# Patient Record
Sex: MALE | Race: BLACK OR AFRICAN AMERICAN | Employment: FULL TIME | URBAN - METROPOLITAN AREA
[De-identification: names, ages, dates, MRNs, and addresses within clinical notes are randomized per-mention and may not be internally consistent; named-entity substitution may affect disease eponyms.]

---

## 2021-12-01 ENCOUNTER — HOSPITAL ENCOUNTER (INPATIENT)
Age: 61
LOS: 17 days | Discharge: HOME | DRG: 216 | End: 2021-12-19
Attending: EMERGENCY MEDICINE | Admitting: HOSPITALIST
Payer: COMMERCIAL

## 2021-12-01 ENCOUNTER — APPOINTMENT (OUTPATIENT)
Dept: GENERAL RADIOLOGY | Age: 61
DRG: 216 | End: 2021-12-01
Payer: COMMERCIAL

## 2021-12-01 DIAGNOSIS — I50.9 ACUTE CONGESTIVE HEART FAILURE, UNSPECIFIED HEART FAILURE TYPE (HCC): Primary | ICD-10-CM

## 2021-12-01 DIAGNOSIS — Z95.1 S/P CABG (CORONARY ARTERY BYPASS GRAFT): ICD-10-CM

## 2021-12-01 LAB
A/G RATIO: 0.9 (ref 1.1–2.2)
ALBUMIN SERPL-MCNC: 3.4 G/DL (ref 3.4–5)
ALP BLD-CCNC: 507 U/L (ref 40–129)
ALT SERPL-CCNC: 72 U/L (ref 10–40)
ANION GAP SERPL CALCULATED.3IONS-SCNC: 11 MMOL/L (ref 3–16)
AST SERPL-CCNC: 67 U/L (ref 15–37)
BASOPHILS ABSOLUTE: 0.1 K/UL (ref 0–0.2)
BASOPHILS RELATIVE PERCENT: 0.7 %
BILIRUB SERPL-MCNC: 0.8 MG/DL (ref 0–1)
BUN BLDV-MCNC: 20 MG/DL (ref 7–20)
CALCIUM SERPL-MCNC: 9.5 MG/DL (ref 8.3–10.6)
CHLORIDE BLD-SCNC: 104 MMOL/L (ref 99–110)
CO2: 26 MMOL/L (ref 21–32)
CREAT SERPL-MCNC: 1.2 MG/DL (ref 0.9–1.3)
D DIMER: 936 NG/ML DDU (ref 0–229)
EOSINOPHILS ABSOLUTE: 0.1 K/UL (ref 0–0.6)
EOSINOPHILS RELATIVE PERCENT: 0.8 %
GFR AFRICAN AMERICAN: >60
GFR NON-AFRICAN AMERICAN: >60
GLUCOSE BLD-MCNC: 167 MG/DL (ref 70–99)
HCT VFR BLD CALC: 44.5 % (ref 40.5–52.5)
HEMOGLOBIN: 14.8 G/DL (ref 13.5–17.5)
LYMPHOCYTES ABSOLUTE: 2.5 K/UL (ref 1–5.1)
LYMPHOCYTES RELATIVE PERCENT: 32.1 %
MCH RBC QN AUTO: 30.2 PG (ref 26–34)
MCHC RBC AUTO-ENTMCNC: 33.3 G/DL (ref 31–36)
MCV RBC AUTO: 90.6 FL (ref 80–100)
MONOCYTES ABSOLUTE: 0.8 K/UL (ref 0–1.3)
MONOCYTES RELATIVE PERCENT: 10.4 %
NEUTROPHILS ABSOLUTE: 4.3 K/UL (ref 1.7–7.7)
NEUTROPHILS RELATIVE PERCENT: 56 %
PDW BLD-RTO: 16.7 % (ref 12.4–15.4)
PLATELET # BLD: 190 K/UL (ref 135–450)
PMV BLD AUTO: 8.9 FL (ref 5–10.5)
POTASSIUM REFLEX MAGNESIUM: 4.1 MMOL/L (ref 3.5–5.1)
PRO-BNP: 3325 PG/ML (ref 0–124)
RBC # BLD: 4.92 M/UL (ref 4.2–5.9)
REASON FOR REJECTION: NORMAL
REJECTED TEST: NORMAL
SODIUM BLD-SCNC: 141 MMOL/L (ref 136–145)
TOTAL PROTEIN: 7 G/DL (ref 6.4–8.2)
TROPONIN: 0.01 NG/ML
WBC # BLD: 7.8 K/UL (ref 4–11)

## 2021-12-01 PROCEDURE — 80053 COMPREHEN METABOLIC PANEL: CPT

## 2021-12-01 PROCEDURE — 71045 X-RAY EXAM CHEST 1 VIEW: CPT

## 2021-12-01 PROCEDURE — 96374 THER/PROPH/DIAG INJ IV PUSH: CPT

## 2021-12-01 PROCEDURE — 85025 COMPLETE CBC W/AUTO DIFF WBC: CPT

## 2021-12-01 PROCEDURE — 93005 ELECTROCARDIOGRAM TRACING: CPT | Performed by: PHYSICIAN ASSISTANT

## 2021-12-01 PROCEDURE — 84484 ASSAY OF TROPONIN QUANT: CPT

## 2021-12-01 PROCEDURE — 85379 FIBRIN DEGRADATION QUANT: CPT

## 2021-12-01 PROCEDURE — 83880 ASSAY OF NATRIURETIC PEPTIDE: CPT

## 2021-12-01 PROCEDURE — 36415 COLL VENOUS BLD VENIPUNCTURE: CPT

## 2021-12-01 PROCEDURE — 99285 EMERGENCY DEPT VISIT HI MDM: CPT

## 2021-12-01 ASSESSMENT — ENCOUNTER SYMPTOMS
STRIDOR: 0
BACK PAIN: 0
NAUSEA: 0
WHEEZING: 0
SHORTNESS OF BREATH: 1
ABDOMINAL PAIN: 0
CHEST TIGHTNESS: 0
COLOR CHANGE: 0
VOMITING: 0
CONSTIPATION: 0
COUGH: 1
DIARRHEA: 0

## 2021-12-02 ENCOUNTER — APPOINTMENT (OUTPATIENT)
Dept: CT IMAGING | Age: 61
DRG: 216 | End: 2021-12-02
Payer: COMMERCIAL

## 2021-12-02 PROBLEM — I50.9 ACUTE HEART FAILURE (HCC): Status: ACTIVE | Noted: 2021-12-02

## 2021-12-02 LAB
ANION GAP SERPL CALCULATED.3IONS-SCNC: 13 MMOL/L (ref 3–16)
BILIRUBIN URINE: NEGATIVE
BLOOD, URINE: NEGATIVE
BUN BLDV-MCNC: 17 MG/DL (ref 7–20)
CALCIUM SERPL-MCNC: 9.4 MG/DL (ref 8.3–10.6)
CHLORIDE BLD-SCNC: 106 MMOL/L (ref 99–110)
CLARITY: CLEAR
CO2: 23 MMOL/L (ref 21–32)
COLOR: YELLOW
CREAT SERPL-MCNC: 1.1 MG/DL (ref 0.9–1.3)
EKG ATRIAL RATE: 122 BPM
EKG ATRIAL RATE: 123 BPM
EKG DIAGNOSIS: NORMAL
EKG DIAGNOSIS: NORMAL
EKG P AXIS: 22 DEGREES
EKG P AXIS: 27 DEGREES
EKG P-R INTERVAL: 142 MS
EKG P-R INTERVAL: 144 MS
EKG Q-T INTERVAL: 310 MS
EKG Q-T INTERVAL: 328 MS
EKG QRS DURATION: 100 MS
EKG QRS DURATION: 98 MS
EKG QTC CALCULATION (BAZETT): 443 MS
EKG QTC CALCULATION (BAZETT): 467 MS
EKG R AXIS: 126 DEGREES
EKG R AXIS: 129 DEGREES
EKG T AXIS: 62 DEGREES
EKG T AXIS: 71 DEGREES
EKG VENTRICULAR RATE: 122 BPM
EKG VENTRICULAR RATE: 123 BPM
ESTIMATED AVERAGE GLUCOSE: 134.1 MG/DL
GFR AFRICAN AMERICAN: >60
GFR NON-AFRICAN AMERICAN: >60
GLUCOSE BLD-MCNC: 116 MG/DL (ref 70–99)
GLUCOSE BLD-MCNC: 145 MG/DL (ref 70–99)
GLUCOSE BLD-MCNC: 173 MG/DL (ref 70–99)
GLUCOSE BLD-MCNC: 179 MG/DL (ref 70–99)
GLUCOSE URINE: NEGATIVE MG/DL
HBA1C MFR BLD: 6.3 %
HCT VFR BLD CALC: 43.6 % (ref 40.5–52.5)
HEMOGLOBIN: 14.6 G/DL (ref 13.5–17.5)
KETONES, URINE: NEGATIVE MG/DL
LEUKOCYTE ESTERASE, URINE: NEGATIVE
LV EF: 18 %
LVEF MODALITY: NORMAL
MAGNESIUM: 1.9 MG/DL (ref 1.8–2.4)
MCH RBC QN AUTO: 30.1 PG (ref 26–34)
MCHC RBC AUTO-ENTMCNC: 33.6 G/DL (ref 31–36)
MCV RBC AUTO: 89.7 FL (ref 80–100)
MICROSCOPIC EXAMINATION: NORMAL
NITRITE, URINE: NEGATIVE
PDW BLD-RTO: 16.6 % (ref 12.4–15.4)
PERFORMED ON: ABNORMAL
PH UA: 5.5 (ref 5–8)
PLATELET # BLD: 208 K/UL (ref 135–450)
PMV BLD AUTO: 8.5 FL (ref 5–10.5)
POTASSIUM SERPL-SCNC: 3.9 MMOL/L (ref 3.5–5.1)
PROTEIN UA: NEGATIVE MG/DL
RBC # BLD: 4.86 M/UL (ref 4.2–5.9)
SODIUM BLD-SCNC: 142 MMOL/L (ref 136–145)
SPECIFIC GRAVITY UA: 1.02 (ref 1–1.03)
TROPONIN: 0.01 NG/ML
TROPONIN: 0.01 NG/ML
URINE REFLEX TO CULTURE: NORMAL
URINE TYPE: NORMAL
UROBILINOGEN, URINE: 0.2 E.U./DL
WBC # BLD: 8.3 K/UL (ref 4–11)

## 2021-12-02 PROCEDURE — 85027 COMPLETE CBC AUTOMATED: CPT

## 2021-12-02 PROCEDURE — 6370000000 HC RX 637 (ALT 250 FOR IP): Performed by: PHYSICIAN ASSISTANT

## 2021-12-02 PROCEDURE — 83735 ASSAY OF MAGNESIUM: CPT

## 2021-12-02 PROCEDURE — 1200000000 HC SEMI PRIVATE

## 2021-12-02 PROCEDURE — 6360000002 HC RX W HCPCS: Performed by: PHYSICIAN ASSISTANT

## 2021-12-02 PROCEDURE — 6370000000 HC RX 637 (ALT 250 FOR IP): Performed by: INTERNAL MEDICINE

## 2021-12-02 PROCEDURE — U0005 INFEC AGEN DETEC AMPLI PROBE: HCPCS

## 2021-12-02 PROCEDURE — 84484 ASSAY OF TROPONIN QUANT: CPT

## 2021-12-02 PROCEDURE — 83036 HEMOGLOBIN GLYCOSYLATED A1C: CPT

## 2021-12-02 PROCEDURE — 93306 TTE W/DOPPLER COMPLETE: CPT

## 2021-12-02 PROCEDURE — 6360000004 HC RX CONTRAST MEDICATION: Performed by: PHYSICIAN ASSISTANT

## 2021-12-02 PROCEDURE — 99222 1ST HOSP IP/OBS MODERATE 55: CPT | Performed by: INTERNAL MEDICINE

## 2021-12-02 PROCEDURE — 2580000003 HC RX 258: Performed by: PHYSICIAN ASSISTANT

## 2021-12-02 PROCEDURE — 36415 COLL VENOUS BLD VENIPUNCTURE: CPT

## 2021-12-02 PROCEDURE — 93010 ELECTROCARDIOGRAM REPORT: CPT | Performed by: INTERNAL MEDICINE

## 2021-12-02 PROCEDURE — 80048 BASIC METABOLIC PNL TOTAL CA: CPT

## 2021-12-02 PROCEDURE — U0003 INFECTIOUS AGENT DETECTION BY NUCLEIC ACID (DNA OR RNA); SEVERE ACUTE RESPIRATORY SYNDROME CORONAVIRUS 2 (SARS-COV-2) (CORONAVIRUS DISEASE [COVID-19]), AMPLIFIED PROBE TECHNIQUE, MAKING USE OF HIGH THROUGHPUT TECHNOLOGIES AS DESCRIBED BY CMS-2020-01-R: HCPCS

## 2021-12-02 PROCEDURE — 81003 URINALYSIS AUTO W/O SCOPE: CPT

## 2021-12-02 PROCEDURE — 6370000000 HC RX 637 (ALT 250 FOR IP): Performed by: HOSPITALIST

## 2021-12-02 PROCEDURE — 71260 CT THORAX DX C+: CPT

## 2021-12-02 RX ORDER — ASPIRIN 81 MG/1
81 TABLET ORAL DAILY
Status: DISCONTINUED | OUTPATIENT
Start: 2021-12-02 | End: 2021-12-10

## 2021-12-02 RX ORDER — ONDANSETRON 2 MG/ML
4 INJECTION INTRAMUSCULAR; INTRAVENOUS EVERY 6 HOURS PRN
Status: DISCONTINUED | OUTPATIENT
Start: 2021-12-02 | End: 2021-12-10

## 2021-12-02 RX ORDER — FUROSEMIDE 10 MG/ML
20 INJECTION INTRAMUSCULAR; INTRAVENOUS ONCE
Status: COMPLETED | OUTPATIENT
Start: 2021-12-02 | End: 2021-12-02

## 2021-12-02 RX ORDER — CARVEDILOL 3.12 MG/1
3.12 TABLET ORAL 2 TIMES DAILY WITH MEALS
Status: DISCONTINUED | OUTPATIENT
Start: 2021-12-02 | End: 2021-12-10

## 2021-12-02 RX ORDER — ACETAMINOPHEN 650 MG/1
650 SUPPOSITORY RECTAL EVERY 6 HOURS PRN
Status: DISCONTINUED | OUTPATIENT
Start: 2021-12-02 | End: 2021-12-10

## 2021-12-02 RX ORDER — ATORVASTATIN CALCIUM 40 MG/1
40 TABLET, FILM COATED ORAL NIGHTLY
Status: DISCONTINUED | OUTPATIENT
Start: 2021-12-02 | End: 2021-12-19 | Stop reason: HOSPADM

## 2021-12-02 RX ORDER — DEXTROSE MONOHYDRATE 50 MG/ML
100 INJECTION, SOLUTION INTRAVENOUS PRN
Status: DISCONTINUED | OUTPATIENT
Start: 2021-12-02 | End: 2021-12-10

## 2021-12-02 RX ORDER — NICOTINE POLACRILEX 4 MG
15 LOZENGE BUCCAL PRN
Status: DISCONTINUED | OUTPATIENT
Start: 2021-12-02 | End: 2021-12-10

## 2021-12-02 RX ORDER — SODIUM CHLORIDE 0.9 % (FLUSH) 0.9 %
5-40 SYRINGE (ML) INJECTION EVERY 12 HOURS SCHEDULED
Status: DISCONTINUED | OUTPATIENT
Start: 2021-12-02 | End: 2021-12-08 | Stop reason: SDUPTHER

## 2021-12-02 RX ORDER — DEXTROSE MONOHYDRATE 25 G/50ML
12.5 INJECTION, SOLUTION INTRAVENOUS PRN
Status: DISCONTINUED | OUTPATIENT
Start: 2021-12-02 | End: 2021-12-10

## 2021-12-02 RX ORDER — INSULIN LISPRO 100 [IU]/ML
0-6 INJECTION, SOLUTION INTRAVENOUS; SUBCUTANEOUS
Status: DISCONTINUED | OUTPATIENT
Start: 2021-12-02 | End: 2021-12-10

## 2021-12-02 RX ORDER — POTASSIUM CHLORIDE 20 MEQ/1
40 TABLET, EXTENDED RELEASE ORAL PRN
Status: DISCONTINUED | OUTPATIENT
Start: 2021-12-02 | End: 2021-12-10

## 2021-12-02 RX ORDER — FUROSEMIDE 10 MG/ML
80 INJECTION INTRAMUSCULAR; INTRAVENOUS ONCE
Status: DISCONTINUED | OUTPATIENT
Start: 2021-12-02 | End: 2021-12-02

## 2021-12-02 RX ORDER — POTASSIUM CHLORIDE 7.45 MG/ML
10 INJECTION INTRAVENOUS PRN
Status: DISCONTINUED | OUTPATIENT
Start: 2021-12-02 | End: 2021-12-10

## 2021-12-02 RX ORDER — SODIUM CHLORIDE 9 MG/ML
25 INJECTION, SOLUTION INTRAVENOUS PRN
Status: DISCONTINUED | OUTPATIENT
Start: 2021-12-02 | End: 2021-12-10

## 2021-12-02 RX ORDER — ZOLPIDEM TARTRATE 5 MG/1
5 TABLET ORAL NIGHTLY PRN
Status: DISCONTINUED | OUTPATIENT
Start: 2021-12-02 | End: 2021-12-10

## 2021-12-02 RX ORDER — FUROSEMIDE 10 MG/ML
40 INJECTION INTRAMUSCULAR; INTRAVENOUS 2 TIMES DAILY
Status: DISCONTINUED | OUTPATIENT
Start: 2021-12-02 | End: 2021-12-06

## 2021-12-02 RX ORDER — LORAZEPAM 0.5 MG/1
0.5 TABLET ORAL NIGHTLY PRN
Status: DISCONTINUED | OUTPATIENT
Start: 2021-12-02 | End: 2021-12-03

## 2021-12-02 RX ORDER — TEMAZEPAM 7.5 MG/1
7.5 CAPSULE ORAL NIGHTLY PRN
Status: DISCONTINUED | OUTPATIENT
Start: 2021-12-02 | End: 2021-12-02 | Stop reason: RX

## 2021-12-02 RX ORDER — INSULIN LISPRO 100 [IU]/ML
0-3 INJECTION, SOLUTION INTRAVENOUS; SUBCUTANEOUS NIGHTLY
Status: DISCONTINUED | OUTPATIENT
Start: 2021-12-02 | End: 2021-12-10

## 2021-12-02 RX ORDER — LISINOPRIL 5 MG/1
2.5 TABLET ORAL DAILY
Status: DISCONTINUED | OUTPATIENT
Start: 2021-12-03 | End: 2021-12-03

## 2021-12-02 RX ORDER — ACETAMINOPHEN 325 MG/1
650 TABLET ORAL EVERY 6 HOURS PRN
Status: DISCONTINUED | OUTPATIENT
Start: 2021-12-02 | End: 2021-12-03 | Stop reason: SDUPTHER

## 2021-12-02 RX ORDER — SODIUM CHLORIDE 0.9 % (FLUSH) 0.9 %
10 SYRINGE (ML) INJECTION PRN
Status: DISCONTINUED | OUTPATIENT
Start: 2021-12-02 | End: 2021-12-08 | Stop reason: SDUPTHER

## 2021-12-02 RX ORDER — LORAZEPAM 0.5 MG/1
0.5 TABLET ORAL ONCE
Status: COMPLETED | OUTPATIENT
Start: 2021-12-02 | End: 2021-12-02

## 2021-12-02 RX ORDER — SPIRONOLACTONE 25 MG/1
12.5 TABLET ORAL DAILY
Status: DISCONTINUED | OUTPATIENT
Start: 2021-12-02 | End: 2021-12-14 | Stop reason: SDUPTHER

## 2021-12-02 RX ADMIN — FUROSEMIDE 40 MG: 10 INJECTION, SOLUTION INTRAMUSCULAR; INTRAVENOUS at 09:04

## 2021-12-02 RX ADMIN — CARVEDILOL 3.12 MG: 3.12 TABLET, FILM COATED ORAL at 17:58

## 2021-12-02 RX ADMIN — IOPAMIDOL 75 ML: 755 INJECTION, SOLUTION INTRAVENOUS at 01:26

## 2021-12-02 RX ADMIN — SPIRONOLACTONE 12.5 MG: 25 TABLET ORAL at 17:58

## 2021-12-02 RX ADMIN — Medication 10 ML: at 20:15

## 2021-12-02 RX ADMIN — FUROSEMIDE 40 MG: 10 INJECTION, SOLUTION INTRAMUSCULAR; INTRAVENOUS at 17:57

## 2021-12-02 RX ADMIN — LORAZEPAM 0.5 MG: 0.5 TABLET ORAL at 11:56

## 2021-12-02 RX ADMIN — NITROGLYCERIN 0.5 INCH: 20 OINTMENT TOPICAL at 02:20

## 2021-12-02 RX ADMIN — FUROSEMIDE 20 MG: 10 INJECTION, SOLUTION INTRAMUSCULAR; INTRAVENOUS at 02:18

## 2021-12-02 RX ADMIN — ASPIRIN 81 MG: 81 TABLET, COATED ORAL at 17:58

## 2021-12-02 RX ADMIN — ZOLPIDEM TARTRATE 5 MG: 5 TABLET ORAL at 20:15

## 2021-12-02 RX ADMIN — ATORVASTATIN CALCIUM 40 MG: 40 TABLET, FILM COATED ORAL at 20:15

## 2021-12-02 RX ADMIN — Medication 10 ML: at 09:04

## 2021-12-02 NOTE — ED PROVIDER NOTES
any history of hypertension or hyperlipidemia. Nursing Notes were all reviewed and agreed with or any disagreements were addressed in the HPI. REVIEW OF SYSTEMS    (2-9 systems for level 4, 10 or more for level 5)     Review of Systems   Constitutional: Negative for activity change, appetite change, chills, diaphoresis, fatigue and fever. Respiratory: Positive for cough and shortness of breath. Negative for chest tightness, wheezing and stridor. Cardiovascular: Positive for leg swelling. Negative for chest pain and palpitations. Gastrointestinal: Negative for abdominal pain, constipation, diarrhea, nausea and vomiting. Genitourinary: Negative for decreased urine volume, difficulty urinating, dysuria, flank pain, frequency, hematuria and urgency. Musculoskeletal: Negative for arthralgias, back pain, myalgias, neck pain and neck stiffness. Skin: Negative for color change, pallor, rash and wound. Neurological: Negative for dizziness, weakness, light-headedness, numbness and headaches. Positives and Pertinent negatives as per HPI. Except as noted above in the ROS, all other systems were reviewed and negative. PAST MEDICAL HISTORY   History reviewed. No pertinent past medical history. SURGICAL HISTORY   History reviewed. No pertinent surgical history. CURRENTMEDICATIONS       Previous Medications    No medications on file         ALLERGIES     Patient has no known allergies. FAMILYHISTORY     History reviewed. No pertinent family history.        SOCIAL HISTORY       Social History     Tobacco Use    Smoking status: Former Smoker     Packs/day: 1.00     Years: 15.00     Pack years: 15.00     Types: Cigarettes    Smokeless tobacco: Never Used   Vaping Use    Vaping Use: Never used   Substance Use Topics    Alcohol use: Not Currently    Drug use: Never       SCREENINGS    Kwasi Coma Scale  Eye Opening: Spontaneous  Best Verbal Response: Oriented  Best Motor Response: Obeys commands  Kwasi Coma Scale Score: 15        PHYSICAL EXAM    (up to 7 for level 4, 8 or more for level 5)     ED Triage Vitals [12/01/21 2134]   BP Temp Temp Source Pulse Resp SpO2 Height Weight   125/87 98 °F (36.7 °C) Oral 130 17 100 % 6' 1\" (1.854 m) 199 lb 1.6 oz (90.3 kg)       Physical Exam  Constitutional:       General: He is not in acute distress. Appearance: Normal appearance. He is well-developed. He is not ill-appearing, toxic-appearing or diaphoretic. HENT:      Head: Normocephalic and atraumatic. Right Ear: External ear normal.      Left Ear: External ear normal.   Eyes:      General:         Right eye: No discharge. Left eye: No discharge. Conjunctiva/sclera: Conjunctivae normal.   Cardiovascular:      Rate and Rhythm: Regular rhythm. Tachycardia present. Pulses: Normal pulses. Heart sounds: Normal heart sounds. No murmur heard. No friction rub. No gallop. Comments: 2+ radial pulses bilaterally. 2+ pitting edema to the bilateral lower extremities. There is no calf tenderness. No JVD. Pulmonary:      Effort: Pulmonary effort is normal. No respiratory distress. Breath sounds: Normal breath sounds. No stridor. No wheezing, rhonchi or rales. Comments: 100% on room air. There is no conversational dyspnea. No hypoxia. No respiratory distress. Chest:      Chest wall: No tenderness. Abdominal:      General: Abdomen is flat. Bowel sounds are normal. There is no distension. Palpations: Abdomen is soft. There is no mass. Tenderness: There is no abdominal tenderness. There is no right CVA tenderness, left CVA tenderness, guarding or rebound. Hernia: No hernia is present. Musculoskeletal:         General: Normal range of motion. Cervical back: Normal range of motion and neck supple. Skin:     General: Skin is warm and dry. Coloration: Skin is not pale. Findings: No erythema or rash.    Neurological:      Mental Status: He is alert and oriented to person, place, and time. Psychiatric:         Behavior: Behavior normal.         DIAGNOSTIC RESULTS   LABS:    Labs Reviewed   CBC WITH AUTO DIFFERENTIAL - Abnormal; Notable for the following components:       Result Value    RDW 16.7 (*)     All other components within normal limits    Narrative:     Performed at:  [facility] [address] [place], [place]   Phone [phone]   COMPREHENSIVE METABOLIC PANEL W/ REFLEX TO MG FOR LOW K - Abnormal; Notable for the following components:    Glucose 167 (*)     Albumin/Globulin Ratio 0.9 (*)     Alkaline Phosphatase 507 (*)     ALT 72 (*)     AST 67 (*)     All other components within normal limits    Narrative:     Performed at:  [facility] [address], [place]   Phone [phone]   BRAIN NATRIURETIC PEPTIDE - Abnormal; Notable for the following components:    Pro-BNP 3,325 (*)     All other components within normal limits    Narrative:     Performed at:  [facility] [address], [place]   Phone [phone]   D-DIMER, QUANTITATIVE - Abnormal; Notable for the following components:    D-Dimer, Quant 936 (*)     All other components within normal limits    Narrative:     Performed at:  [facility] [address], [place]   Phone [phone]    Narrative:     Joyce Kruger  [facility] tel. [phone],  Rejected Test Name CMP TROP BMP/Called to: Laith Thorne, 12/01/2021 23:06, by  Alysia Hatfield  Performed at:  [facility] [address], [place]   Phone [phone]   TROPONIN    Narrative:     Performed at:  [facility] [address], [place]   Phone [phone]   URINE RT REFLEX TO CULTURE       When ordered only abnormal lab results are displayed. patient is tachycardic. Rate vital signs unremarkable. He complains of shortness of breath especially with lying flat at night and associated bilateral leg swelling for the past several weeks. Just recently had travel to and from [facility] states symptoms started shortly after this. He denies a history of congestive heart failure. IV is established and blood work obtained. Given recent travel prior to symptoms beginning D-dimer obtained. D-dimer 936. CMP obtained and showed alk phos of 507 with ALT 72 and AST of 67. Otherwise unremarkable. Troponin was normal.  BNP 3300. CBC showed normal white count and hemoglobin and platelets. EKG interpreted by attending. Chest x-ray showed cardiomegaly with pulmonary congestion. There is bibasilar atelectasis or early infiltrates with questionable effusion. Given elevated D-dimer CT of the chest obtained. CT showed no pulmonary embolism. Does show moderate right-sided pleural effusion and left pleural effusion with subcutaneous edema concerning for anasarca. There is compressive atelectasis on the right greater than left lower lobes. Cardiomegaly noted. Given patient's history and physical examination concern for potential new onset of CHF. He has never had echocardiogram and never been diagnosed with CHF in the past.  He remains tachycardic and upon repeat evaluation oxygen saturation sitting at 93%. Patient was given Lasix and Nitropaste here in the emergency department. Believe he would benefit from admission for further evaluation by cardiology and potential echocardiogram.  Case was discussed with hospital service for admission. FINAL IMPRESSION      1. Acute congestive heart failure, unspecified heart failure type [facility])          DISPOSITION/PLAN   DISPOSITION Decision To Admit 12/02/2021 01:48:27 AM      PATIENT REFERRED TO:  No follow-up provider specified.     DISCHARGE MEDICATIONS:  New Prescriptions    No medications on file DISCONTINUED MEDICATIONS:  Discontinued Medications    No medications on file              (Please note that portions of this note were completed with a voice recognition program.  Efforts were made to edit the dictations but occasionally words are mis-transcribed.)    KATE Sahu Ma (electronically signed)          KATE Wu  12/02/21 0385

## 2021-12-02 NOTE — H&P
Hospital Medicine History & Physical      Patient Name: Sofy Scruggs    : 1960    PCP: No primary care provider on file. Date of Service:  Patient seen and examined on 2021     Chief Complaint: Leg swelling    History Of Present Illness:    Sofy Scruggs is a 64 y.o. male who presented to ED for evaluation of bilateral leg swelling which has been ongoing for the past week. Patient has also had shortness of breath at night and orthopnea. Patient reports he does not weigh himself regularly but did notice that his pants and socks were tight. He denies fever, dizziness, chest pain, cough, abdominal pain, nausea, vomiting, diarrhea, constipation, urinary symptoms. Patient reports that he does have a history of diabetes but states it has been diet controlled for a number years since losing a significant amount of weight. He denies any other chronic health conditions. He is a former smoker. He denies any history of DVT or PE. Past Medical History:    Patient has a past medical history of Diabetes ([address] Past Surgical History:    Patient denies any significant past surgical history. Medications Prior to Admission:      Prior to Admission medications    None       Allergies:  Patient has no known allergies. Social History:      TOBACCO:   reports that he has quit smoking. His smoking use included cigarettes. He has a 15.00 pack-year smoking history. He has never used smokeless tobacco.  ETOH:   reports previous alcohol use. DRUGS:  reports no history of drug use. Family History:      Reviewed in detail positive as follows:    History reviewed. No pertinent family history. REVIEW OF SYSTEMS:   Pertinent positives as noted in the HPI. All other systems reviewed and negative.     PHYSICAL EXAM PERFORMED:    BP (!) 123/98   Pulse 118   Temp 98 °F (36.7 °C) (Oral)   Resp 19   Ht 6' 1\" (1.854 m)   Wt 199 lb 1.6 oz (90.3 kg)   SpO2 97%   BMI 26.27 kg/m²     General appearance:  Awake, alert, no apparent distress  HEENT:  Normocephalic, atraumatic without obvious deformity. PERRL. EOM intact. Conjunctivae/corneas clear. Neck: Supple, with full range of motion. No JVD. Trachea midline. Respiratory:  Clear to auscultation bilaterally without rales, wheezes, or rhonchi. Normal respiratory effort. Cardiovascular:  +Tachycardia. Regular rhythm without murmurs, rubs or gallops. Abdomen: Soft, NT, ND, without rebound or guarding. Normal bowel sounds. Extremities:  +Edema BLE. Full range of motion without deformity. +2 palpable pulses, equal bilaterally. Capillary refill brisk,< 3 seconds   Skin: No rashes or lesions. Warm/dry. Neurologic:  Neurovascularly intact without any focal sensory/motor deficits. Cranial nerves: II-XII intact, grossly non-focal. Alert and oriented x 3. Normal speech. Psychiatric:  Thought content appropriate, normal insight. Labs:   CBC   Recent Labs     12/01/21  2225   WBC 7.8   HGB 14.8   HCT 44.5           RENAL  Recent Labs     12/01/21  2315      K 4.1      CO2 26   BUN 20   CREATININE 1.2       LFTS  Recent Labs     12/01/21  2315   AST 67*   ALT 72*   BILITOT 0.8   ALKPHOS 507*       COAG  No results for input(s): INR in the last 72 hours. CARDIAC ENZYMES  Recent Labs     12/01/21  2315   TROPONINI 0.01       LIPIDS  No results found for: CHOL, TRIG, HDL, LDLCALC      Radiology:     CT CHEST PULMONARY EMBOLISM W CONTRAST   Final Result   No evidence of an acute pulmonary embolus. Moderate-sized right pleural effusion, small left pleural effusion and   subcutaneous edema suspicious for anasarca. There is associated compressive   atelectasis of the right greater than left lower lobes. Mild cardiomegaly. XR CHEST PORTABLE   Final Result   Mild cardiomegaly and mild central pulmonary congestion. Mild bibasilar atelectasis or early infiltrates and a questionable small   right pleural effusion. ASSESSMENT/PLAN:    Acute CHF   CT concerning for moderate right pleural effusion, small left pleural effusion, anasarca  Trend troponin  ProBNP 3325  Lasix 40 mg IV BID  We will deferred initiation of BB, ACEI/ARB to cardiology  Daily weights, Strict I/Os  CHF RN and cardiology consulted    DM2  Diet controlled  No recent HbA1c. Will check in AM  Accuchecks, SSI  Hypoglycemia protocol      DVT prophylaxis: Lovenox  Probiotic if on abx: N/A    Diet: ADULT DIET; Regular; 4 carb choices (60 gm/meal); Low Sodium (2 gm)  Code Status: Full Code    Consults:  IP CONSULT TO CARDIOLOGY  IP CONSULT TO HEART FAILURE NURSE/COORDINATOR  IP CONSULT TO DIETITIAN    Disposition: Admit to Inpatient   ELOS: Greater than two midnights due to medical therapy     Marika Farmer PA-C    Thank you for the opportunity to be involved in this patient's care. If you have any questions or concerns please feel free to contact me at [phone].

## 2021-12-02 NOTE — ED PROVIDER NOTES
EKG Interpretation    Interpreted by emergency department physician    Rhythm: sinus tachycardia  Rate: 120-130  Axis: 131 010 33  Ectopy: none  Conduction: normal  ST Segments: no acute change  T Waves: no acute change  Q Waves: none    Clinical Impression: Sinus tachycardia septal infarct age indeterminate    MD Varsha Barkley MD  12/02/21 0499

## 2021-12-02 NOTE — CONSULTS
Cardiovascular Consultation     Attending Physician: Teresita Rogers MD    PATIENT: Naila Milligan  : 1960  MRN: [id number]    Reason for Consultation:   Chief Complaint   Patient presents with    Leg Swelling     From home. Was sick with cough and rash for about a month, ended about a week ago. Bilateral lower extremity edema X1 week, denies Hx of CHF. Some SOB still. History of present illness:   Mr. Naila Milligan is a 64 y.o. male patient with a medical history reported only for diabetes who presented to the hospital yesterday following worsening cough over the previous 3 weeks. He states that he went to [employer] with his sister and developed the cough upon his return. His sister is bedside and states that they both tested negative for Covid several times. He is unvaccinated. No fevers chills nausea vomiting diarrhea. Dates that the bilateral lower extremity edema started 1 week ago and progressed to his mid calf. He is fully dressed and acknowledges that he is anxious to be discharged as he is never been hospitalized. His sister at bedside states that he was having dyspnea on exertion and orthopnea by his descriptions to her. No chest pain palpitations or syncope. Medical History:      Diagnosis Date    Diabetes [facility])        Surgical History:  History reviewed. No pertinent surgical history.     Social History:  Social History     Socioeconomic History    Marital status: Single     Spouse name: Not on file    Number of children: Not on file    Years of education: Not on file    Highest education level: Not on file   Occupational History    Not on file   Tobacco Use    Smoking status: Current Every Day Smoker     Packs/day: 1.00     Years: 15.00     Pack years: 15.00     Types: Cigarettes    Smokeless tobacco: Never Used   Vaping Use    Vaping Use: Never used   Substance and Sexual Activity    Alcohol use: Not Currently     Comment: occasionally    Drug use: Never    Sexual activity: Not on file   Other Topics Concern    Not on file   Social History Narrative    Not on file     Social Determinants of Health     Financial Resource Strain:     Difficulty of Paying Living Expenses: Not on file   Food Insecurity:     Worried About Running Out of Food in the Last Year: Not on file    Wilfredo of Food in the Last Year: Not on file   Transportation Needs:     Lack of Transportation (Medical): Not on file    Lack of Transportation (Non-Medical): Not on file   Physical Activity:     Days of Exercise per Week: Not on file    Minutes of Exercise per Session: Not on file   Stress:     Feeling of Stress : Not on file   Social Connections:     Frequency of Communication with Friends and Family: Not on file    Frequency of Social Gatherings with Friends and Family: Not on file    Attends Restoration Services: Not on file    Active Member of [address] [place] [address] [employer] or Organizations: Not on file    Attends Club or Organization Meetings: Not on file    Marital Status: Not on file   Intimate Partner Violence:     Fear of Current or Ex-Partner: Not on file    Emotionally Abused: Not on file    Physically Abused: Not on file    Sexually Abused: Not on file   Housing Stability:     Unable to Pay for Housing in the Last Year: Not on file    Number of [place] in the Last Year: Not on file    Unstable Housing in the Last Year: Not on file        Family History:  No evidence for sudden cardiac death or premature CAD. History reviewed. No pertinent family history.     Medications:  sodium chloride flush 0.9 % injection 5-40 mL, 2 times per day  sodium chloride flush 0.9 % injection 10 mL, PRN  0.9 % sodium chloride infusion, PRN  magnesium hydroxide (MILK OF MAGNESIA) 400 MG/5ML suspension 30 mL, Daily PRN  acetaminophen (TYLENOL) tablet 650 mg, Q6H PRN   Or  acetaminophen (TYLENOL) suppository 650 mg, Q6H PRN  enoxaparin (LOVENOX) injection 40 mg, Daily  furosemide (LASIX) injection 40 mg, BID  ondansetron (ZOFRAN) injection 4 mg, Q6H PRN  zolpidem (AMBIEN) tablet 5 mg, Nightly PRN  insulin lispro (1 Unit Dial) 0-6 Units, TID WC  insulin lispro (1 Unit Dial) 0-3 Units, Nightly  glucose (GLUTOSE) 40 % oral gel 15 g, PRN  dextrose 50 % IV solution, PRN  glucagon (rDNA) injection 1 mg, PRN  dextrose 5 % solution, PRN        Allergies:  Patient has no known allergies.      Review of Systems:   [x]Full ROS obtained and negative except as mentioned in HPI    Physical Examination:    /79   Pulse 109   Temp 97.9 °F (36.6 °C) (Oral)   Resp 18   Ht 6' 1\" (1.854 m)   Wt 206 lb (93.4 kg)   SpO2 97%   BMI 27.18 kg/m²   Wt Readings from Last 3 Encounters:   12/02/21 206 lb (93.4 kg)       GENERAL: Well developed, well nourished, no acute distress  NEUROLOGICAL: Alert and oriented x3  PSYCH: Normal mood and affect   SKIN: Warm and dry, without lesions  HEENT: Normocephalic, atraumatic, Sclera non-icteric, mucous membranes moist  NECK: supple, JVP normal, thyroid not enlarged   CARDIAC: Normal PMI, regular rate and rhythm, normal S1S2, no murmur, rub  RESPIRATORY: Normal respiratory effort, clear to auscultation bilaterally  EXTREMITIES: No cyanosis, clubbing palpable pulses bilaterally ; +2 edema to midcalf   MUSCULOSKELETAL: No joint swelling or tenderness, no chest wall tenderness  GASTROINTESTINAL:  soft, non-tender, no bruit    Labs:  Lab Review     Lab Results   Component Value Date     12/02/2021    K 3.9 12/02/2021    K 4.1 12/01/2021     12/02/2021    CO2 23 12/02/2021    BUN 17 12/02/2021    CREATININE 1.1 12/02/2021    GLUCOSE 179 12/02/2021    CALCIUM 9.4 12/02/2021     Lab Results   Component Value Date    TROPONINI 0.01 12/02/2021     Lab Results   Component Value Date    WBC 8.3 12/02/2021    HGB 14.6 12/02/2021    HCT 43.6 12/02/2021    MCV 89.7 12/02/2021     12/02/2021     Imaging:  I have reviewed the below testing personally:    EK2021  Sinus tachycardia  Left posterior fascicular block  Possible Inferior infarct , age undetermined  Anteroseptal infarct , age undetermined    2021 CTA Chest  No evidence of an acute pulmonary embolus.       Moderate-sized right pleural effusion, small left pleural effusion and   subcutaneous edema suspicious for anasarca. [place] is associated compressive   atelectasis of the right greater than left lower lobes.       Mild cardiomegaly.           ProBNP 3325  Troponin 0.01, 0.01     Impression/Recommendations    Mr. Author Hamilton is a 64 y.o. male patient    Acute CHF   Diabetes mellitus   Former tobacco       New CHF diagnosis. Echocardiogram pending. Viral prodrome reported in preceding weeks. No other contextual suggestion provided. No oxygen requirements. Afebrile, no active signs of infection. Responding to initial diuresis. Still with significant fluid overload clinically. No chest pain or ischemic ECG changes. Additional testing and medication recommendations pending TTE. CHF education reinforced. ~salt restriction: 2,000 mg daily                         ~fluid restriction: 1500 ml daily                        ~medication compliance                        ~daily weights and notify of any significant weight gain/loss                        ~establish with CHF nurse                        ~outpatient follow-up with our CHF team          Thank you for allowing me to participate in the care of your patient. Please do not hesitate to call. Nu Butler DO, [facility]  Interventional Cardiology     o: [phone]  [address] [place] [address]., [address] [facility], [address] [place] [address]      NOTE:  This report was transcribed using voice recognition software. Every effort was made to ensure accuracy; however, inadvertent computerized transcription errors may be present.

## 2021-12-02 NOTE — ED PROVIDER NOTES
range of motion. no deformity plus pedal edema to the knee biateral   Pulses: Equal  upper and lower    Skin:  No rashes or lesions to exposed skin. Neurologic: Alert and oriented X 3. Motor grossly normal.  Speech clear. Cr n 2-12 intact    EKG Interpretation    Interpreted by emergency department physician    Rhythm: sinus tachycardia  Rate: 120-130  Axis:27 126 62  Ectopy: {EKG EC  Conduction: normal  ST Segments: no acute change  T Waves: no acute change  Q Waves: none    Clinical Impression: Sinus tachycardia anterior septal infarct age-indeterminate    Roseanne Badillo MD  CT chest shows no PE anasarca present pleural fluid patient will be admitted for new onset CHF received Nitropaste and Lasix here      1. Acute congestive heart failure, unspecified heart failure type ([facility] [address]          DISPOSITION/PLAN  PATIENT REFERRED TO:  No follow-up provider specified.   DISCHARGE MEDICATIONS:  New Prescriptions    No medications on file         MD Roseanne Shrestha MD  12/02/21 0206

## 2021-12-02 NOTE — PROGRESS NOTES
Nutrition Education  Provided heart failure diet education. Education included low sodium diet guidelines (3-4 gm Na+/day) and fluid restriction (64 oz/day). Reviewed foods to choose and foods to avoid, along with label reading and ways to add flavor to food. Pt does not currently follow a low sodium diet at home. Pt accepted education but needs reinforcement. Time spent with patient: 5 minutes. · Verbally reviewed information with PATIENT/FAMILY  · Educated on CHF  · Written educational materials provided. · Contact name and number provided. · Refer to Patient Education activity for more details.     Electronically signed by Adriana Franco RD, LD on 12/2/21 at 11:23 AM EST    Contact: [phone]

## 2021-12-02 NOTE — PROGRESS NOTES
Pt arrived to the ER for BLE swelling, no known cardiac hx per pt, states that a few weeks ago he had a coughing fit and that's when his legs began to swell. 1+ pitting edema in his legs, A and O, ST on the monitor.

## 2021-12-02 NOTE — ED NOTES
Pt received from [facility], noted to be sinus tachy on monitor to 120's, but otherwise in NAD, respirations even and unlabored. Pending CT at this time.       Virgie Amato RN  12/02/21 6468

## 2021-12-02 NOTE — ED NOTES
Bed: 14  Expected date:   Expected time:   Means of arrival:   Comments:  Hanna Thakur RN  12/01/21 1003

## 2021-12-03 LAB
ABO/RH: NORMAL
ANION GAP SERPL CALCULATED.3IONS-SCNC: 12 MMOL/L (ref 3–16)
ANTIBODY SCREEN: NORMAL
BASE EXCESS ARTERIAL: 2 MMOL/L (ref -3–3)
BASE EXCESS ARTERIAL: 2.3 MMOL/L (ref -3–3)
BUN BLDV-MCNC: 16 MG/DL (ref 7–20)
CALCIUM SERPL-MCNC: 9.5 MG/DL (ref 8.3–10.6)
CARBOXYHEMOGLOBIN ARTERIAL: 1.7 % (ref 0–1.5)
CARBOXYHEMOGLOBIN ARTERIAL: 1.8 % (ref 0–1.5)
CHLORIDE BLD-SCNC: 107 MMOL/L (ref 99–110)
CHOLESTEROL, TOTAL: 122 MG/DL (ref 0–199)
CO2: 25 MMOL/L (ref 21–32)
CREAT SERPL-MCNC: 1 MG/DL (ref 0.9–1.3)
ESTIMATED AVERAGE GLUCOSE: 131.2 MG/DL
GFR AFRICAN AMERICAN: >60
GFR NON-AFRICAN AMERICAN: >60
GLUCOSE BLD-MCNC: 109 MG/DL (ref 70–99)
GLUCOSE BLD-MCNC: 115 MG/DL (ref 70–99)
GLUCOSE BLD-MCNC: 146 MG/DL (ref 70–99)
GLUCOSE BLD-MCNC: 224 MG/DL (ref 70–99)
GLUCOSE BLD-MCNC: 96 MG/DL (ref 70–99)
HBA1C MFR BLD: 6.2 %
HCO3 ARTERIAL: 24.8 MMOL/L (ref 21–29)
HCO3 ARTERIAL: 27.2 MMOL/L (ref 21–29)
HCT VFR BLD CALC: 36 % (ref 40.5–52.5)
HDLC SERPL-MCNC: 37 MG/DL (ref 40–60)
HEMOGLOBIN, ART, EXTENDED: 13.1 G/DL (ref 13.5–17.5)
HEMOGLOBIN, ART, EXTENDED: 14.8 G/DL (ref 13.5–17.5)
HEMOGLOBIN: 12.3 G/DL (ref 13.5–17.5)
LDL CHOLESTEROL CALCULATED: 72 MG/DL
LEFT VENTRICULAR EJECTION FRACTION MODE: NORMAL
LV EF: 15 %
MCH RBC QN AUTO: 30.9 PG (ref 26–34)
MCHC RBC AUTO-ENTMCNC: 34.3 G/DL (ref 31–36)
MCV RBC AUTO: 90.2 FL (ref 80–100)
METHEMOGLOBIN ARTERIAL: ABNORMAL %
METHEMOGLOBIN ARTERIAL: ABNORMAL %
O2 SAT, ARTERIAL: 83.3 %
O2 SAT, ARTERIAL: 96.7 %
O2 THERAPY: ABNORMAL
O2 THERAPY: ABNORMAL
PCO2 ARTERIAL: 32.9 MMHG (ref 35–45)
PCO2 ARTERIAL: 42.4 MMHG (ref 35–45)
PDW BLD-RTO: 16.5 % (ref 12.4–15.4)
PERFORMED ON: ABNORMAL
PERFORMED ON: NORMAL
PH ARTERIAL: 7.42 (ref 7.35–7.45)
PH ARTERIAL: 7.49 (ref 7.35–7.45)
PLATELET # BLD: 165 K/UL (ref 135–450)
PMV BLD AUTO: 8.3 FL (ref 5–10.5)
PO2 ARTERIAL: 49.9 MMHG (ref 75–108)
PO2 ARTERIAL: 77.5 MMHG (ref 75–108)
POTASSIUM SERPL-SCNC: 3.7 MMOL/L (ref 3.5–5.1)
RBC # BLD: 3.99 M/UL (ref 4.2–5.9)
SARS-COV-2: NOT DETECTED
SODIUM BLD-SCNC: 144 MMOL/L (ref 136–145)
T4 FREE: 1.6 NG/DL (ref 0.9–1.8)
TCO2 ARTERIAL: 57.8 MMOL/L
TCO2 ARTERIAL: 63.9 MMOL/L
TRIGL SERPL-MCNC: 65 MG/DL (ref 0–150)
TSH REFLEX: 4.67 UIU/ML (ref 0.27–4.2)
VLDLC SERPL CALC-MCNC: 13 MG/DL
WBC # BLD: 6.9 K/UL (ref 4–11)

## 2021-12-03 PROCEDURE — 6370000000 HC RX 637 (ALT 250 FOR IP): Performed by: HOSPITALIST

## 2021-12-03 PROCEDURE — 84439 ASSAY OF FREE THYROXINE: CPT

## 2021-12-03 PROCEDURE — 6370000000 HC RX 637 (ALT 250 FOR IP): Performed by: NURSE PRACTITIONER

## 2021-12-03 PROCEDURE — 83036 HEMOGLOBIN GLYCOSYLATED A1C: CPT

## 2021-12-03 PROCEDURE — 2709999900 HC NON-CHARGEABLE SUPPLY

## 2021-12-03 PROCEDURE — 80048 BASIC METABOLIC PNL TOTAL CA: CPT

## 2021-12-03 PROCEDURE — 2580000003 HC RX 258: Performed by: PHYSICIAN ASSISTANT

## 2021-12-03 PROCEDURE — 6360000002 HC RX W HCPCS: Performed by: INTERNAL MEDICINE

## 2021-12-03 PROCEDURE — 6370000000 HC RX 637 (ALT 250 FOR IP): Performed by: INTERNAL MEDICINE

## 2021-12-03 PROCEDURE — 86900 BLOOD TYPING SEROLOGIC ABO: CPT

## 2021-12-03 PROCEDURE — 4A023N7 MEASUREMENT OF CARDIAC SAMPLING AND PRESSURE, LEFT HEART, PERCUTANEOUS APPROACH: ICD-10-PCS | Performed by: INTERNAL MEDICINE

## 2021-12-03 PROCEDURE — 99233 SBSQ HOSP IP/OBS HIGH 50: CPT | Performed by: NURSE PRACTITIONER

## 2021-12-03 PROCEDURE — 86901 BLOOD TYPING SEROLOGIC RH(D): CPT

## 2021-12-03 PROCEDURE — 84443 ASSAY THYROID STIM HORMONE: CPT

## 2021-12-03 PROCEDURE — C1894 INTRO/SHEATH, NON-LASER: HCPCS

## 2021-12-03 PROCEDURE — 85027 COMPLETE CBC AUTOMATED: CPT

## 2021-12-03 PROCEDURE — 36415 COLL VENOUS BLD VENIPUNCTURE: CPT

## 2021-12-03 PROCEDURE — C1769 GUIDE WIRE: HCPCS

## 2021-12-03 PROCEDURE — 93458 L HRT ARTERY/VENTRICLE ANGIO: CPT

## 2021-12-03 PROCEDURE — 1200000000 HC SEMI PRIVATE

## 2021-12-03 PROCEDURE — 6360000002 HC RX W HCPCS: Performed by: PHYSICIAN ASSISTANT

## 2021-12-03 PROCEDURE — 2580000003 HC RX 258: Performed by: INTERNAL MEDICINE

## 2021-12-03 PROCEDURE — 86850 RBC ANTIBODY SCREEN: CPT

## 2021-12-03 PROCEDURE — 99152 MOD SED SAME PHYS/QHP 5/>YRS: CPT | Performed by: INTERNAL MEDICINE

## 2021-12-03 PROCEDURE — 93458 L HRT ARTERY/VENTRICLE ANGIO: CPT | Performed by: INTERNAL MEDICINE

## 2021-12-03 PROCEDURE — 2500000003 HC RX 250 WO HCPCS

## 2021-12-03 PROCEDURE — 6360000004 HC RX CONTRAST MEDICATION: Performed by: INTERNAL MEDICINE

## 2021-12-03 PROCEDURE — B2111ZZ FLUOROSCOPY OF MULTIPLE CORONARY ARTERIES USING LOW OSMOLAR CONTRAST: ICD-10-PCS | Performed by: INTERNAL MEDICINE

## 2021-12-03 PROCEDURE — 6370000000 HC RX 637 (ALT 250 FOR IP): Performed by: PHYSICIAN ASSISTANT

## 2021-12-03 PROCEDURE — 80061 LIPID PANEL: CPT

## 2021-12-03 PROCEDURE — 2580000003 HC RX 258

## 2021-12-03 PROCEDURE — 99152 MOD SED SAME PHYS/QHP 5/>YRS: CPT

## 2021-12-03 PROCEDURE — 82803 BLOOD GASES ANY COMBINATION: CPT

## 2021-12-03 PROCEDURE — 36600 WITHDRAWAL OF ARTERIAL BLOOD: CPT

## 2021-12-03 PROCEDURE — B2151ZZ FLUOROSCOPY OF LEFT HEART USING LOW OSMOLAR CONTRAST: ICD-10-PCS | Performed by: INTERNAL MEDICINE

## 2021-12-03 PROCEDURE — 6360000002 HC RX W HCPCS

## 2021-12-03 PROCEDURE — 99024 POSTOP FOLLOW-UP VISIT: CPT | Performed by: INTERNAL MEDICINE

## 2021-12-03 RX ORDER — SODIUM CHLORIDE 0.9 % (FLUSH) 0.9 %
5-40 SYRINGE (ML) INJECTION EVERY 12 HOURS SCHEDULED
Status: DISCONTINUED | OUTPATIENT
Start: 2021-12-03 | End: 2021-12-10

## 2021-12-03 RX ORDER — MILRINONE LACTATE 0.2 MG/ML
0.12 INJECTION, SOLUTION INTRAVENOUS CONTINUOUS
Status: DISCONTINUED | OUTPATIENT
Start: 2021-12-03 | End: 2021-12-10

## 2021-12-03 RX ORDER — LORAZEPAM 0.5 MG/1
0.5 TABLET ORAL EVERY 6 HOURS PRN
Status: DISCONTINUED | OUTPATIENT
Start: 2021-12-03 | End: 2021-12-10

## 2021-12-03 RX ORDER — SODIUM CHLORIDE 0.9 % (FLUSH) 0.9 %
5-40 SYRINGE (ML) INJECTION PRN
Status: DISCONTINUED | OUTPATIENT
Start: 2021-12-03 | End: 2021-12-10

## 2021-12-03 RX ORDER — SODIUM CHLORIDE 9 MG/ML
25 INJECTION, SOLUTION INTRAVENOUS PRN
Status: DISCONTINUED | OUTPATIENT
Start: 2021-12-03 | End: 2021-12-08

## 2021-12-03 RX ORDER — ACETAMINOPHEN 325 MG/1
650 TABLET ORAL EVERY 4 HOURS PRN
Status: DISCONTINUED | OUTPATIENT
Start: 2021-12-03 | End: 2021-12-10

## 2021-12-03 RX ADMIN — Medication 10 ML: at 21:05

## 2021-12-03 RX ADMIN — MILRINONE LACTATE 0.12 MCG/KG/MIN: 0.2 INJECTION, SOLUTION INTRAVENOUS at 14:27

## 2021-12-03 RX ADMIN — Medication 10 ML: at 14:35

## 2021-12-03 RX ADMIN — SPIRONOLACTONE 12.5 MG: 25 TABLET ORAL at 11:46

## 2021-12-03 RX ADMIN — ASPIRIN 81 MG: 81 TABLET, COATED ORAL at 09:05

## 2021-12-03 RX ADMIN — FUROSEMIDE 40 MG: 10 INJECTION, SOLUTION INTRAMUSCULAR; INTRAVENOUS at 12:16

## 2021-12-03 RX ADMIN — Medication 10 ML: at 12:15

## 2021-12-03 RX ADMIN — ATORVASTATIN CALCIUM 40 MG: 40 TABLET, FILM COATED ORAL at 20:48

## 2021-12-03 RX ADMIN — INSULIN LISPRO 1 UNITS: 100 INJECTION, SOLUTION INTRAVENOUS; SUBCUTANEOUS at 21:06

## 2021-12-03 RX ADMIN — CARVEDILOL 3.12 MG: 3.12 TABLET, FILM COATED ORAL at 11:46

## 2021-12-03 RX ADMIN — FUROSEMIDE 40 MG: 10 INJECTION, SOLUTION INTRAMUSCULAR; INTRAVENOUS at 18:48

## 2021-12-03 RX ADMIN — CARVEDILOL 3.12 MG: 3.12 TABLET, FILM COATED ORAL at 17:34

## 2021-12-03 RX ADMIN — ZOLPIDEM TARTRATE 5 MG: 5 TABLET ORAL at 20:48

## 2021-12-03 RX ADMIN — IOPAMIDOL 109 ML: 755 INJECTION, SOLUTION INTRAVENOUS at 10:29

## 2021-12-03 RX ADMIN — LORAZEPAM 0.5 MG: 0.5 TABLET ORAL at 17:34

## 2021-12-03 RX ADMIN — SACUBITRIL AND VALSARTAN 1 TABLET: 24; 26 TABLET, FILM COATED ORAL at 12:15

## 2021-12-03 NOTE — PROGRESS NOTES
Cardiovascular Progress Note      Chief Complaint:   Chief Complaint   Patient presents with    Leg Swelling     From home. Was sick with cough and rash for about a month, ended about a week ago. Bilateral lower extremity edema X1 week, denies Hx of CHF. Some SOB still. Impression/Recommendations:    Mr. Dory Gonzales is a 64 y.o. male patient     Acute Biventricular Systolic CHF  (LVEF 88-91%, severe MR by 2021 TTE)  MVCAD  Diabetes mellitus   Former tobacco   Family history of CAD       Severe, three vessel coronary disease  Severe cardiomyopathy  Severe mitral regurgitation   CHADS2VASC=3  CTS consult to discuss CABG/ MVR/KATHARINE Clip  Appreciate CHF Service.      Interval History:   No events overnight. No oxygen requirements, orthopnea. LE edema improving. No chest pain. Questions answered. Troponin 0.01 x 3     [id number]  Sinus tachycardia  Left posterior fascicular block  Possible Inferior infarct , age undetermined  Anteroseptal infarct , age undetermined    2021 TTE   Left ventricular cavity size is mildly dilated. Overall left ventricular systolic function appears severely reduced. Ejection fraction is visually estimated to be 15-20%. Cannot exclude apical   thrombus as contrast not administered. There is severe diffuse global hypokinesis. Grade III diastolic dysfunction. Left atrium is mildly dilated. The right ventricle is moderately dilated with moderately reduced function   by visual assessment. Severe mitral regurgitation with single posteriorly directed jet. Severe tricuspid regurgitation. IVC size is dilated (>2.1 cm) and collapses < 50% with respiration   consistent with elevated RA pressure (15 mmHg). Cannot estimate PASP given severe TR, but PASP elevated. Critical findings relayed to Dr. Marin Yung of severely reduced EF with low   stroke volume.     Medications:  sacubitril-valsartan (ENTRESTO) 24-26 MG per tablet 1 tablet, BID  milrinone (PRIMACOR) 20 mg in dextrose 5 % 100 mL infusion, Continuous  sodium chloride flush 0.9 % injection 5-40 mL, 2 times per day  sodium chloride flush 0.9 % injection 10 mL, PRN  0.9 % sodium chloride infusion, PRN  magnesium hydroxide (MILK OF MAGNESIA) 400 MG/5ML suspension 30 mL, Daily PRN  acetaminophen (TYLENOL) tablet 650 mg, Q6H PRN   Or  acetaminophen (TYLENOL) suppository 650 mg, Q6H PRN  enoxaparin (LOVENOX) injection 40 mg, Daily  furosemide (LASIX) injection 40 mg, BID  ondansetron (ZOFRAN) injection 4 mg, Q6H PRN  zolpidem (AMBIEN) tablet 5 mg, Nightly PRN  insulin lispro (1 Unit Dial) 0-6 Units, TID WC  insulin lispro (1 Unit Dial) 0-3 Units, Nightly  glucose (GLUTOSE) 40 % oral gel 15 g, PRN  dextrose 50 % IV solution, PRN  glucagon (rDNA) injection 1 mg, PRN  dextrose 5 % solution, PRN  potassium chloride (KLOR-CON M) extended release tablet 40 mEq, PRN   Or  potassium bicarb-citric acid (EFFER-K) effervescent tablet 40 mEq, PRN   Or  potassium chloride 10 mEq/100 mL IVPB (Peripheral Line), PRN  carvedilol (COREG) tablet 3.125 mg, BID WC  spironolactone (ALDACTONE) tablet 12.5 mg, Daily  aspirin EC tablet 81 mg, Daily  atorvastatin (LIPITOR) tablet 40 mg, Nightly  LORazepam (ATIVAN) tablet 0.5 mg, Nightly PRN        I/O:     Intake/Output Summary (Last 24 hours) at 12/3/2021 1042  Last data filed at 12/2/2021 1751  Gross per 24 hour   Intake 480 ml   Output --   Net 480 ml       Physical Exam:    /84   Pulse 104   Temp 97.6 °F (36.4 °C) (Oral)   Resp 16   Ht 6' 1\" (1.854 m)   Wt 196 lb 3.2 oz (89 kg)   SpO2 98%   BMI 25.89 kg/m²   Wt Readings from Last 3 Encounters:   12/03/21 196 lb 3.2 oz (89 kg)       GENERAL: Well developed, well nourished, no acute distress  NEUROLOGICAL: Alert and oriented x3  PSYCH: Normal mood and affect   SKIN: Warm and dry, without lesions  HEENT: Normocephalic, atraumatic, Sclera non-icteric, mucous membranes moist  NECK: supple, JVP normal, thyroid not enlarged CAROTID: Normal upstroke, no bruits  CARDIAC: Normal PMI, regular rate and rhythm, normal S1S2, no murmur, rub  RESPIRATORY: Normal respiratory effort, clear to auscultation bilaterally  EXTREMITIES: No cyanosis, clubbing or edema, palpable pulses bilaterally   MUSCULOSKELETAL: No joint swelling or tenderness, no chest wall tenderness  GASTROINTESTINAL:  soft, non-tender, no bruit    Data Review:    CBC:   Recent Labs     12/01/21  2225 12/02/21  0520 12/03/21  1000   WBC 7.8 8.3 6.9   HGB 14.8 14.6 12.3*   HCT 44.5 43.6 36.0*   MCV 90.6 89.7 90.2    208 165     BMP:   Recent Labs     12/01/21  2315 12/02/21  0520 12/03/21  0455    142 144   K 4.1 3.9 3.7    106 107   CO2 26 23 25   BUN 20 17 16   CREATININE 1.2 1.1 1.0   GFRAA >60 >60 >60   MG  --  1.90  --      LFTS:   Recent Labs     12/01/21  2315   ALT 72*   AST 67*   ALKPHOS 507*   PROT 7.0   AGRATIO 0.9*   BILITOT 0.8     Cardiac Enzymes:   Recent Labs     12/01/21  2315 12/02/21  0520 12/02/21  0931   TROPONINI 0.01 0.01 0.01       Dilcia Rosa DO, [facility]  Interventional Cardiology     o: [phone]  [address] [place] [address]., [address] [facility], [address] [place] [address]      NOTE:  This report was transcribed using voice recognition software. Every effort was made to ensure accuracy; however, inadvertent computerized transcription errors may be present.

## 2021-12-03 NOTE — PROGRESS NOTES
[facility] PROGRESS NOTE    12/3/2021 9:02 AM        Name: Myla Jiménez . Admitted: 12/1/2021  Primary Care Provider: No primary care provider on file. (Tel: None)                        Subjective:  . No acute events overnight. Resting well. Pain control. Diet ok. Labs reviewed  Denies any chest pain sob.      Reviewed interval ancillary notes    Current Medications  sodium chloride flush 0.9 % injection 5-40 mL, 2 times per day  sodium chloride flush 0.9 % injection 10 mL, PRN  0.9 % sodium chloride infusion, PRN  magnesium hydroxide (MILK OF MAGNESIA) 400 MG/5ML suspension 30 mL, Daily PRN  acetaminophen (TYLENOL) tablet 650 mg, Q6H PRN   Or  acetaminophen (TYLENOL) suppository 650 mg, Q6H PRN  enoxaparin (LOVENOX) injection 40 mg, Daily  furosemide (LASIX) injection 40 mg, BID  ondansetron (ZOFRAN) injection 4 mg, Q6H PRN  zolpidem (AMBIEN) tablet 5 mg, Nightly PRN  insulin lispro (1 Unit Dial) 0-6 Units, TID WC  insulin lispro (1 Unit Dial) 0-3 Units, Nightly  glucose (GLUTOSE) 40 % oral gel 15 g, PRN  dextrose 50 % IV solution, PRN  glucagon (rDNA) injection 1 mg, PRN  dextrose 5 % solution, PRN  potassium chloride (KLOR-CON M) extended release tablet 40 mEq, PRN   Or  potassium bicarb-citric acid (EFFER-K) effervescent tablet 40 mEq, PRN   Or  potassium chloride 10 mEq/100 mL IVPB (Peripheral Line), PRN  carvedilol (COREG) tablet 3.125 mg, BID WC  lisinopril (PRINIVIL;ZESTRIL) tablet 2.5 mg, Daily  spironolactone (ALDACTONE) tablet 12.5 mg, Daily  aspirin EC tablet 81 mg, Daily  atorvastatin (LIPITOR) tablet 40 mg, Nightly  LORazepam (ATIVAN) tablet 0.5 mg, Nightly PRN        Objective:  /86   Pulse 85   Temp 97.6 °F (36.4 °C) (Oral)   Resp 16   Ht 6' 1\" (1.854 m)   Wt 196 lb 3.2 oz (89 kg)   SpO2 96%   BMI 25.89 kg/m²     Intake/Output Summary (Last 24 hours) at 12/3/2021 0902  Last data filed at 12/2/2021 1751  Gross per 24 hour   Intake 480 ml   Output --   Net 480 ml      Wt Readings from Last 3 Encounters:   12/03/21 196 lb 3.2 oz (89 kg)       General appearance:  Appears comfortable  Eyes: Sclera clear. Pupils equal.  ENT: Moist oral mucosa. Trachea midline, no adenopathy. Cardiovascular: Regular rhythm, normal S1, S2. No murmur. No edema in lower extremities  Respiratory: Not using accessory muscles. Good inspiratory effort. Clear to auscultation bilaterally, no wheeze or crackles. GI: Abdomen soft, no tenderness, not distended, normal bowel sounds  Musculoskeletal: No cyanosis in digits, neck supple  Neurology: CN 2-12 grossly intact. No speech or motor deficits  Psych: Normal affect. Alert and oriented in time, place and person  Skin: Warm, dry, normal turgor    Labs and Tests:  CBC:   Recent Labs     12/01/21  2225 12/02/21  0520   WBC 7.8 8.3   HGB 14.8 14.6    208     BMP:    Recent Labs     12/01/21  2315 12/02/21  0520 12/03/21  0455    142 144   K 4.1 3.9 3.7    106 107   CO2 26 23 25   BUN 20 17 16   CREATININE 1.2 1.1 1.0   GLUCOSE 167* 179* 115*     Hepatic:   Recent Labs     12/01/21  2315   AST 67*   ALT 72*   BILITOT 0.8   ALKPHOS 507*       Discussed care with family and patient             Spent 30  minutes with patient and family at bedside and on unit reviewing medical records and labs, spent greater than 50% time counseling patient and family on diagnosis and plan   Problem List  Active Problems:    Acute heart failure ([facility] [address]  Resolved Problems:    * No resolved hospital problems. *       Assessment & Plan:   1.  Acute CHF  -Finding with low EF of 10 to 20%  -Continue diuresis  -Plan for left heart cath today  -Further recs to follow      Diet: Diet NPO  Code:Full Code  DVT PPX lovenox Saintclair Fujisawa, MD   12/3/2021 9:02 AM

## 2021-12-03 NOTE — PROGRESS NOTES
[facility]  HEART FAILURE  Progress Note      Admit Date 12/1/2021     Reason for Consult:      Reason for Consultation/Chief Complaint: Edema    HPI:    Erica Banks is a 64 y.o. male with PMH DM admitted for cough and LE edema, WEST and orthopnea after viral illness. Echo showed severely depressed EF and pt had LHC today with severe 3 vessel dz and MR - CTS consult today. Subjective:  Patient is being seen for new onset CHF/CMP. There were no acute overnight cardiac events. Today Mr. Nita Ayers denies chest pain, shortness of breath, palpitations and is feeling better, still with some edema. He is anxious to leave, we discussed CMP and medical therapy.        Baseline Weight:    Wt Readings from Last 3 Encounters:   12/03/21 196 lb 3.2 oz (89 kg)          NYHA Class II  Objective:   /86   Pulse 85   Temp 97.6 °F (36.4 °C) (Oral)   Resp 16   Ht 6' 1\" (1.854 m)   Wt 196 lb 3.2 oz (89 kg)   SpO2 96%   BMI 25.89 kg/m²       Intake/Output Summary (Last 24 hours) at 12/3/2021 0858  Last data filed at 12/2/2021 1751  Gross per 24 hour   Intake 720 ml   Output --   Net 720 ml      Wt Readings from Last 3 Encounters:   12/03/21 196 lb 3.2 oz (89 kg)      In: 240 [P.O.:240]  Out: -       Physical Exam:  General Appearance:  Non-obese/Well Nourished  Respiratory:  · Resp Auscultation: Normal breath sounds without dullness  Cardiovascular:  · Auscultation: Regular rate and rhythm, normal S1S2, +murmur  · Palpation: Normal    · JVD: none  · Pedal Pulses: 2+ and equal   Abdomen:  · Soft, NT, ND, + bs  Extremities:  · No Cyanosis or Clubbing  · Extremities: Trace edema  Neurological/Psychiatric:  · Oriented to time, place, and person  · Non-anxious    MEDICATIONS:   Scheduled Meds:   Scheduled Meds:   sodium chloride flush  5-40 mL IntraVENous 2 times per day    enoxaparin  40 mg SubCUTAneous Daily    furosemide  40 mg IntraVENous BID    insulin lispro  0-6 Units SubCUTAneous TID WC    insulin lispro  0-3 Units SubCUTAneous Nightly    carvedilol  3.125 mg Oral BID     lisinopril  2.5 mg Oral Daily    spironolactone  12.5 mg Oral Daily    aspirin  81 mg Oral Daily    atorvastatin  40 mg Oral Nightly     Continuous Infusions:   sodium chloride      dextrose       PRN Meds:.sodium chloride flush, sodium chloride, magnesium hydroxide, acetaminophen **OR** acetaminophen, ondansetron, zolpidem, glucose, dextrose, glucagon (rDNA), dextrose, potassium chloride **OR** potassium alternative oral replacement **OR** potassium chloride, LORazepam  Continuous Infusions:   sodium chloride      dextrose         Intake/Output Summary (Last 24 hours) at 12/3/2021 0858  Last data filed at 12/2/2021 1751  Gross per 24 hour   Intake 720 ml   Output --   Net 720 ml       Lab Data:  CBC:   Lab Results   Component Value Date    WBC 8.3 12/02/2021    HGB 14.6 12/02/2021     12/02/2021     BMP:  Lab Results   Component Value Date     12/03/2021    K 3.7 12/03/2021    K 4.1 12/01/2021     12/03/2021    CO2 25 12/03/2021    BUN 16 12/03/2021    CREATININE 1.0 12/03/2021    GLUCOSE 115 12/03/2021     INR: No results found for: INR     CARDIAC LABS  ENZYMES:  Recent Labs     12/01/21  2315 12/02/21  0520 12/02/21  0931   TROPONINI 0.01 0.01 0.01     FASTING LIPID PANEL:No results found for: HDL, LDLDIRECT, LDLCALC, TRIG, TSH  LIVER PROFILE:  Lab Results   Component Value Date    AST 67 12/01/2021    ALT 72 12/01/2021     BNP:   Lab Results   Component Value Date    PROBNP 3,325 12/01/2021     Iron Studies:  No results found for: FERRITIN  No results found for: IRON, TIBC, FERRITIN   Iron Deficiency Anemia:  No IV Iron Therapy:  No  2017 ACC/AHA HF Guidelines:   intravenous iron replacement in patients with New York Heart Association (NYHA) class II and III HF and iron deficiency(ferritin <100 ng/ml or 100-300 ng/ml if transferrin saturation <20%), to improve functional status and QoL.       1. WEIGHT: Admit Weight: 199 lb 1.6 oz (90.3 kg)      Today  Weight: 196 lb 3.2 oz (89 kg)   2. I/O     Intake/Output Summary (Last 24 hours) at 12/3/2021 0858  Last data filed at 12/2/2021 1751  Gross per 24 hour   Intake 720 ml   Output --   Net 720 ml       Cardiac Testing:     ECHO 12/2/21:   Summary   Left ventricular cavity size is mildly dilated. Overall left ventricular systolic function appears severely reduced. Ejection fraction is visually estimated to be 15-20%. Cannot exclude apical   thrombus as contrast not administered. There is severe diffuse global hypokinesis. Grade III diastolic dysfunction. Left atrium is mildly dilated. The right ventricle is moderately dilated with moderately reduced function   by visual assessment. Severe mitral regurgitation with single posteriorly directed jet. Severe tricuspid regurgitation. IVC size is dilated (>2.1 cm) and collapses < 50% with respiration   consistent with elevated RA pressure (15 mmHg). Cannot estimate PASP given severe TR, but PASP elevated. Critical findings relayed to Dr. Esmer Palm of severely reduced EF with low   stroke volume. [facility] 12/3/21:   Left Heart Cath  Dominance: Right     LM: 30% distal   LAD: large, arborizing, high first diagonal with 70% mid stenosis; LAD has a 95% stenosis after small, diffusely diseased second diagonal and is JOHANNA I flow to the apex   LCx: small, diffusely diseased that is subtotaled after small first marginal and receiving left to left collaterals to smaller second marginal/distal LCX  RCA: 90% proximal, 70% mid, 100% distal stenoses with left to right collaterals to RPDA      LVEDP: 23 mmHg  LVEF: 15% with severe global HK     Impression/Recommendations:  Severe, three vessel coronary disease  Severe cardiomyopathy  Severe mitral regurgitation   CHADS2VASC=3  CTS consult to discuss CABG/ MVR/KATHARINE Clip    Assessment/Plan:     1. AHF-780 out, continue IV lasix  2.  CMP- start entresto instead of ACE, continue BB, dorinda, LHC today with CAD, CTS consult  3.  MVR - CTS          I appreciate the opportunity of cooperating in the care of this individual.    Steve Man, APRN - CNP, ACNP, [address] 12/3/2021, 8:58 AM  Heart Failure  The [address], [address]  Ph: [phone]      Core Measures:   · Discharge instructions:   · LVEF documented:   · ACEI for LV dysfunction:   · Smoking Cessation:

## 2021-12-03 NOTE — PROGRESS NOTES
Clinical Pharmacy Note    HF Core Measures Assessment    Latest EF: 15% on cath    ACE/ARB (EF<40%): entresto  Evidence based BB (bisoprolol, metoprolol XL, carvedilol) (EF<40%): carvedilol   Aldosterone Antagonist (EF<35%): spironolactone    (May consider the use of hydralazine + nitrate in patients intolerant to ACEI/ARB)    Leroy Blevins, [facility]  Clinical Pharmacist  [id number]

## 2021-12-03 NOTE — OP NOTE
Cardiac Catheterization     Procedure: LHC, LVG  Complications: None  Medications: Procedural sedation with minimal conscious sedation (3 Versed/100 Fentanyl)  An independent trained observer pushed medications at my direction. We monitored the patient's level of consciousness and vital signs/physiologic status throughout the procedure duration (see start and stop times in log). Estimated Blood Loss: Less than 20 mL  Specimens: were not obtained  Access: 6 Fr Right Radial   Closure: TR Band   Contrast: 109 cc  Start time: 0943  Stop time: 1000  Fluoro time: 3.6  Findings:     Left Heart Cath  Dominance: Right    LM: 30% distal   LAD: large, arborizing, high first diagonal with 70% mid stenosis; LAD has a 95% stenosis after small, diffusely diseased second diagonal and is JOHANNA I flow to the apex   LCx: small, diffusely diseased that is subtotaled after small first marginal and receiving left to left collaterals to smaller second marginal/distal LCX  RCA: 90% proximal, 70% mid, 100% distal stenoses with left to right collaterals to RPDA     LVEDP: 23 mmHg  LVEF: 15% with severe global HK     Impression/Recommendations:  Severe, three vessel coronary disease  Severe cardiomyopathy  Severe mitral regurgitation   CHADS2VASC=3  CTS consult to discuss CABG/ MVR/KATHARINE Clip  Appreciate CHF Service.      Vinnie Goel DO, [facility]  Interventional Cardiology     o: [phone]  [address], [address] [facility], [address] [place] [address]

## 2021-12-03 NOTE — CONSULTS
Cardiothoracic Surgery Consultation           PCP: No primary care provider on file. Referring Physician: Dr. Venessa Merida    DIAGNOSIS:  Severe 3 vessel CAD. Severe Mitral Valve Regurgitation, Low EF    CHIEF COMPLAINT:  Cough and Bilateral Leg Edema     History Obtained From:  patient, electronic medical record    HISTORY OF PRESENT ILLNESS:      The patient is a 64 y.o. male with significant past medical history of diabetes who presented to the hospital on 12/1/2021 with worsening cough over the past 3 weeks. Left heart angiogram found severe multivessel coronary artery disease. Echo showed severe mitral valve regurgitation and severe tricuspid regurgitation with an EF of 15%. CVTS was consulted for surgical evaluation for myocardial revascularization and possible mitral valve replacement. Patient is currently hemodynamically stable and denying any complaints of chest pain or shortness of breath. Reports swelling in bilateral lower extremities. Patient is a current smoker. Patient has not been vaccinated for covid. Unconfirmed covid diagnosis in October that brought about symptoms. Family history of early CAD (Father had bypass approximately age 48)    Past Medical History:        Diagnosis Date    Diabetes [facility])        Past Surgical History:    History reviewed. No pertinent surgical history.     Medications:   Home Meds:   Prior to Admission medications    Not on File      Scheduled Meds:    sacubitril-valsartan  1 tablet Oral BID    sodium chloride flush  5-40 mL IntraVENous 2 times per day    enoxaparin  40 mg SubCUTAneous Daily    furosemide  40 mg IntraVENous BID    insulin lispro  0-6 Units SubCUTAneous TID WC    insulin lispro  0-3 Units SubCUTAneous Nightly    carvedilol  3.125 mg Oral BID WC    spironolactone  12.5 mg Oral Daily    aspirin  81 mg Oral Daily    atorvastatin  40 mg Oral Nightly     Continuous Infusions:    milrinone      sodium chloride      dextrose chloride (KLOR-CON M) extended release tablet 40 mEq  40 mEq Oral PRN Marce Nalini, DO        Or    potassium bicarb-citric acid (EFFER-K) effervescent tablet 40 mEq  40 mEq Oral PRN Marce Nalini, DO        Or    potassium chloride 10 mEq/100 mL IVPB (Peripheral Line)  10 mEq IntraVENous PRN Marce Nalini, DO        carvedilol (COREG) tablet 3.125 mg  3.125 mg Oral BID WC Marce Nalini, DO   3.125 mg at 12/02/21 1758    spironolactone (ALDACTONE) tablet 12.5 mg  12.5 mg Oral Daily Marce Nalini, DO   12.5 mg at 12/02/21 1758    aspirin EC tablet 81 mg  81 mg Oral Daily Marce Nalini, DO   81 mg at 12/03/21 5870    atorvastatin (LIPITOR) tablet 40 mg  40 mg Oral Nightly Marce Nalini, DO   40 mg at 12/02/21 2015    LORazepam (ATIVAN) tablet 0.5 mg  0.5 mg Oral Nightly PRN Den Hendrix MD           Allergies:  Patient has no known allergies. Social History:    TOBACCO:  Currently smokes. Type of tobacco used:  Cigarettes. Quantity per day:  1 pack(s). Duration of tobacco use:  15 years. Past attempts to quit? No.  Smoking cessation advice provided? Yes. 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 Family History:    Father CAD with CABG at approximately age 48.     REVIEW OF SYSTEMS:    CONSTITUTIONAL:  Denies fevers, chills, sweats  DERMATOLOGICAL: positive for - rash on back   NEUROLOGICAL:  negative  EYES:  glasses  HEENT:  negative  RESPIRATORY:  negative  CARDIOVASCULAR:  negative  GASTROINTESTINAL:  negative  GENITO-URINARY: no dysuria, trouble voiding, or hematuria  ENDOCRINE: negative  MUSCULOSKELETAL:  negative  HEMATOLOGICAL AND LYMPHATIC: negative  IMMUNOLOGICAL: negative  PSYCHOLOGICAL: negative    PHYSICAL EXAM:    VITALS:  /86   Pulse 85   Temp 97.6 °F (36.4 °C) (Oral)   Resp 16   Ht 6' 1\" (1.854 m)   Wt 196 lb 3.2 oz (89 kg)   SpO2 96%   BMI 25.89 kg/m²   Hand Dominance: Right    Eyes:  lids and lashes normal, pupils equal and round, extra ocular muscles intact, sclera clear, conjunctiva normal    Head/ENT:  Normocephalic, atraumatic, good dentition, normal gums, & palate, oropharynx without erythema or exudates    Neck:  supple, symmetrical, trachea midline, no lymphadenopathy, no jugular venous distension, and MASSES:  no masses    Lungs:  no increased work of breathing, good air exchange, no retractions and clear to auscultation, no palpable / percussible abnormalities    Cardiovascular:   Regular rate and rhythm, S1, S2, murmur heard. Pulses:     carotid brachial radial femoral popliteal DP PT   RIGHT +2 +2 TR band +2 +2 +1 +2   LEFT +2 +2 +2 +2 +2 +1 +2     Abdomen:  Soft, normal bowel sounds, non-tender, no hepatosplenomegaly, aorta likely normal and bruits absent    Musculoskeletal:  Back is straight and non-tender,  No CVAT, full ROM of upper and lower extremities. Extremities:   2+ pitting edema in bilateral lower extremities, No clubbing, or cyanosis. Skin: rash on right side of back, dissipating rash thought to be brought on by recent covid infection. warm and normal turgor, no ulcers, infections.     Neurological: awake, alert and oriented x 3, motor 5/5 bilateral upper and lower extremities, sensation grossly intact    Psychiatric: Mood and affect appear appropriate    LABS:  BMP:   Lab Results   Component Value Date     12/03/2021    K 3.7 12/03/2021    K 4.1 12/01/2021     12/03/2021    CO2 25 12/03/2021    BUN 16 12/03/2021    CREATININE 1.0 12/03/2021      No components found for: GLU  Lab Results   Component Value Date    MG 1.90 12/02/2021      CBC:   Lab Results   Component Value Date    WBC 8.3 12/02/2021    HGB 14.6 12/02/2021    HCT 43.6 12/02/2021    MCV 89.7 12/02/2021     12/02/2021      Coagulation:   No results found for: INR, APTT  Cardiac markers:   Lab Results   Component Value Date    TROPONINI 0.01 2021     HgbA1c:  Lab Results   Component Value Date    LABA1C 6.3 2021      Hepatic Profile:  No results found for: ALB    Lab Results   Component Value Date    BILITOT 0.8 2021    AST 67 2021    ALT 72 2021    ALKPHOS 507 2021      Cholesterol:  No results found for: CHOL, HDL, LDLCALC, TRIG   UA:  Lab Results   Component Value Date    COLORU Yellow 2021    PHUR 5.5 2021    CLARITYU Clear 2021    SPECGRAV 1.020 2021    LEUKOCYTESUR Negative 2021    UROBILINOGEN 0.2 2021    BILIRUBINUR Negative 2021    BLOODU Negative 2021    GLUCOSEU Negative 2021       TESTING/IMAGING  EK2021  Sinus tachycardia  Left posterior fascicular block  Possible Inferior infarct , age undetermined  Anteroseptal infarct , age undetermined    ANGIOGRAM: 12/3/2021  Left Heart Cath  Dominance: Right     LM: 30% distal   LAD: large, arborizing, high first diagonal with 70% mid stenosis; LAD has a 95% stenosis after small, diffusely diseased second diagonal and is JOHANNA I flow to the apex   LCx: small, diffusely diseased that is subtotaled after small first marginal and receiving left to left collaterals to smaller second marginal/distal LCX  RCA: 90% proximal, 70% mid, 100% distal stenoses with left to right collaterals to RPDA      LVEDP: 23 mmHg  LVEF: 15% with severe global HK    ECHO: 2021     Summary   Left ventricular cavity size is mildly dilated. Overall left ventricular systolic function appears severely reduced. Ejection fraction is visually estimated to be 15-20%. Cannot exclude apical   thrombus as contrast not administered. There is severe diffuse global hypokinesis. Grade III diastolic dysfunction. Left atrium is mildly dilated. The right ventricle is moderately dilated with moderately reduced function   by visual assessment.    Severe mitral regurgitation with single posteriorly directed jet. Severe tricuspid regurgitation. IVC size is dilated (>2.1 cm) and collapses < 50% with respiration   consistent with elevated RA pressure (15 mmHg). Cannot estimate PASP given severe TR, but PASP elevated. CT CHEST Pulmonary Embolism W Contrast: 12/2/2021  No evidence of an acute pulmonary embolus.     Moderate-sized right pleural effusion, small left pleural effusion and  subcutaneous edema suspicious for anasarca. Vicenta Mary is associated compressive  atelectasis of the right greater than left lower lobes.     Mild cardiomegaly. CXRAY: 12/2/2021  No evidence of an acute pulmonary embolus.     Moderate-sized right pleural effusion, small left pleural effusion and  subcutaneous edema suspicious for anasarca. Vicenta Mary is associated compressive  atelectasis of the right greater than left lower lobes.     Mild cardiomegaly. CAROTIDS: Pending     PFTS: Pending     [id number] Score for Atrial Fibrillation Stroke Risk   Risk   Factors  Component Value   C CHF Yes 1   H HTN No 0   A2 Age >= 76 No,  (61 y.o.) 0   D DM Yes 1   S2 Prior Stroke/TIA No 0   V Vascular Disease Yes 1   A Age 74-69 No,  (61 y.o.) 0   Sc Sex male 0    [id number]  Score  3   Score last updated 12/3/21 20:44 AM EST    Click here for a link to the UpToDate guideline \"Atrial Fibrillation: Anticoagulation therapy to prevent embolization    Disclaimer: Risk Score calculation is dependent on accuracy of patient problem list and past encounter diagnosis. CTS Adult Cardiac Risk: CABG + MVR: Pending Carotids and PFT's and does not take into account poor distal targets  Mortality Risk: 5.452%  Renal Failure: 4.490%  Permanent Stroke: 2.260%  Prolonged Ventilation: 29.002%  DSW Infection: 0.273%  Reoperation: 5.895%  Morbidity and Mortality: 31.417%  Short Length of Stay: 8.928%  Long Length of Stay: 16.483%            ASSESSMENT AND PLAN:     Patient is currently does not want to pursue surgery.    Ed Watson discussed surgical option with patient. Discussed with cardiology to start patient on Primacor and have an echo Monday. If EF is not better may need to pursue a viability study to determine if patient is a good surgical candidate. Continue with medical optimization in the interim. Thank you Dr. Abdirashid Perez for the consultation. Electronically signed by Ryan Espinal on 12/3/2021 at 10:24 AM    Attending Supervising Physicians ANTHONY Patrick 106  I was present with the physician assistant during the history and exam. I discussed the findings and plans with the physician assistant and agree as documented in his note except for where noted and changed. I had discussion about R/B/Alt/E of surgery vs PCI vs medicine with the patient and his family and answered all their questions.     Electronically signed by Isabelle Leon MD on 12/4/21 at 7:18 AM EST

## 2021-12-03 NOTE — PRE SEDATION
Brief Pre-Op Note/Sedation Assessment      Sumeet Portillo [facility]  1960  [id number]      [id number]  7:41 AM    Planned Procedure: Cardiac Catheterization Procedure    Post Procedure Plan: Return to same level of care    Consent: I have discussed with the patient and/or the patient representative the indication, alternatives, and the possible risks and/or complications of the planned procedure and the anesthesia methods. The patient and/or patient representative appear to understand and agree to proceed. Chief Complaint: Dyspnea on Exertion      Indications for Cath Procedure:  Cardiomyopathy  Anginal Classification within 2 weeks:  No symptoms  NYHA Heart Failure Class within 2 weeks: Class IV - Symptoms of HF at rest, Newly Diagnosed? Yes, Heart Failure Type: Systolic  Is Cath Lab Visit Valve-related?: No  Surgical Risk: Intermediate  Functional Type: >= 4 METS with symptoms    Anti- Anginal Meds within 2 weeks:   Yes: Beta Blockers, Aspirin and Statin (Any)    Stress or Imaging Studies Performed:  None     Vital Signs:  /87   Pulse 107   Temp 97.6 °F (36.4 °C) (Oral)   Resp 16   Ht 6' 1\" (1.854 m)   Wt 196 lb 3.2 oz (89 kg)   SpO2 97%   BMI 25.89 kg/m²     Allergies:  No Known Allergies    Past Medical History:  Past Medical History:   Diagnosis Date    Diabetes [facility])          Surgical History:  History reviewed. No pertinent surgical history.       Medications:  Current Facility-Administered Medications   Medication Dose Route Frequency Provider Last Rate Last Admin    sodium chloride flush 0.9 % injection 5-40 mL  5-40 mL IntraVENous 2 times per day Enedina Allen PA-C   10 mL at 12/02/21 2015    sodium chloride flush 0.9 % injection 10 mL  10 mL IntraVENous PRN Enedina Allen PA-C        0.9 % sodium chloride infusion  25 mL IntraVENous PRN Enedina Allen PA-C        magnesium hydroxide (MILK OF MAGNESIA) 400 MG/5ML suspension 30 mL  30 mL Oral Daily PRN Parth Chua Elizabeth Hanley PA-C        acetaminophen (TYLENOL) tablet 650 mg  650 mg Oral Q6H PRN Cher Cerna PA-C        Or    acetaminophen (TYLENOL) suppository 650 mg  650 mg Rectal Q6H PRN Cher Cerna PA-C        enoxaparin (LOVENOX) injection 40 mg  40 mg SubCUTAneous Daily Pippa Humphrey PA-C        furosemide (LASIX) injection 40 mg  40 mg IntraVENous BID Cher Cerna PA-C   40 mg at 12/02/21 1757    ondansetron (ZOFRAN) injection 4 mg  4 mg IntraVENous Q6H PRN Cher Cerna PA-C        zolpidem (AMBIEN) tablet 5 mg  5 mg Oral Nightly PRN Cher Cerna PA-C   5 mg at 12/02/21 2015    insulin lispro (1 Unit Dial) 0-6 Units  0-6 Units SubCUTAneous TID  Cher Cerna PA-C        insulin lispro (1 Unit Dial) 0-3 Units  0-3 Units SubCUTAneous Nightly Pippa Humphrey PA-C        glucose (GLUTOSE) 40 % oral gel 15 g  15 g Oral PRN Cher Cerna PA-C        dextrose 50 % IV solution  12.5 g IntraVENous PRN Cher Cerna PA-C        glucagon (rDNA) injection 1 mg  1 mg IntraMUSCular PRN Cher Cerna PA-C        dextrose 5 % solution  100 mL/hr IntraVENous PRN Cher Cerna PA-C        potassium chloride (KLOR-CON M) extended release tablet 40 mEq  40 mEq Oral PRN Rosenthal Fore, DO        Or    potassium bicarb-citric acid (EFFER-K) effervescent tablet 40 mEq  40 mEq Oral PRN Rosenthal Fore, DO        Or    potassium chloride 10 mEq/100 mL IVPB (Peripheral Line)  10 mEq IntraVENous PRN Rosenthal Fore, DO        carvedilol (COREG) tablet 3.125 mg  3.125 mg Oral BID  Rosenthal Fore, DO   3.125 mg at 12/02/21 1758    lisinopril (PRINIVIL;ZESTRIL) tablet 2.5 mg  2.5 mg Oral Daily Rosenthal Fore, DO        spironolactone (ALDACTONE) tablet 12.5 mg  12.5 mg Oral Daily Rosenthal Fore, DO   12.5 mg at 12/02/21 1758    aspirin EC tablet 81 mg  81 mg Oral Daily Rosenthal Fore, DO   81 mg at 12/02/21 9227    atorvastatin (LIPITOR) tablet 40 mg  40 mg Oral Nightly Vinnie Goel DO   40 mg at 12/02/21 2015    LORazepam (ATIVAN) tablet 0.5 mg  0.5 mg Oral Nightly PRN Angie Acevedo MD               Pre-Sedation:    Pre-Sedation Documentation and Exam:  I have assessed the patient and agree with the H&P present on the chart. Prior History of Anesthesia Complications:   none    Modified Mallampati:  II (soft palate, uvula, fauces visible)    ASA Classification:  Class 4 - A patient with an incapacitating systemic disease that is a constant threat to life      Ria Scale: Activity:  2 - Able to move 4 extremities voluntarily on command  Respiration:  2 - Able to breathe deeply and cough freely  Circulation:  2 - BP+/- 20mmHg of normal  Consciousness:  2 - Fully awake  Oxygen Saturation (color):  2 - Able to maintain oxygen saturation >92% on room air    Sedation/Anesthesia Plan:  Guard the patient's safety and welfare. Minimize physical discomfort and pain. Minimize negative psychological responses to treatment by providing sedation and analgesia and maximize the potential amnesia. Patient to meet pre-procedure discharge plan. Medication Planned:  midazolam intravenously and fentanyl intravenously    Patient is an appropriate candidate for plan of sedation: yes    The risks, benefits, goals, and alternatives of the procedure were discussed in detail with the patient. Informed consent was obtained and further recommendations will be made following the procedure. Vinnie Goel DO[facility]  Interventional Cardiology     o: [phone]  [address] [employer] [facility] [facility], [address]      NOTE:  This report was transcribed using voice recognition software. Every effort was made to ensure accuracy; however, inadvertent computerized transcription errors may be present.

## 2021-12-03 NOTE — PLAN OF CARE
Problem: Falls - Risk of:  Goal: Will remain free from falls  Description: Will remain free from falls  Outcome: Ongoing  Goal: Absence of physical injury  Description: Absence of physical injury  Outcome: Ongoing     Problem: OXYGENATION/RESPIRATORY FUNCTION  Goal: Patient will maintain patent airway  Outcome: Ongoing  Goal: Patient will achieve/maintain normal respiratory rate/effort  Description: Respiratory rate and effort will be within normal limits for the patient  Outcome: Ongoing

## 2021-12-03 NOTE — CONSULTS
Kendall Cardenas 1960    History:  Past Medical History:   Diagnosis Date    Diabetes ([facility] [address]        ECHO:    Conclusions      Summary   Definity administered for endocardial border definition and to rule out LV   thrombus. Left ventricular cavity size is dilated with normal left ventricular wall   thickness. Overall, left ventricular systolic function appears severely reduced. Ejection fraction is visually estimated to be in the 25% range. (Guajardo's=26%)   There is severe diffuse hypokinesis with akinesis of the distal septum and   apex. No evidence of left ventricular mass or thrombus noted. Severe mitral regurgitation directed centrally and posteriorly with systolic   flow reversal in pulmonary veins. Moderate tricuspid regurgitation with a PASP of 46 mmHg. Signature      ------------------------------------------------------------------   Electronically signed by Brenda Shafer, [facility]   (Interpreting physician) on 12/06/2021 at 04:02 PM   ------------------------------------------------------------------        ACE/ARB: Sacubitril-valsartan 24-26 mg bid  BB: Carvedilol 3.125 mg bid  Aldosterone Antagonist: Spironolactone 12.5 mg daily     History of sleep apnea: No  Equipment used at home: None  [place] Screen ordered: Yes    DM History: Yes  DM medications:None  Consult for DM educator: Yes      Last Hospital Admission: 11/7/2208 Diabetic ketoacidosis  Code Status: Full   Discharge plans: Patient to return. May need help if has CABG    Family Present: brother and sister    Patient seen for a new diagnosis of systolic heart failure. He had an angiogram and is requiring a CABG. Patient is thinking about it. He is a 2 pack a day smoker and is very anxious and agitated wanting to go home. He wants to think about having by-pass surgery. He had complaints of a cough and a rash for about a month prior which ended a week ago.  Then developed bilateral lower extremity edema and slight shortness of breath. He was instructed about daily weights and parameters ago. Low sodium and fluid restriction diet taught  as well as  Signs and symptoms of heart failure. Sister said she was going to purchase a scale for him. He would benefit from Cardiac Rehab for strengthening. Patient provided with both written and verbal education on CHF signs/ symptoms, causes, discharge medications, daily weights, low sodium diet, activity, and follow-up. Pt to call if gains 3 pounds in one day or 5 pounds in one week. Mutually agreed upon goals were discussed such as calling the MD as soon as they recognize symptoms and weight gain, maintaining proper diet, taking medications as prescribed, joining cardiac rehab when able. Also reviewed importance of risk factor reduction. Patient provided with CHF Zone Management tool and CHF symptoms magnet. Discussed importance of lifestyle changes: daily weights, low sodium and fluid restriction diet    PATIENT/CAREGIVER TEACHING:    Level of patient/caregiver understanding able to:   [ x] Verbalize understanding [ ] Demonstrate understanding [ ] Teach back   [ ] Needs reinforcement [ ] Other:       Time spent teachin minutes    1. WEIGHT: Admit Weight: 199 lb 1.6 oz (90.3 kg)      Today  Weight: 196 lb 3.2 oz (89 kg)   2. I/O     Intake/Output Summary (Last 24 hours) at 12/3/2021 1731  Last data filed at 2021 1751  Gross per 24 hour   Intake 240 ml   Output --   Net 240 ml       Recommendations:   1. Patient needs an appointment   2. Educate further on fluid restriction 48 oz- 64 oz during inpatient stay so he can understand how to measure intake at home. 3. Continue to educate on S/S.   4. Emphasize daily weights, diet, and knowing when and who to call  5. Provided patient with CHF Resource Line for questions and concerns. 6. He would benefit from outpatient cardiac rehab and Francisco Owusu.         Keiko Mobley KAREEN CARUSO 12/3/2021 5:31 PM

## 2021-12-03 NOTE — CARE COORDINATION
Discharge Planning Note     Patient  reviewed for d/c planning needs; admitted from home , A&O, independent  and it appears that patient has minimal needs for discharge at this time with readmission score of 6%. Discussed with patients nurse and requested that case management be notified if discharge needs are identified. Patient has active insurance with [employer]    There is no PCP list. Patient will be provided with the list of the area PCPs to call . Case management will continue to follow progress and update discharge plan as needed.

## 2021-12-04 LAB
ANION GAP SERPL CALCULATED.3IONS-SCNC: 14 MMOL/L (ref 3–16)
BUN BLDV-MCNC: 17 MG/DL (ref 7–20)
CALCIUM SERPL-MCNC: 9.1 MG/DL (ref 8.3–10.6)
CHLORIDE BLD-SCNC: 103 MMOL/L (ref 99–110)
CO2: 22 MMOL/L (ref 21–32)
CREAT SERPL-MCNC: 1 MG/DL (ref 0.9–1.3)
ESTIMATED AVERAGE GLUCOSE: 131.2 MG/DL
GFR AFRICAN AMERICAN: >60
GFR NON-AFRICAN AMERICAN: >60
GLUCOSE BLD-MCNC: 111 MG/DL (ref 70–99)
GLUCOSE BLD-MCNC: 121 MG/DL (ref 70–99)
GLUCOSE BLD-MCNC: 127 MG/DL (ref 70–99)
GLUCOSE BLD-MCNC: 188 MG/DL (ref 70–99)
HBA1C MFR BLD: 6.2 %
HCT VFR BLD CALC: 43.2 % (ref 40.5–52.5)
HEMOGLOBIN: 14.5 G/DL (ref 13.5–17.5)
MCH RBC QN AUTO: 30.6 PG (ref 26–34)
MCHC RBC AUTO-ENTMCNC: 33.6 G/DL (ref 31–36)
MCV RBC AUTO: 90.9 FL (ref 80–100)
PDW BLD-RTO: 16.5 % (ref 12.4–15.4)
PERFORMED ON: ABNORMAL
PLATELET # BLD: 181 K/UL (ref 135–450)
PMV BLD AUTO: 8.6 FL (ref 5–10.5)
POTASSIUM SERPL-SCNC: 3.8 MMOL/L (ref 3.5–5.1)
PRO-BNP: 2458 PG/ML (ref 0–124)
RBC # BLD: 4.75 M/UL (ref 4.2–5.9)
SODIUM BLD-SCNC: 139 MMOL/L (ref 136–145)
WBC # BLD: 7 K/UL (ref 4–11)

## 2021-12-04 PROCEDURE — 6370000000 HC RX 637 (ALT 250 FOR IP): Performed by: INTERNAL MEDICINE

## 2021-12-04 PROCEDURE — 1200000000 HC SEMI PRIVATE

## 2021-12-04 PROCEDURE — 99233 SBSQ HOSP IP/OBS HIGH 50: CPT | Performed by: INTERNAL MEDICINE

## 2021-12-04 PROCEDURE — 6370000000 HC RX 637 (ALT 250 FOR IP): Performed by: HOSPITALIST

## 2021-12-04 PROCEDURE — 83880 ASSAY OF NATRIURETIC PEPTIDE: CPT

## 2021-12-04 PROCEDURE — 85027 COMPLETE CBC AUTOMATED: CPT

## 2021-12-04 PROCEDURE — 2580000003 HC RX 258: Performed by: INTERNAL MEDICINE

## 2021-12-04 PROCEDURE — 36415 COLL VENOUS BLD VENIPUNCTURE: CPT

## 2021-12-04 PROCEDURE — 6370000000 HC RX 637 (ALT 250 FOR IP): Performed by: NURSE PRACTITIONER

## 2021-12-04 PROCEDURE — 6360000002 HC RX W HCPCS: Performed by: PHYSICIAN ASSISTANT

## 2021-12-04 PROCEDURE — 6370000000 HC RX 637 (ALT 250 FOR IP): Performed by: PHYSICIAN ASSISTANT

## 2021-12-04 PROCEDURE — 80048 BASIC METABOLIC PNL TOTAL CA: CPT

## 2021-12-04 PROCEDURE — 6360000002 HC RX W HCPCS: Performed by: INTERNAL MEDICINE

## 2021-12-04 PROCEDURE — 2580000003 HC RX 258: Performed by: PHYSICIAN ASSISTANT

## 2021-12-04 RX ADMIN — Medication 10 ML: at 21:49

## 2021-12-04 RX ADMIN — LORAZEPAM 0.5 MG: 0.5 TABLET ORAL at 16:41

## 2021-12-04 RX ADMIN — LORAZEPAM 0.5 MG: 0.5 TABLET ORAL at 10:05

## 2021-12-04 RX ADMIN — CARVEDILOL 3.12 MG: 3.12 TABLET, FILM COATED ORAL at 09:55

## 2021-12-04 RX ADMIN — SPIRONOLACTONE 12.5 MG: 25 TABLET ORAL at 09:54

## 2021-12-04 RX ADMIN — ATORVASTATIN CALCIUM 40 MG: 40 TABLET, FILM COATED ORAL at 21:14

## 2021-12-04 RX ADMIN — CARVEDILOL 3.12 MG: 3.12 TABLET, FILM COATED ORAL at 16:40

## 2021-12-04 RX ADMIN — Medication 10 ML: at 09:58

## 2021-12-04 RX ADMIN — Medication 10 ML: at 10:06

## 2021-12-04 RX ADMIN — ENOXAPARIN SODIUM 40 MG: 100 INJECTION SUBCUTANEOUS at 09:57

## 2021-12-04 RX ADMIN — ASPIRIN 81 MG: 81 TABLET, COATED ORAL at 09:54

## 2021-12-04 RX ADMIN — ZOLPIDEM TARTRATE 5 MG: 5 TABLET ORAL at 21:14

## 2021-12-04 RX ADMIN — SACUBITRIL AND VALSARTAN 1 TABLET: 24; 26 TABLET, FILM COATED ORAL at 09:54

## 2021-12-04 RX ADMIN — SACUBITRIL AND VALSARTAN 1 TABLET: 24; 26 TABLET, FILM COATED ORAL at 00:38

## 2021-12-04 RX ADMIN — MILRINONE LACTATE 0.12 MCG/KG/MIN: 0.2 INJECTION, SOLUTION INTRAVENOUS at 14:40

## 2021-12-04 RX ADMIN — FUROSEMIDE 40 MG: 10 INJECTION, SOLUTION INTRAMUSCULAR; INTRAVENOUS at 16:42

## 2021-12-04 RX ADMIN — FUROSEMIDE 40 MG: 10 INJECTION, SOLUTION INTRAMUSCULAR; INTRAVENOUS at 09:58

## 2021-12-04 RX ADMIN — SACUBITRIL AND VALSARTAN 1 TABLET: 24; 26 TABLET, FILM COATED ORAL at 21:14

## 2021-12-04 NOTE — PROGRESS NOTES
[facility]    12/4/2021 2:03 PM        Name: Belkis Vazquez . Admitted: 12/1/2021  Primary Care Provider: No primary care provider on file. (Tel: None)                        Subjective:  . No acute events overnight. Resting well. Pain control. Diet ok. Labs reviewed  Denies any chest pain sob.      Reviewed interval ancillary notes    Current Medications  perflutren lipid microspheres (DEFINITY) injection 1.65 mg, ONCE PRN  sacubitril-valsartan (ENTRESTO) 24-26 MG per tablet 1 tablet, BID  sodium chloride flush 0.9 % injection 5-40 mL, 2 times per day  sodium chloride flush 0.9 % injection 5-40 mL, PRN  0.9 % sodium chloride infusion, PRN  acetaminophen (TYLENOL) tablet 650 mg, Q4H PRN  milrinone (PRIMACOR) 20 mg in dextrose 5 % 100 mL infusion, Continuous  LORazepam (ATIVAN) tablet 0.5 mg, Q6H PRN  sodium chloride flush 0.9 % injection 5-40 mL, 2 times per day  sodium chloride flush 0.9 % injection 10 mL, PRN  0.9 % sodium chloride infusion, PRN  magnesium hydroxide (MILK OF MAGNESIA) 400 MG/5ML suspension 30 mL, Daily PRN  acetaminophen (TYLENOL) suppository 650 mg, Q6H PRN  enoxaparin (LOVENOX) injection 40 mg, Daily  furosemide (LASIX) injection 40 mg, BID  ondansetron (ZOFRAN) injection 4 mg, Q6H PRN  zolpidem (AMBIEN) tablet 5 mg, Nightly PRN  insulin lispro (1 Unit Dial) 0-6 Units, TID WC  insulin lispro (1 Unit Dial) 0-3 Units, Nightly  glucose (GLUTOSE) 40 % oral gel 15 g, PRN  dextrose 50 % IV solution, PRN  glucagon (rDNA) injection 1 mg, PRN  dextrose 5 % solution, PRN  potassium chloride (KLOR-CON M) extended release tablet 40 mEq, PRN   Or  potassium bicarb-citric acid (EFFER-K) effervescent tablet 40 mEq, PRN   Or  potassium chloride 10 mEq/100 mL IVPB (Peripheral Line), PRN  carvedilol (COREG) tablet 3.125 mg, BID WC  spironolactone (ALDACTONE) tablet 12.5 mg, Daily  aspirin EC tablet 81 mg, Daily  atorvastatin (LIPITOR) tablet 40 mg, Nightly        Objective:  /70   Pulse 110   Temp 97.4 °F (36.3 °C) (Oral)   Resp 18   Ht 6' 1\" (1.854 m)   Wt 195 lb (88.5 kg)   SpO2 93%   BMI 25.73 kg/m²     Intake/Output Summary (Last 24 hours) at 12/4/2021 1403  Last data filed at 12/4/2021 1340  Gross per 24 hour   Intake 240 ml   Output 3850 ml   Net -3610 ml      Wt Readings from Last 3 Encounters:   12/04/21 195 lb (88.5 kg)       General appearance:  Appears comfortable  Eyes: Sclera clear. Pupils equal.  ENT: Moist oral mucosa. Trachea midline, no adenopathy. Cardiovascular: Regular rhythm, normal S1, S2. No murmur. No edema in lower extremities  Respiratory: Not using accessory muscles. Good inspiratory effort. Clear to auscultation bilaterally, no wheeze or crackles. GI: Abdomen soft, no tenderness, not distended, normal bowel sounds  Musculoskeletal: No cyanosis in digits, neck supple  Neurology: CN 2-12 grossly intact. No speech or motor deficits  Psych: Normal affect. Alert and oriented in time, place and person  Skin: Warm, dry, normal turgor    Labs and Tests:  CBC:   Recent Labs     12/02/21  0520 12/03/21  1000 12/04/21  0654   WBC 8.3 6.9 7.0   HGB 14.6 12.3* 14.5    165 181     BMP:    Recent Labs     12/02/21  0520 12/03/21  0455 12/04/21  0654    144 139   K 3.9 3.7 3.8    107 103   CO2 23 25 22   BUN 17 16 17   CREATININE 1.1 1.0 1.0   GLUCOSE 179* 115* 121*     Hepatic:   Recent Labs     12/01/21  2315   AST 67*   ALT 72*   BILITOT 0.8   ALKPHOS 507*       Discussed care with family and patient             Spent 30  minutes with patient and family at bedside and on unit reviewing medical records and labs, spent greater than 50% time counseling patient and family on diagnosis and plan   Problem List  Active Problems:    Acute heart failure ([address]  Resolved Problems:    * No resolved hospital problems.  * Assessment & Plan:   1. Acute CHF  -Finding with low EF of 10 to 20%  -Continue diuresis  -Patient is status post left heart cath withmultivessel disease. Patient has been recommended for CABG  -We will continue to monitor  -CT chest following. Reassess  on Monday. On milrinone. Diet: ADULT DIET; Regular; 3 carb choices (45 gm/meal);  Low Sodium (2 gm)  Code:Full Code  DVT PPX cuca Harris MD   12/4/2021 2:03 PM

## 2021-12-04 NOTE — PROGRESS NOTES
Cardiovascular Progress Note      Chief Complaint:   Chief Complaint   Patient presents with    Leg Swelling     From home. Was sick with cough and rash for about a month, ended about a week ago. Bilateral lower extremity edema X1 week, denies Hx of CHF. Some SOB still. Impression/Recommendations:    Mr. Demetra Polk is a 64 y.o. male patient     Acute Biventricular Systolic CHF  (LVEF 36-18%, severe MR, severe TR by 2021 TTE)  MVCAD  Diabetes mellitus   Former tobacco   Family history of CAD       Severe, three vessel coronary disease  Severe cardiomyopathy  Severe mitral regurgitation   CHADS2VASC=3  CTS consult to discuss CABG/ MVR/KATHARINE Clip. Patient still considering options. Plan for Milrinone gtt with repeat limited TTE 21 and potential for viability study pending comparison to previous echo. Appreciate CHF Service. Entresto/Spironolactone     Interval History:   No events overnight. No oxygen requirements, orthopnea. LE edema now +1. No chest pain. Wants to go home but agreeable to stay. Sister at bedside. 206-->195 lbs  ProBNP 3325--> 2458    Troponin 0.01 x 3     EK2021  Sinus tachycardia  Left posterior fascicular block  Possible Inferior infarct , age undetermined  Anteroseptal infarct , age undetermined    2021 TTE   Left ventricular cavity size is mildly dilated. Overall left ventricular systolic function appears severely reduced. Ejection fraction is visually estimated to be 15-20%. Cannot exclude apical   thrombus as contrast not administered. There is severe diffuse global hypokinesis. Grade III diastolic dysfunction. Left atrium is mildly dilated. The right ventricle is moderately dilated with moderately reduced function   by visual assessment. Severe mitral regurgitation with single posteriorly directed jet. Severe tricuspid regurgitation.    IVC size is dilated (>2.1 cm) and collapses < 50% with respiration   consistent with elevated RA pressure (15 mmHg). Cannot estimate PASP given severe TR, but PASP elevated. Critical findings relayed to Dr. Estela Melvin of severely reduced EF with low   stroke volume.     12/3/21  Left Heart Cath  Dominance: Right     LM: 30% distal   LAD: large, arborizing, high first diagonal with 70% mid stenosis; LAD has a 95% stenosis after small, diffusely diseased second diagonal and is JOHANNA I flow to the apex   LCx: small, diffusely diseased that is subtotaled after small first marginal and receiving left to left collaterals to smaller second marginal/distal LCX  RCA: 90% proximal, 70% mid, 100% distal stenoses with left to right collaterals to RPDA      LVEDP: 23 mmHg  LVEF: 15% with severe global HK    Medications:  perflutren lipid microspheres (DEFINITY) injection 1.65 mg, ONCE PRN  sacubitril-valsartan (ENTRESTO) 24-26 MG per tablet 1 tablet, BID  sodium chloride flush 0.9 % injection 5-40 mL, 2 times per day  sodium chloride flush 0.9 % injection 5-40 mL, PRN  0.9 % sodium chloride infusion, PRN  acetaminophen (TYLENOL) tablet 650 mg, Q4H PRN  milrinone (PRIMACOR) 20 mg in dextrose 5 % 100 mL infusion, Continuous  LORazepam (ATIVAN) tablet 0.5 mg, Q6H PRN  sodium chloride flush 0.9 % injection 5-40 mL, 2 times per day  sodium chloride flush 0.9 % injection 10 mL, PRN  0.9 % sodium chloride infusion, PRN  magnesium hydroxide (MILK OF MAGNESIA) 400 MG/5ML suspension 30 mL, Daily PRN  acetaminophen (TYLENOL) suppository 650 mg, Q6H PRN  enoxaparin (LOVENOX) injection 40 mg, Daily  furosemide (LASIX) injection 40 mg, BID  ondansetron (ZOFRAN) injection 4 mg, Q6H PRN  zolpidem (AMBIEN) tablet 5 mg, Nightly PRN  insulin lispro (1 Unit Dial) 0-6 Units, TID WC  insulin lispro (1 Unit Dial) 0-3 Units, Nightly  glucose (GLUTOSE) 40 % oral gel 15 g, PRN  dextrose 50 % IV solution, PRN  glucagon (rDNA) injection 1 mg, PRN  dextrose 5 % solution, PRN  potassium chloride (KLOR-CON M) extended release tablet 40 mEq, PRN Labs     12/01/21  2315 12/02/21  0520 12/02/21  0931   TROPONINI 0.01 0.01 0.01       Candelario Dancer, DO, [facility] [place]  Interventional Cardiology     o: [phone]  [address] [place] [address]., [address], [address]      NOTE:  This report was transcribed using voice recognition software. Every effort was made to ensure accuracy; however, inadvertent computerized transcription errors may be present.

## 2021-12-04 NOTE — PROGRESS NOTES
CC: MR / diffuse CAD, cadiomyopathy    No c/o this morning  Stable overnight  No questions for me  Continue medical treatment of CHF and re-evaluate LV Monday

## 2021-12-05 LAB
ANION GAP SERPL CALCULATED.3IONS-SCNC: 8 MMOL/L (ref 3–16)
BUN BLDV-MCNC: 16 MG/DL (ref 7–20)
CALCIUM SERPL-MCNC: 9.3 MG/DL (ref 8.3–10.6)
CHLORIDE BLD-SCNC: 103 MMOL/L (ref 99–110)
CO2: 26 MMOL/L (ref 21–32)
CREAT SERPL-MCNC: 1 MG/DL (ref 0.9–1.3)
GFR AFRICAN AMERICAN: >60
GFR NON-AFRICAN AMERICAN: >60
GLUCOSE BLD-MCNC: 136 MG/DL (ref 70–99)
GLUCOSE BLD-MCNC: 149 MG/DL (ref 70–99)
GLUCOSE BLD-MCNC: 151 MG/DL (ref 70–99)
GLUCOSE BLD-MCNC: 163 MG/DL (ref 70–99)
GLUCOSE BLD-MCNC: 169 MG/DL (ref 70–99)
PERFORMED ON: ABNORMAL
POTASSIUM SERPL-SCNC: 3.8 MMOL/L (ref 3.5–5.1)
SODIUM BLD-SCNC: 137 MMOL/L (ref 136–145)

## 2021-12-05 PROCEDURE — 6360000002 HC RX W HCPCS: Performed by: PHYSICIAN ASSISTANT

## 2021-12-05 PROCEDURE — 80048 BASIC METABOLIC PNL TOTAL CA: CPT

## 2021-12-05 PROCEDURE — 99232 SBSQ HOSP IP/OBS MODERATE 35: CPT | Performed by: INTERNAL MEDICINE

## 2021-12-05 PROCEDURE — 1200000000 HC SEMI PRIVATE

## 2021-12-05 PROCEDURE — 6370000000 HC RX 637 (ALT 250 FOR IP): Performed by: NURSE PRACTITIONER

## 2021-12-05 PROCEDURE — 6370000000 HC RX 637 (ALT 250 FOR IP): Performed by: PHYSICIAN ASSISTANT

## 2021-12-05 PROCEDURE — 6370000000 HC RX 637 (ALT 250 FOR IP): Performed by: INTERNAL MEDICINE

## 2021-12-05 PROCEDURE — 6360000002 HC RX W HCPCS: Performed by: INTERNAL MEDICINE

## 2021-12-05 PROCEDURE — 36415 COLL VENOUS BLD VENIPUNCTURE: CPT

## 2021-12-05 PROCEDURE — 2580000003 HC RX 258: Performed by: INTERNAL MEDICINE

## 2021-12-05 RX ADMIN — SPIRONOLACTONE 12.5 MG: 25 TABLET ORAL at 11:16

## 2021-12-05 RX ADMIN — Medication 10 ML: at 11:18

## 2021-12-05 RX ADMIN — SACUBITRIL AND VALSARTAN 1 TABLET: 24; 26 TABLET, FILM COATED ORAL at 21:43

## 2021-12-05 RX ADMIN — ZOLPIDEM TARTRATE 5 MG: 5 TABLET ORAL at 21:43

## 2021-12-05 RX ADMIN — MILRINONE LACTATE 0.12 MCG/KG/MIN: 0.2 INJECTION, SOLUTION INTRAVENOUS at 21:42

## 2021-12-05 RX ADMIN — ATORVASTATIN CALCIUM 40 MG: 40 TABLET, FILM COATED ORAL at 21:43

## 2021-12-05 RX ADMIN — FUROSEMIDE 40 MG: 10 INJECTION, SOLUTION INTRAMUSCULAR; INTRAVENOUS at 18:50

## 2021-12-05 RX ADMIN — ASPIRIN 81 MG: 81 TABLET, COATED ORAL at 11:09

## 2021-12-05 RX ADMIN — ENOXAPARIN SODIUM 40 MG: 100 INJECTION SUBCUTANEOUS at 11:11

## 2021-12-05 RX ADMIN — FUROSEMIDE 40 MG: 10 INJECTION, SOLUTION INTRAMUSCULAR; INTRAVENOUS at 11:24

## 2021-12-05 RX ADMIN — CARVEDILOL 3.12 MG: 3.12 TABLET, FILM COATED ORAL at 18:50

## 2021-12-05 RX ADMIN — CARVEDILOL 3.12 MG: 3.12 TABLET, FILM COATED ORAL at 11:09

## 2021-12-05 RX ADMIN — SACUBITRIL AND VALSARTAN 1 TABLET: 24; 26 TABLET, FILM COATED ORAL at 11:08

## 2021-12-05 NOTE — PLAN OF CARE
Problem: HEMODYNAMIC STATUS  Goal: Patient has stable vital signs and fluid balance  Outcome: Ongoing     Problem: Falls - Risk of:  Goal: Will remain free from falls  Description: Will remain free from falls  Outcome: Ongoing     Problem: ACTIVITY INTOLERANCE/IMPAIRED MOBILITY  Goal: Mobility/activity is maintained at optimum level for patient  Outcome: Ongoing

## 2021-12-05 NOTE — PROGRESS NOTES
[facility]    12/5/2021 12:35 PM        Name: Linwood Patel . Admitted: 12/1/2021  Primary Care Provider: No primary care provider on file. (Tel: None)                        Subjective:  . No acute events overnight. Resting well. Pain control. Diet ok. Labs reviewed  Denies any chest pain sob.      Reviewed interval ancillary notes    Current Medications  perflutren lipid microspheres (DEFINITY) injection 1.65 mg, ONCE PRN  sacubitril-valsartan (ENTRESTO) 24-26 MG per tablet 1 tablet, BID  sodium chloride flush 0.9 % injection 5-40 mL, 2 times per day  sodium chloride flush 0.9 % injection 5-40 mL, PRN  0.9 % sodium chloride infusion, PRN  acetaminophen (TYLENOL) tablet 650 mg, Q4H PRN  milrinone (PRIMACOR) 20 mg in dextrose 5 % 100 mL infusion, Continuous  LORazepam (ATIVAN) tablet 0.5 mg, Q6H PRN  sodium chloride flush 0.9 % injection 5-40 mL, 2 times per day  sodium chloride flush 0.9 % injection 10 mL, PRN  0.9 % sodium chloride infusion, PRN  magnesium hydroxide (MILK OF MAGNESIA) 400 MG/5ML suspension 30 mL, Daily PRN  acetaminophen (TYLENOL) suppository 650 mg, Q6H PRN  enoxaparin (LOVENOX) injection 40 mg, Daily  furosemide (LASIX) injection 40 mg, BID  ondansetron (ZOFRAN) injection 4 mg, Q6H PRN  zolpidem (AMBIEN) tablet 5 mg, Nightly PRN  insulin lispro (1 Unit Dial) 0-6 Units, TID WC  insulin lispro (1 Unit Dial) 0-3 Units, Nightly  glucose (GLUTOSE) 40 % oral gel 15 g, PRN  dextrose 50 % IV solution, PRN  glucagon (rDNA) injection 1 mg, PRN  dextrose 5 % solution, PRN  potassium chloride (KLOR-CON M) extended release tablet 40 mEq, PRN   Or  potassium bicarb-citric acid (EFFER-K) effervescent tablet 40 mEq, PRN   Or  potassium chloride 10 mEq/100 mL IVPB (Peripheral Line), PRN  carvedilol (COREG) tablet 3.125 mg, BID WC  spironolactone (ALDACTONE) tablet 12.5 mg, Daily  aspirin EC tablet 81 mg, Daily  atorvastatin (LIPITOR) tablet 40 mg, Nightly        Objective:  /76   Pulse 110   Temp 97.6 °F (36.4 °C) (Oral)   Resp 16   Ht 6' 1\" (1.854 m)   Wt 185 lb 3.2 oz (84 kg)   SpO2 99%   BMI 24.43 kg/m²     Intake/Output Summary (Last 24 hours) at 12/5/2021 1235  Last data filed at 12/5/2021 0243  Gross per 24 hour   Intake --   Output 3275 ml   Net -3275 ml      Wt Readings from Last 3 Encounters:   12/05/21 185 lb 3.2 oz (84 kg)       General appearance:  Appears comfortable  Eyes: Sclera clear. Pupils equal.  ENT: Moist oral mucosa. Trachea midline, no adenopathy. Cardiovascular: Regular rhythm, normal S1, S2. No murmur. No edema in lower extremities  Respiratory: Not using accessory muscles. Good inspiratory effort. Clear to auscultation bilaterally, no wheeze or crackles. GI: Abdomen soft, no tenderness, not distended, normal bowel sounds  Musculoskeletal: No cyanosis in digits, neck supple  Neurology: CN 2-12 grossly intact. No speech or motor deficits  Psych: Normal affect. Alert and oriented in time, place and person  Skin: Warm, dry, normal turgor    Labs and Tests:  CBC:   Recent Labs     12/03/21  1000 12/04/21  0654   WBC 6.9 7.0   HGB 12.3* 14.5    181     BMP:    Recent Labs     12/03/21  0455 12/04/21  0654 12/05/21  0826    139 137   K 3.7 3.8 3.8    103 103   CO2 25 22 26   BUN 16 17 16   CREATININE 1.0 1.0 1.0   GLUCOSE 115* 121* 151*     Hepatic:   No results for input(s): AST, ALT, ALB, BILITOT, ALKPHOS in the last 72 hours. Discussed care with family and patient             Spent 30  minutes with patient and family at bedside and on unit reviewing medical records and labs, spent greater than 50% time counseling patient and family on diagnosis and plan   Problem List  Active Problems:    Acute heart failure ([facility] [address]  Resolved Problems:    * No resolved hospital problems.  *       Assessment & Plan: 1. Acute CHF  -Finding with low EF of 10 to 20%  -Continue diuresis  -Patient is status post left heart cath withmultivessel disease. Patient has been recommended for CABG  -We will continue to monitor  -CT chest following. Reassess  on Monday. On milrinone. Diet: ADULT DIET; Regular; 3 carb choices (45 gm/meal);  Low Sodium (2 gm)  Code:Full Code  DVT PPX lovenox       Barbra Lange MD   12/5/2021 12:35 PM

## 2021-12-05 NOTE — PROGRESS NOTES
Cardiovascular Progress Note      Chief Complaint:   Chief Complaint   Patient presents with    Leg Swelling     From home. Was sick with cough and rash for about a month, ended about a week ago. Bilateral lower extremity edema X1 week, denies Hx of CHF. Some SOB still. Impression/Recommendations:    Mr. Myla Jiménez is a 64 y.o. male patient     Acute Biventricular Systolic CHF  (LVEF 85-56%, severe MR, severe TR by 2021 TTE)  MVCAD  Diabetes mellitus   Former tobacco   Family history of CAD       Severe, three vessel coronary disease  Severe cardiomyopathy  Severe mitral regurgitation   CHADS2VASC=3  CTS consult to discuss CABG/ MVR/KATHARINE Clip. Patient still considering options. Plan for Milrinone gtt with repeat limited TTE 21 and potential for viability study pending comparison to previous echo. Appreciate CHF Service. Entresto/Spironolactone     Interval History:   No events overnight. No oxygen requirements, orthopnea, LE edema. No chest pain. 2 sisters and a brother at bedside.     -7.2L  206-->195 lbs  ProBNP 3325--> 2458    Troponin 0.01 x 3     EK2021  Sinus tachycardia  Left posterior fascicular block  Possible Inferior infarct , age undetermined  Anteroseptal infarct , age undetermined    2021 TTE   Left ventricular cavity size is mildly dilated. Overall left ventricular systolic function appears severely reduced. Ejection fraction is visually estimated to be 15-20%. Cannot exclude apical   thrombus as contrast not administered. There is severe diffuse global hypokinesis. Grade III diastolic dysfunction. Left atrium is mildly dilated. The right ventricle is moderately dilated with moderately reduced function   by visual assessment. Severe mitral regurgitation with single posteriorly directed jet. Severe tricuspid regurgitation. IVC size is dilated (>2.1 cm) and collapses < 50% with respiration   consistent with elevated RA pressure (15 mmHg).    Cannot (EFFER-K) effervescent tablet 40 mEq, PRN   Or  potassium chloride 10 mEq/100 mL IVPB (Peripheral Line), PRN  carvedilol (COREG) tablet 3.125 mg, BID WC  spironolactone (ALDACTONE) tablet 12.5 mg, Daily  aspirin EC tablet 81 mg, Daily  atorvastatin (LIPITOR) tablet 40 mg, Nightly        I/O:     Intake/Output Summary (Last 24 hours) at 12/5/2021 1239  Last data filed at 12/5/2021 0243  Gross per 24 hour   Intake --   Output 3275 ml   Net -3275 ml       Physical Exam:    /76   Pulse 110   Temp 97.6 °F (36.4 °C) (Oral)   Resp 16   Ht 6' 1\" (1.854 m)   Wt 185 lb 3.2 oz (84 kg)   SpO2 99%   BMI 24.43 kg/m²   Wt Readings from Last 3 Encounters:   12/05/21 185 lb 3.2 oz (84 kg)       GENERAL: Well developed, well nourished, no acute distress  NEUROLOGICAL: Alert and oriented x3  PSYCH: Normal mood and affect   SKIN: Warm and dry, without lesions  HEENT: Normocephalic, atraumatic, Sclera non-icteric, mucous membranes moist  NECK: supple, JVP normal, thyroid not enlarged   CAROTID: Normal upstroke, no bruits  CARDIAC: Normal PMI, regular rate and rhythm, normal S1S2, no murmur, rub  RESPIRATORY: Normal respiratory effort, clear to auscultation bilaterally  EXTREMITIES: No cyanosis, clubbing  , palpable pulses bilaterally   MUSCULOSKELETAL: No joint swelling or tenderness, no chest wall tenderness  GASTROINTESTINAL:  soft, non-tender, no bruit    Data Review:    CBC:   Recent Labs     12/03/21  1000 12/04/21  0654   WBC 6.9 7.0   HGB 12.3* 14.5   HCT 36.0* 43.2   MCV 90.2 90.9    181     BMP:   Recent Labs     12/03/21  0455 12/04/21  0654 12/05/21  0826    139 137   K 3.7 3.8 3.8    103 103   CO2 25 22 26   BUN 16 17 16   CREATININE 1.0 1.0 1.0   GFRAA >60 >60 >60        No results for input(s): CKTOTAL, CKMB, CKMBINDEX, TROPONINI in the last 72 hours.     Annamaria Morrissey DO, [facility] [place]  Interventional Cardiology     o: [phone]  [address] [place] [address]., [address] [facility], [address] [facility]      NOTE:  This report was transcribed using voice recognition software. Every effort was made to ensure accuracy; however, inadvertent computerized transcription errors may be present.

## 2021-12-05 NOTE — PROGRESS NOTES
CC: CHF, MR, TR, CAD, ICM    No c/o  In good spirits  Bibasilar crackles RRR trace ankle edema  As per CC   Continue medical treatment CHF  For repeat echo tomorrow

## 2021-12-06 LAB
ANION GAP SERPL CALCULATED.3IONS-SCNC: 12 MMOL/L (ref 3–16)
BUN BLDV-MCNC: 20 MG/DL (ref 7–20)
CALCIUM SERPL-MCNC: 9.1 MG/DL (ref 8.3–10.6)
CHLORIDE BLD-SCNC: 103 MMOL/L (ref 99–110)
CO2: 20 MMOL/L (ref 21–32)
CREAT SERPL-MCNC: 0.9 MG/DL (ref 0.9–1.3)
GFR AFRICAN AMERICAN: >60
GFR NON-AFRICAN AMERICAN: >60
GLUCOSE BLD-MCNC: 120 MG/DL (ref 70–99)
GLUCOSE BLD-MCNC: 124 MG/DL (ref 70–99)
GLUCOSE BLD-MCNC: 141 MG/DL (ref 70–99)
GLUCOSE BLD-MCNC: 166 MG/DL (ref 70–99)
GLUCOSE BLD-MCNC: 217 MG/DL (ref 70–99)
PERFORMED ON: ABNORMAL
POTASSIUM SERPL-SCNC: 3.6 MMOL/L (ref 3.5–5.1)
PRO-BNP: 2156 PG/ML (ref 0–124)
SODIUM BLD-SCNC: 135 MMOL/L (ref 136–145)

## 2021-12-06 PROCEDURE — 99232 SBSQ HOSP IP/OBS MODERATE 35: CPT | Performed by: NURSE PRACTITIONER

## 2021-12-06 PROCEDURE — 93308 TTE F-UP OR LMTD: CPT

## 2021-12-06 PROCEDURE — 2580000003 HC RX 258: Performed by: INTERNAL MEDICINE

## 2021-12-06 PROCEDURE — 6360000004 HC RX CONTRAST MEDICATION: Performed by: INTERNAL MEDICINE

## 2021-12-06 PROCEDURE — 6370000000 HC RX 637 (ALT 250 FOR IP): Performed by: INTERNAL MEDICINE

## 2021-12-06 PROCEDURE — 36415 COLL VENOUS BLD VENIPUNCTURE: CPT

## 2021-12-06 PROCEDURE — 6360000002 HC RX W HCPCS: Performed by: PHYSICIAN ASSISTANT

## 2021-12-06 PROCEDURE — 6370000000 HC RX 637 (ALT 250 FOR IP): Performed by: PHYSICIAN ASSISTANT

## 2021-12-06 PROCEDURE — 80048 BASIC METABOLIC PNL TOTAL CA: CPT

## 2021-12-06 PROCEDURE — 83880 ASSAY OF NATRIURETIC PEPTIDE: CPT

## 2021-12-06 PROCEDURE — 6370000000 HC RX 637 (ALT 250 FOR IP): Performed by: NURSE PRACTITIONER

## 2021-12-06 PROCEDURE — 6360000002 HC RX W HCPCS: Performed by: NURSE PRACTITIONER

## 2021-12-06 PROCEDURE — 1200000000 HC SEMI PRIVATE

## 2021-12-06 PROCEDURE — 2580000003 HC RX 258: Performed by: PHYSICIAN ASSISTANT

## 2021-12-06 PROCEDURE — 6370000000 HC RX 637 (ALT 250 FOR IP): Performed by: HOSPITALIST

## 2021-12-06 PROCEDURE — 99232 SBSQ HOSP IP/OBS MODERATE 35: CPT

## 2021-12-06 RX ORDER — FUROSEMIDE 10 MG/ML
20 INJECTION INTRAMUSCULAR; INTRAVENOUS 2 TIMES DAILY
Status: DISCONTINUED | OUTPATIENT
Start: 2021-12-06 | End: 2021-12-07

## 2021-12-06 RX ADMIN — SACUBITRIL AND VALSARTAN 1 TABLET: 24; 26 TABLET, FILM COATED ORAL at 09:30

## 2021-12-06 RX ADMIN — SPIRONOLACTONE 12.5 MG: 25 TABLET ORAL at 09:29

## 2021-12-06 RX ADMIN — ASPIRIN 81 MG: 81 TABLET, COATED ORAL at 09:29

## 2021-12-06 RX ADMIN — FUROSEMIDE 20 MG: 10 INJECTION, SOLUTION INTRAMUSCULAR; INTRAVENOUS at 18:02

## 2021-12-06 RX ADMIN — LORAZEPAM 0.5 MG: 0.5 TABLET ORAL at 18:02

## 2021-12-06 RX ADMIN — ZOLPIDEM TARTRATE 5 MG: 5 TABLET ORAL at 21:25

## 2021-12-06 RX ADMIN — Medication 10 ML: at 09:30

## 2021-12-06 RX ADMIN — ATORVASTATIN CALCIUM 40 MG: 40 TABLET, FILM COATED ORAL at 21:16

## 2021-12-06 RX ADMIN — PERFLUTREN 1.65 MG: 6.52 INJECTION, SUSPENSION INTRAVENOUS at 14:44

## 2021-12-06 RX ADMIN — CARVEDILOL 3.12 MG: 3.12 TABLET, FILM COATED ORAL at 09:30

## 2021-12-06 RX ADMIN — FUROSEMIDE 40 MG: 10 INJECTION, SOLUTION INTRAMUSCULAR; INTRAVENOUS at 09:30

## 2021-12-06 RX ADMIN — CARVEDILOL 3.12 MG: 3.12 TABLET, FILM COATED ORAL at 18:02

## 2021-12-06 RX ADMIN — Medication 10 ML: at 21:16

## 2021-12-06 RX ADMIN — SACUBITRIL AND VALSARTAN 1 TABLET: 24; 26 TABLET, FILM COATED ORAL at 21:14

## 2021-12-06 ASSESSMENT — PAIN SCALES - GENERAL
PAINLEVEL_OUTOF10: 0

## 2021-12-06 ASSESSMENT — PAIN - FUNCTIONAL ASSESSMENT: PAIN_FUNCTIONAL_ASSESSMENT: 0-10

## 2021-12-06 NOTE — PLAN OF CARE
Problem: Falls - Risk of:  Goal: Will remain free from falls  Description: Will remain free from falls  12/6/2021 0256 by Sergio Cruz RN  Outcome: Ongoing       Problem: Falls - Risk of:  Goal: Absence of physical injury  Description: Absence of physical injury  Outcome: Ongoing

## 2021-12-06 NOTE — PROGRESS NOTES
[facility] PROGRESS NOTE    12/6/2021 3:17 PM        Name: Moraima Nolen . Admitted: 12/1/2021  Primary Care Provider: No primary care provider on file. (Tel: None)                        Subjective:  . No acute events overnight. Resting well. Pain control. Diet ok. Labs reviewed  Denies any chest pain sob.      Reviewed interval ancillary notes    Current Medications  furosemide (LASIX) injection 20 mg, BID  sacubitril-valsartan (ENTRESTO) 24-26 MG per tablet 1 tablet, BID  sodium chloride flush 0.9 % injection 5-40 mL, 2 times per day  sodium chloride flush 0.9 % injection 5-40 mL, PRN  0.9 % sodium chloride infusion, PRN  acetaminophen (TYLENOL) tablet 650 mg, Q4H PRN  milrinone (PRIMACOR) 20 mg in dextrose 5 % 100 mL infusion, Continuous  LORazepam (ATIVAN) tablet 0.5 mg, Q6H PRN  sodium chloride flush 0.9 % injection 5-40 mL, 2 times per day  sodium chloride flush 0.9 % injection 10 mL, PRN  0.9 % sodium chloride infusion, PRN  magnesium hydroxide (MILK OF MAGNESIA) 400 MG/5ML suspension 30 mL, Daily PRN  acetaminophen (TYLENOL) suppository 650 mg, Q6H PRN  enoxaparin (LOVENOX) injection 40 mg, Daily  ondansetron (ZOFRAN) injection 4 mg, Q6H PRN  zolpidem (AMBIEN) tablet 5 mg, Nightly PRN  insulin lispro (1 Unit Dial) 0-6 Units, TID WC  insulin lispro (1 Unit Dial) 0-3 Units, Nightly  glucose (GLUTOSE) 40 % oral gel 15 g, PRN  dextrose 50 % IV solution, PRN  glucagon (rDNA) injection 1 mg, PRN  dextrose 5 % solution, PRN  potassium chloride (KLOR-CON M) extended release tablet 40 mEq, PRN   Or  potassium bicarb-citric acid (EFFER-K) effervescent tablet 40 mEq, PRN   Or  potassium chloride 10 mEq/100 mL IVPB (Peripheral Line), PRN  carvedilol (COREG) tablet 3.125 mg, BID WC  spironolactone (ALDACTONE) tablet 12.5 mg, Daily  aspirin EC tablet 81 mg, Daily  atorvastatin (LIPITOR) tablet 40 mg, Nightly        Objective:  /70   Pulse 109   Temp 97.6 °F (36.4 °C) (Oral)   Resp 16   Ht 6' 1\" (1.854 m)   Wt 184 lb 9.6 oz (83.7 kg)   SpO2 92%   BMI 24.36 kg/m²     Intake/Output Summary (Last 24 hours) at 12/6/2021 1517  Last data filed at 12/6/2021 0929  Gross per 24 hour   Intake 818.68 ml   Output 1000 ml   Net -181.32 ml      Wt Readings from Last 3 Encounters:   12/06/21 184 lb 9.6 oz (83.7 kg)       General appearance:  Appears comfortable  Eyes: Sclera clear. Pupils equal.  ENT: Moist oral mucosa. Trachea midline, no adenopathy. Cardiovascular: Regular rhythm, normal S1, S2. No murmur. No edema in lower extremities  Respiratory: Not using accessory muscles. Good inspiratory effort. Clear to auscultation bilaterally, no wheeze or crackles. GI: Abdomen soft, no tenderness, not distended, normal bowel sounds  Musculoskeletal: No cyanosis in digits, neck supple  Neurology: CN 2-12 grossly intact. No speech or motor deficits  Psych: Normal affect. Alert and oriented in time, place and person  Skin: Warm, dry, normal turgor    Labs and Tests:  CBC:   Recent Labs     12/04/21  0654   WBC 7.0   HGB 14.5        BMP:    Recent Labs     12/04/21  0654 12/05/21  0826 12/06/21  0603    137 135*   K 3.8 3.8 3.6    103 103   CO2 22 26 20*   BUN 17 16 20   CREATININE 1.0 1.0 0.9   GLUCOSE 121* 151* 141*     Hepatic: No results for input(s): AST, ALT, ALB, BILITOT, ALKPHOS in the last 72 hours. Discussed care with family and patient             Spent 30  minutes with patient and family at bedside and on unit reviewing medical records and labs, spent greater than 50% time counseling patient and family on diagnosis and plan   Problem List  Active Problems:    Acute heart failure ([address]  Resolved Problems:    * No resolved hospital problems.  *       Assessment & Plan:   Acute systolic CHF exacerbation  -With new onset with low EF  -Continue diuresis. Cardiomyopathy  -Patient is status post cath with low EF and multivessel disease  -Patient has been referred for CABG evaluation  -CT surgery following plan for repeat echo today post diuresis  -Further recs on timing of CABG      Diet: ADULT DIET; Regular; 3 carb choices (45 gm/meal);  Low Sodium (2 gm)  Code:Full Code  DVT PPX lovenox       Jhony Joseph MD   12/6/2021 3:17 PM

## 2021-12-06 NOTE — PROGRESS NOTES
Cardiothoracic Surgery Progress Note    CC: CHF, MR, TR, CAD, ICM    Subjective:  Hemodynamically stable, RA, afebrile. Alert and oriented X 3. Weight down this am. Denies any chest pain, shortness of breath. WT:83.9 kg/84 kg   admission 90.3 kg    Vital Signs:   /70   Pulse 102   Temp 98 °F (36.7 °C) (Oral)   Resp 16   Ht 6' 1\" (1.854 m)   Wt 184 lb 9.6 oz (83.7 kg)   SpO2 94%   BMI 24.36 kg/m²     Gtts:  Milrinone 20mg in dextrose 5% 100mL infusion    Physical Exam:   Cardiac: regular, murmur heard  Lungs: clear to auscultation, decreased bases bilaterally  Abdomen:  + BM, active bowel sounds heard in all 4 quadrants   Vascular:  pulses all palpable   Extremities: generalized, trace ankle edema  :  UOP 1550/?/2550   Lasix 40mg IV bid        Labs:   CBC: Recent Labs     12/03/21  1000 12/04/21  0654   WBC 6.9 7.0   HGB 12.3* 14.5   HCT 36.0* 43.2    181     BMP:   Recent Labs     12/05/21  0826 12/06/21  0603   K 3.8 3.6   CREATININE 1.0 0.9   CALCIUM 9.3 9.1       Assessment/Plan:  As per CC:     Plan:     CHF, MR, TR, CAD, ICM  Continue medical treatment on CHF   Diuresis - strict I/O   Repeat echo today and will reevaluate after results are reviewed.             Electronically signed by KATE Dos Santos on 12/6/2021 at 9:11 AM

## 2021-12-06 NOTE — PROGRESS NOTES
[facility]  HEART FAILURE  Progress Note      Admit Date 12/1/2021     Reason for Consult:      Reason for Consultation/Chief Complaint: Edema    HPI:    Shaun Mcneil is a 64 y.o. male with PMH DM admitted for cough and LE edema, WEST and orthopnea after viral illness. Echo showed severely depressed EF and pt had LHC  with severe 3 vessel dz and MR Chasity Plan for Milrinone gtt with repeat limited TTE today and potential for viability study pending comparison to previous echo. Subjective:  Patient is being seen for new onset CHF/CMP. There were no acute overnight cardiac events.    Today Mr. Maura Yung denies chest pain, shortness of breath, palpitations and feels well, has had some hypotension with diuresis      Baseline Weight:    Wt Readings from Last 3 Encounters:   12/06/21 184 lb 9.6 oz (83.7 kg)          NYHA Class II  Objective:   /70   Pulse 102   Temp 98 °F (36.7 °C) (Oral)   Resp 16   Ht 6' 1\" (1.854 m)   Wt 184 lb 9.6 oz (83.7 kg)   SpO2 94%   BMI 24.36 kg/m²       Intake/Output Summary (Last 24 hours) at 12/6/2021 0902  Last data filed at 12/6/2021 0400  Gross per 24 hour   Intake 600 ml   Output 1650 ml   Net -1050 ml      Wt Readings from Last 3 Encounters:   12/06/21 184 lb 9.6 oz (83.7 kg)      In: 600 [P.O.:600]  Out: 1000       Physical Exam:  General Appearance:  Non-obese/Well Nourished  Respiratory:  · Resp Auscultation: Normal breath sounds without dullness  Cardiovascular:  · Auscultation: Regular rate and rhythm, normal S1S2, +murmur  · Palpation: Normal    · JVD: none  · Pedal Pulses: 2+ and equal   Abdomen:  · Soft, NT, ND, + bs  Extremities:  · No Cyanosis or Clubbing  · Extremities: No edema  Neurological/Psychiatric:  · Oriented to time, place, and person  · Non-anxious    MEDICATIONS:   Scheduled Meds:   Scheduled Meds:   sacubitril-valsartan  1 tablet Oral BID    sodium chloride flush  5-40 mL IntraVENous 2 times per day    sodium chloride flush  5-40 mL IntraVENous 2 times per day    enoxaparin  40 mg SubCUTAneous Daily    furosemide  40 mg IntraVENous BID    insulin lispro  0-6 Units SubCUTAneous TID     insulin lispro  0-3 Units SubCUTAneous Nightly    carvedilol  3.125 mg Oral BID     spironolactone  12.5 mg Oral Daily    aspirin  81 mg Oral Daily    atorvastatin  40 mg Oral Nightly     Continuous Infusions:   sodium chloride      milrinone 0.125 mcg/kg/min (12/05/21 2142)    sodium chloride      dextrose       PRN Meds:.perflutren lipid microspheres, sodium chloride flush, sodium chloride, acetaminophen, LORazepam, sodium chloride flush, sodium chloride, magnesium hydroxide, [DISCONTINUED] acetaminophen **OR** acetaminophen, ondansetron, zolpidem, glucose, dextrose, glucagon (rDNA), dextrose, potassium chloride **OR** potassium alternative oral replacement **OR** potassium chloride  Continuous Infusions:   sodium chloride      milrinone 0.125 mcg/kg/min (12/05/21 2142)    sodium chloride      dextrose         Intake/Output Summary (Last 24 hours) at 12/6/2021 0902  Last data filed at 12/6/2021 0400  Gross per 24 hour   Intake 600 ml   Output 1650 ml   Net -1050 ml       Lab Data:  CBC:   Lab Results   Component Value Date    WBC 7.0 12/04/2021    HGB 14.5 12/04/2021     12/04/2021     BMP:  Lab Results   Component Value Date     12/06/2021    K 3.6 12/06/2021    K 4.1 12/01/2021     12/06/2021    CO2 20 12/06/2021    BUN 20 12/06/2021    CREATININE 0.9 12/06/2021    GLUCOSE 141 12/06/2021     INR: No results found for: INR     CARDIAC LABS  ENZYMES:  No results for input(s): CKMB, CKMBINDEX, TROPONINI in the last 72 hours.     Invalid input(s): CKTOTAL;3  FASTING LIPID PANEL:  Lab Results   Component Value Date    HDL 37 12/03/2021    LDLCALC 72 12/03/2021    TRIG 65 12/03/2021     LIVER PROFILE:  Lab Results   Component Value Date    AST 67 12/01/2021    ALT 72 12/01/2021     BNP:   Lab Results   Component Value Date PROBNP 2,156 12/06/2021    PROBNP 2,458 12/04/2021    PROBNP 3,325 12/01/2021     Iron Studies:  No results found for: FERRITIN  No results found for: IRON, TIBC, FERRITIN   Iron Deficiency Anemia:  No   IV Iron Therapy:  No  2017 ACC/AHA HF Guidelines:   intravenous iron replacement in patients with New York Heart Association (NYHA) class II and III HF and iron deficiency(ferritin <100 ng/ml or 100-300 ng/ml if transferrin saturation <20%), to improve functional status and QoL. 1. WEIGHT: Admit Weight: 199 lb 1.6 oz (90.3 kg)      Today  Weight: 184 lb 9.6 oz (83.7 kg)   2. I/O     Intake/Output Summary (Last 24 hours) at 12/6/2021 0902  Last data filed at 12/6/2021 0400  Gross per 24 hour   Intake 600 ml   Output 1650 ml   Net -1050 ml       Cardiac Testing:     ECHO 12/2/21:   Summary   Left ventricular cavity size is mildly dilated. Overall left ventricular systolic function appears severely reduced. Ejection fraction is visually estimated to be 15-20%. Cannot exclude apical   thrombus as contrast not administered. There is severe diffuse global hypokinesis. Grade III diastolic dysfunction. Left atrium is mildly dilated. The right ventricle is moderately dilated with moderately reduced function   by visual assessment. Severe mitral regurgitation with single posteriorly directed jet. Severe tricuspid regurgitation. IVC size is dilated (>2.1 cm) and collapses < 50% with respiration   consistent with elevated RA pressure (15 mmHg). Cannot estimate PASP given severe TR, but PASP elevated. Critical findings relayed to Dr. Mellissa Ramsey of severely reduced EF with low   stroke volume.        [facility] 12/3/21:   Left Heart Cath  Dominance: Right     LM: 30% distal   LAD: large, arborizing, high first diagonal with 70% mid stenosis; LAD has a 95% stenosis after small, diffusely diseased second diagonal and is JOHANNA I flow to the apex   LCx: small, diffusely diseased that is subtotaled after small first marginal and receiving left to left collaterals to smaller second marginal/distal LCX  RCA: 90% proximal, 70% mid, 100% distal stenoses with left to right collaterals to RPDA      LVEDP: 23 mmHg  LVEF: 15% with severe global HK     Impression/Recommendations:  Severe, three vessel coronary disease  Severe cardiomyopathy  Severe mitral regurgitation   CHADS2VASC=3  CTS consult to discuss CABG/ MVR/KATHARINE Clip    Assessment/Plan:     1. AHF-8L out on IV lasix, continue milrinone,  decrease IV lasix dose to avoid hypotension  2. CMP- repeat echo today, continue entresto, coreg, and dorinda  3. MVR -per CTS  4.  Hypotension - decrease diuretic dose          I appreciate the opportunity of cooperating in the care of this individual.    Ayleen Croft APRN - CNP, ACNP, [address] 12/6/2021, 9:02 AM  Heart Failure  The [address], [address]  Ph: [phone]      Core Measures:   · Discharge instructions:   · LVEF documented:   · ACEI for LV dysfunction:   · Smoking Cessation:

## 2021-12-07 LAB
BASOPHILS ABSOLUTE: 0.1 K/UL (ref 0–0.2)
BASOPHILS RELATIVE PERCENT: 0.8 %
EOSINOPHILS ABSOLUTE: 0.2 K/UL (ref 0–0.6)
EOSINOPHILS RELATIVE PERCENT: 2.8 %
GLUCOSE BLD-MCNC: 105 MG/DL (ref 70–99)
GLUCOSE BLD-MCNC: 110 MG/DL (ref 70–99)
GLUCOSE BLD-MCNC: 134 MG/DL (ref 70–99)
GLUCOSE BLD-MCNC: 138 MG/DL (ref 70–99)
HCT VFR BLD CALC: 46.9 % (ref 40.5–52.5)
HEMOGLOBIN: 15.5 G/DL (ref 13.5–17.5)
LYMPHOCYTES ABSOLUTE: 2.8 K/UL (ref 1–5.1)
LYMPHOCYTES RELATIVE PERCENT: 39.5 %
MCH RBC QN AUTO: 29.8 PG (ref 26–34)
MCHC RBC AUTO-ENTMCNC: 33.1 G/DL (ref 31–36)
MCV RBC AUTO: 90.1 FL (ref 80–100)
MONOCYTES ABSOLUTE: 0.7 K/UL (ref 0–1.3)
MONOCYTES RELATIVE PERCENT: 9.3 %
NEUTROPHILS ABSOLUTE: 3.3 K/UL (ref 1.7–7.7)
NEUTROPHILS RELATIVE PERCENT: 47.6 %
PDW BLD-RTO: 16.6 % (ref 12.4–15.4)
PERFORMED ON: ABNORMAL
PLATELET # BLD: 201 K/UL (ref 135–450)
PMV BLD AUTO: 8.5 FL (ref 5–10.5)
RBC # BLD: 5.21 M/UL (ref 4.2–5.9)
WBC # BLD: 7 K/UL (ref 4–11)

## 2021-12-07 PROCEDURE — 6370000000 HC RX 637 (ALT 250 FOR IP): Performed by: NURSE PRACTITIONER

## 2021-12-07 PROCEDURE — APPSS15 APP SPLIT SHARED TIME 0-15 MINUTES: Performed by: NURSE PRACTITIONER

## 2021-12-07 PROCEDURE — 80048 BASIC METABOLIC PNL TOTAL CA: CPT

## 2021-12-07 PROCEDURE — 6370000000 HC RX 637 (ALT 250 FOR IP): Performed by: INTERNAL MEDICINE

## 2021-12-07 PROCEDURE — 2580000003 HC RX 258: Performed by: PHYSICIAN ASSISTANT

## 2021-12-07 PROCEDURE — 99232 SBSQ HOSP IP/OBS MODERATE 35: CPT | Performed by: NURSE PRACTITIONER

## 2021-12-07 PROCEDURE — 6370000000 HC RX 637 (ALT 250 FOR IP): Performed by: HOSPITALIST

## 2021-12-07 PROCEDURE — 6360000002 HC RX W HCPCS: Performed by: PHYSICIAN ASSISTANT

## 2021-12-07 PROCEDURE — APPNB30 APP NON BILLABLE TIME 0-30 MINS: Performed by: NURSE PRACTITIONER

## 2021-12-07 PROCEDURE — 36415 COLL VENOUS BLD VENIPUNCTURE: CPT

## 2021-12-07 PROCEDURE — 85025 COMPLETE CBC W/AUTO DIFF WBC: CPT

## 2021-12-07 PROCEDURE — 1200000000 HC SEMI PRIVATE

## 2021-12-07 PROCEDURE — 2100000000 HC CCU R&B

## 2021-12-07 PROCEDURE — 93308 TTE F-UP OR LMTD: CPT

## 2021-12-07 PROCEDURE — 2000000000 HC ICU R&B

## 2021-12-07 PROCEDURE — 2580000003 HC RX 258: Performed by: INTERNAL MEDICINE

## 2021-12-07 PROCEDURE — 6370000000 HC RX 637 (ALT 250 FOR IP): Performed by: PHYSICIAN ASSISTANT

## 2021-12-07 PROCEDURE — 6360000002 HC RX W HCPCS: Performed by: INTERNAL MEDICINE

## 2021-12-07 RX ORDER — FUROSEMIDE 20 MG/1
20 TABLET ORAL 2 TIMES DAILY
Status: DISCONTINUED | OUTPATIENT
Start: 2021-12-07 | End: 2021-12-10

## 2021-12-07 RX ADMIN — SACUBITRIL AND VALSARTAN 0.5 TABLET: 24; 26 TABLET, FILM COATED ORAL at 11:05

## 2021-12-07 RX ADMIN — SPIRONOLACTONE 12.5 MG: 25 TABLET ORAL at 09:58

## 2021-12-07 RX ADMIN — SACUBITRIL AND VALSARTAN 1 TABLET: 24; 26 TABLET, FILM COATED ORAL at 20:41

## 2021-12-07 RX ADMIN — LORAZEPAM 0.5 MG: 0.5 TABLET ORAL at 01:04

## 2021-12-07 RX ADMIN — MILRINONE LACTATE 0.12 MCG/KG/MIN: 0.2 INJECTION, SOLUTION INTRAVENOUS at 06:44

## 2021-12-07 RX ADMIN — ENOXAPARIN SODIUM 40 MG: 100 INJECTION SUBCUTANEOUS at 11:08

## 2021-12-07 RX ADMIN — ZOLPIDEM TARTRATE 5 MG: 5 TABLET ORAL at 23:14

## 2021-12-07 RX ADMIN — FUROSEMIDE 20 MG: 20 TABLET ORAL at 18:47

## 2021-12-07 RX ADMIN — ASPIRIN 81 MG: 81 TABLET, COATED ORAL at 11:06

## 2021-12-07 RX ADMIN — ATORVASTATIN CALCIUM 40 MG: 40 TABLET, FILM COATED ORAL at 20:42

## 2021-12-07 RX ADMIN — CARVEDILOL 3.12 MG: 3.12 TABLET, FILM COATED ORAL at 11:11

## 2021-12-07 RX ADMIN — Medication 10 ML: at 11:06

## 2021-12-07 RX ADMIN — CARVEDILOL 3.12 MG: 3.12 TABLET, FILM COATED ORAL at 18:47

## 2021-12-07 RX ADMIN — Medication 10 ML: at 11:12

## 2021-12-07 ASSESSMENT — PAIN SCALES - GENERAL
PAINLEVEL_OUTOF10: 0

## 2021-12-07 NOTE — PROGRESS NOTES
VSS - afebrile. Pt is alert and oriented x 4 with no history of falls. Assessment completed as charted. Bed is in lowest position with 2/4 bed rails raised, wheels locked and call light within reach - patient wearing non-skid socks and verbalizes understanding to call out for assistance. Family at bedside. No further requests at this time. Will continue to monitor.      Vitals:    12/06/21 1948   BP: 94/60   Pulse: 109   Resp: 17   Temp: 97.3 °F (36.3 °C)   SpO2: 95%

## 2021-12-07 NOTE — PROGRESS NOTES
[facility]  HEART FAILURE  Progress Note      Admit Date 12/1/2021     Reason for Consult:      Reason for Consultation/Chief Complaint: Edema    HPI:    Holden Hdz is a 64 y.o. male with PMH DM admitted for cough and LE edema, WEST and orthopnea after viral illness. Echo showed severely depressed EF and pt had LHC  with severe 3 vessel dz and MR. Ortiz yesterday still with very low EF, hopefully pt will agree to surgery. Continue Milrinone until then      Subjective:  Patient is being seen for new onset CHF/CMP. There were no acute overnight cardiac events. Today Mr. Theodore Mohr denies chest pain, shortness of breath, palpitations and feels well, has had some hypotension with diuresis      Baseline Weight:    Wt Readings from Last 3 Encounters:   12/07/21 175 lb 3.2 oz (79.5 kg)          NYHA Class II  Objective:   BP 97/66   Pulse 108   Temp 97.7 °F (36.5 °C) (Oral)   Resp 17   Ht 6' 1\" (1.854 m)   Wt 175 lb 3.2 oz (79.5 kg)   SpO2 97%   BMI 23.11 kg/m²       Intake/Output Summary (Last 24 hours) at 12/7/2021 0923  Last data filed at 12/7/2021 0454  Gross per 24 hour   Intake 531.98 ml   Output 275 ml   Net 256.98 ml      Wt Readings from Last 3 Encounters:   12/07/21 175 lb 3.2 oz (79.5 kg)      In: 313.3 [P.O.:240;  I.V.:73.3]  Out: 275       Physical Exam:  General Appearance:  Non-obese/Well Nourished  Respiratory:  · Resp Auscultation: Normal breath sounds without dullness  Cardiovascular:  · Auscultation: Regular rate and rhythm, normal S1S2, +murmur  · Palpation: Normal    · JVD: none  · Pedal Pulses: 2+ and equal   Abdomen:  · Soft, NT, ND, + bs  Extremities:  · No Cyanosis or Clubbing  · Extremities: No edema  Neurological/Psychiatric:  · Oriented to time, place, and person  · Non-anxious    MEDICATIONS:   Scheduled Meds:   Scheduled Meds:   furosemide  20 mg IntraVENous BID    sacubitril-valsartan  1 tablet Oral BID    sodium chloride flush  5-40 mL IntraVENous 2 times per day    sodium chloride flush  5-40 mL IntraVENous 2 times per day    enoxaparin  40 mg SubCUTAneous Daily    insulin lispro  0-6 Units SubCUTAneous TID WC    insulin lispro  0-3 Units SubCUTAneous Nightly    carvedilol  3.125 mg Oral BID WC    spironolactone  12.5 mg Oral Daily    aspirin  81 mg Oral Daily    atorvastatin  40 mg Oral Nightly     Continuous Infusions:   sodium chloride      milrinone 0.125 mcg/kg/min (12/07/21 0644)    sodium chloride      dextrose       PRN Meds:.sodium chloride flush, sodium chloride, acetaminophen, LORazepam, sodium chloride flush, sodium chloride, magnesium hydroxide, [DISCONTINUED] acetaminophen **OR** acetaminophen, ondansetron, zolpidem, glucose, dextrose, glucagon (rDNA), dextrose, potassium chloride **OR** potassium alternative oral replacement **OR** potassium chloride  Continuous Infusions:   sodium chloride      milrinone 0.125 mcg/kg/min (12/07/21 0644)    sodium chloride      dextrose         Intake/Output Summary (Last 24 hours) at 12/7/2021 0923  Last data filed at 12/7/2021 0454  Gross per 24 hour   Intake 531.98 ml   Output 275 ml   Net 256.98 ml       Lab Data:  CBC:   Lab Results   Component Value Date    WBC 7.0 12/04/2021    HGB 14.5 12/04/2021     12/04/2021     BMP:  Lab Results   Component Value Date     12/06/2021    K 3.6 12/06/2021    K 4.1 12/01/2021     12/06/2021    CO2 20 12/06/2021    BUN 20 12/06/2021    CREATININE 0.9 12/06/2021    GLUCOSE 141 12/06/2021     INR: No results found for: INR     CARDIAC LABS  ENZYMES:  No results for input(s): CKMB, CKMBINDEX, TROPONINI in the last 72 hours.     Invalid input(s): CKTOTAL;3  FASTING LIPID PANEL:  Lab Results   Component Value Date    HDL 37 12/03/2021    LDLCALC 72 12/03/2021    TRIG 65 12/03/2021     LIVER PROFILE:  Lab Results   Component Value Date    AST 67 12/01/2021    ALT 72 12/01/2021     BNP:   Lab Results   Component Value Date    PROBNP 2,156 12/06/2021    PROBNP single posteriorly directed jet. Severe tricuspid regurgitation. IVC size is dilated (>2.1 cm) and collapses < 50% with respiration   consistent with elevated RA pressure (15 mmHg). Cannot estimate PASP given severe TR, but PASP elevated. Critical findings relayed to Dr. Esmer Palm of severely reduced EF with low   stroke volume. [facility] 12/3/21:   Left Heart Cath  Dominance: Right     LM: 30% distal   LAD: large, arborizing, high first diagonal with 70% mid stenosis; LAD has a 95% stenosis after small, diffusely diseased second diagonal and is JOHANNA I flow to the apex   LCx: small, diffusely diseased that is subtotaled after small first marginal and receiving left to left collaterals to smaller second marginal/distal LCX  RCA: 90% proximal, 70% mid, 100% distal stenoses with left to right collaterals to RPDA      LVEDP: 23 mmHg  LVEF: 15% with severe global HK     Impression/Recommendations:  Severe, three vessel coronary disease  Severe cardiomyopathy  Severe mitral regurgitation   CHADS2VASC=3  CTS consult to discuss CABG/ MVR/KATHARINE Clip    Assessment/Plan:     1. AHF- compensated, stop IV lasix and start po, continue dorinda and Milrinone gtt until surgery tentatively friday  2. CMP-continue entresto, coreg, and dorinda, start SGLT2i at discharge  3. MVR -per CTS  4.  Hypotension - stop IV lasix, may need to decrease entresto dose if continued          I appreciate the opportunity of cooperating in the care of this individual.    Lana Rawls, APRN - CNP, ACNP, [address] 12/7/2021, 9:23 AM  Heart Failure  [facility], [address]  Ph: [phone]      Core Measures:   · Discharge instructions:   · LVEF documented:   · ACEI for LV dysfunction:   · Smoking Cessation:

## 2021-12-07 NOTE — PROGRESS NOTES
[facility]    12/7/2021 8:50 AM        Name: Janelle Meyers . Admitted: 12/1/2021  Primary Care Provider: No primary care provider on file. (Tel: None)                        Subjective:  . No acute events overnight. Resting well. Pain control. Diet ok. Labs reviewed  Denies any chest pain sob.      Reviewed interval ancillary notes    Current Medications  furosemide (LASIX) injection 20 mg, BID  sacubitril-valsartan (ENTRESTO) 24-26 MG per tablet 1 tablet, BID  sodium chloride flush 0.9 % injection 5-40 mL, 2 times per day  sodium chloride flush 0.9 % injection 5-40 mL, PRN  0.9 % sodium chloride infusion, PRN  acetaminophen (TYLENOL) tablet 650 mg, Q4H PRN  milrinone (PRIMACOR) 20 mg in dextrose 5 % 100 mL infusion, Continuous  LORazepam (ATIVAN) tablet 0.5 mg, Q6H PRN  sodium chloride flush 0.9 % injection 5-40 mL, 2 times per day  sodium chloride flush 0.9 % injection 10 mL, PRN  0.9 % sodium chloride infusion, PRN  magnesium hydroxide (MILK OF MAGNESIA) 400 MG/5ML suspension 30 mL, Daily PRN  acetaminophen (TYLENOL) suppository 650 mg, Q6H PRN  enoxaparin (LOVENOX) injection 40 mg, Daily  ondansetron (ZOFRAN) injection 4 mg, Q6H PRN  zolpidem (AMBIEN) tablet 5 mg, Nightly PRN  insulin lispro (1 Unit Dial) 0-6 Units, TID WC  insulin lispro (1 Unit Dial) 0-3 Units, Nightly  glucose (GLUTOSE) 40 % oral gel 15 g, PRN  dextrose 50 % IV solution, PRN  glucagon (rDNA) injection 1 mg, PRN  dextrose 5 % solution, PRN  potassium chloride (KLOR-CON M) extended release tablet 40 mEq, PRN   Or  potassium bicarb-citric acid (EFFER-K) effervescent tablet 40 mEq, PRN   Or  potassium chloride 10 mEq/100 mL IVPB (Peripheral Line), PRN  carvedilol (COREG) tablet 3.125 mg, BID WC  spironolactone (ALDACTONE) tablet 12.5 mg, Daily  aspirin EC tablet 81 mg, Daily  atorvastatin (LIPITOR) tablet 40 mg, Nightly        Objective:  BP 97/66   Pulse 108   Temp 97.7 °F (36.5 °C) (Oral)   Resp 17   Ht 6' 1\" (1.854 m)   Wt 175 lb 3.2 oz (79.5 kg)   SpO2 97%   BMI 23.11 kg/m²     Intake/Output Summary (Last 24 hours) at 12/7/2021 0850  Last data filed at 12/7/2021 0454  Gross per 24 hour   Intake 531.98 ml   Output 275 ml   Net 256.98 ml      Wt Readings from Last 3 Encounters:   12/07/21 175 lb 3.2 oz (79.5 kg)       General appearance:  Appears comfortable  Eyes: Sclera clear. Pupils equal.  ENT: Moist oral mucosa. Trachea midline, no adenopathy. Cardiovascular: Regular rhythm, normal S1, S2. No murmur. No edema in lower extremities  Respiratory: Not using accessory muscles. Good inspiratory effort. Clear to auscultation bilaterally, no wheeze or crackles. GI: Abdomen soft, no tenderness, not distended, normal bowel sounds  Musculoskeletal: No cyanosis in digits, neck supple  Neurology: CN 2-12 grossly intact. No speech or motor deficits  Psych: Normal affect. Alert and oriented in time, place and person  Skin: Warm, dry, normal turgor    Labs and Tests:  CBC:   No results for input(s): WBC, HGB, PLT in the last 72 hours. BMP:    Recent Labs     12/05/21  0826 12/06/21  0603    135*   K 3.8 3.6    103   CO2 26 20*   BUN 16 20   CREATININE 1.0 0.9   GLUCOSE 151* 141*     Hepatic: No results for input(s): AST, ALT, ALB, BILITOT, ALKPHOS in the last 72 hours. Discussed care with family and patient             Spent 30  minutes with patient and family at bedside and on unit reviewing medical records and labs, spent greater than 50% time counseling patient and family on diagnosis and plan   Problem List  Active Problems:    Acute heart failure ([address]  Resolved Problems:    * No resolved hospital problems. *       Assessment & Plan:   Acute systolic CHF exacerbation  -With new onset with low EF  -Continue diuresis.   -Still on milrinone drip.  -Has been started on [facility] continue beta-blocker first      Cardiomyopathy  -Patient is status post cath with low EF and multivessel disease  -Patient has been referred for CABG evaluation  -Repeat echo did not show any significant change  -Still persistently not agreeable to CABG  -Follow recommendation      Diet: ADULT DIET; Regular; 3 carb choices (45 gm/meal);  Low Sodium (2 gm)  Code:Full Code  DVT PPX lovenox       Jackquelyn Holstein, MD   12/7/2021 8:50 AM

## 2021-12-07 NOTE — PROGRESS NOTES
Cardiothoracic Surgery Progress Note    CC: Severe mitral regurgitation, moderate tricuspid regurgitation, severe multivessel coronary artery disease, ICM, reduced EF, CHF    Subjective:  Hemodynamically stable, RA currently, afebrile. Alert and oriented X 3. Patient denying any c/o chest pain or SOB at present time. Weight down this am.     WT:79.5 kg/83.7 kg   admit 90.3 kg    Vital Signs:   BP 97/66   Pulse 108   Temp 97.7 °F (36.5 °C) (Oral)   Resp 17   Ht 6' 1\" (1.854 m)   Wt 175 lb 3.2 oz (79.5 kg)   SpO2 97%   BMI 23.11 kg/m²      Gtts: milrinone @ . 125 mcg/kg/min    Physical Exam:   Cardiac: regular, no murmur  Lungs: clear to auscultation, decreased bases bilaterally  Abdomen:  BS NA X 4,   Vascular:  pulses all palpable   Extremities: trace edema LE  :  UOP ?/?/275   Lasix 20 mg IVP BID      Labs:   BMP:   Recent Labs     12/05/21  0826 12/06/21  0603   K 3.8 3.6   CREATININE 1.0 0.9   CALCIUM 9.3 9.1     Imaging:   ECHO: 12/6/2021  Definity administered for endocardial border definition and to rule out LV   thrombus. Left ventricular cavity size is dilated with normal left ventricular wall   thickness. Overall, left ventricular systolic function appears severely reduced. Ejection fraction is visually estimated to be in the 25% range. (Guajardo's=26%)   There is severe diffuse hypokinesis with akinesis of the distal septum and   apex. No evidence of left ventricular mass or thrombus noted. Severe mitral regurgitation directed centrally and posteriorly with systolic   flow reversal in pulmonary veins. Moderate tricuspid regurgitation with a PASP of 46 mmHg. Assessment/Plan:  As per CC:     Plan:   Severe mitral regurgitation, severe MV CAD, moderate tricuspid regurgitation  -Continuing to work-up if patient is appropriate candidate for MVR + CABG. Echo reviewed, EF improved to 25%.  Further recommendations to follow.   -Patient still unsure whether he wants to proceed with open heart surgery. Brother at bedside. All questions answered.    ICM, acute systolic CHF  -on milrinone gtt  -Lasix 20 mg IV BID  -entresto, coreg, spironolactone      Electronically signed by MICHELLE Murphy CNP on 12/7/2021 at 8:56 AM

## 2021-12-07 NOTE — PROGRESS NOTES
Physician Progress Note      Mervin Rodriguez  [facility] #:                  [id number]  :                       1960  ADMIT DATE:       2021 9:20 PM  DISCH DATE:  RESPONDING  PROVIDER #:        Princess Louis SCHUSTER          QUERY TEXT:    Patient admitted with Acute Systolic CHF. Noted documentation in ED note of   Sinus tachycardia anterior septal infarct age-indeterminate on 21. If   possible, please document in the progress notes and discharge summary if you   are evaluating and/or treating any of the following: The medical record reflects the following:  Risk Factors: Acute sCHF, s/p LHC w/severe multiple vessel disease, Severe   Cardiomyopathy, Severe Mitral Regurgitation, DM  Clinical Indicators: Per ED Note 21 \"Clinical Impression:  Sinus   tachycardia anterior septal infarct age-indeterminate. \"   Labs on admission   21 TROPONIN x 3 0.01 EKG 21 \"Sinus tachycardia Left posterior   fascicular block Possible Inferior infarct , age undetermined Anteroseptal   infarct , age undetermined Confirmed by Barnabas Dakins"  Per OP Report [facility]   21 \"Impression/Recommendations: Severe, three vessel coronary disease    Severe cardiomyopathy Severe mitral regurgitation  CHADS2VASC=3 CTS consult to   discuss CABG/ MVR/KATHARINE Clip  Treatment: Cardiology/Cardiac Surgery Consults, [facility], 2D Echo, CxR, CT   Chest/PE, iv Lasix, iv Primacor, monitor cardiac status & labs  Options provided:  -- NSTEMI  -- Type 2 MI  -- Demand Ischemia with MI  -- Demand Ischemia only, no MI  -- After further study, MI ruled out  -- Other - I will add my own diagnosis  -- Disagree - Not applicable / Not valid  -- Disagree - Clinically unable to determine / Unknown  -- Refer to Clinical Documentation Reviewer    PROVIDER RESPONSE TEXT:    This patient has an NSTEMI.     Query created by: Luis Eduardo Mckee on 2021 12:21 PM      Electronically signed by:  Princess Louis SCHUSTER 2021 12:34 PM

## 2021-12-08 LAB
ANION GAP SERPL CALCULATED.3IONS-SCNC: 20 MMOL/L (ref 3–16)
BUN BLDV-MCNC: 20 MG/DL (ref 7–20)
CALCIUM SERPL-MCNC: 9.2 MG/DL (ref 8.3–10.6)
CHLORIDE BLD-SCNC: 102 MMOL/L (ref 99–110)
CO2: 17 MMOL/L (ref 21–32)
CREAT SERPL-MCNC: 1 MG/DL (ref 0.9–1.3)
GFR AFRICAN AMERICAN: >60
GFR NON-AFRICAN AMERICAN: >60
GLUCOSE BLD-MCNC: 109 MG/DL (ref 70–99)
GLUCOSE BLD-MCNC: 112 MG/DL (ref 70–99)
GLUCOSE BLD-MCNC: 158 MG/DL (ref 70–99)
GLUCOSE BLD-MCNC: 187 MG/DL (ref 70–99)
GLUCOSE BLD-MCNC: 99 MG/DL (ref 70–99)
PERFORMED ON: ABNORMAL
POTASSIUM SERPL-SCNC: 4.5 MMOL/L (ref 3.5–5.1)
PRO-BNP: 1345 PG/ML (ref 0–124)
SODIUM BLD-SCNC: 139 MMOL/L (ref 136–145)

## 2021-12-08 PROCEDURE — 1200000000 HC SEMI PRIVATE

## 2021-12-08 PROCEDURE — 36415 COLL VENOUS BLD VENIPUNCTURE: CPT

## 2021-12-08 PROCEDURE — 6370000000 HC RX 637 (ALT 250 FOR IP): Performed by: NURSE PRACTITIONER

## 2021-12-08 PROCEDURE — 2000000000 HC ICU R&B

## 2021-12-08 PROCEDURE — 6360000002 HC RX W HCPCS: Performed by: PHYSICIAN ASSISTANT

## 2021-12-08 PROCEDURE — 6370000000 HC RX 637 (ALT 250 FOR IP): Performed by: INTERNAL MEDICINE

## 2021-12-08 PROCEDURE — 6370000000 HC RX 637 (ALT 250 FOR IP): Performed by: HOSPITALIST

## 2021-12-08 PROCEDURE — 83880 ASSAY OF NATRIURETIC PEPTIDE: CPT

## 2021-12-08 PROCEDURE — 99232 SBSQ HOSP IP/OBS MODERATE 35: CPT | Performed by: NURSE PRACTITIONER

## 2021-12-08 RX ADMIN — LORAZEPAM 0.5 MG: 0.5 TABLET ORAL at 02:32

## 2021-12-08 RX ADMIN — ASPIRIN 81 MG: 81 TABLET, COATED ORAL at 09:08

## 2021-12-08 RX ADMIN — FUROSEMIDE 20 MG: 20 TABLET ORAL at 16:59

## 2021-12-08 RX ADMIN — FUROSEMIDE 20 MG: 20 TABLET ORAL at 09:08

## 2021-12-08 RX ADMIN — CARVEDILOL 3.12 MG: 3.12 TABLET, FILM COATED ORAL at 09:08

## 2021-12-08 RX ADMIN — ATORVASTATIN CALCIUM 40 MG: 40 TABLET, FILM COATED ORAL at 19:36

## 2021-12-08 RX ADMIN — SPIRONOLACTONE 12.5 MG: 25 TABLET ORAL at 09:08

## 2021-12-08 RX ADMIN — SACUBITRIL AND VALSARTAN 1 TABLET: 24; 26 TABLET, FILM COATED ORAL at 09:08

## 2021-12-08 RX ADMIN — CARVEDILOL 3.12 MG: 3.12 TABLET, FILM COATED ORAL at 16:59

## 2021-12-08 RX ADMIN — ENOXAPARIN SODIUM 40 MG: 100 INJECTION SUBCUTANEOUS at 09:08

## 2021-12-08 RX ADMIN — SACUBITRIL AND VALSARTAN 1 TABLET: 24; 26 TABLET, FILM COATED ORAL at 19:36

## 2021-12-08 ASSESSMENT — PAIN SCALES - GENERAL
PAINLEVEL_OUTOF10: 0

## 2021-12-08 NOTE — CARE COORDINATION
Discharge Planning Assessment  Readmission risk score 8%  RN discharge planner met with patient/ (and family member) to discuss reason for admission, current living situation, and potential needs at the time of discharge    Demographics/Insurance verified Yes    Current type of dwelling: condo single level with 7 steps to enter    Patient from ECF/SW confirmed with:n/a    Living arrangements: lives alone, has supportive family who could stay with him if needed    Level of function/Support: independent    PCP: NONE, PCP list provided    Last Visit to PCP:    DME: none    Active with any community resources/agencies/skilled home care: none, CM discussed the role of home care should patient agree to open heart surgery. An in network listing of home care agencies will be provided to patient    Medication compliance issues: not noted    Financial issues that could impact healthcare:  none      Tentative discharge plan: home, will need home health if agrees to surgery    Discussed and provided facilities of choice if transition to a skilled nursing facility is required at the time of discharge      Discussed with patient and/or family that on the day of discharge home tentative time of discharge will be between 10 AM and noon.     Transportation at the time of discharge:family    Maggy Garcia, BSN, CCM, RN  [facility]  [phone]

## 2021-12-08 NOTE — PROGRESS NOTES
CC: 3V CAD, ICM, severe MR and TR, CHF    No c/o  Stable overnight  I discussed yesterday with the patient and his family mechanical vs tissue valves and that with MVR / TVr / CABG, he has a Euroscore II risk of ~5.8% risk of death and ~12-15% risk of major complication including, but not limited to: bleeding (with possible need for transfusion), infection, heart attack, heart block (with possible need for permanent pacemaker), stroke (with possible permanent neurologic deficit), kidney failure (with possible need for dialysis), respiratory insufficiency (with possible need for tracheostomy and / or prolonged ventilator support), intestinal ischemia, intestinal infarction, limb ischemia, limb loss, recurrence of diease and need for further surgery. We discussed the necessity of scars and the possibility of chronic pain at or associated with the surgical sites. All questions answered. He is still considering this data.

## 2021-12-08 NOTE — PROGRESS NOTES
[facility]    12/8/2021 2:43 PM        Name: Dory Gonzales . Admitted: 12/1/2021  Primary Care Provider: No primary care provider on file. (Tel: None)                        Subjective:  . No acute events overnight. Resting well. Pain control. Diet ok. Labs reviewed  Denies any chest pain sob.      Reviewed interval ancillary notes    Current Medications  furosemide (LASIX) tablet 20 mg, BID  sacubitril-valsartan (ENTRESTO) 24-26 MG per tablet 1 tablet, BID  sodium chloride flush 0.9 % injection 5-40 mL, 2 times per day  sodium chloride flush 0.9 % injection 5-40 mL, PRN  acetaminophen (TYLENOL) tablet 650 mg, Q4H PRN  milrinone (PRIMACOR) 20 mg in dextrose 5 % 100 mL infusion, Continuous  LORazepam (ATIVAN) tablet 0.5 mg, Q6H PRN  0.9 % sodium chloride infusion, PRN  magnesium hydroxide (MILK OF MAGNESIA) 400 MG/5ML suspension 30 mL, Daily PRN  acetaminophen (TYLENOL) suppository 650 mg, Q6H PRN  enoxaparin (LOVENOX) injection 40 mg, Daily  ondansetron (ZOFRAN) injection 4 mg, Q6H PRN  zolpidem (AMBIEN) tablet 5 mg, Nightly PRN  insulin lispro (1 Unit Dial) 0-6 Units, TID WC  insulin lispro (1 Unit Dial) 0-3 Units, Nightly  glucose (GLUTOSE) 40 % oral gel 15 g, PRN  dextrose 50 % IV solution, PRN  glucagon (rDNA) injection 1 mg, PRN  dextrose 5 % solution, PRN  potassium chloride (KLOR-CON M) extended release tablet 40 mEq, PRN   Or  potassium bicarb-citric acid (EFFER-K) effervescent tablet 40 mEq, PRN   Or  potassium chloride 10 mEq/100 mL IVPB (Peripheral Line), PRN  carvedilol (COREG) tablet 3.125 mg, BID WC  spironolactone (ALDACTONE) tablet 12.5 mg, Daily  aspirin EC tablet 81 mg, Daily  atorvastatin (LIPITOR) tablet 40 mg, Nightly        Objective:  BP 91/68   Pulse 106   Temp 97.3 °F (36.3 °C) (Temporal)   Resp 18   Ht 6' 1\" (1.854 m)   Wt 171 lb 8.3 oz (77.8 kg)   SpO2 95%   BMI 22.63 kg/m²     Intake/Output Summary (Last 24 hours) at 12/8/2021 1443  Last data filed at 12/8/2021 1230  Gross per 24 hour   Intake 1042.75 ml   Output 1100 ml   Net -57.25 ml      Wt Readings from Last 3 Encounters:   12/08/21 171 lb 8.3 oz (77.8 kg)       General appearance:  Appears comfortable  Eyes: Sclera clear. Pupils equal.  ENT: Moist oral mucosa. Trachea midline, no adenopathy. Cardiovascular: Regular rhythm, normal S1, S2. No murmur. No edema in lower extremities  Respiratory: Not using accessory muscles. Good inspiratory effort. Clear to auscultation bilaterally, no wheeze or crackles. GI: Abdomen soft, no tenderness, not distended, normal bowel sounds  Musculoskeletal: No cyanosis in digits, neck supple  Neurology: CN 2-12 grossly intact. No speech or motor deficits  Psych: Normal affect. Alert and oriented in time, place and person  Skin: Warm, dry, normal turgor    Labs and Tests:  CBC:   Recent Labs     12/07/21  1108   WBC 7.0   HGB 15.5        BMP:    Recent Labs     12/06/21  0603 12/07/21  1108   * 139   K 3.6 4.5    102   CO2 20* 17*   BUN 20 20   CREATININE 0.9 1.0   GLUCOSE 141* 99     Hepatic: No results for input(s): AST, ALT, ALB, BILITOT, ALKPHOS in the last 72 hours. Discussed care with family and patient             Spent 30  minutes with patient and family at bedside and on unit reviewing medical records and labs, spent greater than 50% time counseling patient and family on diagnosis and plan   Problem List  Active Problems:    Acute heart failure ([facility] [place] [address]  Resolved Problems:    * No resolved hospital problems. *       Assessment & Plan:   Acute systolic CHF exacerbation  -With new onset with low EF  -Continue diuresis.   -Still on milrinone drip.  -Has been started on Entresto continue beta-blocker first      Cardiomyopathy  -Patient is status post cath with low EF and multivessel disease  -Patient has been referred for CABG evaluation  -Repeat echo did not show any significant change  -Still not unclear on patient needing surgery or not      Diet: ADULT DIET; Regular; 3 carb choices (45 gm/meal); Low Sodium (2 gm)  ADULT ORAL NUTRITION SUPPLEMENT; Breakfast, Dinner;  Low Calorie/High Protein Oral Supplement  Code:Full Code  DVT PPX lovenox   Disposition pending patient decision CABG    Rock Ji MD   12/8/2021 2:43 PM

## 2021-12-08 NOTE — PROGRESS NOTES
Comprehensive Nutrition Assessment    Type and Reason for Visit:  Initial (LOS)    Nutrition Recommendations/Plan:   1. Continue carb control, low sodium diet  2. Offer Ensure high protein bid due to high protein needs  3. Monitor need for diet education  4. Will monitor nutritional adequacy, nutrition-related labs, weights, BMs, and clinical progress     Nutrition Assessment:  Patient admitted with acute heart failure. Extensive heart history and diabetes. Status post left heart cath on 12/3, severe cardiomyopathy noted with possible CABG. CHF diet education provided on 12/2/21. Currently on a carb control. diet eating % meals. Blood sugars mostly less than 200 mg/dl. Unable to speak with patient at this time, attempted x 2. Will offer Ensure high protein bid and monitor tolerance. Will monitor need for further education. Malnutrition Assessment:  Malnutrition Status: At risk for malnutrition (Comment)      Estimated Daily Nutrient Needs:  Energy (kcal):  [id number]; Weight Used for Energy Requirements:  Current (77.8 kg)     Protein (g):  ; Weight Used for Protein Requirements:  Current (77.8 kg; 1.2-1.3)        Fluid (ml/day):  less than 2 liters with history of CHF; Method Used for Fluid Requirements:         Nutrition Related Findings:  electrolytes stable as of 12/7 on 2 diuretics      Wounds:  None       Current Nutrition Therapies:    ADULT DIET; Regular; 3 carb choices (45 gm/meal); Low Sodium (2 gm)    Anthropometric Measures:  · Height: 6' 1\" (185.4 cm)  · Current Body Weight: 171 lb 8 oz (77.8 kg)     · Ideal Body Weight: 184 lbs; % Ideal Body Weight     · BMI: 22.6  · BMI Categories: Normal Weight (BMI 18.5-24. 9)       Nutrition Diagnosis:   · Increased nutrient needs related to increase demand for energy/nutrients as evidenced by  (extended hospital stay)      Nutrition Interventions:   Food and/or Nutrient Delivery:  Continue Current Diet, Start Oral Nutrition Supplement  Nutrition Education/Counseling:  Education completed (on 12/2/2)   Coordination of Nutrition Care:  Continue to monitor while inpatient    Goals:  Patient will eat 50% or greater of meals and supplements. Nutrition Monitoring and Evaluation:   Behavioral-Environmental Outcomes:      Food/Nutrient Intake Outcomes:  Food and Nutrient Intake  Physical Signs/Symptoms Outcomes:  Biochemical Data, Nutrition Focused Physical Findings     Discharge Planning:     Too soon to determine     Electronically signed by Keyanna Poe RD, LD on 12/8/21 at 12:11 PM EST    Contact: [phone]

## 2021-12-08 NOTE — PROGRESS NOTES
[facility]  HEART FAILURE  Progress Note      Admit Date 12/1/2021     Reason for Consult:      Reason for Consultation/Chief Complaint: Edema    HPI:    Demetra Polk is a 64 y.o. male with PMH DM admitted for cough and LE edema, WEST and orthopnea after viral illness. Echo showed severely depressed EF and pt had LHC  with severe 3 vessel dz and MR. Repeat Echo with very low EF, hopefully pt will agree to surgery for this friday. Continue Milrinone until then      Subjective:  Patient is being seen for new onset CHF/CMP. There were no acute overnight cardiac events.    Today Mr. Esperanza Cardoso denies chest pain, shortness of breath, palpitations and feels well, has had some hypotension with diuresis      Baseline Weight:    Wt Readings from Last 3 Encounters:   12/08/21 171 lb 8.3 oz (77.8 kg)          NYHA Class II  Objective:   BP 95/70   Pulse 110   Temp 97 °F (36.1 °C) (Temporal)   Resp 18   Ht 6' 1\" (1.854 m)   Wt 171 lb 8.3 oz (77.8 kg)   SpO2 99%   BMI 22.63 kg/m²       Intake/Output Summary (Last 24 hours) at 12/8/2021 0932  Last data filed at 12/8/2021 0549  Gross per 24 hour   Intake 442.75 ml   Output 750 ml   Net -307.25 ml      Wt Readings from Last 3 Encounters:   12/08/21 171 lb 8.3 oz (77.8 kg)      In: 442.8 [P.O.:360; I.V.:82.8]  Out: 750       Physical Exam:  General Appearance:  Non-obese/Well Nourished  Respiratory:  · Resp Auscultation: Normal breath sounds without dullness  Cardiovascular:  · Auscultation: Regular rate and rhythm, normal S1S2, +murmur  · Palpation: Normal    · JVD: none  · Pedal Pulses: 2+ and equal   Abdomen:  · Soft, NT, ND, + bs  Extremities:  · No Cyanosis or Clubbing  · Extremities: No edema  Neurological/Psychiatric:  · Oriented to time, place, and person  · Non-anxious    MEDICATIONS:   Scheduled Meds:   Scheduled Meds:   furosemide  20 mg Oral BID    sacubitril-valsartan  1 tablet Oral BID    sodium chloride flush  5-40 mL IntraVENous 2 times per day    enoxaparin  40 mg SubCUTAneous Daily    insulin lispro  0-6 Units SubCUTAneous TID     insulin lispro  0-3 Units SubCUTAneous Nightly    carvedilol  3.125 mg Oral BID     spironolactone  12.5 mg Oral Daily    aspirin  81 mg Oral Daily    atorvastatin  40 mg Oral Nightly     Continuous Infusions:   milrinone 0.125 mcg/kg/min (12/08/21 0549)    sodium chloride      dextrose       PRN Meds:.sodium chloride flush, acetaminophen, LORazepam, sodium chloride, magnesium hydroxide, [DISCONTINUED] acetaminophen **OR** acetaminophen, ondansetron, zolpidem, glucose, dextrose, glucagon (rDNA), dextrose, potassium chloride **OR** potassium alternative oral replacement **OR** potassium chloride  Continuous Infusions:   milrinone 0.125 mcg/kg/min (12/08/21 0549)    sodium chloride      dextrose         Intake/Output Summary (Last 24 hours) at 12/8/2021 0932  Last data filed at 12/8/2021 0549  Gross per 24 hour   Intake 442.75 ml   Output 750 ml   Net -307.25 ml       Lab Data:  CBC:   Lab Results   Component Value Date    WBC 7.0 12/07/2021    HGB 15.5 12/07/2021     12/07/2021     BMP:  Lab Results   Component Value Date     12/07/2021    K 4.5 12/07/2021    K 4.1 12/01/2021     12/07/2021    CO2 17 12/07/2021    BUN 20 12/07/2021    CREATININE 1.0 12/07/2021    GLUCOSE 99 12/07/2021     INR: No results found for: INR     CARDIAC LABS  ENZYMES:  No results for input(s): CKMB, CKMBINDEX, TROPONINI in the last 72 hours.     Invalid input(s): CKTOTAL;3  FASTING LIPID PANEL:  Lab Results   Component Value Date    HDL 37 12/03/2021    LDLCALC 72 12/03/2021    TRIG 65 12/03/2021     LIVER PROFILE:  Lab Results   Component Value Date    AST 67 12/01/2021    ALT 72 12/01/2021     BNP:   Lab Results   Component Value Date    PROBNP 1,345 12/08/2021    PROBNP 2,156 12/06/2021    PROBNP 2,458 12/04/2021     Iron Studies:  No results found for: FERRITIN  No results found for: IRON, TIBC, FERRITIN   Iron Deficiency Anemia:  No   IV Iron Therapy:  No  2017 ACC/AHA HF Guidelines:   intravenous iron replacement in patients with New York Heart Association (NYHA) class II and III HF and iron deficiency(ferritin <100 ng/ml or 100-300 ng/ml if transferrin saturation <20%), to improve functional status and QoL. 1. WEIGHT: Admit Weight: 199 lb 1.6 oz (90.3 kg)      Today  Weight: 171 lb 8.3 oz (77.8 kg)   2. I/O     Intake/Output Summary (Last 24 hours) at 12/8/2021 0932  Last data filed at 12/8/2021 0549  Gross per 24 hour   Intake 442.75 ml   Output 750 ml   Net -307.25 ml       Cardiac Testing:   Limited Echo 12/6/21:   Summary   Definity administered for endocardial border definition and to rule out LV   thrombus. Left ventricular cavity size is dilated with normal left ventricular wall   thickness. Overall, left ventricular systolic function appears severely reduced. Ejection fraction is visually estimated to be in the 25% range. (Guajardo's=26%)   There is severe diffuse hypokinesis with akinesis of the distal septum and   apex. No evidence of left ventricular mass or thrombus noted. Severe mitral regurgitation directed centrally and posteriorly with systolic   flow reversal in pulmonary veins. Moderate tricuspid regurgitation with a PASP of 46 mmHg  ECHO 12/2/21:   Summary   Left ventricular cavity size is mildly dilated. Overall left ventricular systolic function appears severely reduced. Ejection fraction is visually estimated to be 15-20%. Cannot exclude apical   thrombus as contrast not administered. There is severe diffuse global hypokinesis. Grade III diastolic dysfunction. Left atrium is mildly dilated. The right ventricle is moderately dilated with moderately reduced function   by visual assessment. Severe mitral regurgitation with single posteriorly directed jet. Severe tricuspid regurgitation.    IVC size is dilated (>2.1 cm) and collapses < 50% with respiration   consistent with elevated RA pressure (15 mmHg). Cannot estimate PASP given severe TR, but PASP elevated. Critical findings relayed to Dr. Chris Andrews of severely reduced EF with low   stroke volume. [facility] 12/3/21:   Left Heart Cath  Dominance: Right     LM: 30% distal   LAD: large, arborizing, high first diagonal with 70% mid stenosis; LAD has a 95% stenosis after small, diffusely diseased second diagonal and is JOHANNA I flow to the apex   LCx: small, diffusely diseased that is subtotaled after small first marginal and receiving left to left collaterals to smaller second marginal/distal LCX  RCA: 90% proximal, 70% mid, 100% distal stenoses with left to right collaterals to RPDA      LVEDP: 23 mmHg  LVEF: 15% with severe global HK     Impression/Recommendations:  Severe, three vessel coronary disease  Severe cardiomyopathy  Severe mitral regurgitation   CHADS2VASC=3  CTS consult to discuss CABG/ MVR/KATHARINE Clip    Assessment/Plan:     1. AHF- compensated, continue po lasix, Milrinone gtt  2. CMP-continue entresto, coreg, and dorinda, start SGLT2i at discharge  3. MVR -per CTS  4.  Hypotension - stable, asx          I appreciate the opportunity of cooperating in the care of this individual.    MICHELLE Pollock - CNP, ACNP, [address] 12/8/2021, 9:32 AM  Heart Failure  The [address], [address]  Ph: [phone]      Core Measures:   · Discharge instructions:   · LVEF documented:   · ACEI for LV dysfunction:   · Smoking Cessation:

## 2021-12-09 ENCOUNTER — ANESTHESIA EVENT (OUTPATIENT)
Dept: OPERATING ROOM | Age: 61
DRG: 216 | End: 2021-12-09
Payer: COMMERCIAL

## 2021-12-09 PROBLEM — I50.43 ACUTE ON CHRONIC COMBINED SYSTOLIC AND DIASTOLIC HEART FAILURE (HCC): Status: ACTIVE | Noted: 2021-12-02

## 2021-12-09 PROBLEM — I50.43 CHF (CONGESTIVE HEART FAILURE), NYHA CLASS I, ACUTE ON CHRONIC, COMBINED (HCC): Status: ACTIVE | Noted: 2021-12-09

## 2021-12-09 PROBLEM — I25.10 CAD (CORONARY ARTERY DISEASE): Status: ACTIVE | Noted: 2021-12-09

## 2021-12-09 PROBLEM — I07.1 SEVERE TRICUSPID REGURGITATION: Status: ACTIVE | Noted: 2021-12-09

## 2021-12-09 PROBLEM — I34.0 SEVERE MITRAL REGURGITATION: Status: ACTIVE | Noted: 2021-12-09

## 2021-12-09 LAB
ABO/RH: NORMAL
ALBUMIN SERPL-MCNC: 3.2 G/DL (ref 3.4–5)
ALP BLD-CCNC: 312 U/L (ref 40–129)
ALT SERPL-CCNC: 53 U/L (ref 10–40)
ANION GAP SERPL CALCULATED.3IONS-SCNC: 10 MMOL/L (ref 3–16)
ANTIBODY SCREEN: NORMAL
APTT: 39.3 SEC (ref 26.2–38.6)
AST SERPL-CCNC: 54 U/L (ref 15–37)
BASOPHILS ABSOLUTE: 0 K/UL (ref 0–0.2)
BASOPHILS RELATIVE PERCENT: 0.7 %
BILIRUB SERPL-MCNC: 0.5 MG/DL (ref 0–1)
BILIRUBIN DIRECT: <0.2 MG/DL (ref 0–0.3)
BILIRUBIN, INDIRECT: ABNORMAL MG/DL (ref 0–1)
BUN BLDV-MCNC: 25 MG/DL (ref 7–20)
CALCIUM SERPL-MCNC: 8.9 MG/DL (ref 8.3–10.6)
CHLORIDE BLD-SCNC: 103 MMOL/L (ref 99–110)
CO2: 23 MMOL/L (ref 21–32)
CREAT SERPL-MCNC: 0.9 MG/DL (ref 0.9–1.3)
EOSINOPHILS ABSOLUTE: 0.1 K/UL (ref 0–0.6)
EOSINOPHILS RELATIVE PERCENT: 2.4 %
GFR AFRICAN AMERICAN: >60
GFR NON-AFRICAN AMERICAN: >60
GLUCOSE BLD-MCNC: 109 MG/DL (ref 70–99)
GLUCOSE BLD-MCNC: 119 MG/DL (ref 70–99)
GLUCOSE BLD-MCNC: 133 MG/DL (ref 70–99)
GLUCOSE BLD-MCNC: 200 MG/DL (ref 70–99)
GLUCOSE BLD-MCNC: 228 MG/DL (ref 70–99)
HCT VFR BLD CALC: 46 % (ref 40.5–52.5)
HEMOGLOBIN: 15.1 G/DL (ref 13.5–17.5)
INR BLD: 1.06 (ref 0.88–1.12)
LYMPHOCYTES ABSOLUTE: 2.4 K/UL (ref 1–5.1)
LYMPHOCYTES RELATIVE PERCENT: 39.1 %
MCH RBC QN AUTO: 29.8 PG (ref 26–34)
MCHC RBC AUTO-ENTMCNC: 32.8 G/DL (ref 31–36)
MCV RBC AUTO: 90.7 FL (ref 80–100)
MONOCYTES ABSOLUTE: 0.6 K/UL (ref 0–1.3)
MONOCYTES RELATIVE PERCENT: 8.9 %
NEUTROPHILS ABSOLUTE: 3.1 K/UL (ref 1.7–7.7)
NEUTROPHILS RELATIVE PERCENT: 48.9 %
PDW BLD-RTO: 16.8 % (ref 12.4–15.4)
PERFORMED ON: ABNORMAL
PLATELET # BLD: 178 K/UL (ref 135–450)
PMV BLD AUTO: 8.8 FL (ref 5–10.5)
POTASSIUM SERPL-SCNC: 4.1 MMOL/L (ref 3.5–5.1)
PROTHROMBIN TIME: 12 SEC (ref 9.9–12.7)
RBC # BLD: 5.07 M/UL (ref 4.2–5.9)
REASON FOR REJECTION: NORMAL
REJECTED TEST: NORMAL
SARS-COV-2, NAAT: NOT DETECTED
SODIUM BLD-SCNC: 136 MMOL/L (ref 136–145)
TOTAL PROTEIN: 6.5 G/DL (ref 6.4–8.2)
WBC # BLD: 6.2 K/UL (ref 4–11)

## 2021-12-09 PROCEDURE — 94010 BREATHING CAPACITY TEST: CPT

## 2021-12-09 PROCEDURE — 36415 COLL VENOUS BLD VENIPUNCTURE: CPT

## 2021-12-09 PROCEDURE — 93880 EXTRACRANIAL BILAT STUDY: CPT

## 2021-12-09 PROCEDURE — 2000000000 HC ICU R&B

## 2021-12-09 PROCEDURE — 85025 COMPLETE CBC W/AUTO DIFF WBC: CPT

## 2021-12-09 PROCEDURE — P9016 RBC LEUKOCYTES REDUCED: HCPCS

## 2021-12-09 PROCEDURE — 80076 HEPATIC FUNCTION PANEL: CPT

## 2021-12-09 PROCEDURE — 6360000002 HC RX W HCPCS: Performed by: INTERNAL MEDICINE

## 2021-12-09 PROCEDURE — 80048 BASIC METABOLIC PNL TOTAL CA: CPT

## 2021-12-09 PROCEDURE — 99232 SBSQ HOSP IP/OBS MODERATE 35: CPT | Performed by: NURSE PRACTITIONER

## 2021-12-09 PROCEDURE — 6370000000 HC RX 637 (ALT 250 FOR IP): Performed by: INTERNAL MEDICINE

## 2021-12-09 PROCEDURE — 6370000000 HC RX 637 (ALT 250 FOR IP): Performed by: NURSE PRACTITIONER

## 2021-12-09 PROCEDURE — 85610 PROTHROMBIN TIME: CPT

## 2021-12-09 PROCEDURE — 93970 EXTREMITY STUDY: CPT

## 2021-12-09 PROCEDURE — 86901 BLOOD TYPING SEROLOGIC RH(D): CPT

## 2021-12-09 PROCEDURE — 86900 BLOOD TYPING SEROLOGIC ABO: CPT

## 2021-12-09 PROCEDURE — 87635 SARS-COV-2 COVID-19 AMP PRB: CPT

## 2021-12-09 PROCEDURE — 6370000000 HC RX 637 (ALT 250 FOR IP): Performed by: HOSPITALIST

## 2021-12-09 PROCEDURE — 85730 THROMBOPLASTIN TIME PARTIAL: CPT

## 2021-12-09 PROCEDURE — 86850 RBC ANTIBODY SCREEN: CPT

## 2021-12-09 PROCEDURE — 86923 COMPATIBILITY TEST ELECTRIC: CPT

## 2021-12-09 PROCEDURE — 6360000002 HC RX W HCPCS: Performed by: PHYSICIAN ASSISTANT

## 2021-12-09 PROCEDURE — 93971 EXTREMITY STUDY: CPT

## 2021-12-09 RX ORDER — SODIUM CHLORIDE, SODIUM LACTATE, POTASSIUM CHLORIDE, CALCIUM CHLORIDE 600; 310; 30; 20 MG/100ML; MG/100ML; MG/100ML; MG/100ML
INJECTION, SOLUTION INTRAVENOUS CONTINUOUS
Status: DISCONTINUED | OUTPATIENT
Start: 2021-12-10 | End: 2021-12-10

## 2021-12-09 RX ORDER — PANTOPRAZOLE SODIUM 40 MG/10ML
40 INJECTION, POWDER, LYOPHILIZED, FOR SOLUTION INTRAVENOUS
Status: COMPLETED | OUTPATIENT
Start: 2021-12-10 | End: 2021-12-10

## 2021-12-09 RX ORDER — CHLORHEXIDINE GLUCONATE 4 G/100ML
SOLUTION TOPICAL SEE ADMIN INSTRUCTIONS
Status: COMPLETED | OUTPATIENT
Start: 2021-12-09 | End: 2021-12-09

## 2021-12-09 RX ADMIN — CARVEDILOL 3.12 MG: 3.12 TABLET, FILM COATED ORAL at 08:27

## 2021-12-09 RX ADMIN — ASPIRIN 81 MG: 81 TABLET, COATED ORAL at 08:27

## 2021-12-09 RX ADMIN — FUROSEMIDE 20 MG: 20 TABLET ORAL at 08:27

## 2021-12-09 RX ADMIN — SACUBITRIL AND VALSARTAN 1 TABLET: 24; 26 TABLET, FILM COATED ORAL at 19:37

## 2021-12-09 RX ADMIN — ATORVASTATIN CALCIUM 40 MG: 40 TABLET, FILM COATED ORAL at 19:37

## 2021-12-09 RX ADMIN — SACUBITRIL AND VALSARTAN 1 TABLET: 24; 26 TABLET, FILM COATED ORAL at 08:28

## 2021-12-09 RX ADMIN — SPIRONOLACTONE 12.5 MG: 25 TABLET ORAL at 08:27

## 2021-12-09 RX ADMIN — ENOXAPARIN SODIUM 40 MG: 100 INJECTION SUBCUTANEOUS at 08:28

## 2021-12-09 RX ADMIN — MUPIROCIN: 20 OINTMENT TOPICAL at 19:37

## 2021-12-09 RX ADMIN — CARVEDILOL 3.12 MG: 3.12 TABLET, FILM COATED ORAL at 17:53

## 2021-12-09 RX ADMIN — CHLORHEXIDINE GLUCONATE: 213 SOLUTION TOPICAL at 19:38

## 2021-12-09 RX ADMIN — LORAZEPAM 0.5 MG: 0.5 TABLET ORAL at 23:10

## 2021-12-09 RX ADMIN — MILRINONE LACTATE 0.12 MCG/KG/MIN: 0.2 INJECTION, SOLUTION INTRAVENOUS at 18:00

## 2021-12-09 RX ADMIN — FUROSEMIDE 20 MG: 20 TABLET ORAL at 17:53

## 2021-12-09 ASSESSMENT — PAIN SCALES - GENERAL
PAINLEVEL_OUTOF10: 0

## 2021-12-09 ASSESSMENT — LIFESTYLE VARIABLES: SMOKING_STATUS: 1

## 2021-12-09 NOTE — PROGRESS NOTES
Surgical consent for anticipated procedure signed at this time and placed in hard chart. Consent for blood transfusion also signed and placed in chart.  No questions voiced by patient at this time    Electronically signed by Dariana Alejandro RN on 12/9/2021 at 1:29 PM

## 2021-12-09 NOTE — ANESTHESIA PRE PROCEDURE
Department of Anesthesiology  Preprocedure Note       Name:  Sofy Scruggs   Age:  64 y.o.  :  1960                                          MRN:  [id number]         Date:  2021      Surgeon: Jessie Mohs):   Sheri Jonas MD    Procedure: Procedure(s):  CABG CORONARY ARTERY BYPASS GRAFT/LIG KATHARINE/ MITRAL VALVE REPLACEMENT/TRICUSPID VALVE REPAIR    Medications prior to admission:   Prior to Admission medications    Not on File       Current medications:    Current Facility-Administered Medications   Medication Dose Route Frequency Provider Last Rate Last Admin    furosemide (LASIX) tablet 20 mg  20 mg Oral BID Othella Casa, APRN - CNP   20 mg at 21 0827    sacubitril-valsartan (ENTRESTO) 24-26 MG per tablet 1 tablet  1 tablet Oral BID Othella Casa, APRN - CNP   1 tablet at 21 8671    sodium chloride flush 0.9 % injection 5-40 mL  5-40 mL IntraVENous 2 times per day Zion Arndt DO   10 mL at 21 1106    sodium chloride flush 0.9 % injection 5-40 mL  5-40 mL IntraVENous PRN Zion Arndt DO        acetaminophen (TYLENOL) tablet 650 mg  650 mg Oral Q4H PRN Zion Arndt DO        milrinone (PRIMACOR) 20 mg in dextrose 5 % 100 mL infusion  0.125 mcg/kg/min IntraVENous Continuous Zion Arndt DO 3.3 mL/hr at 21 0549 0.125 mcg/kg/min at 21 0549    LORazepam (ATIVAN) tablet 0.5 mg  0.5 mg Oral Q6H PRN Claudy Sarabia MD   0.5 mg at 21 0232    0.9 % sodium chloride infusion  25 mL IntraVENous PRN Cher Cerna PA-C        magnesium hydroxide (MILK OF MAGNESIA) 400 MG/5ML suspension 30 mL  30 mL Oral Daily PRN Cher Cerna PA-C        acetaminophen (TYLENOL) suppository 650 mg  650 mg Rectal Q6H PRN Cher Cerna PA-C        enoxaparin (LOVENOX) injection 40 mg  40 mg SubCUTAneous Daily Cher Cerna PA-C   40 mg at 21 0828    ondansetron (ZOFRAN) injection 4 mg  4 mg IntraVENous Q6H PRN Cher Cerna PA-C        zolpidem (AMBIEN) tablet 5 mg  5 mg Oral Nightly PRN [place] Rob[place], PA-C   5 mg at 12/07/21 2314    insulin lispro (1 Unit Dial) 0-6 Units  0-6 Units SubCUTAneous TID WC [place] Robes, PA-C        insulin lispro (1 Unit Dial) 0-3 Units  0-3 Units SubCUTAneous Nightly [place] Rob[place], PA-C   1 Units at 12/03/21 2106    glucose (GLUTOSE) 40 % oral gel 15 g  15 g Oral PRN [place] Robes, PA-C        dextrose 50 % IV solution  12.5 g IntraVENous PRN [place], PA-C        glucagon (rDNA) injection 1 mg  1 mg IntraMUSCular PRN [place], PA-C        dextrose 5 % solution  100 mL/hr IntraVENous PRN [place], PA-C        potassium chloride (KLOR-CON M) extended release tablet 40 mEq  40 mEq Oral PRN Maryagnes Shed, DO        Or    potassium bicarb-citric acid (EFFER-K) effervescent tablet 40 mEq  40 mEq Oral PRN Maryagnes Shed, DO        Or    potassium chloride 10 mEq/100 mL IVPB (Peripheral Line)  10 mEq IntraVENous PRN Maryagnes Shed, DO        carvedilol (COREG) tablet 3.125 mg  3.125 mg Oral BID  Maryagnes Shed, DO   3.125 mg at 12/09/21 0827    spironolactone (ALDACTONE) tablet 12.5 mg  12.5 mg Oral Daily Maryagnes Shed, DO   12.5 mg at 12/09/21 0827    aspirin EC tablet 81 mg  81 mg Oral Daily Maryagnes Shed, DO   81 mg at 12/09/21 0827    atorvastatin (LIPITOR) tablet 40 mg  40 mg Oral Nightly Maryagnes Shed, DO   40 mg at 12/08/21 1936       Allergies:  No Known Allergies    Problem List:    Patient Active Problem List   Diagnosis Code    Acute on chronic combined systolic and diastolic heart failure (HCC) I50.43    CAD (coronary artery disease) I25.10    Severe mitral regurgitation I34.0    Severe tricuspid regurgitation I07.1       Past Medical History:        Diagnosis Date    Diabetes [facility])        Past Surgical History:  History reviewed. No pertinent surgical history.     Social History:    Social History     Tobacco Use    Smoking status: Current Every Day Smoker     Packs/day: 1.00     Years: 15.00     Pack years: 15.00     Types: Cigarettes    Smokeless tobacco: Never Used   Substance Use Topics    Alcohol use: Not Currently     Comment: occasionally                                Ready to quit: Not Answered  Counseling given: No      Vital Signs (Current):   Vitals:    12/08/21 1903 12/09/21 0053 12/09/21 0500 12/09/21 0824   BP: 101/76 91/66 86/60 99/69   Pulse: 111 109 110 108   Resp: 16 16 16 17   Temp: 97.1 °F (36.2 °C) 97.4 °F (36.3 °C) 96.9 °F (36.1 °C) 97.7 °F (36.5 °C)   TempSrc: Temporal Temporal Temporal Temporal   SpO2: 99% 96% 99% 95%   Weight:   170 lb 14.4 oz (77.5 kg)    Height:                                                  BP Readings from Last 3 Encounters:   12/09/21 99/69       NPO Status:                                                                                 BMI:   Wt Readings from Last 3 Encounters:   12/09/21 170 lb 14.4 oz (77.5 kg)     Body mass index is 22.55 kg/m².     CBC:   Lab Results   Component Value Date    WBC 7.0 12/07/2021    RBC 5.21 12/07/2021    HGB 15.5 12/07/2021    HCT 46.9 12/07/2021    MCV 90.1 12/07/2021    RDW 16.6 12/07/2021     12/07/2021       CMP:   Lab Results   Component Value Date     12/07/2021    K 4.5 12/07/2021    K 4.1 12/01/2021     12/07/2021    CO2 17 12/07/2021    BUN 20 12/07/2021    CREATININE 1.0 12/07/2021    GFRAA >60 12/07/2021    AGRATIO 0.9 12/01/2021    LABGLOM >60 12/07/2021    GLUCOSE 99 12/07/2021    PROT 7.0 12/01/2021    CALCIUM 9.2 12/07/2021    BILITOT 0.8 12/01/2021    ALKPHOS 507 12/01/2021    AST 67 12/01/2021    ALT 72 12/01/2021       POC Tests:   Recent Labs     12/09/21  0836   POCGLU 109*       Coags: No results found for: PROTIME, INR, APTT    HCG (If Applicable): No results found for: PREGTESTUR, PREGSERUM, HCG, HCGQUANT     ABGs:   Lab Results   Component Value Date    PHART 7.486 12/03/2021    PO2ART 77.5 12/03/2021 [id number] 32.9 12/03/2021    QYD9QDA 24.8 12/03/2021    BEART 2.0 12/03/2021    [id number] 96.7 12/03/2021        Type & Screen (If Applicable):  No results found for: LABABO, LABRH    Drug/Infectious Status (If Applicable):  No results found for: HIV, HEPCAB    COVID-19 Screening (If Applicable):   Lab Results   Component Value Date    COVID19 Not Detected 12/02/2021           Anesthesia Evaluation  Patient summary reviewed and Nursing notes reviewed  Airway: Mallampati: II  TM distance: >3 FB   Neck ROM: full  Mouth opening: > = 3 FB Dental:          Pulmonary:   (+) COPD:  current smoker                          ROS comment: 1 ppd   Cardiovascular:  Exercise tolerance: good (>4 METS),   (+) valvular problems/murmurs: MR, CAD: non-obstructive,                ROS comment: Echo 2021  Left ventricular cavity size is mildly dilated. Overall left ventricular systolic function appears severely reduced. Ejection fraction is visually estimated to be 15-20%. Cannot exclude apical   thrombus as contrast not administered. There is severe diffuse global hypokinesis. Grade III diastolic dysfunction. Left atrium is mildly dilated. The right ventricle is moderately dilated with moderately reduced function   by visual assessment. Severe mitral regurgitation with single posteriorly directed jet. Severe tricuspid regurgitation. IVC size is dilated (>2.1 cm) and collapses < 50% with respiration   consistent with elevated RA pressure (15 mmHg). Cannot estimate PASP given severe TR, but PASP elevated. Critical findings relayed to Dr. Schuyler Juan of severely reduced EF with low   stroke volume. Repeat echo 2021   Definity administered for endocardial border definition and to rule out LV   thrombus. Left ventricular cavity size is dilated with normal left ventricular wall   thickness. Overall, left ventricular systolic function appears severely reduced.    Ejection fraction is visually estimated to be in the 25%

## 2021-12-09 NOTE — PROGRESS NOTES
CC: Severe MR, Severe TR, 3V CAD, acute and chronic combined systolic and diastolic CHF    Patient has elected to have surgery. He agrees mechanical valve is his best option. Will try to get pre-op testing for OR tomorrow. ALl his questions answered.

## 2021-12-09 NOTE — PROGRESS NOTES
Call placed to Marleny Elizondo, cardiology coordinator, to make aware of patient's HR gabriele from the 110s down to the 20s during carotid doppler study. She will pass message along to Dawna Cox, NP with cardiology. Awaiting further orders.     Electronically signed by Christian Muñoz RN on 12/9/2021 at 11:50 AM

## 2021-12-09 NOTE — PROGRESS NOTES
Hospitalist Progress Note      PCP: No primary care provider on file. Date of Admission: 12/1/2021    Chief Complaint: Leg swelling    Hospital Course: 63 yo M with history of DM 2 who came to ER with BL LE edema. Admitted as inpatient for acute on chronic combined CHF. Started on IV Lasix. Echo:  Left ventricular cavity size is mildly dilated. Overall left ventricular systolic function appears severely reduced. Ejection fraction is visually estimated to be 15-20%. Cannot exclude apical   thrombus as contrast not administered. There is severe diffuse global hypokinesis. Grade III diastolic dysfunction. Left atrium is mildly dilated. The right ventricle is moderately dilated with moderately reduced function   by visual assessment. Severe mitral regurgitation with single posteriorly directed jet. Severe tricuspid regurgitation. IVC size is dilated (>2.1 cm) and collapses < 50% with respiration   consistent with elevated RA pressure (15 mmHg). Cannot estimate PASP given severe TR, but PASP elevated. Critical findings relayed to Dr. Carmina Klein of severely reduced EF with low   stroke volume. [facility] on 12/3:  Left Heart Cath  Dominance: Right     LM: 30% distal   LAD: large, arborizing, high first diagonal with 70% mid stenosis; LAD has a 95% stenosis after small, diffusely diseased second diagonal and is JOHANNA I flow to the apex   LCx: small, diffusely diseased that is subtotaled after small first marginal and receiving left to left collaterals to smaller second marginal/distal LCX  RCA: 90% proximal, 70% mid, 100% distal stenoses with left to right collaterals to RPDA      LVEDP: 23 mmHg  LVEF: 15% with severe global HK     Impression/Recommendations:  Severe, three vessel coronary disease  Severe cardiomyopathy  Severe mitral regurgitation   CHADS2VASC=3  CTS consult to discuss CABG/ MVR/KATHARINE Clip  Appreciate CHF Service. Seen by CTS.   For CABG/mMVR/KATHARINE clip on 12/10/21. Subjective:  Patient denies CP, SOB, HA or abdominal pain. No fevers. Ready to go to OR tomorrow. Medications:  Reviewed    Infusion Medications    milrinone 0.125 mcg/kg/min (12/08/21 0549)    sodium chloride      dextrose       Scheduled Medications    furosemide  20 mg Oral BID    sacubitril-valsartan  1 tablet Oral BID    sodium chloride flush  5-40 mL IntraVENous 2 times per day    enoxaparin  40 mg SubCUTAneous Daily    insulin lispro  0-6 Units SubCUTAneous TID WC    insulin lispro  0-3 Units SubCUTAneous Nightly    carvedilol  3.125 mg Oral BID WC    spironolactone  12.5 mg Oral Daily    aspirin  81 mg Oral Daily    atorvastatin  40 mg Oral Nightly     PRN Meds: sodium chloride flush, acetaminophen, LORazepam, sodium chloride, magnesium hydroxide, [DISCONTINUED] acetaminophen **OR** acetaminophen, ondansetron, zolpidem, glucose, dextrose, glucagon (rDNA), dextrose, potassium chloride **OR** potassium alternative oral replacement **OR** potassium chloride      Intake/Output Summary (Last 24 hours) at 12/9/2021 0830  Last data filed at 12/8/2021 1615  Gross per 24 hour   Intake 960 ml   Output 1600 ml   Net -640 ml       Physical Exam Performed:    BP 99/69   Pulse 108   Temp 97.7 °F (36.5 °C) (Temporal)   Resp 17   Ht 6' 1\" (1.854 m)   Wt 170 lb 14.4 oz (77.5 kg)   SpO2 95%   BMI 22.55 kg/m²     General appearance: No apparent distress, appears stated age and cooperative. HEENT: Pupils equal, round, and reactive to light. Conjunctivae/corneas clear. Neck: Supple, with full range of motion. No jugular venous distention. Trachea midline. Respiratory:  Normal respiratory effort. Clear to auscultation, bilaterally without Rales/Wheezes/Rhonchi. Cardiovascular: Regular rate and rhythm with normal S1/S2 without murmurs, rubs or gallops. Abdomen: Soft, non-tender, non-distended with normal bowel sounds. Musculoskeletal: No clubbing, cyanosis or edema bilaterally. Full range of motion without deformity. Skin: Skin color, texture, turgor normal.  No rashes or lesions. Neurologic:  Neurovascularly intact without any focal sensory/motor deficits. Cranial nerves: II-XII intact, grossly non-focal.  Psychiatric: Alert and oriented, thought content appropriate, normal insight  Capillary Refill: Brisk,3 seconds, normal   Peripheral Pulses: +2 palpable, equal bilaterally       Labs:   Recent Labs     12/07/21  1108   WBC 7.0   HGB 15.5   HCT 46.9        Recent Labs     12/07/21  1108      K 4.5      CO2 17*   BUN 20   CREATININE 1.0   CALCIUM 9.2     No results for input(s): AST, ALT, BILIDIR, BILITOT, ALKPHOS in the last 72 hours. No results for input(s): INR in the last 72 hours. No results for input(s): [place] in the last 72 hours. Urinalysis:      Lab Results   Component Value Date    NITRU Negative 12/02/2021    BLOODU Negative 12/02/2021    SPECGRAV 1.020 12/02/2021    GLUCOSEU Negative 12/02/2021       Radiology:  CT CHEST PULMONARY EMBOLISM W CONTRAST   Final Result   No evidence of an acute pulmonary embolus. Moderate-sized right pleural effusion, small left pleural effusion and   subcutaneous edema suspicious for anasarca. There is associated compressive   atelectasis of the right greater than left lower lobes. Mild cardiomegaly. XR CHEST PORTABLE   Final Result   Mild cardiomegaly and mild central pulmonary congestion. Mild bibasilar atelectasis or early infiltrates and a questionable small   right pleural effusion. VL DUP CAROTID BILATERAL    (Results Pending)   VL PRE OP VEIN MAPPING    (Results Pending)           Assessment/Plan:    Active Hospital Problems    Diagnosis     CAD (coronary artery disease) [I25.10]     Severe mitral regurgitation [I34.0]     Severe tricuspid regurgitation [I07.1]     Acute on chronic combined systolic and diastolic heart failure ([address] [I50.43]      1.  Continue ASA, Coreg, Lipitor, Aldactone, Entresto  2. NPO p MN for CABG tomorrow  3. Continue Lasix 20 mg PO bid  4. Continue SSI  5. BL Carotid US today  6. Vein mapping today  7. Continue Milrinone gtt    DVT Prophylaxis: SCD  Diet: ADULT DIET; Regular; 3 carb choices (45 gm/meal); Low Sodium (2 gm)  ADULT ORAL NUTRITION SUPPLEMENT; Breakfast, Dinner; Low Calorie/High Protein Oral Supplement  Code Status: Full Code    PT/OT Eval Status: After OR    Dispo - Home    Discussed with patient, Jaleesa Montanez (Cardio NP), nursing and CM. D/W brother and sister at bedside. CABG, mMVR and KATHARINE clip tomorrow.     Surendra Chambers MD

## 2021-12-09 NOTE — ACP (ADVANCE CARE PLANNING)
Advanced Care Planning Note. Purpose of Encounter: Advanced care planning in light of CAD  Parties In Attendance: Patient, brother, sister  Decisional Capacity: Yes  Subjective: Patient denies CP and SOB  Objective:   Goals of Care Determination: Patient wants full support (CPR, vent, surgery, HD, trach, PEG)  Plan:  Milrinone gtt. Cardio and CTS consults. CABG  Code Status: Full code   Time spent on Advanced care Plannin minutes  Advanced Care Planning Documents: Completed advanced directives on chart, daughter and sister share the POA.     Ashley Villafana MD  2021 8:30 AM

## 2021-12-09 NOTE — PROGRESS NOTES
[facility]  HEART FAILURE  Progress Note      Admit Date 12/1/2021     Reason for Consult:      Reason for Consultation/Chief Complaint: Edema    HPI:    Seble Almonte is a 64 y.o. male with PMH DM admitted for cough and LE edema, WEST and orthopnea after viral illness. Echo showed severely depressed EF and pt had LHC  with severe 3 vessel dz and MR. Repeat Echo with very low EF, pt has agreed to surgery, continue Milrinone until then      Subjective:  Patient is being seen for new onset CHF/CMP. There were no acute overnight cardiac events.    Today Mr. Kimber Hubbard denies chest pain, shortness of breath, palpitations and feels well, hypotension stable, denies dizziness      Baseline Weight: 170   Wt Readings from Last 3 Encounters:   12/09/21 170 lb 14.4 oz (77.5 kg)          NYHA Class II  Objective:   BP 99/69   Pulse 108   Temp 97.7 °F (36.5 °C) (Temporal)   Resp 17   Ht 6' 1\" (1.854 m)   Wt 170 lb 14.4 oz (77.5 kg)   SpO2 95%   BMI 22.55 kg/m²       Intake/Output Summary (Last 24 hours) at 12/9/2021 8912  Last data filed at 12/8/2021 1615  Gross per 24 hour   Intake 600 ml   Output 1250 ml   Net -650 ml      Wt Readings from Last 3 Encounters:   12/09/21 170 lb 14.4 oz (77.5 kg)      In: 360 [P.O.:360]  Out: 1250       Physical Exam:  General Appearance:  Non-obese/Well Nourished  Respiratory:  · Resp Auscultation: Normal breath sounds without dullness  Cardiovascular:  · Auscultation: Regular rate and rhythm, normal S1S2, +murmur  · Palpation: Normal    · JVD: none  · Pedal Pulses: 2+ and equal   Abdomen:  · Soft, NT, ND, + bs  Extremities:  · No Cyanosis or Clubbing  · Extremities: No edema  Neurological/Psychiatric:  · Oriented to time, place, and person  · Non-anxious    MEDICATIONS:   Scheduled Meds:   Scheduled Meds:   furosemide  20 mg Oral BID    sacubitril-valsartan  1 tablet Oral BID    sodium chloride flush  5-40 mL IntraVENous 2 times per day    enoxaparin  40 mg SubCUTAneous Daily    insulin lispro  0-6 Units SubCUTAneous TID     insulin lispro  0-3 Units SubCUTAneous Nightly    carvedilol  3.125 mg Oral BID WC    spironolactone  12.5 mg Oral Daily    aspirin  81 mg Oral Daily    atorvastatin  40 mg Oral Nightly     Continuous Infusions:   milrinone 0.125 mcg/kg/min (12/08/21 0549)    sodium chloride      dextrose       PRN Meds:.sodium chloride flush, acetaminophen, LORazepam, sodium chloride, magnesium hydroxide, [DISCONTINUED] acetaminophen **OR** acetaminophen, ondansetron, zolpidem, glucose, dextrose, glucagon (rDNA), dextrose, potassium chloride **OR** potassium alternative oral replacement **OR** potassium chloride  Continuous Infusions:   milrinone 0.125 mcg/kg/min (12/08/21 0549)    sodium chloride      dextrose         Intake/Output Summary (Last 24 hours) at 12/9/2021 0927  Last data filed at 12/8/2021 1615  Gross per 24 hour   Intake 600 ml   Output 1250 ml   Net -650 ml       Lab Data:  CBC:   Lab Results   Component Value Date    WBC 7.0 12/07/2021    HGB 15.5 12/07/2021     12/07/2021     BMP:  Lab Results   Component Value Date     12/07/2021    K 4.5 12/07/2021    K 4.1 12/01/2021     12/07/2021    CO2 17 12/07/2021    BUN 20 12/07/2021    CREATININE 1.0 12/07/2021    GLUCOSE 99 12/07/2021     INR: No results found for: INR     CARDIAC LABS  ENZYMES:  No results for input(s): CKMB, CKMBINDEX, TROPONINI in the last 72 hours.     Invalid input(s): CKTOTAL;3  FASTING LIPID PANEL:  Lab Results   Component Value Date    HDL 37 12/03/2021    LDLCALC 72 12/03/2021    TRIG 65 12/03/2021     LIVER PROFILE:  Lab Results   Component Value Date    AST 67 12/01/2021    ALT 72 12/01/2021     BNP:   Lab Results   Component Value Date    PROBNP 1,345 12/08/2021    PROBNP 2,156 12/06/2021    PROBNP 2,458 12/04/2021     Iron Studies:  No results found for: FERRITIN  No results found for: IRON, TIBC, FERRITIN   Iron Deficiency Anemia:  No   IV Iron Therapy:  No  2017 ACC/AHA HF Guidelines:   intravenous iron replacement in patients with New York Heart Association (NYHA) class II and III HF and iron deficiency(ferritin <100 ng/ml or 100-300 ng/ml if transferrin saturation <20%), to improve functional status and QoL. 1. WEIGHT: Admit Weight: 199 lb 1.6 oz (90.3 kg)      Today  Weight: 170 lb 14.4 oz (77.5 kg)   2. I/O     Intake/Output Summary (Last 24 hours) at 12/9/2021 0927  Last data filed at 12/8/2021 1615  Gross per 24 hour   Intake 600 ml   Output 1250 ml   Net -650 ml       Cardiac Testing:   Limited Echo 12/6/21:   Summary   Definity administered for endocardial border definition and to rule out LV   thrombus. Left ventricular cavity size is dilated with normal left ventricular wall   thickness. Overall, left ventricular systolic function appears severely reduced. Ejection fraction is visually estimated to be in the 25% range. (Guajardo's=26%)   There is severe diffuse hypokinesis with akinesis of the distal septum and   apex. No evidence of left ventricular mass or thrombus noted. Severe mitral regurgitation directed centrally and posteriorly with systolic   flow reversal in pulmonary veins. Moderate tricuspid regurgitation with a PASP of 46 mmHg  ECHO 12/2/21:   Summary   Left ventricular cavity size is mildly dilated. Overall left ventricular systolic function appears severely reduced. Ejection fraction is visually estimated to be 15-20%. Cannot exclude apical   thrombus as contrast not administered. There is severe diffuse global hypokinesis. Grade III diastolic dysfunction. Left atrium is mildly dilated. The right ventricle is moderately dilated with moderately reduced function   by visual assessment. Severe mitral regurgitation with single posteriorly directed jet. Severe tricuspid regurgitation. IVC size is dilated (>2.1 cm) and collapses < 50% with respiration   consistent with elevated RA pressure (15 mmHg).    Cannot estimate PASP given severe TR, but PASP elevated. Critical findings relayed to Dr. Chriss Haywood of severely reduced EF with low   stroke volume. [facility] 12/3/21:   Left Heart Cath  Dominance: Right     LM: 30% distal   LAD: large, arborizing, high first diagonal with 70% mid stenosis; LAD has a 95% stenosis after small, diffusely diseased second diagonal and is JOHANNA I flow to the apex   LCx: small, diffusely diseased that is subtotaled after small first marginal and receiving left to left collaterals to smaller second marginal/distal LCX  RCA: 90% proximal, 70% mid, 100% distal stenoses with left to right collaterals to RPDA      LVEDP: 23 mmHg  LVEF: 15% with severe global HK     Impression/Recommendations:  Severe, three vessel coronary disease  Severe cardiomyopathy  Severe mitral regurgitation   CHADS2VASC=3  CTS consult to discuss CABG/ MVR/KATHARINE Clip    Assessment/Plan:     1. AHF- compensated, continue po lasix, Milrinone gtt  2. CMP-continue entresto, coreg, and dorinda, start SGLT2i at discharge  3. CAD/MVR - plans for surgery tomorrow  4.  Hypotension - stable, asx          I appreciate the opportunity of cooperating in the care of this individual.    Isabel Faulkner, APRN - CNP, ACNP, [address] 12/9/2021, 9:27 AM  Heart Failure  The [address], [address]  Ph: [phone]      Core Measures:   · Discharge instructions:   · LVEF documented:   · ACEI for LV dysfunction:   · Smoking Cessation:

## 2021-12-10 ENCOUNTER — ANESTHESIA (OUTPATIENT)
Dept: OPERATING ROOM | Age: 61
DRG: 216 | End: 2021-12-10
Payer: COMMERCIAL

## 2021-12-10 ENCOUNTER — APPOINTMENT (OUTPATIENT)
Dept: GENERAL RADIOLOGY | Age: 61
DRG: 216 | End: 2021-12-10
Payer: COMMERCIAL

## 2021-12-10 VITALS
OXYGEN SATURATION: 99 % | DIASTOLIC BLOOD PRESSURE: 57 MMHG | RESPIRATION RATE: 20 BRPM | SYSTOLIC BLOOD PRESSURE: 104 MMHG | TEMPERATURE: 98.4 F

## 2021-12-10 LAB
ACTIVATED CLOTTING TIME: 113 SEC (ref 99–130)
ACTIVATED CLOTTING TIME: 140 SEC (ref 99–130)
ACTIVATED CLOTTING TIME: 176 SEC (ref 99–130)
ACTIVATED CLOTTING TIME: 449 SEC (ref 99–130)
ACTIVATED CLOTTING TIME: 465 SEC (ref 99–130)
ACTIVATED CLOTTING TIME: 469 SEC (ref 99–130)
ACTIVATED CLOTTING TIME: 480 SEC (ref 99–130)
ACTIVATED CLOTTING TIME: 481 SEC (ref 99–130)
ACTIVATED CLOTTING TIME: 497 SEC (ref 99–130)
ACTIVATED CLOTTING TIME: 527 SEC (ref 99–130)
ACTIVATED CLOTTING TIME: 535 SEC (ref 99–130)
ACTIVATED CLOTTING TIME: 550 SEC (ref 99–130)
ACTIVATED CLOTTING TIME: 562 SEC (ref 99–130)
ACTIVATED CLOTTING TIME: 601 SEC (ref 99–130)
ANION GAP SERPL CALCULATED.3IONS-SCNC: 11 MMOL/L (ref 3–16)
BASE EXCESS ARTERIAL: -1 (ref -3–3)
BASE EXCESS ARTERIAL: -2 (ref -3–3)
BASE EXCESS ARTERIAL: -3 (ref -3–3)
BASE EXCESS ARTERIAL: -4 (ref -3–3)
BASE EXCESS ARTERIAL: 0 (ref -3–3)
BUN BLDV-MCNC: 28 MG/DL (ref 7–20)
CALCIUM IONIZED: 1.09 MMOL/L (ref 1.12–1.32)
CALCIUM IONIZED: 1.11 MMOL/L (ref 1.12–1.32)
CALCIUM IONIZED: 1.11 MMOL/L (ref 1.12–1.32)
CALCIUM IONIZED: 1.13 MMOL/L (ref 1.12–1.32)
CALCIUM IONIZED: 1.14 MMOL/L (ref 1.12–1.32)
CALCIUM IONIZED: 1.15 MMOL/L (ref 1.12–1.32)
CALCIUM IONIZED: 1.21 MMOL/L (ref 1.12–1.32)
CALCIUM IONIZED: 1.23 MMOL/L (ref 1.12–1.32)
CALCIUM IONIZED: 1.26 MMOL/L (ref 1.12–1.32)
CALCIUM IONIZED: 1.29 MMOL/L (ref 1.12–1.32)
CALCIUM IONIZED: 1.4 MMOL/L (ref 1.12–1.32)
CALCIUM IONIZED: 1.7 MMOL/L (ref 1.12–1.32)
CALCIUM SERPL-MCNC: 8.9 MG/DL (ref 8.3–10.6)
CHLORIDE BLD-SCNC: 110 MMOL/L (ref 99–110)
CO2: 23 MMOL/L (ref 21–32)
CREAT SERPL-MCNC: 1.2 MG/DL (ref 0.9–1.3)
GFR AFRICAN AMERICAN: >60
GFR NON-AFRICAN AMERICAN: >60
GLUCOSE BLD-MCNC: 102 MG/DL (ref 70–99)
GLUCOSE BLD-MCNC: 116 MG/DL (ref 70–99)
GLUCOSE BLD-MCNC: 123 MG/DL (ref 70–99)
GLUCOSE BLD-MCNC: 125 MG/DL (ref 70–99)
GLUCOSE BLD-MCNC: 138 MG/DL (ref 70–99)
GLUCOSE BLD-MCNC: 141 MG/DL (ref 70–99)
GLUCOSE BLD-MCNC: 150 MG/DL (ref 70–99)
GLUCOSE BLD-MCNC: 150 MG/DL (ref 70–99)
GLUCOSE BLD-MCNC: 151 MG/DL (ref 70–99)
GLUCOSE BLD-MCNC: 153 MG/DL (ref 70–99)
GLUCOSE BLD-MCNC: 154 MG/DL (ref 70–99)
GLUCOSE BLD-MCNC: 159 MG/DL (ref 70–99)
GLUCOSE BLD-MCNC: 161 MG/DL (ref 70–99)
GLUCOSE BLD-MCNC: 162 MG/DL (ref 70–99)
GLUCOSE BLD-MCNC: 169 MG/DL (ref 70–99)
GLUCOSE BLD-MCNC: 172 MG/DL (ref 70–99)
GLUCOSE BLD-MCNC: 175 MG/DL (ref 70–99)
GLUCOSE BLD-MCNC: 178 MG/DL (ref 70–99)
GLUCOSE BLD-MCNC: 183 MG/DL (ref 70–99)
GLUCOSE BLD-MCNC: 184 MG/DL (ref 70–99)
GLUCOSE BLD-MCNC: 91 MG/DL (ref 70–99)
HCO3 ARTERIAL: 21 MMOL/L (ref 21–29)
HCO3 ARTERIAL: 21.9 MMOL/L (ref 21–29)
HCO3 ARTERIAL: 21.9 MMOL/L (ref 21–29)
HCO3 ARTERIAL: 22.2 MMOL/L (ref 21–29)
HCO3 ARTERIAL: 22.4 MMOL/L (ref 21–29)
HCO3 ARTERIAL: 22.5 MMOL/L (ref 21–29)
HCO3 ARTERIAL: 22.6 MMOL/L (ref 21–29)
HCO3 ARTERIAL: 22.9 MMOL/L (ref 21–29)
HCO3 ARTERIAL: 23.3 MMOL/L (ref 21–29)
HCO3 ARTERIAL: 23.4 MMOL/L (ref 21–29)
HCO3 ARTERIAL: 23.4 MMOL/L (ref 21–29)
HCO3 ARTERIAL: 23.7 MMOL/L (ref 21–29)
HCO3 ARTERIAL: 23.9 MMOL/L (ref 21–29)
HCO3 ARTERIAL: 24.2 MMOL/L (ref 21–29)
HCO3 ARTERIAL: 25.4 MMOL/L (ref 21–29)
HCO3 ARTERIAL: 25.7 MMOL/L (ref 21–29)
HCO3 ARTERIAL: 25.7 MMOL/L (ref 21–29)
HCO3 ARTERIAL: 25.8 MMOL/L (ref 21–29)
HCO3 ARTERIAL: 25.9 MMOL/L (ref 21–29)
HCO3 ARTERIAL: 26.2 MMOL/L (ref 21–29)
HCT VFR BLD CALC: 44.7 % (ref 40.5–52.5)
HEMOGLOBIN: 10 GM/DL (ref 13.5–17.5)
HEMOGLOBIN: 10.2 GM/DL (ref 13.5–17.5)
HEMOGLOBIN: 10.3 GM/DL (ref 13.5–17.5)
HEMOGLOBIN: 10.4 GM/DL (ref 13.5–17.5)
HEMOGLOBIN: 10.4 GM/DL (ref 13.5–17.5)
HEMOGLOBIN: 11 GM/DL (ref 13.5–17.5)
HEMOGLOBIN: 11.4 GM/DL (ref 13.5–17.5)
HEMOGLOBIN: 14.2 GM/DL (ref 13.5–17.5)
HEMOGLOBIN: 14.5 GM/DL (ref 13.5–17.5)
HEMOGLOBIN: 14.6 G/DL (ref 13.5–17.5)
HEMOGLOBIN: 14.8 GM/DL (ref 13.5–17.5)
HEMOGLOBIN: 15.3 GM/DL (ref 13.5–17.5)
HEMOGLOBIN: 15.8 GM/DL (ref 13.5–17.5)
HEMOGLOBIN: 8.9 GM/DL (ref 13.5–17.5)
HEMOGLOBIN: 9.3 GM/DL (ref 13.5–17.5)
HEMOGLOBIN: 9.8 GM/DL (ref 13.5–17.5)
HEMOGLOBIN: 9.8 GM/DL (ref 13.5–17.5)
LACTATE: 1.19 MMOL/L (ref 0.4–2)
LACTATE: 1.45 MMOL/L (ref 0.4–2)
LACTATE: 1.48 MMOL/L (ref 0.4–2)
LACTATE: 1.58 MMOL/L (ref 0.4–2)
LACTATE: 1.6 MMOL/L (ref 0.4–2)
LACTATE: 1.63 MMOL/L (ref 0.4–2)
LACTATE: 1.64 MMOL/L (ref 0.4–2)
LACTATE: 1.67 MMOL/L (ref 0.4–2)
LACTATE: 1.73 MMOL/L (ref 0.4–2)
LACTATE: 1.94 MMOL/L (ref 0.4–2)
LACTATE: 1.98 MMOL/L (ref 0.4–2)
LACTATE: 2.09 MMOL/L (ref 0.4–2)
LACTATE: 2.34 MMOL/L (ref 0.4–2)
LACTATE: 2.44 MMOL/L (ref 0.4–2)
MAGNESIUM: 3.1 MG/DL (ref 1.8–2.4)
MCH RBC QN AUTO: 29.7 PG (ref 26–34)
MCHC RBC AUTO-ENTMCNC: 32.7 G/DL (ref 31–36)
MCV RBC AUTO: 90.9 FL (ref 80–100)
O2 SAT, ARTERIAL: 100 % (ref 93–100)
O2 SAT, ARTERIAL: 91 % (ref 93–100)
O2 SAT, ARTERIAL: 92 % (ref 93–100)
O2 SAT, ARTERIAL: 94 % (ref 93–100)
O2 SAT, ARTERIAL: 94 % (ref 93–100)
O2 SAT, ARTERIAL: 95 % (ref 93–100)
O2 SAT, ARTERIAL: 95 % (ref 93–100)
O2 SAT, ARTERIAL: 97 % (ref 93–100)
PCO2 ARTERIAL: 34.2 MM HG (ref 35–45)
PCO2 ARTERIAL: 35.3 MM HG (ref 35–45)
PCO2 ARTERIAL: 35.6 MM HG (ref 35–45)
PCO2 ARTERIAL: 37.8 MM HG (ref 35–45)
PCO2 ARTERIAL: 38.6 MM HG (ref 35–45)
PCO2 ARTERIAL: 39 MM HG (ref 35–45)
PCO2 ARTERIAL: 39.4 MM HG (ref 35–45)
PCO2 ARTERIAL: 40.8 MM HG (ref 35–45)
PCO2 ARTERIAL: 40.9 MM HG (ref 35–45)
PCO2 ARTERIAL: 42.8 MM HG (ref 35–45)
PCO2 ARTERIAL: 43.2 MM HG (ref 35–45)
PCO2 ARTERIAL: 43.2 MM HG (ref 35–45)
PCO2 ARTERIAL: 43.5 MM HG (ref 35–45)
PCO2 ARTERIAL: 44.6 MM HG (ref 35–45)
PCO2 ARTERIAL: 45 MM HG (ref 35–45)
PCO2 ARTERIAL: 46.5 MM HG (ref 35–45)
PCO2 ARTERIAL: 46.5 MM HG (ref 35–45)
PCO2 ARTERIAL: 47.6 MM HG (ref 35–45)
PCO2 ARTERIAL: 48.9 MM HG (ref 35–45)
PCO2 ARTERIAL: 49.8 MM HG (ref 35–45)
PDW BLD-RTO: 16.6 % (ref 12.4–15.4)
PERFORMED ON: ABNORMAL
PH ARTERIAL: 7.28 (ref 7.35–7.45)
PH ARTERIAL: 7.31 (ref 7.35–7.45)
PH ARTERIAL: 7.32 (ref 7.35–7.45)
PH ARTERIAL: 7.34 (ref 7.35–7.45)
PH ARTERIAL: 7.34 (ref 7.35–7.45)
PH ARTERIAL: 7.35 (ref 7.35–7.45)
PH ARTERIAL: 7.35 (ref 7.35–7.45)
PH ARTERIAL: 7.36 (ref 7.35–7.45)
PH ARTERIAL: 7.37 (ref 7.35–7.45)
PH ARTERIAL: 7.37 (ref 7.35–7.45)
PH ARTERIAL: 7.38 (ref 7.35–7.45)
PH ARTERIAL: 7.39 (ref 7.35–7.45)
PH ARTERIAL: 7.39 (ref 7.35–7.45)
PH ARTERIAL: 7.4 (ref 7.35–7.45)
PH ARTERIAL: 7.42 (ref 7.35–7.45)
PH ARTERIAL: 7.44 (ref 7.35–7.45)
PLATELET # BLD: 108 K/UL (ref 135–450)
PMV BLD AUTO: 9.1 FL (ref 5–10.5)
PO2 ARTERIAL: 233.2 MM HG (ref 75–108)
PO2 ARTERIAL: 243.9 MM HG (ref 75–108)
PO2 ARTERIAL: 251.6 MM HG (ref 75–108)
PO2 ARTERIAL: 303.6 MM HG (ref 75–108)
PO2 ARTERIAL: 344.7 MM HG (ref 75–108)
PO2 ARTERIAL: 344.8 MM HG (ref 75–108)
PO2 ARTERIAL: 386.6 MM HG (ref 75–108)
PO2 ARTERIAL: 387.9 MM HG (ref 75–108)
PO2 ARTERIAL: 391.7 MM HG (ref 75–108)
PO2 ARTERIAL: 402.2 MM HG (ref 75–108)
PO2 ARTERIAL: 424.9 MM HG (ref 75–108)
PO2 ARTERIAL: 508.2 MM HG (ref 75–108)
PO2 ARTERIAL: 575.8 MM HG (ref 75–108)
PO2 ARTERIAL: 68.7 MM HG (ref 75–108)
PO2 ARTERIAL: 69.2 MM HG (ref 75–108)
PO2 ARTERIAL: 70.7 MM HG (ref 75–108)
PO2 ARTERIAL: 72.4 MM HG (ref 75–108)
PO2 ARTERIAL: 81.5 MM HG (ref 75–108)
PO2 ARTERIAL: 82 MM HG (ref 75–108)
PO2 ARTERIAL: 97.6 MM HG (ref 75–108)
POC HEMATOCRIT: 26 % (ref 40.5–52.5)
POC HEMATOCRIT: 27 % (ref 40.5–52.5)
POC HEMATOCRIT: 29 % (ref 40.5–52.5)
POC HEMATOCRIT: 30 % (ref 40.5–52.5)
POC HEMATOCRIT: 30 % (ref 40.5–52.5)
POC HEMATOCRIT: 31 % (ref 40.5–52.5)
POC HEMATOCRIT: 31 % (ref 40.5–52.5)
POC HEMATOCRIT: 32 % (ref 40.5–52.5)
POC HEMATOCRIT: 33 % (ref 40.5–52.5)
POC HEMATOCRIT: 42 % (ref 40.5–52.5)
POC HEMATOCRIT: 43 % (ref 40.5–52.5)
POC HEMATOCRIT: 43 % (ref 40.5–52.5)
POC HEMATOCRIT: 45 % (ref 40.5–52.5)
POC HEMATOCRIT: 47 % (ref 40.5–52.5)
POC PATIENT TEMP: 99
POC POTASSIUM: 3.7 MMOL/L (ref 3.5–5.1)
POC POTASSIUM: 3.9 MMOL/L (ref 3.5–5.1)
POC POTASSIUM: 4 MMOL/L (ref 3.5–5.1)
POC POTASSIUM: 4.1 MMOL/L (ref 3.5–5.1)
POC POTASSIUM: 4.1 MMOL/L (ref 3.5–5.1)
POC POTASSIUM: 4.2 MMOL/L (ref 3.5–5.1)
POC POTASSIUM: 4.2 MMOL/L (ref 3.5–5.1)
POC POTASSIUM: 4.3 MMOL/L (ref 3.5–5.1)
POC POTASSIUM: 4.3 MMOL/L (ref 3.5–5.1)
POC POTASSIUM: 4.4 MMOL/L (ref 3.5–5.1)
POC POTASSIUM: 4.5 MMOL/L (ref 3.5–5.1)
POC POTASSIUM: 4.5 MMOL/L (ref 3.5–5.1)
POC POTASSIUM: 4.8 MMOL/L (ref 3.5–5.1)
POC POTASSIUM: 5 MMOL/L (ref 3.5–5.1)
POC POTASSIUM: 5.4 MMOL/L (ref 3.5–5.1)
POC SAMPLE TYPE: ABNORMAL
POC SODIUM: 140 MMOL/L (ref 136–145)
POC SODIUM: 141 MMOL/L (ref 136–145)
POC SODIUM: 142 MMOL/L (ref 136–145)
POC SODIUM: 143 MMOL/L (ref 136–145)
POC SODIUM: 146 MMOL/L (ref 136–145)
POTASSIUM SERPL-SCNC: 4.1 MMOL/L (ref 3.5–5.1)
RBC # BLD: 4.92 M/UL (ref 4.2–5.9)
SODIUM BLD-SCNC: 144 MMOL/L (ref 136–145)
TCO2 ARTERIAL: 22 MMOL/L
TCO2 ARTERIAL: 23 MMOL/L
TCO2 ARTERIAL: 23 MMOL/L
TCO2 ARTERIAL: 24 MMOL/L
TCO2 ARTERIAL: 25 MMOL/L
TCO2 ARTERIAL: 27 MMOL/L
TCO2 ARTERIAL: 28 MMOL/L
WBC # BLD: 13.5 K/UL (ref 4–11)

## 2021-12-10 PROCEDURE — 85347 COAGULATION TIME ACTIVATED: CPT

## 2021-12-10 PROCEDURE — 06BQ4ZZ EXCISION OF LEFT SAPHENOUS VEIN, PERCUTANEOUS ENDOSCOPIC APPROACH: ICD-10-PCS | Performed by: THORACIC SURGERY (CARDIOTHORACIC VASCULAR SURGERY)

## 2021-12-10 PROCEDURE — C1889 IMPLANT/INSERT DEVICE, NOC: HCPCS | Performed by: THORACIC SURGERY (CARDIOTHORACIC VASCULAR SURGERY)

## 2021-12-10 PROCEDURE — 03HC33Z INSERTION OF INFUSION DEVICE INTO LEFT RADIAL ARTERY, PERCUTANEOUS APPROACH: ICD-10-PCS | Performed by: THORACIC SURGERY (CARDIOTHORACIC VASCULAR SURGERY)

## 2021-12-10 PROCEDURE — 2780000010 HC IMPLANT OTHER: Performed by: THORACIC SURGERY (CARDIOTHORACIC VASCULAR SURGERY)

## 2021-12-10 PROCEDURE — 6360000002 HC RX W HCPCS: Performed by: NURSE PRACTITIONER

## 2021-12-10 PROCEDURE — 71045 X-RAY EXAM CHEST 1 VIEW: CPT

## 2021-12-10 PROCEDURE — 5A1935Z RESPIRATORY VENTILATION, LESS THAN 24 CONSECUTIVE HOURS: ICD-10-PCS | Performed by: THORACIC SURGERY (CARDIOTHORACIC VASCULAR SURGERY)

## 2021-12-10 PROCEDURE — 2709999900 HC NON-CHARGEABLE SUPPLY: Performed by: THORACIC SURGERY (CARDIOTHORACIC VASCULAR SURGERY)

## 2021-12-10 PROCEDURE — 85027 COMPLETE CBC AUTOMATED: CPT

## 2021-12-10 PROCEDURE — 2500000003 HC RX 250 WO HCPCS: Performed by: THORACIC SURGERY (CARDIOTHORACIC VASCULAR SURGERY)

## 2021-12-10 PROCEDURE — 2000000000 HC ICU R&B

## 2021-12-10 PROCEDURE — C9290 INJ, BUPIVACAINE LIPOSOME: HCPCS | Performed by: THORACIC SURGERY (CARDIOTHORACIC VASCULAR SURGERY)

## 2021-12-10 PROCEDURE — 82947 ASSAY GLUCOSE BLOOD QUANT: CPT

## 2021-12-10 PROCEDURE — 33430 REPLACEMENT OF MITRAL VALVE: CPT | Performed by: THORACIC SURGERY (CARDIOTHORACIC VASCULAR SURGERY)

## 2021-12-10 PROCEDURE — 94761 N-INVAS EAR/PLS OXIMETRY MLT: CPT

## 2021-12-10 PROCEDURE — 07B90ZX EXCISION OF LEFT INTERNAL MAMMARY LYMPHATIC, OPEN APPROACH, DIAGNOSTIC: ICD-10-PCS | Performed by: THORACIC SURGERY (CARDIOTHORACIC VASCULAR SURGERY)

## 2021-12-10 PROCEDURE — 3700000000 HC ANESTHESIA ATTENDED CARE: Performed by: THORACIC SURGERY (CARDIOTHORACIC VASCULAR SURGERY)

## 2021-12-10 PROCEDURE — 33519 CABG ARTERY-VEIN THREE: CPT | Performed by: THORACIC SURGERY (CARDIOTHORACIC VASCULAR SURGERY)

## 2021-12-10 PROCEDURE — 6360000002 HC RX W HCPCS: Performed by: ANESTHESIOLOGY

## 2021-12-10 PROCEDURE — 94002 VENT MGMT INPAT INIT DAY: CPT

## 2021-12-10 PROCEDURE — 83605 ASSAY OF LACTIC ACID: CPT

## 2021-12-10 PROCEDURE — 7100000010 HC PHASE II RECOVERY - FIRST 15 MIN

## 2021-12-10 PROCEDURE — 85014 HEMATOCRIT: CPT

## 2021-12-10 PROCEDURE — 02L70CK OCCLUSION OF LEFT ATRIAL APPENDAGE WITH EXTRALUMINAL DEVICE, OPEN APPROACH: ICD-10-PCS | Performed by: THORACIC SURGERY (CARDIOTHORACIC VASCULAR SURGERY)

## 2021-12-10 PROCEDURE — 6370000000 HC RX 637 (ALT 250 FOR IP): Performed by: THORACIC SURGERY (CARDIOTHORACIC VASCULAR SURGERY)

## 2021-12-10 PROCEDURE — 3700000001 HC ADD 15 MINUTES (ANESTHESIA): Performed by: THORACIC SURGERY (CARDIOTHORACIC VASCULAR SURGERY)

## 2021-12-10 PROCEDURE — 33508 ENDOSCOPIC VEIN HARVEST: CPT | Performed by: THORACIC SURGERY (CARDIOTHORACIC VASCULAR SURGERY)

## 2021-12-10 PROCEDURE — 2580000003 HC RX 258: Performed by: THORACIC SURGERY (CARDIOTHORACIC VASCULAR SURGERY)

## 2021-12-10 PROCEDURE — 02100Z9 BYPASS CORONARY ARTERY, ONE ARTERY FROM LEFT INTERNAL MAMMARY, OPEN APPROACH: ICD-10-PCS | Performed by: THORACIC SURGERY (CARDIOTHORACIC VASCULAR SURGERY)

## 2021-12-10 PROCEDURE — 82330 ASSAY OF CALCIUM: CPT

## 2021-12-10 PROCEDURE — 021209W BYPASS CORONARY ARTERY, THREE ARTERIES FROM AORTA WITH AUTOLOGOUS VENOUS TISSUE, OPEN APPROACH: ICD-10-PCS | Performed by: THORACIC SURGERY (CARDIOTHORACIC VASCULAR SURGERY)

## 2021-12-10 PROCEDURE — 6360000002 HC RX W HCPCS: Performed by: THORACIC SURGERY (CARDIOTHORACIC VASCULAR SURGERY)

## 2021-12-10 PROCEDURE — 3600000018 HC SURGERY OHS ADDTL 15MIN: Performed by: THORACIC SURGERY (CARDIOTHORACIC VASCULAR SURGERY)

## 2021-12-10 PROCEDURE — 82803 BLOOD GASES ANY COMBINATION: CPT

## 2021-12-10 PROCEDURE — C9113 INJ PANTOPRAZOLE SODIUM, VIA: HCPCS | Performed by: THORACIC SURGERY (CARDIOTHORACIC VASCULAR SURGERY)

## 2021-12-10 PROCEDURE — 2500000003 HC RX 250 WO HCPCS: Performed by: ANESTHESIOLOGY

## 2021-12-10 PROCEDURE — 5A1221Z PERFORMANCE OF CARDIAC OUTPUT, CONTINUOUS: ICD-10-PCS | Performed by: THORACIC SURGERY (CARDIOTHORACIC VASCULAR SURGERY)

## 2021-12-10 PROCEDURE — 7100000011 HC PHASE II RECOVERY - ADDTL 15 MIN

## 2021-12-10 PROCEDURE — 3600000008 HC SURGERY OHS BASE: Performed by: THORACIC SURGERY (CARDIOTHORACIC VASCULAR SURGERY)

## 2021-12-10 PROCEDURE — C1751 CATH, INF, PER/CENT/MIDLINE: HCPCS | Performed by: THORACIC SURGERY (CARDIOTHORACIC VASCULAR SURGERY)

## 2021-12-10 PROCEDURE — 0BH17EZ INSERTION OF ENDOTRACHEAL AIRWAY INTO TRACHEA, VIA NATURAL OR ARTIFICIAL OPENING: ICD-10-PCS | Performed by: THORACIC SURGERY (CARDIOTHORACIC VASCULAR SURGERY)

## 2021-12-10 PROCEDURE — 83735 ASSAY OF MAGNESIUM: CPT

## 2021-12-10 PROCEDURE — C9113 INJ PANTOPRAZOLE SODIUM, VIA: HCPCS | Performed by: NURSE PRACTITIONER

## 2021-12-10 PROCEDURE — 6370000000 HC RX 637 (ALT 250 FOR IP): Performed by: INTERNAL MEDICINE

## 2021-12-10 PROCEDURE — 80048 BASIC METABOLIC PNL TOTAL CA: CPT

## 2021-12-10 PROCEDURE — 02UJ0JZ SUPPLEMENT TRICUSPID VALVE WITH SYNTHETIC SUBSTITUTE, OPEN APPROACH: ICD-10-PCS | Performed by: THORACIC SURGERY (CARDIOTHORACIC VASCULAR SURGERY)

## 2021-12-10 PROCEDURE — 2780000006 HC MISC HEART VALVE: Performed by: THORACIC SURGERY (CARDIOTHORACIC VASCULAR SURGERY)

## 2021-12-10 PROCEDURE — 2720000010 HC SURG SUPPLY STERILE: Performed by: THORACIC SURGERY (CARDIOTHORACIC VASCULAR SURGERY)

## 2021-12-10 PROCEDURE — 04HL33Z INSERTION OF INFUSION DEVICE INTO LEFT FEMORAL ARTERY, PERCUTANEOUS APPROACH: ICD-10-PCS | Performed by: THORACIC SURGERY (CARDIOTHORACIC VASCULAR SURGERY)

## 2021-12-10 PROCEDURE — 2580000003 HC RX 258: Performed by: NURSE PRACTITIONER

## 2021-12-10 PROCEDURE — 02HQ33Z INSERTION OF INFUSION DEVICE INTO RIGHT PULMONARY ARTERY, PERCUTANEOUS APPROACH: ICD-10-PCS | Performed by: THORACIC SURGERY (CARDIOTHORACIC VASCULAR SURGERY)

## 2021-12-10 PROCEDURE — 33533 CABG ARTERIAL SINGLE: CPT | Performed by: THORACIC SURGERY (CARDIOTHORACIC VASCULAR SURGERY)

## 2021-12-10 PROCEDURE — C1729 CATH, DRAINAGE: HCPCS | Performed by: THORACIC SURGERY (CARDIOTHORACIC VASCULAR SURGERY)

## 2021-12-10 PROCEDURE — 3E0T3BZ INTRODUCTION OF ANESTHETIC AGENT INTO PERIPHERAL NERVES AND PLEXI, PERCUTANEOUS APPROACH: ICD-10-PCS | Performed by: THORACIC SURGERY (CARDIOTHORACIC VASCULAR SURGERY)

## 2021-12-10 PROCEDURE — 02RG0JZ REPLACEMENT OF MITRAL VALVE WITH SYNTHETIC SUBSTITUTE, OPEN APPROACH: ICD-10-PCS | Performed by: THORACIC SURGERY (CARDIOTHORACIC VASCULAR SURGERY)

## 2021-12-10 PROCEDURE — B246ZZ4 ULTRASONOGRAPHY OF RIGHT AND LEFT HEART, TRANSESOPHAGEAL: ICD-10-PCS | Performed by: ANESTHESIOLOGY

## 2021-12-10 PROCEDURE — 38530 BIOPSY/REMOVAL LYMPH NODES: CPT | Performed by: THORACIC SURGERY (CARDIOTHORACIC VASCULAR SURGERY)

## 2021-12-10 PROCEDURE — B246ZZ4 ULTRASONOGRAPHY OF RIGHT AND LEFT HEART, TRANSESOPHAGEAL: ICD-10-PCS | Performed by: THORACIC SURGERY (CARDIOTHORACIC VASCULAR SURGERY)

## 2021-12-10 PROCEDURE — C1894 INTRO/SHEATH, NON-LASER: HCPCS | Performed by: THORACIC SURGERY (CARDIOTHORACIC VASCULAR SURGERY)

## 2021-12-10 PROCEDURE — 2580000003 HC RX 258: Performed by: ANESTHESIOLOGY

## 2021-12-10 PROCEDURE — P9045 ALBUMIN (HUMAN), 5%, 250 ML: HCPCS | Performed by: THORACIC SURGERY (CARDIOTHORACIC VASCULAR SURGERY)

## 2021-12-10 PROCEDURE — 84132 ASSAY OF SERUM POTASSIUM: CPT

## 2021-12-10 PROCEDURE — 88305 TISSUE EXAM BY PATHOLOGIST: CPT

## 2021-12-10 PROCEDURE — 33464 VALVULOPLASTY TRICUSPID: CPT | Performed by: THORACIC SURGERY (CARDIOTHORACIC VASCULAR SURGERY)

## 2021-12-10 PROCEDURE — 2700000000 HC OXYGEN THERAPY PER DAY

## 2021-12-10 PROCEDURE — 84295 ASSAY OF SERUM SODIUM: CPT

## 2021-12-10 DEVICE — IMPLANTABLE DEVICE: Type: IMPLANTABLE DEVICE | Status: FUNCTIONAL

## 2021-12-10 DEVICE — IMPLANTABLE DEVICE: Type: IMPLANTABLE DEVICE | Site: HEART | Status: FUNCTIONAL

## 2021-12-10 DEVICE — Z INACTIVE USE 2424444 DEVICE OCCL CLP L45MM SM FOOTPRINT 1 HND APPL SUTURELESS W/: Type: IMPLANTABLE DEVICE | Status: FUNCTIONAL

## 2021-12-10 RX ORDER — FENTANYL CITRATE 50 UG/ML
INJECTION, SOLUTION INTRAMUSCULAR; INTRAVENOUS PRN
Status: DISCONTINUED | OUTPATIENT
Start: 2021-12-10 | End: 2021-12-10 | Stop reason: SDUPTHER

## 2021-12-10 RX ORDER — CARVEDILOL 3.12 MG/1
3.12 TABLET ORAL 2 TIMES DAILY
Status: DISCONTINUED | OUTPATIENT
Start: 2021-12-10 | End: 2021-12-18

## 2021-12-10 RX ORDER — NOREPINEPHRINE BIT/0.9 % NACL 16MG/250ML
2 INFUSION BOTTLE (ML) INTRAVENOUS
Status: COMPLETED | OUTPATIENT
Start: 2021-12-10 | End: 2021-12-10

## 2021-12-10 RX ORDER — SODIUM CHLORIDE 0.9 % (FLUSH) 0.9 %
10 SYRINGE (ML) INJECTION EVERY 12 HOURS SCHEDULED
Status: DISCONTINUED | OUTPATIENT
Start: 2021-12-10 | End: 2021-12-19 | Stop reason: HOSPADM

## 2021-12-10 RX ORDER — DOBUTAMINE HYDROCHLORIDE 200 MG/100ML
2 INJECTION INTRAVENOUS CONTINUOUS PRN
Status: DISCONTINUED | OUTPATIENT
Start: 2021-12-10 | End: 2021-12-13

## 2021-12-10 RX ORDER — NOREPINEPHRINE BIT/0.9 % NACL 16MG/250ML
2 INFUSION BOTTLE (ML) INTRAVENOUS CONTINUOUS PRN
Status: DISCONTINUED | OUTPATIENT
Start: 2021-12-10 | End: 2021-12-13

## 2021-12-10 RX ORDER — PROTAMINE SULFATE 10 MG/ML
INJECTION, SOLUTION INTRAVENOUS PRN
Status: DISCONTINUED | OUTPATIENT
Start: 2021-12-10 | End: 2021-12-10 | Stop reason: SDUPTHER

## 2021-12-10 RX ORDER — PROPOFOL 10 MG/ML
INJECTION, EMULSION INTRAVENOUS CONTINUOUS PRN
Status: DISCONTINUED | OUTPATIENT
Start: 2021-12-10 | End: 2021-12-10 | Stop reason: SDUPTHER

## 2021-12-10 RX ORDER — PROPOFOL 10 MG/ML
INJECTION, EMULSION INTRAVENOUS PRN
Status: DISCONTINUED | OUTPATIENT
Start: 2021-12-10 | End: 2021-12-10 | Stop reason: SDUPTHER

## 2021-12-10 RX ORDER — MIDAZOLAM HYDROCHLORIDE 5 MG/ML
INJECTION INTRAMUSCULAR; INTRAVENOUS PRN
Status: DISCONTINUED | OUTPATIENT
Start: 2021-12-10 | End: 2021-12-10 | Stop reason: SDUPTHER

## 2021-12-10 RX ORDER — ASPIRIN 300 MG/1
300 SUPPOSITORY RECTAL ONCE
Status: DISCONTINUED | OUTPATIENT
Start: 2021-12-10 | End: 2021-12-13

## 2021-12-10 RX ORDER — ONDANSETRON 2 MG/ML
INJECTION INTRAMUSCULAR; INTRAVENOUS PRN
Status: DISCONTINUED | OUTPATIENT
Start: 2021-12-10 | End: 2021-12-10 | Stop reason: SDUPTHER

## 2021-12-10 RX ORDER — FUROSEMIDE 10 MG/ML
20 INJECTION INTRAMUSCULAR; INTRAVENOUS 2 TIMES DAILY
Status: DISCONTINUED | OUTPATIENT
Start: 2021-12-11 | End: 2021-12-18

## 2021-12-10 RX ORDER — ESMOLOL HYDROCHLORIDE 10 MG/ML
5 INJECTION INTRAVENOUS ONCE
Status: COMPLETED | OUTPATIENT
Start: 2021-12-10 | End: 2021-12-10

## 2021-12-10 RX ORDER — VALSARTAN 80 MG/1
80 TABLET ORAL
Status: DISCONTINUED | OUTPATIENT
Start: 2021-12-13 | End: 2021-12-18

## 2021-12-10 RX ORDER — SODIUM CHLORIDE 9 MG/ML
INJECTION, SOLUTION INTRAVENOUS CONTINUOUS PRN
Status: DISCONTINUED | OUTPATIENT
Start: 2021-12-10 | End: 2021-12-10 | Stop reason: SDUPTHER

## 2021-12-10 RX ORDER — ZOLPIDEM TARTRATE 5 MG/1
5 TABLET ORAL NIGHTLY PRN
Status: DISCONTINUED | OUTPATIENT
Start: 2021-12-11 | End: 2021-12-19 | Stop reason: HOSPADM

## 2021-12-10 RX ORDER — DEXTROSE, SODIUM CHLORIDE, AND POTASSIUM CHLORIDE 5; .45; .15 G/100ML; G/100ML; G/100ML
1000 INJECTION INTRAVENOUS CONTINUOUS
Status: DISCONTINUED | OUTPATIENT
Start: 2021-12-10 | End: 2021-12-13

## 2021-12-10 RX ORDER — DEXTROSE MONOHYDRATE 50 MG/ML
100 INJECTION, SOLUTION INTRAVENOUS PRN
Status: DISCONTINUED | OUTPATIENT
Start: 2021-12-10 | End: 2021-12-13

## 2021-12-10 RX ORDER — LIDOCAINE HYDROCHLORIDE 20 MG/ML
INJECTION, SOLUTION INTRAVENOUS PRN
Status: DISCONTINUED | OUTPATIENT
Start: 2021-12-10 | End: 2021-12-10 | Stop reason: SDUPTHER

## 2021-12-10 RX ORDER — POTASSIUM CHLORIDE 29.8 MG/ML
20 INJECTION INTRAVENOUS PRN
Status: DISCONTINUED | OUTPATIENT
Start: 2021-12-10 | End: 2021-12-19 | Stop reason: HOSPADM

## 2021-12-10 RX ORDER — METOCLOPRAMIDE HYDROCHLORIDE 5 MG/ML
INJECTION INTRAMUSCULAR; INTRAVENOUS PRN
Status: DISCONTINUED | OUTPATIENT
Start: 2021-12-10 | End: 2021-12-10 | Stop reason: SDUPTHER

## 2021-12-10 RX ORDER — ACETAMINOPHEN 500 MG
1000 TABLET ORAL EVERY 6 HOURS
Status: DISCONTINUED | OUTPATIENT
Start: 2021-12-10 | End: 2021-12-19 | Stop reason: HOSPADM

## 2021-12-10 RX ORDER — SUCCINYLCHOLINE/SOD CL,ISO/PF 200MG/10ML
SYRINGE (ML) INTRAVENOUS PRN
Status: DISCONTINUED | OUTPATIENT
Start: 2021-12-10 | End: 2021-12-10 | Stop reason: SDUPTHER

## 2021-12-10 RX ORDER — ALBUMIN, HUMAN INJ 5% 5 %
25 SOLUTION INTRAVENOUS PRN
Status: DISCONTINUED | OUTPATIENT
Start: 2021-12-10 | End: 2021-12-13

## 2021-12-10 RX ORDER — SODIUM CHLORIDE 9 MG/ML
50 INJECTION, SOLUTION INTRAVENOUS ONCE
Status: DISCONTINUED | OUTPATIENT
Start: 2021-12-10 | End: 2021-12-10

## 2021-12-10 RX ORDER — ALBUTEROL SULFATE 90 UG/1
2 AEROSOL, METERED RESPIRATORY (INHALATION) EVERY 6 HOURS PRN
Status: DISCONTINUED | OUTPATIENT
Start: 2021-12-10 | End: 2021-12-18

## 2021-12-10 RX ORDER — PANTOPRAZOLE SODIUM 40 MG/10ML
40 INJECTION, POWDER, LYOPHILIZED, FOR SOLUTION INTRAVENOUS DAILY
Status: DISCONTINUED | OUTPATIENT
Start: 2021-12-10 | End: 2021-12-13

## 2021-12-10 RX ORDER — DOPAMINE HYDROCHLORIDE 160 MG/100ML
2 INJECTION, SOLUTION INTRAVENOUS CONTINUOUS PRN
Status: DISCONTINUED | OUTPATIENT
Start: 2021-12-10 | End: 2021-12-13

## 2021-12-10 RX ORDER — VECURONIUM BROMIDE 1 MG/ML
INJECTION, POWDER, LYOPHILIZED, FOR SOLUTION INTRAVENOUS PRN
Status: DISCONTINUED | OUTPATIENT
Start: 2021-12-10 | End: 2021-12-10 | Stop reason: SDUPTHER

## 2021-12-10 RX ORDER — FONDAPARINUX SODIUM 2.5 MG/.5ML
2.5 INJECTION SUBCUTANEOUS DAILY
Status: DISCONTINUED | OUTPATIENT
Start: 2021-12-11 | End: 2021-12-12

## 2021-12-10 RX ORDER — DEXTROSE MONOHYDRATE 25 G/50ML
12.5 INJECTION, SOLUTION INTRAVENOUS PRN
Status: DISCONTINUED | OUTPATIENT
Start: 2021-12-10 | End: 2021-12-13

## 2021-12-10 RX ORDER — TRAMADOL HYDROCHLORIDE 50 MG/1
100 TABLET ORAL EVERY 6 HOURS PRN
Status: DISCONTINUED | OUTPATIENT
Start: 2021-12-10 | End: 2021-12-19 | Stop reason: HOSPADM

## 2021-12-10 RX ORDER — NITROGLYCERIN 20 MG/100ML
10 INJECTION INTRAVENOUS CONTINUOUS PRN
Status: DISCONTINUED | OUTPATIENT
Start: 2021-12-10 | End: 2021-12-13

## 2021-12-10 RX ORDER — TRAMADOL HYDROCHLORIDE 50 MG/1
50 TABLET ORAL EVERY 6 HOURS PRN
Status: DISCONTINUED | OUTPATIENT
Start: 2021-12-10 | End: 2021-12-19 | Stop reason: HOSPADM

## 2021-12-10 RX ORDER — NITROGLYCERIN 40 MG/1
1 PATCH TRANSDERMAL DAILY
Status: DISCONTINUED | OUTPATIENT
Start: 2021-12-11 | End: 2021-12-13

## 2021-12-10 RX ORDER — SENNA AND DOCUSATE SODIUM 50; 8.6 MG/1; MG/1
1 TABLET, FILM COATED ORAL 2 TIMES DAILY
Status: DISCONTINUED | OUTPATIENT
Start: 2021-12-10 | End: 2021-12-19 | Stop reason: HOSPADM

## 2021-12-10 RX ORDER — FUROSEMIDE 10 MG/ML
20 INJECTION INTRAMUSCULAR; INTRAVENOUS PRN
Status: DISCONTINUED | OUTPATIENT
Start: 2021-12-10 | End: 2021-12-19 | Stop reason: HOSPADM

## 2021-12-10 RX ORDER — MEPERIDINE HYDROCHLORIDE 50 MG/ML
25 INJECTION INTRAMUSCULAR; INTRAVENOUS; SUBCUTANEOUS
Status: DISPENSED | OUTPATIENT
Start: 2021-12-10 | End: 2021-12-10

## 2021-12-10 RX ORDER — MAGNESIUM HYDROXIDE 1200 MG/15ML
LIQUID ORAL CONTINUOUS PRN
Status: COMPLETED | OUTPATIENT
Start: 2021-12-10 | End: 2021-12-10

## 2021-12-10 RX ORDER — SODIUM CHLORIDE 0.9 % (FLUSH) 0.9 %
10 SYRINGE (ML) INJECTION DAILY
Status: DISCONTINUED | OUTPATIENT
Start: 2021-12-10 | End: 2021-12-13

## 2021-12-10 RX ORDER — PROTAMINE SULFATE 10 MG/ML
50 INJECTION, SOLUTION INTRAVENOUS
Status: ACTIVE | OUTPATIENT
Start: 2021-12-10 | End: 2021-12-10

## 2021-12-10 RX ORDER — CALCIUM CHLORIDE 100 MG/ML
INJECTION INTRAVENOUS; INTRAVENTRICULAR
Status: COMPLETED | OUTPATIENT
Start: 2021-12-10 | End: 2021-12-10

## 2021-12-10 RX ORDER — SODIUM CHLORIDE, SODIUM LACTATE, POTASSIUM CHLORIDE, AND CALCIUM CHLORIDE .6; .31; .03; .02 G/100ML; G/100ML; G/100ML; G/100ML
250 INJECTION, SOLUTION INTRAVENOUS CONTINUOUS PRN
Status: DISCONTINUED | OUTPATIENT
Start: 2021-12-10 | End: 2021-12-16

## 2021-12-10 RX ORDER — SODIUM CHLORIDE 0.9 % (FLUSH) 0.9 %
10 SYRINGE (ML) INJECTION PRN
Status: DISCONTINUED | OUTPATIENT
Start: 2021-12-10 | End: 2021-12-19 | Stop reason: HOSPADM

## 2021-12-10 RX ORDER — MAGNESIUM SULFATE HEPTAHYDRATE 500 MG/ML
INJECTION, SOLUTION INTRAMUSCULAR; INTRAVENOUS PRN
Status: DISCONTINUED | OUTPATIENT
Start: 2021-12-10 | End: 2021-12-10 | Stop reason: SDUPTHER

## 2021-12-10 RX ORDER — ONDANSETRON 2 MG/ML
4 INJECTION INTRAMUSCULAR; INTRAVENOUS EVERY 6 HOURS PRN
Status: DISCONTINUED | OUTPATIENT
Start: 2021-12-10 | End: 2021-12-19 | Stop reason: HOSPADM

## 2021-12-10 RX ORDER — MILRINONE LACTATE 0.2 MG/ML
0.38 INJECTION, SOLUTION INTRAVENOUS CONTINUOUS PRN
Status: DISCONTINUED | OUTPATIENT
Start: 2021-12-10 | End: 2021-12-12

## 2021-12-10 RX ORDER — HEPARIN SODIUM 1000 [USP'U]/ML
INJECTION, SOLUTION INTRAVENOUS; SUBCUTANEOUS PRN
Status: DISCONTINUED | OUTPATIENT
Start: 2021-12-10 | End: 2021-12-10 | Stop reason: SDUPTHER

## 2021-12-10 RX ORDER — POLYETHYLENE GLYCOL 3350 17 G/17G
17 POWDER, FOR SOLUTION ORAL DAILY PRN
Status: DISCONTINUED | OUTPATIENT
Start: 2021-12-10 | End: 2021-12-19 | Stop reason: HOSPADM

## 2021-12-10 RX ORDER — METOCLOPRAMIDE HYDROCHLORIDE 5 MG/ML
10 INJECTION INTRAMUSCULAR; INTRAVENOUS EVERY 6 HOURS PRN
Status: DISCONTINUED | OUTPATIENT
Start: 2021-12-10 | End: 2021-12-19 | Stop reason: HOSPADM

## 2021-12-10 RX ORDER — MAGNESIUM SULFATE IN WATER 40 MG/ML
2000 INJECTION, SOLUTION INTRAVENOUS PRN
Status: DISCONTINUED | OUTPATIENT
Start: 2021-12-10 | End: 2021-12-19 | Stop reason: HOSPADM

## 2021-12-10 RX ORDER — LANOLIN ALCOHOL/MO/W.PET/CERES
400 CREAM (GRAM) TOPICAL 2 TIMES DAILY
Status: DISCONTINUED | OUTPATIENT
Start: 2021-12-11 | End: 2021-12-13

## 2021-12-10 RX ORDER — PANTOPRAZOLE SODIUM 40 MG/1
40 TABLET, DELAYED RELEASE ORAL DAILY
Status: DISCONTINUED | OUTPATIENT
Start: 2021-12-11 | End: 2021-12-19 | Stop reason: HOSPADM

## 2021-12-10 RX ORDER — POTASSIUM CHLORIDE 750 MG/1
10 TABLET, FILM COATED, EXTENDED RELEASE ORAL
Status: DISCONTINUED | OUTPATIENT
Start: 2021-12-11 | End: 2021-12-18

## 2021-12-10 RX ORDER — PROPOFOL 10 MG/ML
10 INJECTION, EMULSION INTRAVENOUS
Status: DISCONTINUED | OUTPATIENT
Start: 2021-12-10 | End: 2021-12-13

## 2021-12-10 RX ORDER — DIPHENHYDRAMINE HCL 25 MG
25 TABLET ORAL NIGHTLY PRN
Status: DISCONTINUED | OUTPATIENT
Start: 2021-12-11 | End: 2021-12-19 | Stop reason: HOSPADM

## 2021-12-10 RX ORDER — NICOTINE POLACRILEX 4 MG
15 LOZENGE BUCCAL PRN
Status: DISCONTINUED | OUTPATIENT
Start: 2021-12-10 | End: 2021-12-13

## 2021-12-10 RX ORDER — MORPHINE SULFATE 2 MG/ML
2 INJECTION, SOLUTION INTRAMUSCULAR; INTRAVENOUS
Status: DISCONTINUED | OUTPATIENT
Start: 2021-12-10 | End: 2021-12-19 | Stop reason: HOSPADM

## 2021-12-10 RX ORDER — SODIUM CHLORIDE 9 MG/ML
25 INJECTION, SOLUTION INTRAVENOUS PRN
Status: DISCONTINUED | OUTPATIENT
Start: 2021-12-10 | End: 2021-12-19 | Stop reason: HOSPADM

## 2021-12-10 RX ORDER — CALCIUM CHLORIDE 100 MG/ML
INJECTION INTRAVENOUS; INTRAVENTRICULAR PRN
Status: DISCONTINUED | OUTPATIENT
Start: 2021-12-10 | End: 2021-12-10 | Stop reason: SDUPTHER

## 2021-12-10 RX ADMIN — MUPIROCIN: 20 OINTMENT TOPICAL at 22:15

## 2021-12-10 RX ADMIN — FENTANYL CITRATE 100 MCG: 50 INJECTION, SOLUTION INTRAMUSCULAR; INTRAVENOUS at 07:06

## 2021-12-10 RX ADMIN — FAMOTIDINE 20 MG: 10 INJECTION INTRAVENOUS at 07:37

## 2021-12-10 RX ADMIN — PROPOFOL 100 MG: 10 INJECTION, EMULSION INTRAVENOUS at 12:36

## 2021-12-10 RX ADMIN — AMINOCAPROIC ACID 5000 MG: 250 INJECTION, SOLUTION INTRAVENOUS at 17:09

## 2021-12-10 RX ADMIN — ESMOLOL HYDROCHLORIDE 5 MG: 100 INJECTION, SOLUTION INTRAVENOUS at 06:17

## 2021-12-10 RX ADMIN — VANCOMYCIN HYDROCHLORIDE 1000 MG: 1 INJECTION, POWDER, LYOPHILIZED, FOR SOLUTION INTRAVENOUS at 19:45

## 2021-12-10 RX ADMIN — METOCLOPRAMIDE 10 MG: 5 INJECTION, SOLUTION INTRAMUSCULAR; INTRAVENOUS at 07:42

## 2021-12-10 RX ADMIN — ALBUMIN (HUMAN) 12.5 G: 12.5 INJECTION, SOLUTION INTRAVENOUS at 18:26

## 2021-12-10 RX ADMIN — VANCOMYCIN HYDROCHLORIDE 1000 MG: 1 INJECTION, POWDER, LYOPHILIZED, FOR SOLUTION INTRAVENOUS at 07:02

## 2021-12-10 RX ADMIN — SODIUM BICARBONATE 50 MEQ: 84 INJECTION, SOLUTION INTRAVENOUS at 09:22

## 2021-12-10 RX ADMIN — Medication 4 MCG/MIN: at 07:04

## 2021-12-10 RX ADMIN — SODIUM CHLORIDE: 9 INJECTION, SOLUTION INTRAVENOUS at 07:02

## 2021-12-10 RX ADMIN — SODIUM CHLORIDE, PRESERVATIVE FREE 10 ML: 5 INJECTION INTRAVENOUS at 17:04

## 2021-12-10 RX ADMIN — SODIUM CHLORIDE 2 UNITS/HR: 9 INJECTION, SOLUTION INTRAVENOUS at 07:14

## 2021-12-10 RX ADMIN — SODIUM CHLORIDE, PRESERVATIVE FREE 10 ML: 5 INJECTION INTRAVENOUS at 16:59

## 2021-12-10 RX ADMIN — POTASSIUM CHLORIDE, DEXTROSE MONOHYDRATE AND SODIUM CHLORIDE 1000 ML: 150; 5; 450 INJECTION, SOLUTION INTRAVENOUS at 17:12

## 2021-12-10 RX ADMIN — SODIUM BICARBONATE 50 MEQ: 84 INJECTION, SOLUTION INTRAVENOUS at 11:25

## 2021-12-10 RX ADMIN — CEFAZOLIN 2000 MG: 10 INJECTION, POWDER, FOR SOLUTION INTRAVENOUS at 07:02

## 2021-12-10 RX ADMIN — FENTANYL CITRATE 50 MCG: 50 INJECTION, SOLUTION INTRAMUSCULAR; INTRAVENOUS at 07:01

## 2021-12-10 RX ADMIN — CEFAZOLIN 2000 MG: 10 INJECTION, POWDER, FOR SOLUTION INTRAVENOUS at 22:55

## 2021-12-10 RX ADMIN — ACETAMINOPHEN 1000 MG: 500 TABLET ORAL at 17:25

## 2021-12-10 RX ADMIN — POTASSIUM CHLORIDE 20 MEQ: 29.8 INJECTION, SOLUTION INTRAVENOUS at 16:02

## 2021-12-10 RX ADMIN — ALBUMIN (HUMAN) 12.5 G: 12.5 INJECTION, SOLUTION INTRAVENOUS at 19:31

## 2021-12-10 RX ADMIN — MIDAZOLAM 3 MG: 5 INJECTION INTRAMUSCULAR; INTRAVENOUS at 12:29

## 2021-12-10 RX ADMIN — AMIODARONE HYDROCHLORIDE 0.5 MG/MIN: 50 INJECTION, SOLUTION INTRAVENOUS at 07:22

## 2021-12-10 RX ADMIN — MIDAZOLAM 5 MG: 5 INJECTION INTRAMUSCULAR; INTRAVENOUS at 09:24

## 2021-12-10 RX ADMIN — PANTOPRAZOLE SODIUM 40 MG: 40 INJECTION, POWDER, FOR SOLUTION INTRAVENOUS at 05:17

## 2021-12-10 RX ADMIN — CALCIUM CHLORIDE 0.5 G: 100 INJECTION INTRAVENOUS; INTRAVENTRICULAR at 13:39

## 2021-12-10 RX ADMIN — LIDOCAINE HYDROCHLORIDE 100 MG: 20 INJECTION, SOLUTION INTRAVENOUS at 07:03

## 2021-12-10 RX ADMIN — ATORVASTATIN CALCIUM 40 MG: 40 TABLET, FILM COATED ORAL at 22:30

## 2021-12-10 RX ADMIN — ONDANSETRON 4 MG: 2 INJECTION INTRAMUSCULAR; INTRAVENOUS at 08:04

## 2021-12-10 RX ADMIN — FENTANYL CITRATE 250 MCG: 50 INJECTION, SOLUTION INTRAMUSCULAR; INTRAVENOUS at 09:24

## 2021-12-10 RX ADMIN — CALCIUM CHLORIDE 1 G: 100 INJECTION INTRAVENOUS; INTRAVENTRICULAR at 14:07

## 2021-12-10 RX ADMIN — EPINEPHRINE 1.33 MCG/MIN: 1 INJECTION PARENTERAL at 17:33

## 2021-12-10 RX ADMIN — MORPHINE SULFATE 2 MG: 2 INJECTION, SOLUTION INTRAMUSCULAR; INTRAVENOUS at 20:40

## 2021-12-10 RX ADMIN — TRAMADOL HYDROCHLORIDE 100 MG: 50 TABLET, FILM COATED ORAL at 23:17

## 2021-12-10 RX ADMIN — PROTAMINE SULFATE 100 MG: 10 INJECTION, SOLUTION INTRAVENOUS at 13:59

## 2021-12-10 RX ADMIN — PROTAMINE SULFATE 300 MG: 10 INJECTION, SOLUTION INTRAVENOUS at 13:44

## 2021-12-10 RX ADMIN — FENTANYL CITRATE 50 MCG: 50 INJECTION, SOLUTION INTRAMUSCULAR; INTRAVENOUS at 06:49

## 2021-12-10 RX ADMIN — SODIUM CHLORIDE 3.96 UNITS/HR: 9 INJECTION, SOLUTION INTRAVENOUS at 17:12

## 2021-12-10 RX ADMIN — Medication 100 MG: at 07:06

## 2021-12-10 RX ADMIN — Medication 10 MCG/MIN: at 17:17

## 2021-12-10 RX ADMIN — MORPHINE SULFATE 2 MG: 2 INJECTION, SOLUTION INTRAMUSCULAR; INTRAVENOUS at 18:40

## 2021-12-10 RX ADMIN — AMIODARONE HYDROCHLORIDE 0.5 MG/MIN: 50 INJECTION, SOLUTION INTRAVENOUS at 17:32

## 2021-12-10 RX ADMIN — PROPOFOL 80 MG: 10 INJECTION, EMULSION INTRAVENOUS at 07:06

## 2021-12-10 RX ADMIN — POTASSIUM CHLORIDE 20 MEQ: 29.8 INJECTION, SOLUTION INTRAVENOUS at 17:16

## 2021-12-10 RX ADMIN — CALCIUM CHLORIDE 0.5 G: 100 INJECTION INTRAVENOUS; INTRAVENTRICULAR at 13:32

## 2021-12-10 RX ADMIN — Medication 2 MCG/MIN: at 07:00

## 2021-12-10 RX ADMIN — HYDROCORTISONE SODIUM SUCCINATE 200 MG: 100 INJECTION, POWDER, FOR SOLUTION INTRAMUSCULAR; INTRAVENOUS at 07:35

## 2021-12-10 RX ADMIN — CEFAZOLIN 2000 MG: 10 INJECTION, POWDER, FOR SOLUTION INTRAVENOUS at 14:30

## 2021-12-10 RX ADMIN — Medication 10 ML: at 21:00

## 2021-12-10 RX ADMIN — PROPOFOL 100 MG: 10 INJECTION, EMULSION INTRAVENOUS at 12:50

## 2021-12-10 RX ADMIN — EPINEPHRINE 1 MCG/MIN: 1 INJECTION PARENTERAL at 07:13

## 2021-12-10 RX ADMIN — FENTANYL CITRATE 50 MCG: 50 INJECTION, SOLUTION INTRAMUSCULAR; INTRAVENOUS at 07:49

## 2021-12-10 RX ADMIN — HEPARIN SODIUM 27000 UNITS: 1000 INJECTION INTRAVENOUS; SUBCUTANEOUS at 08:42

## 2021-12-10 RX ADMIN — ALBUMIN (HUMAN) 12.5 G: 12.5 INJECTION, SOLUTION INTRAVENOUS at 15:45

## 2021-12-10 RX ADMIN — MIDAZOLAM 1 MG: 5 INJECTION INTRAMUSCULAR; INTRAVENOUS at 06:44

## 2021-12-10 RX ADMIN — PROPOFOL 120 MG: 10 INJECTION, EMULSION INTRAVENOUS at 12:25

## 2021-12-10 RX ADMIN — FENTANYL CITRATE 250 MCG: 50 INJECTION, SOLUTION INTRAMUSCULAR; INTRAVENOUS at 12:29

## 2021-12-10 RX ADMIN — ALBUMIN (HUMAN) 12.5 G: 12.5 INJECTION, SOLUTION INTRAVENOUS at 16:52

## 2021-12-10 RX ADMIN — MAGNESIUM SULFATE HEPTAHYDRATE 1 G: 500 INJECTION, SOLUTION INTRAMUSCULAR; INTRAVENOUS at 07:42

## 2021-12-10 RX ADMIN — PROTHROMBIN, COAGULATION FACTOR VII HUMAN, COAGULATION FACTOR IX HUMAN, COAGULATION FACTOR X HUMAN, PROTEIN C, PROTEIN S HUMAN, AND WATER 2500 UNITS: KIT at 14:17

## 2021-12-10 RX ADMIN — CEFAZOLIN 2000 MG: 10 INJECTION, POWDER, FOR SOLUTION INTRAVENOUS at 10:35

## 2021-12-10 RX ADMIN — HEPARIN SODIUM 3000 UNITS: 1000 INJECTION INTRAVENOUS; SUBCUTANEOUS at 07:35

## 2021-12-10 RX ADMIN — VECURONIUM BROMIDE 10 MG: 1 INJECTION, POWDER, LYOPHILIZED, FOR SOLUTION INTRAVENOUS at 07:14

## 2021-12-10 RX ADMIN — PROPOFOL 75 MCG/KG/MIN: 10 INJECTION, EMULSION INTRAVENOUS at 09:21

## 2021-12-10 RX ADMIN — ACETAMINOPHEN 1000 MG: 500 TABLET ORAL at 22:04

## 2021-12-10 RX ADMIN — MILRINONE LACTATE 0.5 MCG/KG/MIN: 0.2 INJECTION, SOLUTION INTRAVENOUS at 17:17

## 2021-12-10 RX ADMIN — MIDAZOLAM 1 MG: 5 INJECTION INTRAMUSCULAR; INTRAVENOUS at 07:03

## 2021-12-10 RX ADMIN — CARVEDILOL 3.12 MG: 3.12 TABLET, FILM COATED ORAL at 05:18

## 2021-12-10 ASSESSMENT — PULMONARY FUNCTION TESTS
PIF_VALUE: 20
PIF_VALUE: 19
PIF_VALUE: 1
PIF_VALUE: 14
PIF_VALUE: 19
PIF_VALUE: 19
PIF_VALUE: 2
PIF_VALUE: 3
PIF_VALUE: 23
PIF_VALUE: 1
PIF_VALUE: 17
PIF_VALUE: 1
PIF_VALUE: 2
PIF_VALUE: 20
PIF_VALUE: 1
PIF_VALUE: 20
PIF_VALUE: 1
PIF_VALUE: 19
PIF_VALUE: 15
PIF_VALUE: 20
PIF_VALUE: 1
PIF_VALUE: 24
PIF_VALUE: 12
PIF_VALUE: 19
PIF_VALUE: 1
PIF_VALUE: 20
PIF_VALUE: 12
PIF_VALUE: 2
PIF_VALUE: 1
PIF_VALUE: 19
PIF_VALUE: 1
PIF_VALUE: 15
PIF_VALUE: 1
PIF_VALUE: 1
PIF_VALUE: 2
PIF_VALUE: 25
PIF_VALUE: 15
PIF_VALUE: 20
PIF_VALUE: 1
PIF_VALUE: 1
PIF_VALUE: 13
PIF_VALUE: 19
PIF_VALUE: 17
PIF_VALUE: 20
PIF_VALUE: 19
PIF_VALUE: 12
PIF_VALUE: 19
PIF_VALUE: 1
PIF_VALUE: 1
PIF_VALUE: 30
PIF_VALUE: 1
PIF_VALUE: 12
PIF_VALUE: 1
PIF_VALUE: 1
PIF_VALUE: 20
PIF_VALUE: 1
PIF_VALUE: 20
PIF_VALUE: 12
PIF_VALUE: 2
PIF_VALUE: 1
PIF_VALUE: 19
PIF_VALUE: 1
PIF_VALUE: 19
PIF_VALUE: 1
PIF_VALUE: 1
PIF_VALUE: 2
PIF_VALUE: 18
PIF_VALUE: 1
PIF_VALUE: 1
PIF_VALUE: 19
PIF_VALUE: 1
PIF_VALUE: 12
PIF_VALUE: 25
PIF_VALUE: 1
PIF_VALUE: 19
PIF_VALUE: 1
PIF_VALUE: 23
PIF_VALUE: 13
PIF_VALUE: 15
PIF_VALUE: 1
PIF_VALUE: 20
PIF_VALUE: 14
PIF_VALUE: 1
PIF_VALUE: 1
PIF_VALUE: 24
PIF_VALUE: 12
PIF_VALUE: 1
PIF_VALUE: 20
PIF_VALUE: 27
PIF_VALUE: 15
PIF_VALUE: 1
PIF_VALUE: 1
PIF_VALUE: 19
PIF_VALUE: 15
PIF_VALUE: 1
PIF_VALUE: 2
PIF_VALUE: 1
PIF_VALUE: 19
PIF_VALUE: 1
PIF_VALUE: 17
PIF_VALUE: 1
PIF_VALUE: 24
PIF_VALUE: 18
PIF_VALUE: 20
PIF_VALUE: 1
PIF_VALUE: 15
PIF_VALUE: 1
PIF_VALUE: 1
PIF_VALUE: 13
PIF_VALUE: 1
PIF_VALUE: 18
PIF_VALUE: 1
PIF_VALUE: 19
PIF_VALUE: 1
PIF_VALUE: 19
PIF_VALUE: 1
PIF_VALUE: 26
PIF_VALUE: 18
PIF_VALUE: 19
PIF_VALUE: 1
PIF_VALUE: 1
PIF_VALUE: 17
PIF_VALUE: 14
PIF_VALUE: 1
PIF_VALUE: 12
PIF_VALUE: 1
PIF_VALUE: 2
PIF_VALUE: 15
PIF_VALUE: 1
PIF_VALUE: 19
PIF_VALUE: 18
PIF_VALUE: 14
PIF_VALUE: 1
PIF_VALUE: 19
PIF_VALUE: 25
PIF_VALUE: 1
PIF_VALUE: 15
PIF_VALUE: 1
PIF_VALUE: 1
PIF_VALUE: 12
PIF_VALUE: 3
PIF_VALUE: 15
PIF_VALUE: 24
PIF_VALUE: 1
PIF_VALUE: 17
PIF_VALUE: 1
PIF_VALUE: 20
PIF_VALUE: 24
PIF_VALUE: 1
PIF_VALUE: 20
PIF_VALUE: 17
PIF_VALUE: 1
PIF_VALUE: 1
PIF_VALUE: 19
PIF_VALUE: 23
PIF_VALUE: 1
PIF_VALUE: 1
PIF_VALUE: 2
PIF_VALUE: 1
PIF_VALUE: 12
PIF_VALUE: 15
PIF_VALUE: 12
PIF_VALUE: 19
PIF_VALUE: 13
PIF_VALUE: 15
PIF_VALUE: 17
PIF_VALUE: 1
PIF_VALUE: 12
PIF_VALUE: 20
PIF_VALUE: 1
PIF_VALUE: 1
PIF_VALUE: 15
PIF_VALUE: 15
PIF_VALUE: 17
PIF_VALUE: 1
PIF_VALUE: 1
PIF_VALUE: 15
PIF_VALUE: 1
PIF_VALUE: 2
PIF_VALUE: 1
PIF_VALUE: 1
PIF_VALUE: 12
PIF_VALUE: 19
PIF_VALUE: 1
PIF_VALUE: 16
PIF_VALUE: 1
PIF_VALUE: 20
PIF_VALUE: 4
PIF_VALUE: 1
PIF_VALUE: 1
PIF_VALUE: 20
PIF_VALUE: 1
PIF_VALUE: 1
PIF_VALUE: 12
PIF_VALUE: 1
PIF_VALUE: 12
PIF_VALUE: 2
PIF_VALUE: 19
PIF_VALUE: 24
PIF_VALUE: 30
PIF_VALUE: 1
PIF_VALUE: 12
PIF_VALUE: 2
PIF_VALUE: 18
PIF_VALUE: 1
PIF_VALUE: 16
PIF_VALUE: 1
PIF_VALUE: 15
PIF_VALUE: 1
PIF_VALUE: 1
PIF_VALUE: 12
PIF_VALUE: 17
PIF_VALUE: 20
PIF_VALUE: 16
PIF_VALUE: 20
PIF_VALUE: 15
PIF_VALUE: 19
PIF_VALUE: 24
PIF_VALUE: 1
PIF_VALUE: 11
PIF_VALUE: 1
PIF_VALUE: 1
PIF_VALUE: 17
PIF_VALUE: 20
PIF_VALUE: 15
PIF_VALUE: 1
PIF_VALUE: 13
PIF_VALUE: 1
PIF_VALUE: 23
PIF_VALUE: 1
PIF_VALUE: 33
PIF_VALUE: 1
PIF_VALUE: 16
PIF_VALUE: 1
PIF_VALUE: 2
PIF_VALUE: 15
PIF_VALUE: 19
PIF_VALUE: 18
PIF_VALUE: 19
PIF_VALUE: 1
PIF_VALUE: 17
PIF_VALUE: 19
PIF_VALUE: 1
PIF_VALUE: 25
PIF_VALUE: 1
PIF_VALUE: 2
PIF_VALUE: 20
PIF_VALUE: 15
PIF_VALUE: 18
PIF_VALUE: 1
PIF_VALUE: 19
PIF_VALUE: 12
PIF_VALUE: 2
PIF_VALUE: 4
PIF_VALUE: 17
PIF_VALUE: 1
PIF_VALUE: 18
PIF_VALUE: 1
PIF_VALUE: 15
PIF_VALUE: 1
PIF_VALUE: 12
PIF_VALUE: 22
PIF_VALUE: 24
PIF_VALUE: 20
PIF_VALUE: 12
PIF_VALUE: 1
PIF_VALUE: 20
PIF_VALUE: 1
PIF_VALUE: 13
PIF_VALUE: 1
PIF_VALUE: 1
PIF_VALUE: 15
PIF_VALUE: 1
PIF_VALUE: 1
PIF_VALUE: 18
PIF_VALUE: 1
PIF_VALUE: 1
PIF_VALUE: 2
PIF_VALUE: 15
PIF_VALUE: 1
PIF_VALUE: 12
PIF_VALUE: 12
PIF_VALUE: 15
PIF_VALUE: 20
PIF_VALUE: 4
PIF_VALUE: 12
PIF_VALUE: 16
PIF_VALUE: 14
PIF_VALUE: 19
PIF_VALUE: 1
PIF_VALUE: 1
PIF_VALUE: 11
PIF_VALUE: 11
PIF_VALUE: 1
PIF_VALUE: 2
PIF_VALUE: 1
PIF_VALUE: 12
PIF_VALUE: 1
PIF_VALUE: 15
PIF_VALUE: 1
PIF_VALUE: 12
PIF_VALUE: 18
PIF_VALUE: 11
PIF_VALUE: 17
PIF_VALUE: 1
PIF_VALUE: 12
PIF_VALUE: 2
PIF_VALUE: 15
PIF_VALUE: 24
PIF_VALUE: 19
PIF_VALUE: 15
PIF_VALUE: 12
PIF_VALUE: 37
PIF_VALUE: 17
PIF_VALUE: 1
PIF_VALUE: 17
PIF_VALUE: 16
PIF_VALUE: 20
PIF_VALUE: 2
PIF_VALUE: 1
PIF_VALUE: 15
PIF_VALUE: 1
PIF_VALUE: 17
PIF_VALUE: 12
PIF_VALUE: 1
PIF_VALUE: 19
PIF_VALUE: 12
PIF_VALUE: 15
PIF_VALUE: 1
PIF_VALUE: 17
PIF_VALUE: 18
PIF_VALUE: 1
PIF_VALUE: 15
PIF_VALUE: 1
PIF_VALUE: 13
PIF_VALUE: 1
PIF_VALUE: 19
PIF_VALUE: 20
PIF_VALUE: 12
PIF_VALUE: 1
PIF_VALUE: 1
PIF_VALUE: 15
PIF_VALUE: 17
PIF_VALUE: 20
PIF_VALUE: 1
PIF_VALUE: 18
PIF_VALUE: 12
PIF_VALUE: 19
PIF_VALUE: 1
PIF_VALUE: 1
PIF_VALUE: 12
PIF_VALUE: 1
PIF_VALUE: 1
PIF_VALUE: 15
PIF_VALUE: 25
PIF_VALUE: 20
PIF_VALUE: 19
PIF_VALUE: 17
PIF_VALUE: 1
PIF_VALUE: 19
PIF_VALUE: 19
PIF_VALUE: 1
PIF_VALUE: 19
PIF_VALUE: 20
PIF_VALUE: 12
PIF_VALUE: 2
PIF_VALUE: 20
PIF_VALUE: 15
PIF_VALUE: 24
PIF_VALUE: 19
PIF_VALUE: 2
PIF_VALUE: 1
PIF_VALUE: 15
PIF_VALUE: 17
PIF_VALUE: 2
PIF_VALUE: 12
PIF_VALUE: 1
PIF_VALUE: 15
PIF_VALUE: 19
PIF_VALUE: 12
PIF_VALUE: 19
PIF_VALUE: 1
PIF_VALUE: 18
PIF_VALUE: 1
PIF_VALUE: 15
PIF_VALUE: 1
PIF_VALUE: 1
PIF_VALUE: 20
PIF_VALUE: 2
PIF_VALUE: 1
PIF_VALUE: 4
PIF_VALUE: 18
PIF_VALUE: 20
PIF_VALUE: 2
PIF_VALUE: 12
PIF_VALUE: 1
PIF_VALUE: 18
PIF_VALUE: 1
PIF_VALUE: 2
PIF_VALUE: 19
PIF_VALUE: 15
PIF_VALUE: 1
PIF_VALUE: 1
PIF_VALUE: 20
PIF_VALUE: 11
PIF_VALUE: 19
PIF_VALUE: 20
PIF_VALUE: 19
PIF_VALUE: 12
PIF_VALUE: 1
PIF_VALUE: 15
PIF_VALUE: 1
PIF_VALUE: 2
PIF_VALUE: 17
PIF_VALUE: 18
PIF_VALUE: 12
PIF_VALUE: 1
PIF_VALUE: 19
PIF_VALUE: 1
PIF_VALUE: 13
PIF_VALUE: 1
PIF_VALUE: 19
PIF_VALUE: 1
PIF_VALUE: 12
PIF_VALUE: 1
PIF_VALUE: 1
PIF_VALUE: 24
PIF_VALUE: 16
PIF_VALUE: 1
PIF_VALUE: 15
PIF_VALUE: 1
PIF_VALUE: 1
PIF_VALUE: 18
PIF_VALUE: 13
PIF_VALUE: 15
PIF_VALUE: 12
PIF_VALUE: 17
PIF_VALUE: 1
PIF_VALUE: 15
PIF_VALUE: 1
PIF_VALUE: 18
PIF_VALUE: 1
PIF_VALUE: 12
PIF_VALUE: 1
PIF_VALUE: 12
PIF_VALUE: 1
PIF_VALUE: 13
PIF_VALUE: 1
PIF_VALUE: 15
PIF_VALUE: 15
PIF_VALUE: 23
PIF_VALUE: 15
PIF_VALUE: 1
PIF_VALUE: 19
PIF_VALUE: 19
PIF_VALUE: 12
PIF_VALUE: 20
PIF_VALUE: 2
PIF_VALUE: 1

## 2021-12-10 ASSESSMENT — PAIN SCALES - GENERAL
PAINLEVEL_OUTOF10: 5
PAINLEVEL_OUTOF10: 0
PAINLEVEL_OUTOF10: 5
PAINLEVEL_OUTOF10: 0
PAINLEVEL_OUTOF10: 5
PAINLEVEL_OUTOF10: 5
PAINLEVEL_OUTOF10: 7

## 2021-12-10 ASSESSMENT — PAIN - FUNCTIONAL ASSESSMENT: PAIN_FUNCTIONAL_ASSESSMENT: 0-10

## 2021-12-10 NOTE — PROGRESS NOTES
Order received for arterial line removal.  Left femoral arterial line discontinued. Manual pressure held for 10 minutes and tegaderm on site. No hematoma, no oozing noted. Patient tolerated well.

## 2021-12-10 NOTE — PROGRESS NOTES
Patient admitted to CVU from [place] [address] and attached to ventilator and monitors. Report received from anesthesiologist.  Chest x-ray ordered. Labs drawn and sent. Assessment complete. Continue monitoring hemodynamics. Family let back to see patient. Visiting hours reviewed and all questions answered. Family aware of discharge class.  Primary RN Candy/ No ERIC    RECOVERY PERIOD BEGINS  [phone]

## 2021-12-10 NOTE — PROGRESS NOTES
Pre-op medications given. Bactroban given. Confirmed consents signed and in chart. Family at bedside. All questions answered. V/U. Pt changed into gown and voided. Weight in chart.

## 2021-12-10 NOTE — ANESTHESIA PROCEDURE NOTES
Tricuspid Valve: Annulus normal.  Stenosis not present. Regurgitation mild. Leaflets normal.  Leaflet motions normal.    Pulmonic Valve: Annulus normal.  Stenosis not present. Regurgitation none. Other Valve Findings:       RING AROUND TRICUSPID VALVE and MECHANICAL MITRAL VALVE    Aorta    Ascending Aorta:  Size normal.  Dissection not present. Aortic Arch:  Size normal.  Dissection not present. Descending Aorta:  Size normal.  Dissection not present. Atria    Right Atrium:  Size normal.  Spontaneous echo contrast not present. Thrombus not present. Tumor not present. Device not present. Left Atrium:  Size normal.  Spontaneous echo contrast not present. Thrombus not present. Tumor not present. Device not present.     Left atrial appendage normal.      Septa    Atrial Septum:  Intra-atrial septal morphology normal.      Ventricular Septum:  Intra-ventricular septum morphology normal.          Other Findings  Pericardium:  normal  Pleural Effusion:  none  Pulmonary Arteries:  normal  Pulmonary Venous Flow:  normal    Anesthesia Information  Performed Personally

## 2021-12-10 NOTE — PROGRESS NOTES
Attempted to see patient multiple times on 12/10/21 without success. In OR with CTS. Will transfer attending service to [address] [place], [place] [address]ists will follow as consultants. Will continue to follow for DM 2.     Cata Ulloa MD

## 2021-12-10 NOTE — DISCHARGE SUMMARY
Discharge Summary    Patient:  Seble Almonte 1960 [id number]   Admission Date:  12/1/2021  9:20 PM  Discharge Date:  12/19/2021    Principle Diagnosis: Severe mitral regurgitation, severe tricuspid regurgitation, severe multivessel coronary artery disease    Secondary Diagnosis:    -acute on chronic combined systolic and diastolic heart failure   -DM2  -tobacco abuse      Angiogram: 12/3/2021  Dominance: Right     LM: 30% distal   LAD: large, arborizing, high first diagonal with 70% mid stenosis; LAD has a 95% stenosis after small, diffusely diseased second diagonal and is JOHANAN I flow to the apex   LCx: small, diffusely diseased that is subtotaled after small first marginal and receiving left to left collaterals to smaller second marginal/distal LCX  RCA: 90% proximal, 70% mid, 100% distal stenoses with left to right collaterals to RPDA      LVEDP: 23 mmHg  LVEF: 15% with severe global HK     ECHO: 12/2/2021   Left ventricular cavity size is mildly dilated.   Overall left ventricular systolic function appears severely reduced.   Ejection fraction is visually estimated to be 15-20%. Cannot exclude apical   thrombus as contrast not administered.  [place] Estevez is severe diffuse global hypokinesis.   Grade III diastolic dysfunction.   Left atrium is mildly dilated.   The right ventricle is moderately dilated with moderately reduced function   by visual assessment.   Severe mitral regurgitation with single posteriorly directed jet.   Severe tricuspid regurgitation.    IVC size is dilated (>2.1 cm) and collapses < 50% with respiration   consistent with elevated RA pressure (15 mmHg).   Cannot estimate PASP given severe TR, but PASP elevated.     Procedure:  12/10/2021  CABG X 4 , CORONARY ARTERY BYPASS GRAFT STRONG TO LAD, VEIN GRAFT TO PDA, SVG SEQUENCED TO DIAG AND OM, LIGATION KATHARINE  ATRICLIP 45MM, EVH LEFT SAPHENOUS VEIN, ANTERIOR AND POSTERIOR CHORDAE SPARING MITRAL VALVE REPLACEMENT MEDTRONIC 29MM mechanical valve, TRICUSPID VALVE REPAIR with 28mm Physio tricuspid annuloplasty ring , TOTAL CPB, VERO, Doppler flow verification of bypass grafts, 5 LEVEL BILATERAL INTERCOSTAL NERVE BLOCK  WITH EXPAREL, LEFT INTERNAL MAMMARY LYMPH NODE BIOPSIES X4       History:    The patient is a 64 y.o. male with significant past medical history of diabetes who presented to the hospital on 12/1/2021 with worsening cough over the past 3 weeks. Left heart angiogram found severe multivessel coronary artery disease. Echo showed severe mitral valve regurgitation and severe tricuspid regurgitation with an EF of 15%. CVTS was consulted for surgical evaluation for myocardial revascularization and possible mitral valve replacement.      Reports swelling in bilateral lower extremities. Patient is a current smoker. Patient has not been vaccinated for covid. Unconfirmed covid diagnosis in October that brought about symptoms. Family history of early CAD (Father had bypass approximately age 48)    Hospital Course: The patient underwent CABG X 4, MV replacement with mechanical, TV repair, and KATHARINE on 12/10/2021 . The patient's post-operative course was uneventful. Patient developed thrombocytopenia and HIT sent and resulted negative. Lowest platelet count 29G. On POD # 2, patient was started on heparin gtt to bridge until INR therapeutic. Patient with soft BP and antihypertensives not tolerated and held. Right pleural chest tube left in until 12/18 when drainage met criteria to discontinue. Post operative  echocardiogram found EF to be 20% (15-20% pre-op per echo) and LifeVest ordered. Unfortunately patient's insurance denied LifeVest coverage despite high risk for sudden cardiac death for severe low ejection fraction and nonsustained ventricular tachycardia. EP continuing to pursue LifeVest on outpatient basis for coverage and peer to peer scheduled. Patient as started on amiodarone 200 mg to help suppress ventricular ectopy.  He developed mild (PROTONIX) 40 MG tablet Take 1 tablet by mouth daily  Qty: 30 tablet, Refills: 0      digoxin (LANOXIN) 125 MCG tablet Take 1 tablet by mouth daily  Qty: 30 tablet, Refills: 3      spironolactone (ALDACTONE) 25 MG tablet Take 0.5 tablets by mouth daily  Qty: 30 tablet, Refills: 3      carvedilol (COREG) 3.125 MG tablet Take 1 tablet by mouth 2 times daily (with meals) Hold for SBP<90 or P<60 Administer with food to minimize the risk of orthostatic hypotension  Qty: 60 tablet, Refills: 3      furosemide (LASIX) 40 MG tablet Take 1 tablet by mouth daily  Qty: 60 tablet, Refills: 3      amiodarone (CORDARONE) 200 MG tablet Take 1 tablet by mouth daily  Qty: 30 tablet, Refills: 3      warfarin (COUMADIN) 6 MG tablet Take by mouth daily at [facility] as directed (start with 1 tablet)  Qty: 30 tablet, Refills: 3      methylPREDNISolone (MEDROL, KARRI,) 4 MG tablet Take by mouth as directed. Qty: 1 kit, Refills: 0            Labs:  Renal:   Lab Results   Component Value Date     12/14/2021    K 4.8 12/15/2021    K 4.1 12/01/2021    CL 98 12/14/2021    CO2 23 12/14/2021    BUN 44 12/14/2021    CREATININE 1.2 12/14/2021     Heme:   Lab Results   Component Value Date    WBC 11.5 12/14/2021    HGB 8.7 12/14/2021    HCT 26.3 12/14/2021    MCV 91.0 12/14/2021     12/14/2021     HgA1C:   Lab Results   Component Value Date    LABA1C 6.2 12/03/2021     PT/INR:   Recent Labs     12/18/21  0640 12/19/21  0515   PROTIME 35.4* 33.7*   INR 2.99* 2.85*       Admission WT: 90.3 kg  Pre-Op WT: 76.2 kg  Discharge WT: 78 kg    Patient Instructions: See AVS for full list of discharge instructions given to patient  Activity: DO NOT LIFT, PUSH, OR PULL ANYTHING OVER 5 POUNDS FOR 8 WEEK from the day of surgery  Diet:  cardiac diet and diabetic diet  Wound Care:  [address] YOUR INCISIONS DAILY WITH A CLEAN WASHCLOTH AND ANTIBACTERIAL SOAP. Do not wash your incisions after you have cleansed other parts of your body.     Follow-up Appointments: Cardiothoracic Surgeon, Dr. Conner Yuan on 1/6/22 @ 9:45am  Cardiology BRIANNE Avila on 1/12/22 @ 9:30am      Electronically signed by MICHELLE Brooks CNP on 12/19/2021 at 10:51 AM

## 2021-12-10 NOTE — PROGRESS NOTES
CC: 3V CAD, ICM, severe MR and TR    Seen in pre-op holding  No c/o  SR  CTAB RRR abd benign  As per CC   All patient and family questions answered  For CABG / MVR / TVr today

## 2021-12-10 NOTE — PLAN OF CARE
S/p CABG x MVR and Tricuspid ring. Currently has ST rhythm with stable BP , Titrating vasoactive meds as appropriate per Dr. Kennedi Olson goals.

## 2021-12-10 NOTE — ANESTHESIA PROCEDURE NOTES
Specific leaflet segments with abnormal motions are described in the following comments:       posterior P1 & P2    Tricuspid Valve: Annulus normal.  Stenosis not present. Regurgitation severe. Leaflets normal.  Leaflet motions normal.    Pulmonic Valve: Annulus normal.  Stenosis not present. Regurgitation none. Aorta    Ascending Aorta:  Size normal.  Dissection not present. Aortic Arch:  Size normal.  Dissection not present. Descending Aorta:  Size normal.  Dissection not present. Atria    Right Atrium:  Size normal.  Spontaneous echo contrast not present. Thrombus not present. Tumor not present. Device not present. Left Atrium:  Size normal.  Spontaneous echo contrast not present. Thrombus not present. Tumor not present. Device not present.     Left atrial appendage normal.      Septa    Atrial Septum:  Intra-atrial septal morphology normal.      Ventricular Septum:  Intra-ventricular septum morphology normal.          Other Findings  Pericardium:  normal  Pleural Effusion:  none  Pulmonary Arteries:  normal  Pulmonary Venous Flow:  normal    Anesthesia Information  Performed Personally

## 2021-12-10 NOTE — ANESTHESIA PROCEDURE NOTES
Central Venous Line:    A central venous line was placed using surface landmarks, in the pre-op for the following indication(s): central venous access and CVP monitoring. 12/10/2021 6:58 AM12/10/2021 6:51 AM    Sterility preparation included the following: hand hygiene performed prior to procedure, maximum sterile barriers used and sterile technique used to drape from head to toe. The patient was placed in Trendelenburg position. The right internal jugular vein was prepped. The site was prepped with Chloraprep. A 9 Fr (size), introducer triple lumen was placed. During the procedure, the following specific steps were taken: target vein identified, needle advanced into vein and blood aspirated and guidewire advanced into vein. Intravenous verification was obtained by ultrasound and venous blood return. Post insertion care included: all ports aspirated, all ports flushed easily, guidewire removed intact, Biopatch applied, line sutured in place and dressing applied. During the procedure the patient experienced: patient tolerated procedure well with no complications and EBL < 5mL. Anesthesia type: generalA(n) oximetric, 7.5 (size) Pulmonary Artery Catheter (PAC) was placed through the Introducer CVL in the right internal jugular vein. The PAC placement was confirmed by pressure tracing changes. The patient experienced the following events during the procedure: patient tolerated procedure well with no complications. PA Cath placed?: Yes    Additional notes:  U/S [id number].  (1) US was used to identify the  vessel. (2) It was assessed and patent. (3) US was used to visualize vascular needle entry into the  vessel.  (4) The selected vessel appeared anatomically normal and (5) there were no apparent abnormal findings. (6) A permanent ultrasound image was saved in the patient's record.       Staffing  Performed: Anesthesiologist   Anesthesiologist: Christopher Reno MD  Preanesthetic Checklist  Completed: patient identified, IV checked, site marked, risks and benefits discussed, surgical consent, monitors and equipment checked, pre-op evaluation, timeout performed, anesthesia consent given, oxygen available and patient being monitored

## 2021-12-10 NOTE — PROCEDURES
Pulmonary Function Testing      Patient name:  Linwood Patel     [facility] Unit #:   [id number]   Date of test: 12/9/2021  Date of interpretation:   12/10/2021    Mr. Linwood Patel is a 64y.o. year-old current smoker. The spirometry data were not acceptable and reproducible. Patient did not exhibit for 6 seconds. Spirometry:  Flow volume loops were normal. The FEV-1/FVC ratio was normal. The  Prebronchodilator FEV-1 was 2.82 liters (79% of predicted), which was moderately decreased. The FVC was 3.35 liters (73% of predicted), which was decreased. Response to inhaled bronchodilators (albuterol) was not performed. Lung volumes:  Lung volumes were not tested. Diffusion capacity was not tested. Interpretation:  Decreased FEV1 and FVC with normal FEV1/FVC ratio suggestive of restrictive lung disease. Full PFTs are required for further evaluation. Results may be underestimated as patient did not exhale for 6 seconds.

## 2021-12-10 NOTE — PROGRESS NOTES
Pt verified information regarding surgery, name, birth date, surgeon, procedure and allergies: nka. Patient transferred to [address] [place] [address] for surgery. Appropriate antibiotics ordered: ancef vanco. Beta blocker: esmolol ivp. Lab work within normal limits, 2 units of RBC's on call to OR, vital signs stable, Mepilex sacral border applied and 2% chlorhexidine gluconate skin prep given. Patient and family educated about surgery and pain management. Arterial line placed in left radial and TLC introducer in RIJ with PA line by Dr. Everett Lujan. To surgery per bed at 2473.

## 2021-12-10 NOTE — BRIEF OP NOTE
Brief Postoperative Note      Patient: Alyssia Way  YOB: 1960  MRN: [id number]    Date of Procedure: 12/10/2021    Pre-Op Diagnosis: 3V CORONARY ARTERY DISEASE, ICM, severe MR, severe TR, CHF, right pleural effusion    Post-Op Diagnosis: Same and LIMA lymphadenopathy       Procedure(s):  CABG X 4 , CORONARY ARTERY BYPASS GRAFT STRONG TO LAD, VEIN GRAFT TO PDA, SVG SEQUENCED TO DIAG AND OM, LIGATION KATHARINE  ATRICLIP 45MM, EVH LEFT SAPHENOUS VEIN, ANTERIOR AND POSTERIOR CHORDAE SPARING MITRAL VALVE REPLACEMENT MEDTRONIC 29MM mechanical valve, TRICUSPID VALVE REPAIR with 28mm Physio tricuspid annuloplasty ring , TOTAL CPB, VERO, Doppler flow verification of bypass grafts, 5 LEVEL BILATERAL INTERCOSTAL NERVE BLOCK  WITH EXPAREL, LEFT INTERNAL MAMMARY LYMPH NODE BIOPSIES X4    Surgeon(s): Trace Man MD    Assistant:  Surgical Assistant: Lashell Young  First Assistant: Parrish Aranda RN  Physician Assistant: Jose Stern PA-C    Anesthesia: General by Dr. Carlita Tuttle    Perfusionist: Aleksandar Coronel    PT: 258   PCP: 410    CT x 3 (BP+M)  V wires    Estimated Blood Loss (mL): 217     Complications: None    Specimens:   ID Type Source Tests Collected by Time Destination   A : LEFT INTERNAL MAMMARY LYMPH NODE Tissue Tissue SURGICAL PATHOLOGY Trace Man MD 12/10/2021 0816    B : LEFT INTERNAL MAMMARY LYMPH NODE #2 Tissue Tissue SURGICAL PATHOLOGY Trace Man MD 12/10/2021 0840    C : LEFT INTERNAL MAMMARY [facility] NODE #3 Tissue Tissue SURGICAL PATHOLOGY Trace Man MD 12/10/2021 2406    D : LEFT INTERNAL MAMMARY LYMPH NODE #4 Tissue Tissue SURGICAL PATHOLOGY Trace Man MD 12/10/2021 7753    E : MITRAL VALVE LEAFLETS Tissue Tissue SURGICAL PATHOLOGY Trace Man MD 12/10/2021 1137        Implants:  Implant Name Type Inv.  Item Serial No.  Lot No. LRB No. Used Action   VALVE MI STD 29MM DIA24.8MM TISS ANNULUS 29.5MM 4.59SQCM - [id number]  VALVE MI STD 29MM DIA24.8MM TISS ANNULUS 29.5MM 4.59SQCM [id number] [employer] [place] [employer]WD  N/A 1 Implanted   DEVICE OCCL CLP L45MM SM FOOTPRINT 1 HND APPL SUTURELESS W/ - [id number]  DEVICE OCCL CLP L45MM SM FOOTPRINT 1 HND APPL SUTURELESS W/ [id number] [employer]WD  N/A 1 Implanted   RING ANNULPLSTY 28MM MITRL TRICSP VLV CARP EDW - [id number]  RING ANNULPLSTY 28MM MITRL TRICSP VLV CARP EDW [id number] [employer]  N/A 1 Implanted         Drains:   Chest Tube 3 Mediastinal; Pleural 24 [place] (Active)   Suction To water seal 12/10/21 1322   Drainage Description Bright red 12/10/21 1322   Dressing Type Split gauze 12/10/21 1322       Urethral Catheter Non-latex 16 fr (Active)       Findings: Excellent LIMA and SV.  1.25mm PDA, OM, D1 and LAD. Soft spots found for grafting. New MV no leaks except expected hinge jets with mean gradient 5mm Hg. No TR post repair. Many LIMA Lymph nodes encountered during dissection and biopsied. EF improved from ~15% to ~ 35%. Excellent doppler signals in all grafts post-CPB.     Electronically signed by Indra Espino MD on 12/10/2021 at 3:15 PM

## 2021-12-10 NOTE — ANESTHESIA POSTPROCEDURE EVALUATION
Department of Anesthesiology  Postprocedure Note    Patient: Michaela Penn  MRN: [id number]  YOB: 1960  Date of evaluation: 12/10/2021  Time:  3:31 PM     Procedure Summary     Date: 12/10/21 Room / Location: [address]    Anesthesia Start: 7186 Anesthesia Stop: 5657    Procedure: CABG X 4 , CORONARY ARTERY BYPASS GRAFT STRONG TO LAD, VEIN GRAFT TO PDA, SVG SEQUENCED TO DIAG AND OM, LIGATION KATHARINE  ATRICLIP 45MM, EVH LEFT SAPHENOUS VEIN, ANTERIOR AND POSTERIOR CHORDAE SPARING MITRAL VALVE REPLACEMENT MEDTRONIC 29MM mechanical valve, TRICUSPID VALVE REPAIR with 28mm Physio tricuspid annuloplasty ring , TOTAL CPB, VERO, Doppler flow verification of bypass grafts, 5 LEVEL BILATERAL INTERCOSTAL NERVE BLOCK  WITH EXPAREL, LEFT INTERNAL MAMMARY LYMPH NODE BIOPSIES X4 (N/A ) Diagnosis: (CORONARY ARTERY DISEASE)    Surgeons: Ofe Ingram MD Responsible Provider: Graciela Saucedo MD    Anesthesia Type: general ASA Status: 4          Anesthesia Type: general    Ria Phase I: Ria Score: 10    Ria Phase II:      Last vitals: Reviewed and per EMR flowsheets.        Anesthesia Post Evaluation    Patient location during evaluation: ICU  Patient participation: complete - patient cannot participate  Level of consciousness: sedated and ventilated  Pain score: 2  Airway patency: patent  Nausea & Vomiting: no vomiting  Complications: no  Cardiovascular status: blood pressure returned to baseline  Respiratory status: intubated, acceptable and ventilator  Hydration status: euvolemic  Multimodal analgesia pain management approach

## 2021-12-10 NOTE — ANESTHESIA PROCEDURE NOTES
Arterial Line:    An arterial line was placed using surface landmarks, in the pre-op for the following indication(s): continuous blood pressure monitoring and blood sampling needed. A 20 gauge (size), 1 and 3/8 inch (length), Angiocath (type) catheter was placed, Seldinger technique used, into the left radial artery, secured by tape and Tegaderm. Anesthesia type: Local  Local infiltration: Injection    Events:  patient tolerated procedure well with no complications and EBL < 5mL.   12/10/2021 6:43 AM12/10/2021 6:45 AM  Anesthesiologist: Og Coronado MD  Performed: Anesthesiologist   Preanesthetic Checklist  Completed: patient identified, IV checked, site marked, risks and benefits discussed, surgical consent, monitors and equipment checked, pre-op evaluation, timeout performed, anesthesia consent given, oxygen available and patient being monitored

## 2021-12-11 LAB
ANION GAP SERPL CALCULATED.3IONS-SCNC: 9 MMOL/L (ref 3–16)
BUN BLDV-MCNC: 27 MG/DL (ref 7–20)
CALCIUM SERPL-MCNC: 8.8 MG/DL (ref 8.3–10.6)
CHLORIDE BLD-SCNC: 111 MMOL/L (ref 99–110)
CO2: 24 MMOL/L (ref 21–32)
CREAT SERPL-MCNC: 1.1 MG/DL (ref 0.9–1.3)
GFR AFRICAN AMERICAN: >60
GFR NON-AFRICAN AMERICAN: >60
GLUCOSE BLD-MCNC: 100 MG/DL (ref 70–99)
GLUCOSE BLD-MCNC: 101 MG/DL (ref 70–99)
GLUCOSE BLD-MCNC: 102 MG/DL (ref 70–99)
GLUCOSE BLD-MCNC: 103 MG/DL (ref 70–99)
GLUCOSE BLD-MCNC: 103 MG/DL (ref 70–99)
GLUCOSE BLD-MCNC: 104 MG/DL (ref 70–99)
GLUCOSE BLD-MCNC: 107 MG/DL (ref 70–99)
GLUCOSE BLD-MCNC: 118 MG/DL (ref 70–99)
GLUCOSE BLD-MCNC: 120 MG/DL (ref 70–99)
GLUCOSE BLD-MCNC: 156 MG/DL (ref 70–99)
GLUCOSE BLD-MCNC: 88 MG/DL (ref 70–99)
GLUCOSE BLD-MCNC: 90 MG/DL (ref 70–99)
GLUCOSE BLD-MCNC: 93 MG/DL (ref 70–99)
GLUCOSE BLD-MCNC: 97 MG/DL (ref 70–99)
HCT VFR BLD CALC: 31.7 % (ref 40.5–52.5)
HEMOGLOBIN: 10.5 G/DL (ref 13.5–17.5)
HEMOGLOBIN: 11.3 GM/DL (ref 13.5–17.5)
INR BLD: 1.18 (ref 0.88–1.12)
MAGNESIUM: 2.5 MG/DL (ref 1.8–2.4)
MCH RBC QN AUTO: 29.8 PG (ref 26–34)
MCHC RBC AUTO-ENTMCNC: 33.1 G/DL (ref 31–36)
MCV RBC AUTO: 90.1 FL (ref 80–100)
PDW BLD-RTO: 16.7 % (ref 12.4–15.4)
PERFORMED ON: ABNORMAL
PERFORMED ON: NORMAL
PLATELET # BLD: 99 K/UL (ref 135–450)
PLATELET SLIDE REVIEW: ABNORMAL
PMV BLD AUTO: 9.2 FL (ref 5–10.5)
POC HEMATOCRIT: 33 % (ref 40.5–52.5)
POC PATIENT TEMP: 99
POC POTASSIUM: 4.7 MMOL/L (ref 3.5–5.1)
POC SAMPLE TYPE: ABNORMAL
POTASSIUM SERPL-SCNC: 4.9 MMOL/L (ref 3.5–5.1)
PROTHROMBIN TIME: 13.4 SEC (ref 9.9–12.7)
RBC # BLD: 3.52 M/UL (ref 4.2–5.9)
SLIDE REVIEW: ABNORMAL
SODIUM BLD-SCNC: 144 MMOL/L (ref 136–145)
WBC # BLD: 14.8 K/UL (ref 4–11)

## 2021-12-11 PROCEDURE — 84132 ASSAY OF SERUM POTASSIUM: CPT

## 2021-12-11 PROCEDURE — 2000000000 HC ICU R&B

## 2021-12-11 PROCEDURE — P9045 ALBUMIN (HUMAN), 5%, 250 ML: HCPCS | Performed by: THORACIC SURGERY (CARDIOTHORACIC VASCULAR SURGERY)

## 2021-12-11 PROCEDURE — 82947 ASSAY GLUCOSE BLOOD QUANT: CPT

## 2021-12-11 PROCEDURE — 85014 HEMATOCRIT: CPT

## 2021-12-11 PROCEDURE — 85027 COMPLETE CBC AUTOMATED: CPT

## 2021-12-11 PROCEDURE — 6370000000 HC RX 637 (ALT 250 FOR IP): Performed by: THORACIC SURGERY (CARDIOTHORACIC VASCULAR SURGERY)

## 2021-12-11 PROCEDURE — 99024 POSTOP FOLLOW-UP VISIT: CPT | Performed by: THORACIC SURGERY (CARDIOTHORACIC VASCULAR SURGERY)

## 2021-12-11 PROCEDURE — 85610 PROTHROMBIN TIME: CPT

## 2021-12-11 PROCEDURE — 2700000000 HC OXYGEN THERAPY PER DAY

## 2021-12-11 PROCEDURE — 80048 BASIC METABOLIC PNL TOTAL CA: CPT

## 2021-12-11 PROCEDURE — 94761 N-INVAS EAR/PLS OXIMETRY MLT: CPT

## 2021-12-11 PROCEDURE — 2500000003 HC RX 250 WO HCPCS: Performed by: THORACIC SURGERY (CARDIOTHORACIC VASCULAR SURGERY)

## 2021-12-11 PROCEDURE — 6360000002 HC RX W HCPCS: Performed by: THORACIC SURGERY (CARDIOTHORACIC VASCULAR SURGERY)

## 2021-12-11 PROCEDURE — 2580000003 HC RX 258: Performed by: THORACIC SURGERY (CARDIOTHORACIC VASCULAR SURGERY)

## 2021-12-11 PROCEDURE — 83735 ASSAY OF MAGNESIUM: CPT

## 2021-12-11 PROCEDURE — C9113 INJ PANTOPRAZOLE SODIUM, VIA: HCPCS | Performed by: THORACIC SURGERY (CARDIOTHORACIC VASCULAR SURGERY)

## 2021-12-11 RX ADMIN — Medication 1 MCG/MIN: at 21:33

## 2021-12-11 RX ADMIN — TRAMADOL HYDROCHLORIDE 100 MG: 50 TABLET, FILM COATED ORAL at 05:50

## 2021-12-11 RX ADMIN — Medication 400 MG: at 21:23

## 2021-12-11 RX ADMIN — CEFAZOLIN 2000 MG: 10 INJECTION, POWDER, FOR SOLUTION INTRAVENOUS at 21:22

## 2021-12-11 RX ADMIN — CEFAZOLIN 2000 MG: 10 INJECTION, POWDER, FOR SOLUTION INTRAVENOUS at 05:50

## 2021-12-11 RX ADMIN — FUROSEMIDE 20 MG: 10 INJECTION, SOLUTION INTRAMUSCULAR; INTRAVENOUS at 09:00

## 2021-12-11 RX ADMIN — PANTOPRAZOLE SODIUM 40 MG: 40 INJECTION, POWDER, FOR SOLUTION INTRAVENOUS at 09:30

## 2021-12-11 RX ADMIN — ACETAMINOPHEN 1000 MG: 500 TABLET ORAL at 04:30

## 2021-12-11 RX ADMIN — FUROSEMIDE 20 MG: 10 INJECTION, SOLUTION INTRAMUSCULAR; INTRAVENOUS at 17:13

## 2021-12-11 RX ADMIN — POTASSIUM CHLORIDE 10 MEQ: 10 TABLET, EXTENDED RELEASE ORAL at 08:00

## 2021-12-11 RX ADMIN — TRAMADOL HYDROCHLORIDE 100 MG: 50 TABLET, FILM COATED ORAL at 21:23

## 2021-12-11 RX ADMIN — Medication 400 MG: at 09:30

## 2021-12-11 RX ADMIN — CEFAZOLIN 2000 MG: 10 INJECTION, POWDER, FOR SOLUTION INTRAVENOUS at 15:30

## 2021-12-11 RX ADMIN — TRAMADOL HYDROCHLORIDE 100 MG: 50 TABLET, FILM COATED ORAL at 12:04

## 2021-12-11 RX ADMIN — ACETAMINOPHEN 1000 MG: 500 TABLET ORAL at 15:48

## 2021-12-11 RX ADMIN — ACETAMINOPHEN 1000 MG: 500 TABLET ORAL at 21:42

## 2021-12-11 RX ADMIN — MILRINONE LACTATE 0.12 MCG/KG/MIN: 0.2 INJECTION, SOLUTION INTRAVENOUS at 21:33

## 2021-12-11 RX ADMIN — MUPIROCIN: 20 OINTMENT TOPICAL at 21:44

## 2021-12-11 RX ADMIN — POTASSIUM CHLORIDE 10 MEQ: 10 TABLET, EXTENDED RELEASE ORAL at 11:44

## 2021-12-11 RX ADMIN — POTASSIUM CHLORIDE 10 MEQ: 10 TABLET, EXTENDED RELEASE ORAL at 17:13

## 2021-12-11 RX ADMIN — ALBUMIN (HUMAN) 12.5 G: 12.5 INJECTION, SOLUTION INTRAVENOUS at 00:12

## 2021-12-11 RX ADMIN — SENNOSIDES AND DOCUSATE SODIUM 1 TABLET: 50; 8.6 TABLET ORAL at 21:24

## 2021-12-11 RX ADMIN — Medication 10 ML: at 21:44

## 2021-12-11 RX ADMIN — ATORVASTATIN CALCIUM 40 MG: 40 TABLET, FILM COATED ORAL at 21:23

## 2021-12-11 RX ADMIN — ASPIRIN 325 MG: 325 TABLET, COATED ORAL at 09:00

## 2021-12-11 RX ADMIN — FONDAPARINUX SODIUM 2.5 MG: 2.5 INJECTION SUBCUTANEOUS at 09:30

## 2021-12-11 RX ADMIN — ACETAMINOPHEN 1000 MG: 500 TABLET ORAL at 10:00

## 2021-12-11 RX ADMIN — MUPIROCIN: 20 OINTMENT TOPICAL at 09:30

## 2021-12-11 ASSESSMENT — PAIN SCALES - GENERAL
PAINLEVEL_OUTOF10: 2
PAINLEVEL_OUTOF10: 7
PAINLEVEL_OUTOF10: 3
PAINLEVEL_OUTOF10: 5
PAINLEVEL_OUTOF10: 7
PAINLEVEL_OUTOF10: 4
PAINLEVEL_OUTOF10: 5
PAINLEVEL_OUTOF10: 0
PAINLEVEL_OUTOF10: 0

## 2021-12-11 ASSESSMENT — PAIN DESCRIPTION - DESCRIPTORS: DESCRIPTORS: ACHING;CONSTANT

## 2021-12-11 ASSESSMENT — PAIN DESCRIPTION - PAIN TYPE: TYPE: SURGICAL PAIN

## 2021-12-11 ASSESSMENT — PAIN DESCRIPTION - ORIENTATION: ORIENTATION: LEFT;MID

## 2021-12-11 ASSESSMENT — PAIN DESCRIPTION - LOCATION: LOCATION: CHEST

## 2021-12-11 NOTE — PROGRESS NOTES
Pt is awake, tapping hand frequently, and nodding head responding to questions. Ventilator setting changed from SIMV to spontaneous. Pt tolerating well. Will get repeat ABG per protocol and continue to evaluate for extubation.

## 2021-12-11 NOTE — PROGRESS NOTES
EVENTS:POD 1 cabg x 4, LAAc, MVR, TV repair, Left int mammary lymph node biopsies. PMH CAD, ICM, xevere MR,severe TR,chf,R pl effusion, DM        Todays wt:79.2kgs  Admission wt:77.3kg    NEURO:Awake, alert    CARDIAC:Stach 120s. C.I 1.8-2.0. MAP 70s. vwires                  Intact. PAD 22-24. Pain controlled. k 4.9                 Mg 2.5    RESP:02 4l. Extubated 12/10     Hemoc: last h/hcy 10.5/31 plt 97                CT drainage last 8 hrs 100ccs                    GI:Active BS. On protonix       Will increase diet as terence    SKIN:intact    MUSCUSKELETAL:moves all extrem    : fan intact. BUN/creat 27/1.1        - 1866 last 24 hrs. LINES Rij SG, Lradial AL, R CVC, RFA PIV      GTTS:epi off. norepi 3, amio 0.5, milrinone 0.125 ins gtt      See flowsheets for details, VS, MAR for meds and titration. Will tritrate gtts as tolerated.

## 2021-12-11 NOTE — PROGRESS NOTES
Progress Note    S/P CABG X 4 , CORONARY ARTERY BYPASS GRAFT STRONG TO LAD, VEIN GRAFT TO PDA, SVG SEQUENCED TO DIAG AND OM, LIGATION KATHARINE  ATRICLIP 45MM, EVH LEFT SAPHENOUS VEIN, ANTERIOR AND POSTERIOR CHORDAE SPARING MITRAL VALVE REPLACEMENT MEDTRONIC 29MM mechanical valve, TRICUSPID VALVE REPAIR with 28mm Physio tricuspid annuloplasty ring , TOTAL CPB, VERO, Doppler flow verification of bypass grafts, 5 LEVEL BILATERAL INTERCOSTAL NERVE BLOCK  WITH EXPAREL, LEFT INTERNAL MAMMARY LYMPH NODE BIOPSIES X4 12/10/21    Vital Signs:                                                 BP 96/69   Pulse 123   Temp 99.5 °F (37.5 °C) (Core)   Resp 19   Ht 6' 1\" (1.854 m)   Wt 174 lb 9.7 oz (79.2 kg)   SpO2 92%   BMI 23.04 kg/m²  O2 Flow Rate (L/min): 2 L/min   SR  CVP (Mean): 13 mmHg  PAP (s/d): 34/26  PAP (Mean): 30 mmHg  SVR (Using ABP Mean): 1326.32 dyne*sec/cm5  CCI: 2.1 L/min  SVO2 (%): 56 %  Admission Weight: 199 lb 1.6 oz (90.3 kg)  DOS 76.2    Drips:  norepi 2, amio 0.5, mil 0.125, insulin    I/O:    Intake/Output Summary (Last 24 hours) at 12/11/2021 0854  Last data filed at 12/11/2021 0800  Gross per 24 hour   Intake 2473.63 ml   Output 5415 ml   Net -2941.37 ml     Chest Tube: 510, 100    CV: reg, wound c/d/i  Pulm: decreased  Abd: soft  Ext: warm, trace edema    Data Review:  CBC:   Recent Labs     12/09/21  0948 12/10/21  0730 12/10/21  1602 12/10/21  1905 12/11/21  0510   WBC 6.2  --  13.5*  --  14.8*   HGB 15.1   < > 14.6 14.5 10.5*   HCT 46.0  --  44.7  --  31.7*   MCV 90.7  --  90.9  --  90.1     --  108*  --   --     < > = values in this interval not displayed.      BMP:   Recent Labs     12/09/21  0948 12/10/21  1602 12/11/21  0510    144 144   K 4.1 4.1 4.9    110 111*   CO2 23 23 24   BUN 25* 28* 27*   CREATININE 0.9 1.2 1.1   CALCIUM 8.9 8.9 8.8   MG  --  3.10* 2.50*     Cardiac Enzymes: No results for input(s): CKTOTAL, CKMB, CKMBINDEX, TROPONINI in the last 72 hours.  PT/INR:   Recent Labs     12/09/21  1030 12/11/21  0525   PROTIME 12.0 13.4*   INR 1.06 1.18*     APTT:   Recent Labs     12/09/21  1030   APTT 39.3*       Assessment/Plan:  CV - ST   - SVR high. 250cc alb, decrease norepi. If insufficient, add dobut. Place ntg patch. Slow wean mil 1cc q hrs if CI>2   - EF 20 preop. BB, aldact, ARB-hold for now. - stop amio (empiric)  pulm - pulm toilet, wean O2   - chest tube output still somewhat sanguinous  Renal - Cr nl. Diurese gently. - retain Molina for accurate I+O  Heme - acute blood loss anemia. - thrombocytopenia. plts 99 today.  ASA   - arixtra   - eventually need coumadin for mech MVR      Mi Sapp MD  12/11/2021  8:54 AM

## 2021-12-11 NOTE — PROGRESS NOTES
Pt now post op CABG day # 1 , on insulin drip. AIC 6.2.    Hospitalist will sign off.  pls call if any help needed for diabetes management

## 2021-12-11 NOTE — PROGRESS NOTES
Updated Dr. Santa Sewell via phone. Order received for bilat soft wrist restraints, due to pt reaching and pulling at ETT. Will continue weaning FIO2 as tolerated and monitoring ABG's with goal to extubate this evening.

## 2021-12-11 NOTE — PROGRESS NOTES
Patient awake and following commands. Patient placed on CPAP per open heart protocol. ABG drawn 30 minutes later and WNL . Respiratory called for weaning parameters. NIF - 30  Patient suctioned and extubated to 4 liters nasal cannula  OG tube discontinued. Patient tolerated well. Oxygen saturation 93% on 4 liters nasal cannula.   Patient alert and oriented X 3.           RECOVERY PERIOD ENDS 6720

## 2021-12-11 NOTE — PROGRESS NOTES
EVENTS:POD 1 cabg x 4, LAAc, MVR, TV repair, Left int mammary lymph node biopsies. PMH CAD, ICM, severe MR,severe TR,chf,R pl effusion, DM  Albumin given for low PAD,C. I. today. Todays wt:79.2kgs  Admission wt:77.3kg    Rickey Reaves, alert    CARDIAC:Stach 120s. C.I 1.8-2.0. MAP 70s. vwires                  Intact. PAD 22-24. Pain controlled. k 4.9                 Mg 2.5    RESP:02 4l. Extubated 12/10     Hemoc: last h/hcy 10.5/31 plt 97                CT drainage last 8 hrs 100ccs                    GI:Active BS. On protonix       Will increase diet as terence    SKIN:intact    MUSCUSKELETAL:moves all extrem    : fan intact. BUN/creat 27/1.1        - 1866 last 24 hrs. LINES Rij SG, Lradial AL, R CVC, RFA PIV      GTTS:epi off. norepi 3, amio 0.5, milrinone 0.125 ins gtt      See flowsheets for details, VS, MAR for meds and titration. Will tritrate gtts as tolerated.

## 2021-12-12 LAB
APTT: 28.2 SEC (ref 26.2–38.6)
APTT: 45.5 SEC (ref 26.2–38.6)
GLUCOSE BLD-MCNC: 133 MG/DL (ref 70–99)
GLUCOSE BLD-MCNC: 145 MG/DL (ref 70–99)
GLUCOSE BLD-MCNC: 150 MG/DL (ref 70–99)
GLUCOSE BLD-MCNC: 201 MG/DL (ref 70–99)
GLUCOSE BLD-MCNC: 206 MG/DL (ref 70–99)
GLUCOSE BLD-MCNC: 207 MG/DL (ref 70–99)
GLUCOSE BLD-MCNC: 217 MG/DL (ref 70–99)
GLUCOSE BLD-MCNC: 85 MG/DL (ref 70–99)
GLUCOSE BLD-MCNC: 87 MG/DL (ref 70–99)
GLUCOSE BLD-MCNC: 87 MG/DL (ref 70–99)
GLUCOSE BLD-MCNC: 96 MG/DL (ref 70–99)
INR BLD: 1.2 (ref 0.88–1.12)
MAGNESIUM: 2.4 MG/DL (ref 1.8–2.4)
PERFORMED ON: ABNORMAL
PERFORMED ON: NORMAL
PROTHROMBIN TIME: 13.7 SEC (ref 9.9–12.7)

## 2021-12-12 PROCEDURE — 6360000002 HC RX W HCPCS: Performed by: THORACIC SURGERY (CARDIOTHORACIC VASCULAR SURGERY)

## 2021-12-12 PROCEDURE — 94761 N-INVAS EAR/PLS OXIMETRY MLT: CPT

## 2021-12-12 PROCEDURE — 6370000000 HC RX 637 (ALT 250 FOR IP): Performed by: THORACIC SURGERY (CARDIOTHORACIC VASCULAR SURGERY)

## 2021-12-12 PROCEDURE — 37799 UNLISTED PX VASCULAR SURGERY: CPT

## 2021-12-12 PROCEDURE — P9045 ALBUMIN (HUMAN), 5%, 250 ML: HCPCS | Performed by: THORACIC SURGERY (CARDIOTHORACIC VASCULAR SURGERY)

## 2021-12-12 PROCEDURE — P9047 ALBUMIN (HUMAN), 25%, 50ML: HCPCS | Performed by: THORACIC SURGERY (CARDIOTHORACIC VASCULAR SURGERY)

## 2021-12-12 PROCEDURE — 2700000000 HC OXYGEN THERAPY PER DAY

## 2021-12-12 PROCEDURE — 99024 POSTOP FOLLOW-UP VISIT: CPT | Performed by: THORACIC SURGERY (CARDIOTHORACIC VASCULAR SURGERY)

## 2021-12-12 PROCEDURE — 85730 THROMBOPLASTIN TIME PARTIAL: CPT

## 2021-12-12 PROCEDURE — 2000000000 HC ICU R&B

## 2021-12-12 PROCEDURE — 83735 ASSAY OF MAGNESIUM: CPT

## 2021-12-12 PROCEDURE — 94667 MNPJ CHEST WALL 1ST: CPT

## 2021-12-12 PROCEDURE — 2580000003 HC RX 258: Performed by: THORACIC SURGERY (CARDIOTHORACIC VASCULAR SURGERY)

## 2021-12-12 PROCEDURE — 85610 PROTHROMBIN TIME: CPT

## 2021-12-12 RX ORDER — HEPARIN SODIUM 1000 [USP'U]/ML
47 INJECTION, SOLUTION INTRAVENOUS; SUBCUTANEOUS PRN
Status: DISCONTINUED | OUTPATIENT
Start: 2021-12-12 | End: 2021-12-13

## 2021-12-12 RX ORDER — HEPARIN SODIUM 10000 [USP'U]/100ML
5-30 INJECTION, SOLUTION INTRAVENOUS CONTINUOUS
Status: DISCONTINUED | OUTPATIENT
Start: 2021-12-12 | End: 2021-12-14

## 2021-12-12 RX ORDER — INSULIN LISPRO 100 [IU]/ML
0-6 INJECTION, SOLUTION INTRAVENOUS; SUBCUTANEOUS
Status: DISCONTINUED | OUTPATIENT
Start: 2021-12-12 | End: 2021-12-13

## 2021-12-12 RX ORDER — ALBUMIN (HUMAN) 12.5 G/50ML
25 SOLUTION INTRAVENOUS EVERY 6 HOURS
Status: COMPLETED | OUTPATIENT
Start: 2021-12-12 | End: 2021-12-13

## 2021-12-12 RX ORDER — INSULIN LISPRO 100 [IU]/ML
0-3 INJECTION, SOLUTION INTRAVENOUS; SUBCUTANEOUS NIGHTLY
Status: DISCONTINUED | OUTPATIENT
Start: 2021-12-12 | End: 2021-12-13

## 2021-12-12 RX ORDER — SODIUM CHLORIDE 9 MG/ML
INJECTION, SOLUTION INTRAVENOUS
Status: DISPENSED
Start: 2021-12-12 | End: 2021-12-13

## 2021-12-12 RX ORDER — HEPARIN SODIUM 1000 [USP'U]/ML
23 INJECTION, SOLUTION INTRAVENOUS; SUBCUTANEOUS PRN
Status: DISCONTINUED | OUTPATIENT
Start: 2021-12-12 | End: 2021-12-13

## 2021-12-12 RX ORDER — ASPIRIN 81 MG/1
81 TABLET, CHEWABLE ORAL DAILY
Status: DISCONTINUED | OUTPATIENT
Start: 2021-12-13 | End: 2021-12-19 | Stop reason: HOSPADM

## 2021-12-12 RX ORDER — WARFARIN SODIUM 2.5 MG/1
2.5 TABLET ORAL
Status: COMPLETED | OUTPATIENT
Start: 2021-12-12 | End: 2021-12-12

## 2021-12-12 RX ADMIN — INSULIN LISPRO 1 UNITS: 100 INJECTION, SOLUTION INTRAVENOUS; SUBCUTANEOUS at 20:15

## 2021-12-12 RX ADMIN — ACETAMINOPHEN 1000 MG: 500 TABLET ORAL at 15:06

## 2021-12-12 RX ADMIN — SENNOSIDES AND DOCUSATE SODIUM 1 TABLET: 50; 8.6 TABLET ORAL at 09:20

## 2021-12-12 RX ADMIN — FUROSEMIDE 20 MG: 10 INJECTION, SOLUTION INTRAMUSCULAR; INTRAVENOUS at 09:19

## 2021-12-12 RX ADMIN — PANTOPRAZOLE SODIUM 40 MG: 40 TABLET, DELAYED RELEASE ORAL at 09:21

## 2021-12-12 RX ADMIN — TRAMADOL HYDROCHLORIDE 100 MG: 50 TABLET, FILM COATED ORAL at 05:30

## 2021-12-12 RX ADMIN — POTASSIUM CHLORIDE 10 MEQ: 10 TABLET, EXTENDED RELEASE ORAL at 08:20

## 2021-12-12 RX ADMIN — ALBUMIN (HUMAN) 25 G: 0.25 INJECTION, SOLUTION INTRAVENOUS at 22:14

## 2021-12-12 RX ADMIN — INSULIN LISPRO 2 UNITS: 100 INJECTION, SOLUTION INTRAVENOUS; SUBCUTANEOUS at 17:58

## 2021-12-12 RX ADMIN — ACETAMINOPHEN 1000 MG: 500 TABLET ORAL at 09:23

## 2021-12-12 RX ADMIN — ALBUMIN (HUMAN) 25 G: 0.25 INJECTION, SOLUTION INTRAVENOUS at 15:09

## 2021-12-12 RX ADMIN — POTASSIUM CHLORIDE 10 MEQ: 10 TABLET, EXTENDED RELEASE ORAL at 17:36

## 2021-12-12 RX ADMIN — ZOLPIDEM TARTRATE 5 MG: 5 TABLET ORAL at 20:04

## 2021-12-12 RX ADMIN — Medication 400 MG: at 09:20

## 2021-12-12 RX ADMIN — MUPIROCIN: 20 OINTMENT TOPICAL at 20:14

## 2021-12-12 RX ADMIN — FUROSEMIDE 20 MG: 10 INJECTION, SOLUTION INTRAMUSCULAR; INTRAVENOUS at 17:38

## 2021-12-12 RX ADMIN — ATORVASTATIN CALCIUM 40 MG: 40 TABLET, FILM COATED ORAL at 20:04

## 2021-12-12 RX ADMIN — SODIUM CHLORIDE, PRESERVATIVE FREE 10 ML: 5 INJECTION INTRAVENOUS at 09:21

## 2021-12-12 RX ADMIN — TRAMADOL HYDROCHLORIDE 100 MG: 50 TABLET, FILM COATED ORAL at 22:06

## 2021-12-12 RX ADMIN — TRAMADOL HYDROCHLORIDE 50 MG: 50 TABLET, FILM COATED ORAL at 15:06

## 2021-12-12 RX ADMIN — ALBUMIN (HUMAN) 25 G: 0.25 INJECTION, SOLUTION INTRAVENOUS at 11:19

## 2021-12-12 RX ADMIN — MUPIROCIN: 20 OINTMENT TOPICAL at 09:24

## 2021-12-12 RX ADMIN — Medication 400 MG: at 20:04

## 2021-12-12 RX ADMIN — SODIUM CHLORIDE 4.2 UNITS/HR: 9 INJECTION, SOLUTION INTRAVENOUS at 20:21

## 2021-12-12 RX ADMIN — HEPARIN SODIUM 12 UNITS/KG/HR: 10000 INJECTION, SOLUTION INTRAVENOUS at 16:25

## 2021-12-12 RX ADMIN — Medication 10 ML: at 09:22

## 2021-12-12 RX ADMIN — ASPIRIN 325 MG: 325 TABLET, COATED ORAL at 09:21

## 2021-12-12 RX ADMIN — ACETAMINOPHEN 1000 MG: 500 TABLET ORAL at 22:05

## 2021-12-12 RX ADMIN — POTASSIUM CHLORIDE 10 MEQ: 10 TABLET, EXTENDED RELEASE ORAL at 12:00

## 2021-12-12 RX ADMIN — MILRINONE LACTATE 0.12 MCG/KG/MIN: 0.2 INJECTION, SOLUTION INTRAVENOUS at 03:00

## 2021-12-12 RX ADMIN — INSULIN LISPRO 2 UNITS: 100 INJECTION, SOLUTION INTRAVENOUS; SUBCUTANEOUS at 11:54

## 2021-12-12 RX ADMIN — SENNOSIDES AND DOCUSATE SODIUM 1 TABLET: 50; 8.6 TABLET ORAL at 20:04

## 2021-12-12 RX ADMIN — Medication 10 ML: at 20:05

## 2021-12-12 RX ADMIN — ALBUMIN (HUMAN) 12.5 G: 12.5 INJECTION, SOLUTION INTRAVENOUS at 00:05

## 2021-12-12 RX ADMIN — WARFARIN SODIUM 2.5 MG: 2.5 TABLET ORAL at 18:16

## 2021-12-12 RX ADMIN — ACETAMINOPHEN 1000 MG: 500 TABLET ORAL at 05:30

## 2021-12-12 ASSESSMENT — PAIN SCALES - GENERAL
PAINLEVEL_OUTOF10: 0
PAINLEVEL_OUTOF10: 4
PAINLEVEL_OUTOF10: 0
PAINLEVEL_OUTOF10: 5
PAINLEVEL_OUTOF10: 0
PAINLEVEL_OUTOF10: 7
PAINLEVEL_OUTOF10: 0
PAINLEVEL_OUTOF10: 0
PAINLEVEL_OUTOF10: 2
PAINLEVEL_OUTOF10: 6
PAINLEVEL_OUTOF10: 0
PAINLEVEL_OUTOF10: 2

## 2021-12-12 ASSESSMENT — PAIN DESCRIPTION - LOCATION: LOCATION: INCISION

## 2021-12-12 ASSESSMENT — PAIN DESCRIPTION - PAIN TYPE: TYPE: SURGICAL PAIN

## 2021-12-12 NOTE — PROGRESS NOTES
EVENTS:POD 2 cabg x 4, LAAc, MVR, TV repair, Left int mammary lymph node biopsies. PMH CAD, ICM, xevere MR,severe TR,chf,R pl effusion, DM. Todays wt:79.2  Admission wt:77.3kg    Josiane Kilts, alert    CARDIAC: Stach 112. Last C.I 2.4 svo2 58 PAD 21          V wires out. Had albumin 250 night shift for low            C.I     RESP:02 2 l Extubated 12/10 . -750    Hemoc: last h/hct 10.5/31 plt 99 INR 1.2                CT drainage last 8 hrs 60 ccs                    GI:Active BS. On protonix       Full liquids tolerated. Will increase diet as tolerated. SKIN:intact    MUSCUSKELETAL: out of bed in chair. : fan intact. BUN/creat 27/1.       -291 last 24hrs. Lasix 20 IV given bid.        -      LINES Rij SG, Lradial AL, R CVC, RFA PIV      GTTS:epi off. norepi 2, milrinone 0.125 ins gtt off. See flowsheets for details, VS, MAR for meds and titration. Will tritrate gtts as tolerated.

## 2021-12-12 NOTE — RT PROTOCOL NOTE
RT Inhaler-Nebulizer Bronchodilator Protocol Note    There is a bronchodilator order in the chart from a provider indicating to follow the RT Bronchodilator Protocol and there is an Initiate RT Inhaler-Nebulizer Bronchodilator Protocol order as well (see protocol at bottom of note). CXR Findings:  XR CHEST PORTABLE    Result Date: 12/10/2021  Status post sternotomy and cardiac valve placement with stable mild cardiomegaly and minimal central pulmonary congestion Minimal perihilar and bibasilar interstitial edema or pneumonitis ET tube mediastinal drains and chest tubes in place inferiorly and ET tube and PA catheter in place as above Gastric tube tip just within the fundus. Suggest readjusting the gastric tube tip into the distal stomach for more physiologic positioning. The findings from the last RT Protocol Assessment were as follows:   History Pulmonary Disease: Smoker 15 pack years or more  Respiratory Pattern: Regular pattern and RR 12-20 bpm  Breath Sounds: Slightly diminished and/or crackles  Cough: Strong, spontaneous, non-productive  Indication for Bronchodilator Therapy:    Bronchodilator Assessment Score: 3    Aerosolized bronchodilator medication orders have been revised according to the RT Inhaler-Nebulizer Bronchodilator Protocol below. Respiratory Therapist to perform RT Therapy Protocol Assessment initially then follow the protocol. Repeat RT Therapy Protocol Assessment PRN for score 0-3 or on second treatment, BID, and PRN for scores above 3. No Indications - adjust the frequency to every 6 hours PRN wheezing or bronchospasm, if no treatments needed after 48 hours then discontinue using Per Protocol order mode. If indication present, adjust the RT bronchodilator orders based on the Bronchodilator Assessment Score as indicated below.   Use Inhaler orders unless patient has one or more of the following: on home nebulizer, not able to hold breath for 10 seconds, is not alert and oriented, cannot activate and use MDI correctly, or respiratory rate 25 breaths per minute or more, then use the equivalent nebulizer order(s) with same Frequency and PRN reasons based on the score. If a patient is on this medication at home then do not decrease Frequency below that used at home. 0-3 - enter or revise RT bronchodilator order(s) to equivalent RT Bronchodilator order with Frequency of every 4 hours PRN for wheezing or increased work of breathing using Per Protocol order mode. 4-6 - enter or revise RT Bronchodilator order(s) to two equivalent RT bronchodilator orders with one order with BID Frequency and one order with Frequency of every 4 hours PRN wheezing or increased work of breathing using Per Protocol order mode. 7-10 - enter or revise RT Bronchodilator order(s) to two equivalent RT bronchodilator orders with one order with TID Frequency and one order with Frequency of every 4 hours PRN wheezing or increased work of breathing using Per Protocol order mode. 11-13 - enter or revise RT Bronchodilator order(s) to one equivalent RT bronchodilator order with QID Frequency and an Albuterol order with Frequency of every 4 hours PRN wheezing or increased work of breathing using Per Protocol order mode. Greater than 13 - enter or revise RT Bronchodilator order(s) to one equivalent RT bronchodilator order with every 4 hours Frequency and an Albuterol order with Frequency of every 2 hours PRN wheezing or increased work of breathing using Per Protocol order mode. RT to enter RT Home Evaluation for COPD & MDI Assessment order using Per Protocol order mode.     Electronically signed by Hang Oneill RCP on 12/12/2021 at 7:04 AM

## 2021-12-12 NOTE — PROGRESS NOTES
Progress Note    S/P CABG X 4 , CORONARY ARTERY BYPASS GRAFT STRONG TO LAD, VEIN GRAFT TO PDA, SVG SEQUENCED TO DIAG AND OM, LIGATION KATHARINE  ATRICLIP 45MM, EVH LEFT SAPHENOUS VEIN, ANTERIOR AND POSTERIOR CHORDAE SPARING MITRAL VALVE REPLACEMENT MEDTRONIC 29MM mechanical valve, TRICUSPID VALVE REPAIR with 28mm Physio tricuspid annuloplasty ring , TOTAL CPB, VERO, Doppler flow verification of bypass grafts, 5 LEVEL BILATERAL INTERCOSTAL NERVE BLOCK  WITH EXPAREL, LEFT INTERNAL MAMMARY LYMPH NODE BIOPSIES X4 12/10/21    Vital Signs:                                                 BP 96/69   Pulse 110   Temp 99 °F (37.2 °C) (Core)   Resp 27   Ht 6' 1\" (1.854 m)   Wt 185 lb 6.5 oz (84.1 kg)   SpO2 97%   BMI 24.46 kg/m²  O2 Flow Rate (L/min): 2 L/min   SR  CVP (Mean): 11 mmHg  PAP (s/d): 44/24  PAP (Mean): 33 mmHg  SVR (Using ABP Mean): 890.57 dyne*sec/cm5  CCI: 2.1 L/min  SVO2 (%): 55 %  Admission Weight: 199 lb 1.6 oz (90.3 kg)  DOS 76.2    Drips:  norepi 2    I/O:      Intake/Output Summary (Last 24 hours) at 12/12/2021 0949  Last data filed at 12/12/2021 0800  Gross per 24 hour   Intake 1048.08 ml   Output 1295 ml   Net -246.92 ml     Chest Tube: 80, 110, 120    CV: reg, wound c/d/i  Pulm: decreased  Abd: soft  Ext: warm, trace edema    Data Review:  CBC:   Recent Labs     12/10/21  1602 12/10/21  1602 12/10/21  1905 12/11/21  0100 12/11/21  0510   WBC 13.5*  --   --   --  14.8*   HGB 14.6   < > 14.5 11.3* 10.5*   HCT 44.7  --   --   --  31.7*   MCV 90.9  --   --   --  90.1   *  --   --   --  99*    < > = values in this interval not displayed. BMP:   Recent Labs     12/10/21  1602 12/11/21  0510 12/12/21  0435    144  --    K 4.1 4.9  --     111*  --    CO2 23 24  --    BUN 28* 27*  --    CREATININE 1.2 1.1  --    CALCIUM 8.9 8.8  --    MG 3.10* 2.50* 2.40     Cardiac Enzymes: No results for input(s): CKTOTAL, CKMB, CKMBINDEX, TROPONINI in the last 72 hours.   PT/INR:   Recent Labs 12/09/21  1030 12/11/21  0525 12/12/21  0435   PROTIME 12.0 13.4* 13.7*   INR 1.06 1.18* 1.20*     APTT:   Recent Labs     12/09/21  1030   APTT 39.3*       Assessment/Plan:  CV - ST   - CI much better after some volume. Milrinone off. Remove swan. - EF 20 preop. bp marginal. SPA q 6, wean off norepi.    pulm - pulm toilet, wean O2   - chest tube output thinning, still too much to remove  Renal - Cr nl. Diurese. - retain Molina for accurate I+O  Heme - acute blood loss anemia. - thrombocytopenia. Follow. - coumadin for mech MVR. Bridge with low dose hep gtt which can be held as other lines/tubes removed.       Geoff Damico MD  12/12/2021  9:49 AM

## 2021-12-12 NOTE — PROGRESS NOTES
Patient met criteria per open heart protocol to transition to stepdown level of care. Procedures explained to patient. RIJ Lamona Mustapha discontinued and obturator inserted. Patient tolerated well and minimal ectopy on monitor. Left radial  arterial line  Kept in place. Pt on norepi. Patient tolerated well.

## 2021-12-12 NOTE — OP NOTE
[facility]                     [address] [facility], [address] [facility]                                OPERATIVE REPORT    PATIENT NAME: Yovani Cheng                      :        1960  MED REC NO:   [id number]                          ROOM:       7330  ACCOUNT NO:   [de-identified]                           ADMIT DATE: 2021  PROVIDER:     Debbra Schirmer, MD    DATE OF PROCEDURE:  12/10/2021    PREOPERATIVE DIAGNOSES:  1. Three-vessel coronary artery disease. 2.  Ischemic cardiomyopathy. 3.  Severe mitral regurgitation. 4.  Systolic restriction of leaflets. 5.  Severe tricuspid regurgitation. 6.  Acute on chronic combined congestive heart failure. 7.  Right pleural effusion. POSTOPERATIVE DIAGNOSES:  1. Three-vessel coronary artery disease. 2.  Ischemic cardiomyopathy. 3.  Severe mitral regurgitation. 4.  Systolic restriction of leaflets. 5.  Severe tricuspid regurgitation. 6.  Acute on chronic combined congestive heart failure. 7.  Right pleural effusion. OPERATION PERFORMED:  1. Urgent anterior and posterior chordae sparing mitral valve  replacement with a Medtronic 29-mm mechanical valve. 2.  Tricuspid valve repair with a 28-mm Physio tricuspid annuloplasty  ring. 3.  Coronary artery bypass grafting x4 with a left internal mammary  artery to the LAD, a saphenous vein graft to the PDA and a separate  saphenous vein graft sequenced to the diagonal and the obtuse marginal.  4.  Ligation of left atrial appendage with a 45 mm AtriClip. 5.  Left internal mammary lymph node biopsy x4.  6.  Transesophageal echocardiography performed by Anesthesia. 7.  Intraoperative Doppler interrogation of grafts. 8.  Intercostal nerve blocks x5 with Exparel. SURGEON:  Debbra Schirmer, MD    FIRST ASSISTANT:  Edvin Mejia, vein harvest and relief Gregory Albarran. SECOND ASSISTANT:  Jacey Gomez.     ANESTHESIA:  General endotracheal by Dr. Remington Hathaway who also performed  transesophageal echocardiography. PERFUSIONIST:  Aleksandar Coronel. PUMP TIME:  253 minutes. CROSS-CLAMP TIME:  217 minutes. FINDINGS:  Excellent left internal mammary artery and saphenous vein. The PDA was markedly calcified proximally, but there was a soft spot in  the mid to distal region that was still 1.25 mm. There was an obtuse  marginal that was 1.25 mm in the mid lateral wall. The diagonal and LAD  also were diffusely calcified, but soft spots were found and they were  1.25 mm and were grafted. The new mitral valve had no leaks except for  the expected hinge jets and a mean gradient of 5 mmHg. There was no  tricuspid regurgitation post repair. Many left internal mammary artery  lymph nodes were encountered during dissection and these were biopsied. The patient's ejection fraction improved from 15 to 20% range to the 30  to 35% range. There were excellent Doppler signals in all grafts post  cardiopulmonary bypass. DRAINS:  Bilateral pleural and a mediastinal 24-Arabic Lawson. PACING WIRES:  Bipolar ventricular. ESTIMATED BLOOD LOSS:  200 mL. COMPLICATIONS:  None apparent. DISPOSITION:  Critical but stable to CVICU. REPORT OF PROCEDURE:  After placement of appropriate monitors, induction  of general endotracheal anesthesia and infusion of appropriate  antibiotics, the patient was sterilely prepped and draped. Saphenous  vein was endoscopically harvested from the left leg and thigh. These  wounds were irrigated and closed in layers after achieving meticulous  hemostasis. Concurrent to vein harvest, a median sternotomy was  performed and a pericardial well was created. The hears was inspected  and it was found that he did have apparent lateral wall targets and the  targets that were grafted to aid in the length of the vein that needed  to be harvested. The left internal mammary artery was then taken down  from the anterior chest wall.   The lymph nodes were encountered during  the dissection and these were biopsied and sent for permanent section. The patient was given systemic doses of heparin. The internal mammary  artery was ligated distally and divided. It was noted to have excellent  flow. It was injected with papaverine, clamped, and set aside. A left  femoral arterial line had been placed sterilely prior to the sternotomy  using modified Seldinger technique as was a right femoral venous sheath. It was a 5-French sheath. The 5-French sheath was used to with wire  exchange and over serial dilators place a 23-mm dual-stage venous  cannula with echocardiographic guidance and with digital palpation of  the superior vena cava and inferior vena cava as the chest was open. The patient was cannulated in otherwise standard fashion for  cardiopulmonary bypass with antegrade and retrograde cardioplegia. After confirming appropriate ACT, the patient went on cardiopulmonary  bypass without difficulty. A vent was placed in the right superior  pulmonary vein. The aorta was cross-clamped and warm antegrade  cardioplegia was given until cardiac standstill, and then cold blood  cardioplegia was given to complete induction and intermittently  throughout the case as needed. The last part of the induction was given  in retrograde fashion to confirm placement of the retrograde cannula. First, the posterior descending artery was explored proximally for a  soft spot, but this did not have 1 mm run-off more distally so it was  closed with 8-0 Prolene. The distal PDA beyond the calcification was  explored and was able to accept the 1 mm probe with a little bit of room  around it, 1.25 mm. The vein graft was anastomosed to the PDA in a  running end-to-side fashion which was connected to cardioplegia for  subsequent doses. Lateral wall was explored. It had a sizeable obtuse  marginal in the mid lateral wall.   This was opened just as it became  intramyocardial and was found to be of adequate size for grafting and  the vein graft was anastomosed to this in a running end-to-side fashion. Hemostasis was confirmed. Vein graft was then sequenced to the larger  diagonal in a running side-to-side fashion. This vein graft too was  connected to cardioplegia for subsequent maintenance doses. The left  internal mammary artery was brought through a hole in the pericardium  anterior to the phrenic nerve and anastomosed to the LAD in a running  end-to-side fashion. It was confirmed to be hemostatic and then the  soft non-crushing self-retaining vascular clamp was placed on the  internal mammary artery. This was removed when the hot shot was given  at the end of the case. The carbon dioxide had been flooded in the  surgical field of the chest prior to cannulation. Caval tapes were then  tightened over the superior and inferior vena cava and an incision was  made through the right atrium. Transeptal exposure of the mitral valve  was achieved. The anterior leaflet was taken down as the pledgeted 2-0  sutures were placed in the anterior annulus and the mid portion was  resected to keep paddles of the anterior leaflet with the attached  underlying chordae which was swept posteriorly and incorporated in the  suture line in the posterior annulus. The posterior leaflet was  preserved. Annulus was sized and found to be appropriate for a 29-mm  Medtronic mechanical valve. This was sewn into the annulus on the 2-0  sutures and the sutures were tied with the Cor-Knot device. The septum  was closed and it was noted there was interestingly a tear just in the  dome of the left atrium that did not include the right atrium from  apparent retraction along the atriotomy line which was closed also with  two layers of running 3-0 Prolene. The left ventricular vent had been  replaced to keep open just one of the leaflets of the new valve for  de-airing later.   The left atrial appendage had been sized when I did  the obtuse marginal graft. It was decided to place the clip rather than  oversew the left atrial appendage from inside of the atrium due to the  tear in the dome of the left atrium. After the left atriotomy was  closed, the tricuspid valve was addressed. A 28-mm tricuspid ring was  sewn to the annulus using interrupted 2-0 Ethibond suture. These  sutures were hand tied. The valve was tested and no leak was seen. Right atriotomy was closed in two layers of running 3-0 Prolene. The  left atrial appendage was clipped at this point. Two punch aortotomies  were made and the proximal venous anastomosis was performed in a running  end-to-side fashion. The patient was given a hot shot of first  retrograde and then antegrade cardioplegia. De-airing maneuvers were  performed with echocardiographic guidance. The patient was placed in  steep Trendelenburg position. The aortic cross-clamp was removed and  the patient was allowed to re-perfuse. During this time, a  transmediastinal right pleural, a mediastinal and a left pleural  24-Korean Lawson drains were brought through a separate stab incision  inferiorly. A bipolar ventricular pacing lead was placed. The patient  had return of spontaneous rhythm, did not require cardioversion. After  rewarming and reperfusion, the patient was weaned from cardiopulmonary  bypass without difficulty. He was decannulated and his heparin  reversed. The wound was checked for hemostasis which was meticulously  achieved. Grafts were interrogated with Doppler ultrasound. All were  noted to have excellent signals. The patient was noted to have  coagulopathy as some of the 6-0 needle holes were still oozing and was  given 2500 of Kcentra with resolution of this coagulopathy. The  pericardium was then loosely re-approximated in the midline using  interrupted 0-Ethibond sutures.   The sternal edges were treated with  platelet gel and median sternotomy was closed in standard

## 2021-12-12 NOTE — PROGRESS NOTES
12/12/21 1313   Incentive Spirometry Tx   Treatment Tolerance Fair   Incentive Spirometry Goal (mL) 747 mL   Incentive Spirometry Achieved (mL) 250 mL

## 2021-12-12 NOTE — PROGRESS NOTES
12/12/21 0704   RT Protocol   History Pulmonary Disease 1   Respiratory pattern 0   Breath sounds 2   Cough 0   Bronchodilator Assessment Score 3

## 2021-12-13 LAB
ANION GAP SERPL CALCULATED.3IONS-SCNC: 12 MMOL/L (ref 3–16)
APTT: 100 SEC (ref 26.2–38.6)
APTT: 52.7 SEC (ref 26.2–38.6)
APTT: 53.3 SEC (ref 26.2–38.6)
BLOOD BANK DISPENSE STATUS: NORMAL
BLOOD BANK DISPENSE STATUS: NORMAL
BLOOD BANK PRODUCT CODE: NORMAL
BLOOD BANK PRODUCT CODE: NORMAL
BPU ID: NORMAL
BPU ID: NORMAL
BUN BLDV-MCNC: 40 MG/DL (ref 7–20)
CALCIUM SERPL-MCNC: 9.2 MG/DL (ref 8.3–10.6)
CHLORIDE BLD-SCNC: 103 MMOL/L (ref 99–110)
CO2: 22 MMOL/L (ref 21–32)
CREAT SERPL-MCNC: 1.1 MG/DL (ref 0.9–1.3)
DESCRIPTION BLOOD BANK: NORMAL
DESCRIPTION BLOOD BANK: NORMAL
GFR AFRICAN AMERICAN: >60
GFR NON-AFRICAN AMERICAN: >60
GLUCOSE BLD-MCNC: 137 MG/DL (ref 70–99)
GLUCOSE BLD-MCNC: 86 MG/DL (ref 70–99)
GLUCOSE BLD-MCNC: 93 MG/DL (ref 70–99)
GLUCOSE BLD-MCNC: 95 MG/DL (ref 70–99)
GLUCOSE BLD-MCNC: 95 MG/DL (ref 70–99)
HCT VFR BLD CALC: 26.6 % (ref 40.5–52.5)
HEMOGLOBIN: 8.8 G/DL (ref 13.5–17.5)
INR BLD: 1.26 (ref 0.88–1.12)
MAGNESIUM: 2.6 MG/DL (ref 1.8–2.4)
MCH RBC QN AUTO: 29.9 PG (ref 26–34)
MCHC RBC AUTO-ENTMCNC: 33.1 G/DL (ref 31–36)
MCV RBC AUTO: 90.3 FL (ref 80–100)
PDW BLD-RTO: 16.7 % (ref 12.4–15.4)
PERFORMED ON: ABNORMAL
PERFORMED ON: NORMAL
PLATELET # BLD: 86 K/UL (ref 135–450)
PMV BLD AUTO: 9.8 FL (ref 5–10.5)
POTASSIUM SERPL-SCNC: 4.5 MMOL/L (ref 3.5–5.1)
PROTHROMBIN TIME: 14.4 SEC (ref 9.9–12.7)
RBC # BLD: 2.95 M/UL (ref 4.2–5.9)
SODIUM BLD-SCNC: 137 MMOL/L (ref 136–145)
WBC # BLD: 11.6 K/UL (ref 4–11)

## 2021-12-13 PROCEDURE — 6370000000 HC RX 637 (ALT 250 FOR IP): Performed by: THORACIC SURGERY (CARDIOTHORACIC VASCULAR SURGERY)

## 2021-12-13 PROCEDURE — 2140000000 HC CCU INTERMEDIATE R&B

## 2021-12-13 PROCEDURE — APPNB30 APP NON BILLABLE TIME 0-30 MINS: Performed by: NURSE PRACTITIONER

## 2021-12-13 PROCEDURE — 2580000003 HC RX 258: Performed by: THORACIC SURGERY (CARDIOTHORACIC VASCULAR SURGERY)

## 2021-12-13 PROCEDURE — 86022 PLATELET ANTIBODIES: CPT

## 2021-12-13 PROCEDURE — 37799 UNLISTED PX VASCULAR SURGERY: CPT

## 2021-12-13 PROCEDURE — 6370000000 HC RX 637 (ALT 250 FOR IP): Performed by: NURSE PRACTITIONER

## 2021-12-13 PROCEDURE — 85610 PROTHROMBIN TIME: CPT

## 2021-12-13 PROCEDURE — 83735 ASSAY OF MAGNESIUM: CPT

## 2021-12-13 PROCEDURE — 80048 BASIC METABOLIC PNL TOTAL CA: CPT

## 2021-12-13 PROCEDURE — 94761 N-INVAS EAR/PLS OXIMETRY MLT: CPT

## 2021-12-13 PROCEDURE — 6360000002 HC RX W HCPCS: Performed by: THORACIC SURGERY (CARDIOTHORACIC VASCULAR SURGERY)

## 2021-12-13 PROCEDURE — 99232 SBSQ HOSP IP/OBS MODERATE 35: CPT | Performed by: INTERNAL MEDICINE

## 2021-12-13 PROCEDURE — APPSS15 APP SPLIT SHARED TIME 0-15 MINUTES: Performed by: NURSE PRACTITIONER

## 2021-12-13 PROCEDURE — P9047 ALBUMIN (HUMAN), 25%, 50ML: HCPCS | Performed by: THORACIC SURGERY (CARDIOTHORACIC VASCULAR SURGERY)

## 2021-12-13 PROCEDURE — 94669 MECHANICAL CHEST WALL OSCILL: CPT

## 2021-12-13 PROCEDURE — 85730 THROMBOPLASTIN TIME PARTIAL: CPT

## 2021-12-13 PROCEDURE — 85027 COMPLETE CBC AUTOMATED: CPT

## 2021-12-13 RX ORDER — ALBUMIN, HUMAN INJ 5% 5 %
SOLUTION INTRAVENOUS
Status: DISPENSED
Start: 2021-12-13 | End: 2021-12-14

## 2021-12-13 RX ORDER — WARFARIN SODIUM 5 MG/1
5 TABLET ORAL DAILY
Status: DISCONTINUED | OUTPATIENT
Start: 2021-12-13 | End: 2021-12-16

## 2021-12-13 RX ADMIN — POTASSIUM CHLORIDE 10 MEQ: 10 TABLET, EXTENDED RELEASE ORAL at 18:00

## 2021-12-13 RX ADMIN — Medication 10 ML: at 21:32

## 2021-12-13 RX ADMIN — FUROSEMIDE 20 MG: 10 INJECTION, SOLUTION INTRAMUSCULAR; INTRAVENOUS at 09:17

## 2021-12-13 RX ADMIN — TRAMADOL HYDROCHLORIDE 100 MG: 50 TABLET, FILM COATED ORAL at 18:00

## 2021-12-13 RX ADMIN — TRAMADOL HYDROCHLORIDE 100 MG: 50 TABLET, FILM COATED ORAL at 04:30

## 2021-12-13 RX ADMIN — SENNOSIDES AND DOCUSATE SODIUM 1 TABLET: 50; 8.6 TABLET ORAL at 21:29

## 2021-12-13 RX ADMIN — MAGNESIUM HYDROXIDE 30 ML: 400 SUSPENSION ORAL at 11:09

## 2021-12-13 RX ADMIN — WARFARIN SODIUM 5 MG: 5 TABLET ORAL at 18:00

## 2021-12-13 RX ADMIN — ATORVASTATIN CALCIUM 40 MG: 40 TABLET, FILM COATED ORAL at 21:29

## 2021-12-13 RX ADMIN — Medication 10 ML: at 09:20

## 2021-12-13 RX ADMIN — ZOLPIDEM TARTRATE 5 MG: 5 TABLET ORAL at 21:30

## 2021-12-13 RX ADMIN — POTASSIUM CHLORIDE 10 MEQ: 10 TABLET, EXTENDED RELEASE ORAL at 09:16

## 2021-12-13 RX ADMIN — POTASSIUM CHLORIDE 10 MEQ: 10 TABLET, EXTENDED RELEASE ORAL at 11:09

## 2021-12-13 RX ADMIN — ACETAMINOPHEN 1000 MG: 500 TABLET ORAL at 17:59

## 2021-12-13 RX ADMIN — MUPIROCIN: 20 OINTMENT TOPICAL at 09:17

## 2021-12-13 RX ADMIN — ALBUMIN (HUMAN) 25 G: 0.25 INJECTION, SOLUTION INTRAVENOUS at 04:14

## 2021-12-13 RX ADMIN — HEPARIN SODIUM 14 UNITS/KG/HR: 10000 INJECTION, SOLUTION INTRAVENOUS at 13:01

## 2021-12-13 RX ADMIN — ACETAMINOPHEN 1000 MG: 500 TABLET ORAL at 04:30

## 2021-12-13 RX ADMIN — ACETAMINOPHEN 1000 MG: 500 TABLET ORAL at 21:29

## 2021-12-13 RX ADMIN — MUPIROCIN: 20 OINTMENT TOPICAL at 21:32

## 2021-12-13 RX ADMIN — ASPIRIN 81 MG 81 MG: 81 TABLET ORAL at 09:16

## 2021-12-13 RX ADMIN — FUROSEMIDE 20 MG: 10 INJECTION, SOLUTION INTRAMUSCULAR; INTRAVENOUS at 18:00

## 2021-12-13 RX ADMIN — SODIUM CHLORIDE, PRESERVATIVE FREE 10 ML: 5 INJECTION INTRAVENOUS at 18:00

## 2021-12-13 RX ADMIN — SENNOSIDES AND DOCUSATE SODIUM 1 TABLET: 50; 8.6 TABLET ORAL at 09:16

## 2021-12-13 RX ADMIN — PANTOPRAZOLE SODIUM 40 MG: 40 TABLET, DELAYED RELEASE ORAL at 09:16

## 2021-12-13 RX ADMIN — ACETAMINOPHEN 1000 MG: 500 TABLET ORAL at 11:09

## 2021-12-13 ASSESSMENT — PAIN SCALES - GENERAL
PAINLEVEL_OUTOF10: 0
PAINLEVEL_OUTOF10: 2
PAINLEVEL_OUTOF10: 6
PAINLEVEL_OUTOF10: 4
PAINLEVEL_OUTOF10: 2
PAINLEVEL_OUTOF10: 3
PAINLEVEL_OUTOF10: 2
PAINLEVEL_OUTOF10: 2
PAINLEVEL_OUTOF10: 3
PAINLEVEL_OUTOF10: 7
PAINLEVEL_OUTOF10: 7
PAINLEVEL_OUTOF10: 6
PAINLEVEL_OUTOF10: 5
PAINLEVEL_OUTOF10: 6

## 2021-12-13 ASSESSMENT — PAIN DESCRIPTION - DESCRIPTORS: DESCRIPTORS: ACHING

## 2021-12-13 ASSESSMENT — PAIN DESCRIPTION - PAIN TYPE
TYPE: SURGICAL PAIN

## 2021-12-13 ASSESSMENT — PAIN DESCRIPTION - LOCATION
LOCATION: INCISION
LOCATION: CHEST

## 2021-12-13 NOTE — PROGRESS NOTES
Chest tubes  and labeled into 3 different chambers. Pt. Tolerated well.       Electronically signed by Yadi Giron RN on 12/13/2021 at 5:55 PM

## 2021-12-13 NOTE — PROGRESS NOTES
Physical/Occupational Therapy  Discharge Summary  Matty Murphy      Orders received for PT/OT evaluation. Order specifies PT/OT eval and treat \"if not ambulatory by POD #2.\" Discussed with nursing. Patient ambulatory without difficulty. Denied DME needs. Will discharge acute PT/OT per order. Please re-order if any changes.  Thanks, Maria Guadalupe Munoz, PT, DPT [id number], Jason Ocampo, MOT OTR/L [id number]

## 2021-12-13 NOTE — PROGRESS NOTES
Cardiovascular Progress Note      Chief Complaint:   Chief Complaint   Patient presents with    Leg Swelling     From home. Was sick with cough and rash for about a month, ended about a week ago. Bilateral lower extremity edema X1 week, denies Hx of CHF. Some SOB still. Impression/Recommendations:    Acute Biventricular Systolic CHF  (LVEF 08-66%, severe MR, severe TR by 12/2/2021 TTE)  MVCAD  Diabetes mellitus   ABLA, stable  TCP  Tobacco     4 V CABG (LIMA-LAD, SVG-RPDA, Sequential SVG-D1-OM), KATHARINE Clip, Mechanical MVR, TrV annuloplasty ring  POD# 3  Heparin gtt to Warfarin   ASA 81 mg /Statin  Off pressors and oxygen. Low normal BP. Gentle diuresis. Interval History:   Pt. S/E. Resting in chair. Good PO. Ambulating. Pain controlled. No SOB.      Today's Wt: 186 lbs  Preop: 167 lbs    Chest tube  Sternotomy c/d/i  Alex hose, trace edema     Medications:  insulin lispro (1 Unit Dial) 0-6 Units, TID WC  insulin lispro (1 Unit Dial) 0-3 Units, Nightly  heparin (porcine) injection 3,950 Units, PRN  heparin (porcine) injection 1,930 Units, PRN  heparin 25,000 units in dextrose 5% 250 mL (premix) infusion, Continuous  aspirin chewable tablet 81 mg, Daily  dextrose 5 % and 0.45 % NaCl with KCl 20 mEq infusion, Continuous  sodium chloride flush 0.9 % injection 10 mL, 2 times per day  sodium chloride flush 0.9 % injection 10 mL, PRN  0.9 % sodium chloride infusion, PRN  ondansetron (ZOFRAN) injection 4 mg, Q6H PRN  metoclopramide (REGLAN) injection 10 mg, Q6H PRN  aspirin suppository 300 mg, Once  acetaminophen (TYLENOL) tablet 1,000 mg, Q6H  morphine (PF) injection 2 mg, Q2H PRN  furosemide (LASIX) injection 20 mg, BID  magnesium oxide (MAG-OX) tablet 400 mg, BID  mupirocin (BACTROBAN) 2 % ointment, BID  nitroGLYCERIN (NITRODUR) 0.2 MG/HR 1 patch, Daily  potassium chloride (KLOR-CON) extended release tablet 10 mEq, TID WC  propofol injection, Titrated  diphenhydrAMINE (BENADRYL) tablet 25 mg, Nightly PRN  zolpidem (AMBIEN) tablet 5 mg, Nightly PRN  sennosides-docusate sodium (SENOKOT-S) 8.6-50 MG tablet 1 tablet, BID  magnesium hydroxide (MILK OF MAGNESIA) 400 MG/5ML suspension 30 mL, Daily PRN  polyethylene glycol (GLYCOLAX) packet 17 g, Daily PRN  pantoprazole (PROTONIX) tablet 40 mg, Daily  pantoprazole (PROTONIX) injection 40 mg, Daily   And  sodium chloride flush 0.9 % injection 10 mL, Daily  potassium chloride 20 mEq/50 mL IVPB (Central Line), PRN  magnesium sulfate 2000 mg in 50 mL IVPB premix, PRN  calcium chloride 1,000 mg in sodium chloride 0.9 % 100 mL IVPB, PRN  calcium chloride 2,000 mg in sodium chloride 0.9 % 100 mL IVPB, PRN  albuterol sulfate  (90 Base) MCG/ACT inhaler 2 puff, Q6H PRN  albumin human 5 % IV solution 25 g, PRN  lactated ringers bolus, Continuous PRN  DOPamine (INTROPIN) 400 mg in dextrose 5 % 250 mL infusion, Continuous PRN  DOBUTamine (DOBUTREX) 500 mg in dextrose 5 % 250 mL infusion, Continuous PRN  phenylephrine (YESI-SYNEPHRINE) 50 mg in dextrose 5 % 250 mL infusion, Continuous PRN  furosemide (LASIX) injection 20 mg, PRN  norepinephrine (LEVOPHED) 16 mg in sodium chloride 0.9 % 250 mL infusion, Continuous PRN  nitroGLYCERIN 50 mg in dextrose 5% 250 mL infusion, Continuous PRN  insulin regular (HUMULIN R;NOVOLIN R) 100 Units in sodium chloride 0.9 % 100 mL infusion, Continuous  glucose (GLUTOSE) 40 % oral gel 15 g, PRN  dextrose 50 % IV solution, PRN  glucagon (rDNA) injection 1 mg, PRN  dextrose 5 % solution, PRN  traMADol (ULTRAM) tablet 50 mg, Q6H PRN   Or  traMADol (ULTRAM) tablet 100 mg, Q6H PRN  [Held by provider] carvedilol (COREG) tablet 3.125 mg, BID  [Held by provider] valsartan (DIOVAN) tablet 80 mg, Lunch  EPINEPHrine (EPINEPHrine HCL) 5 mg in dextrose 5 % 250 mL infusion, Continuous  [Held by provider] spironolactone (ALDACTONE) tablet 12.5 mg, Daily  atorvastatin (LIPITOR) tablet 40 mg, Nightly        I/O:     Intake/Output Summary (Last 24 hours) at 12/13/2021 3856  Last data filed at 12/13/2021 0700  Gross per 24 hour   Intake 982.43 ml   Output 1715 ml   Net -732.57 ml       Physical Exam:    BP 88/66   Pulse 105   Temp 97.5 °F (36.4 °C) (Temporal)   Resp 16   Ht 6' 1\" (1.854 m)   Wt 186 lb 8.2 oz (84.6 kg)   SpO2 95%   BMI 24.61 kg/m²   Wt Readings from Last 3 Encounters:   12/13/21 186 lb 8.2 oz (84.6 kg)       GENERAL: Well developed, well nourished, no acute distress  NEUROLOGICAL: Alert and oriented x3  PSYCH: Normal mood and affect   SKIN: Warm and dry, without lesions  HEENT: Normocephalic, atraumatic, Sclera non-icteric, mucous membranes moist  NECK: supple, JVP normal, thyroid not enlarged   CAROTID: Normal upstroke, no bruits  CARDIAC: Normal PMI, regular rate and rhythm, normal S1S2, no murmur, rub  RESPIRATORY: Normal respiratory effort, clear to auscultation bilaterally  EXTREMITIES: No cyanosis, clubbing or edema, palpable pulses bilaterally   MUSCULOSKELETAL: No joint swelling or tenderness, no chest wall tenderness  GASTROINTESTINAL:  soft, non-tender, no bruit    Data Review:    CBC:   Recent Labs     12/10/21  1602 12/10/21  1905 12/11/21  0100 12/11/21  0510 12/13/21  0422   WBC 13.5*  --   --  14.8* 11.6*   HGB 14.6   < > 11.3* 10.5* 8.8*   HCT 44.7  --   --  31.7* 26.6*   MCV 90.9  --   --  90.1 90.3   *  --   --  99* 86*    < > = values in this interval not displayed. BMP:   Recent Labs     12/10/21  1602 12/10/21  1602 12/11/21  0510 12/12/21  0435 12/13/21  0422     --  144  --  137   K 4.1  --  4.9  --  4.5     --  111*  --  103   CO2 23  --  24  --  22   BUN 28*  --  27*  --  40*   CREATININE 1.2  --  1.1  --  1.1   GFRAA >60  --  >60  --  >60   MG 3.10*   < > 2.50* 2.40 2.60*    < > = values in this interval not displayed.         Recent Labs     12/11/21  0525 12/12/21  0435 12/13/21  0422   PROTIME 13.4* 13.7* 14.4*   INR 1.18* 1.20* 1.26*     APTT:   Recent Labs     12/12/21  1420 12/12/21  2220 12/13/21  0422 APTT 28.2 45.5* 52.7*     Zion Arndt DO, [facility]  Interventional Cardiology     o: [phone]  [address] [place] [address]., [address] [facility], [address] [place] [address]      NOTE:  This report was transcribed using voice recognition software. Every effort was made to ensure accuracy; however, inadvertent computerized transcription errors may be present.

## 2021-12-13 NOTE — FLOWSHEET NOTE
Levo off since 2300. Left radial art line removed. Pressure held x5min and tegaderm applied. Molina removed. Urinal provided. Up to chair x1 assist.   PTT 52.7, Hep gtt adjusted to 16unit/kg/hr per protocol. next ptt due at 11am.  BG 86, insulin gtt off.

## 2021-12-13 NOTE — PROGRESS NOTES
Cardiothoracic Surgery Progress Note    CC: s/p CABG X 4, KATHARINE clip, MVR (mechanical), tricuspid valve repair   POD# 3    Subjective:  Hemodynamically stable, RA, afebrile. Alert and oriented X 3. Weight up this am from yesterday. Arterial line dc'd this am. Patient c/o minimal incisional pain at present time.      WT:84.6 kg/84.1 kg   pre-op 76.2 kg    Vital Signs:   BP 88/66   Pulse 105   Temp 97.5 °F (36.4 °C) (Temporal)   Resp 16   SpO2 95%       ABP (Arterial line BP): 100/59 (12/13/21 0529)  ABP mean (Arterial line mean): 71 mmHg (12/13/21 0529)    Gtts: Heparin gtt     Physical Exam:   Cardiac: regular, no murmur  Lungs: clear to auscultation, decreased bases bilaterally  Abdomen:  No BM, BS NA X 4  Vascular:  pulses all palpable   Extremities: generalized, non-pitting edema 1+  :  /610/245   Lasix 20 mg IVP BID  Chest Tube(s) & Incision(s):    Chest Tubes: 355/610/245  No airleak No crepitus -20cm sx  Sternum: stable, edges approximated, no drainage  Chest tube site: C/D/I    Left leg incision:  Edges well approximated, no drainage         Labs:   CBC:   Recent Labs     12/11/21  0510 12/13/21 0422   WBC 14.8* 11.6*   HGB 10.5* 8.8*   HCT 31.7* 26.6*   PLT 99* 86*     BMP:   Recent Labs     12/11/21  0510 12/12/21 0435 12/13/21 0422   K 4.9  --  4.5   CREATININE 1.1  --  1.1   CALCIUM 8.8  --  9.2   MG 2.50* 2.40 2.60*     PT/INR:   Recent Labs     12/12/21 0435 12/13/21 0422   PROTIME 13.7* 14.4*   INR 1.20* 1.26*       Assessment/Plan:  As per CC:     Plan:   Severe CAD/Severe Mitral Valve Regurgitation/Moderate tricuspid Regurgitation- s/p CABG X 4, KATHARINE, MVR (mechanical), TR repair  Aggressive IS  Diuresis - strict I/O, fluid restriction   Follow CVTS CT removal protocol   Laxative of choice   Ambulate in [place] X 3 today   Thrombocytopenia  Platelets trending down (86K today)- send HIT   Heparin bridge until coumadin can be started for mechanical valve  CBC tomorrow   Tobacco Abuse  Smoking cessation counseling       Electronically signed by MICHELLE Brown CNP on 12/13/2021 at 8:12 AM     Split chest tubes to separate outputs. Increase coumadin.   D/C heparin gtt when INR>1.49    Attending lAeta Agarwal  I was present with the nurse practitioner during the history and exam. I discussed the findings and plans with the nurse practitioner and agree as documented in her note except for where noted and / or changed    Electronically signed by Gokul Alston MD on 12/13/21 at 1:50 PM EST

## 2021-12-14 LAB
ANION GAP SERPL CALCULATED.3IONS-SCNC: 11 MMOL/L (ref 3–16)
APTT: 127.3 SEC (ref 26.2–38.6)
BUN BLDV-MCNC: 44 MG/DL (ref 7–20)
CALCIUM SERPL-MCNC: 9 MG/DL (ref 8.3–10.6)
CHLORIDE BLD-SCNC: 98 MMOL/L (ref 99–110)
CO2: 23 MMOL/L (ref 21–32)
CREAT SERPL-MCNC: 1.2 MG/DL (ref 0.9–1.3)
GFR AFRICAN AMERICAN: >60
GFR NON-AFRICAN AMERICAN: >60
GLUCOSE BLD-MCNC: 207 MG/DL (ref 70–99)
HCT VFR BLD CALC: 26.3 % (ref 40.5–52.5)
HEMOGLOBIN: 8.7 G/DL (ref 13.5–17.5)
INR BLD: 1.61 (ref 0.88–1.12)
MCH RBC QN AUTO: 30 PG (ref 26–34)
MCHC RBC AUTO-ENTMCNC: 32.9 G/DL (ref 31–36)
MCV RBC AUTO: 91 FL (ref 80–100)
PDW BLD-RTO: 16.4 % (ref 12.4–15.4)
PLATELET # BLD: 103 K/UL (ref 135–450)
PMV BLD AUTO: 9.6 FL (ref 5–10.5)
POTASSIUM SERPL-SCNC: 5.5 MMOL/L (ref 3.5–5.1)
PRO-BNP: 4485 PG/ML (ref 0–124)
PROTHROMBIN TIME: 18.5 SEC (ref 9.9–12.7)
RBC # BLD: 2.89 M/UL (ref 4.2–5.9)
REASON FOR REJECTION: NORMAL
REJECTED TEST: NORMAL
SODIUM BLD-SCNC: 132 MMOL/L (ref 136–145)
WBC # BLD: 11.5 K/UL (ref 4–11)

## 2021-12-14 PROCEDURE — 6370000000 HC RX 637 (ALT 250 FOR IP): Performed by: THORACIC SURGERY (CARDIOTHORACIC VASCULAR SURGERY)

## 2021-12-14 PROCEDURE — 94669 MECHANICAL CHEST WALL OSCILL: CPT

## 2021-12-14 PROCEDURE — 93321 DOPPLER ECHO F-UP/LMTD STD: CPT

## 2021-12-14 PROCEDURE — 80048 BASIC METABOLIC PNL TOTAL CA: CPT

## 2021-12-14 PROCEDURE — 93325 DOPPLER ECHO COLOR FLOW MAPG: CPT

## 2021-12-14 PROCEDURE — 6360000002 HC RX W HCPCS: Performed by: NURSE PRACTITIONER

## 2021-12-14 PROCEDURE — 2140000000 HC CCU INTERMEDIATE R&B

## 2021-12-14 PROCEDURE — 85730 THROMBOPLASTIN TIME PARTIAL: CPT

## 2021-12-14 PROCEDURE — 85027 COMPLETE CBC AUTOMATED: CPT

## 2021-12-14 PROCEDURE — 99223 1ST HOSP IP/OBS HIGH 75: CPT | Performed by: INTERNAL MEDICINE

## 2021-12-14 PROCEDURE — 36415 COLL VENOUS BLD VENIPUNCTURE: CPT

## 2021-12-14 PROCEDURE — 93308 TTE F-UP OR LMTD: CPT

## 2021-12-14 PROCEDURE — 85610 PROTHROMBIN TIME: CPT

## 2021-12-14 PROCEDURE — 6370000000 HC RX 637 (ALT 250 FOR IP): Performed by: NURSE PRACTITIONER

## 2021-12-14 PROCEDURE — 99233 SBSQ HOSP IP/OBS HIGH 50: CPT | Performed by: CLINICAL NURSE SPECIALIST

## 2021-12-14 PROCEDURE — 6360000002 HC RX W HCPCS: Performed by: THORACIC SURGERY (CARDIOTHORACIC VASCULAR SURGERY)

## 2021-12-14 PROCEDURE — 83880 ASSAY OF NATRIURETIC PEPTIDE: CPT

## 2021-12-14 PROCEDURE — 99024 POSTOP FOLLOW-UP VISIT: CPT | Performed by: THORACIC SURGERY (CARDIOTHORACIC VASCULAR SURGERY)

## 2021-12-14 PROCEDURE — 2580000003 HC RX 258: Performed by: THORACIC SURGERY (CARDIOTHORACIC VASCULAR SURGERY)

## 2021-12-14 RX ORDER — SPIRONOLACTONE 25 MG/1
12.5 TABLET ORAL DAILY
Status: DISCONTINUED | OUTPATIENT
Start: 2021-12-14 | End: 2021-12-19 | Stop reason: HOSPADM

## 2021-12-14 RX ORDER — DIGOXIN 0.25 MG/ML
125 INJECTION INTRAMUSCULAR; INTRAVENOUS DAILY
Status: DISCONTINUED | OUTPATIENT
Start: 2021-12-14 | End: 2021-12-16

## 2021-12-14 RX ORDER — IBUPROFEN 600 MG/1
600 TABLET ORAL EVERY 8 HOURS
Status: DISCONTINUED | OUTPATIENT
Start: 2021-12-14 | End: 2021-12-18

## 2021-12-14 RX ADMIN — POTASSIUM CHLORIDE 10 MEQ: 10 TABLET, EXTENDED RELEASE ORAL at 09:05

## 2021-12-14 RX ADMIN — ATORVASTATIN CALCIUM 40 MG: 40 TABLET, FILM COATED ORAL at 20:21

## 2021-12-14 RX ADMIN — SPIRONOLACTONE 12.5 MG: 25 TABLET ORAL at 18:00

## 2021-12-14 RX ADMIN — IBUPROFEN 600 MG: 600 TABLET ORAL at 16:33

## 2021-12-14 RX ADMIN — TRAMADOL HYDROCHLORIDE 100 MG: 50 TABLET, FILM COATED ORAL at 12:08

## 2021-12-14 RX ADMIN — SENNOSIDES AND DOCUSATE SODIUM 1 TABLET: 50; 8.6 TABLET ORAL at 20:21

## 2021-12-14 RX ADMIN — FUROSEMIDE 20 MG: 10 INJECTION, SOLUTION INTRAMUSCULAR; INTRAVENOUS at 18:01

## 2021-12-14 RX ADMIN — PANTOPRAZOLE SODIUM 40 MG: 40 TABLET, DELAYED RELEASE ORAL at 09:05

## 2021-12-14 RX ADMIN — MUPIROCIN: 20 OINTMENT TOPICAL at 09:00

## 2021-12-14 RX ADMIN — ASPIRIN 81 MG 81 MG: 81 TABLET ORAL at 09:05

## 2021-12-14 RX ADMIN — ACETAMINOPHEN 1000 MG: 500 TABLET ORAL at 10:10

## 2021-12-14 RX ADMIN — FUROSEMIDE 20 MG: 10 INJECTION, SOLUTION INTRAMUSCULAR; INTRAVENOUS at 09:06

## 2021-12-14 RX ADMIN — Medication 10 ML: at 09:06

## 2021-12-14 RX ADMIN — WARFARIN SODIUM 5 MG: 5 TABLET ORAL at 18:11

## 2021-12-14 RX ADMIN — POTASSIUM CHLORIDE 10 MEQ: 10 TABLET, EXTENDED RELEASE ORAL at 11:59

## 2021-12-14 RX ADMIN — Medication 10 ML: at 20:23

## 2021-12-14 RX ADMIN — TRAMADOL HYDROCHLORIDE 100 MG: 50 TABLET, FILM COATED ORAL at 20:21

## 2021-12-14 RX ADMIN — IBUPROFEN 600 MG: 600 TABLET ORAL at 09:05

## 2021-12-14 RX ADMIN — DIGOXIN 125 MCG: 250 INJECTION, SOLUTION INTRAMUSCULAR; INTRAVENOUS; PARENTERAL at 16:40

## 2021-12-14 RX ADMIN — ACETAMINOPHEN 1000 MG: 500 TABLET ORAL at 22:30

## 2021-12-14 RX ADMIN — ACETAMINOPHEN 1000 MG: 500 TABLET ORAL at 16:33

## 2021-12-14 RX ADMIN — MUPIROCIN: 20 OINTMENT TOPICAL at 20:23

## 2021-12-14 RX ADMIN — TRAMADOL HYDROCHLORIDE 100 MG: 50 TABLET, FILM COATED ORAL at 00:34

## 2021-12-14 RX ADMIN — ZOLPIDEM TARTRATE 5 MG: 5 TABLET ORAL at 20:21

## 2021-12-14 ASSESSMENT — PAIN SCALES - GENERAL
PAINLEVEL_OUTOF10: 7
PAINLEVEL_OUTOF10: 4
PAINLEVEL_OUTOF10: 0
PAINLEVEL_OUTOF10: 4
PAINLEVEL_OUTOF10: 0
PAINLEVEL_OUTOF10: 7

## 2021-12-14 ASSESSMENT — PAIN DESCRIPTION - DESCRIPTORS
DESCRIPTORS: ACHING
DESCRIPTORS: ACHING;SHARP

## 2021-12-14 ASSESSMENT — PAIN DESCRIPTION - ORIENTATION
ORIENTATION: LEFT
ORIENTATION: LEFT

## 2021-12-14 ASSESSMENT — PAIN DESCRIPTION - LOCATION
LOCATION: CHEST

## 2021-12-14 ASSESSMENT — PAIN DESCRIPTION - PAIN TYPE
TYPE: SURGICAL PAIN

## 2021-12-14 NOTE — CONSULTS
Cardiac Electrophysiology Consultation   Date: 12/14/2021  Admit Date:  12/1/2021  Reason for Consultation: Consideration for LifeVest   Consult Requesting Physician: Evelyn Melara MD     Chief Complaint   Patient presents with    Leg Swelling     From home. Was sick with cough and rash for about a month, ended about a week ago. Bilateral lower extremity edema X1 week, denies Hx of CHF. Some SOB still. HPI: Curtis Hammer is a 64 y.o. male with history of diabetes mellitus admitted with worsening SOB, found to be in heart failure with severely reduced LV systolic function, mitral and tricuspid regurgitaiton. LHC revealed 3-V disease. He's s/p CABG x4, mechanical MVR and TV repair, KATHARINE clip and doing well. EP consulted for consideration of LifeVest given severely depressed LV function. Past Medical History:   Diagnosis Date    Diabetes [facility])         Past Surgical History:   Procedure Laterality Date    CORONARY ARTERY BYPASS GRAFT N/A 12/10/2021    CABG X 4 , CORONARY ARTERY BYPASS GRAFT STRONG TO LAD, VEIN GRAFT TO PDA, SVG SEQUENCED TO DIAG AND OM, LIGATION KATHARINE  ATRICLIP 45MM, EVH LEFT SAPHENOUS VEIN, ANTERIOR AND POSTERIOR CHORDAE SPARING MITRAL VALVE REPLACEMENT MEDTRONIC 29MM mechanical valve, TRICUSPID VALVE REPAIR with 28mm Physio tricuspid annuloplasty ring , TOTAL CPB, VERO, Doppler flow verification of bypass grafts, 5 LEVEL BILATERAL        No Known Allergies    Social History:  Reviewed. reports that he has been smoking cigarettes. He has a 15.00 pack-year smoking history. He has never used smokeless tobacco. He reports previous alcohol use. He reports that he does not use drugs. Family History:  Reviewed. family history is not on file. No premature CAD. Review of System:  Pertinent positives and negatives are mentioned in the HPI. The rest of the systems are negative.     Physical Examination:  Vitals:    12/14/21 1423   BP: 94/60   Pulse: 99   Resp: 16   Temp: 97.5 °F (36.4 °C) SpO2: 99%        Intake/Output Summary (Last 24 hours) at 2021 1502  Last data filed at 2021 1426  Gross per 24 hour   Intake 620 ml   Output 3154 ml   Net -2534 ml     In: 370 [P.O.:360; I.V.:10]  Out:     Wt Readings from Last 3 Encounters:   21 184 lb 15.5 oz (83.9 kg)     Temp  Av °F (36.7 °C)  Min: 97.1 °F (36.2 °C)  Max: 98.6 °F (37 °C)  Pulse  Av.3  Min: 98  Max: 103  BP  Min: 82/55  Max: 100/60  SpO2  Av.7 %  Min: 92 %  Max: 99 %    · Telemetry: Sinus rhythm  · Constitutional: Alert. Oriented to person, place, and time. No distress. · Head: Normocephalic and atraumatic. · Mouth/Throat: Lips appear moist. Oropharynx is clear and moist.  · Eyes: Conjunctivae normal. EOM are normal.   · Neck: Neck supple. Supple, no JVD  · Cardiovascular: Normal rate, regular rhythm. · Pulmonary/Chest: Bilateral respiratory sounds present. No wheeze or crackles  · Abdominal: Soft. Normal bowel sounds present. No distension, No tenderness. · Musculoskeletal:No pitting edema . · Neurological: Alert and oriented. Grossly normal with no focal neurological deficit. · Skin: Sternal wound well approximated, healing well. · Psychiatric: No anxiety nor agitation. ·   Labs:  Reviewed. Recent Labs     21  0422 21  1220    132*   K 4.5 5.5*    98*   CO2 22 23   BUN 40* 44*   CREATININE 1.1 1.2     Recent Labs     21  0422 21  0619   WBC 11.6* 11.5*   HGB 8.8* 8.7*   HCT 26.6* 26.3*   MCV 90.3 91.0   PLT 86* 103*     Lab Results   Component Value Date    TROPONINI 0.01 2021     No results found for: BNP  Lab Results   Component Value Date    PROTIME 18.5 2021    PROTIME 14.4 2021    PROTIME 13.7 2021    INR 1.61 2021    INR 1.26 2021    INR 1.20 2021     Lab Results   Component Value Date    CHOL 122 2021    HDL 37 2021    TRIG 65 2021       Diagnostic and imaging results reviewed.      ECG: Sinus tachycardia. Echo: today:      Summary   Left ventricular cavity size is moderately dilated with normal left   ventricular wall thickness. Overall left ventricular systolic function appears severely reduced. Ejection fraction is visually estimated to be 20%. There is severe diffuse hypokinesis. The right ventricle is mildly enlarged. Right ventricular systolic function   is severely reduced. There is a 29 mm Medtronic mechanical valve well seated with a peak velocity   of 1.9 m/s and a mean gradient of 8 mmHg. No paravalvular leak noted. There is a 28 mm Physio tricuspid annuloplasty ring well seated with a peak   velocity of 2.7 m/s and a mean gradient of 4 mmHg. Mild tricuspid   regurgitation with a PASP of 31 mmHg. I independently reviewed and interpreted the ECG, telemetry, serology, echocardiographic results.     Scheduled Meds:   ibuprofen  600 mg Oral Q8H    digoxin  125 mcg IntraVENous Daily    spironolactone  12.5 mg Oral Daily    warfarin  5 mg Oral Daily    aspirin  81 mg Oral Daily    sodium chloride flush  10 mL IntraVENous 2 times per day    acetaminophen  1,000 mg Oral Q6H    furosemide  20 mg IntraVENous BID    mupirocin   Nasal BID    [Held by provider] potassium chloride  10 mEq Oral TID WC    sennosides-docusate sodium  1 tablet Oral BID    pantoprazole  40 mg Oral Daily    [Held by provider] carvedilol  3.125 mg Oral BID    [Held by provider] valsartan  80 mg Oral Lunch    atorvastatin  40 mg Oral Nightly     Continuous Infusions:   sodium chloride      lactated ringers bolus       PRN Meds:.perflutren lipid microspheres, sodium chloride flush, sodium chloride, ondansetron, metoclopramide, morphine, diphenhydrAMINE, zolpidem, magnesium hydroxide, polyethylene glycol, potassium chloride, magnesium sulfate, calcium chloride IVPB, calcium chloride IVPB, albuterol sulfate HFA, lactated ringers bolus, furosemide, traMADol **OR** traMADol     Problem List: Patient Active Problem List    Diagnosis Date Noted    Acute congestive heart failure ([facility]     CAD (coronary artery disease) 12/09/2021    Severe mitral regurgitation 12/09/2021    Severe tricuspid regurgitation 12/09/2021    CHF (congestive heart failure), NYHA class I, acute on chronic, combined ([facility] 12/09/2021    Acute on chronic combined systolic and diastolic heart failure ([facility] 12/02/2021      Active Hospital Problems    Diagnosis Date Noted    Acute congestive heart failure (HCC) [I50.9]     CAD (coronary artery disease) [I25.10] 12/09/2021    Severe mitral regurgitation [I34.0] 12/09/2021    Severe tricuspid regurgitation [I07.1] 12/09/2021    CHF (congestive heart failure), NYHA class I, acute on chronic, combined ([facility] [I50.43] 12/09/2021    Acute on chronic combined systolic and diastolic heart failure ([facility] [I50.43] 12/02/2021         Consultation Assessment and Recommendation(s):  1. CAD s/p CABG x4 (LIMA/LAD, SVG/RPDA, sequential SVG/D1/OM). 12/11/2021  2. Valvular disease s/p Mechanical MVR and TV repair (annuloplasty) 12/11/2021  3. Ischemic cardiomyopathy s/p revascularization. 4. Heart failure with severely reduced LVEF  5. KATHARINE clip. · Mr. Kervin Blum underwent successful coronary bypass and valve surgery. His LVEF is severely depressed. No PVCS or VTs on monitor. He has a risk of SCD but no indication for ICD given recent revascularization. As such it is reasonable to have a wearable defibrillator for 3 months as he initiated and titrated to maximally tolerated GDMT for heart failure/cardiomyopathy. · Can stop wearing LifeVest if repeat TTE that can be done in 2 months shows LVEF >35%. · If no improvement in LV systolic function >52% in 90 days, then ICD would be indicated. · BP is currently low but when can tolerate will be started on beta blockers and ACEi. Thank you for allowing me to participate in the care of Stephani Banks .  If you have any questions/comments, please do not hesitate to contact us.       Electronically signed by Bala Scott MD on 12/14/2021 at 3:02 PM  Cardiac Electrophysiology  [facility]

## 2021-12-14 NOTE — PROGRESS NOTES
Food/Nutrient Intake Outcomes:  Food and Nutrient Intake  Physical Signs/Symptoms Outcomes:  Biochemical Data, Nutrition Focused Physical Findings     Discharge Planning:     Too soon to determine     Electronically signed by Yadi Beltrán RD, NELLY on 12/14/21 at 4:31 PM EST    Contact: [phone]

## 2021-12-14 NOTE — PROGRESS NOTES
ptt 127.3, Hep gtt stopped for one hour, decreased gtt by 3 units to 11 Units/kg/hr, new ptt due in am, will monitor.

## 2021-12-14 NOTE — PROGRESS NOTES
CC: s/p MVR mech / TV repair / CABG    No c/o  ST at 102  BP at baseline high 80's low 90's  Only right pleural chest tube draining significantly  INR 1.6  CTAB RRR sternum stable  As per CC  D/C med and left pleural CT's -- keep right pleural  D/C heparin gtt  Add motrin for right pleural effusion  D/C cordis  Check echo today or tomorrow for EF evaluation (if <30% evaluate for lifevest)

## 2021-12-14 NOTE — PROGRESS NOTES
Criteria met per open heart protocol to discontinue central line. Procedure explained to patient. Right IJ discontinued per protocol while in bed. Pressure held X 15 minutes and dressing intact. No hematoma or no oozing noted. Vaseline guaze applied. Patient tolerated well. Cont to monitor.     Electronically signed by Elia Porter RN on 12/14/2021 at 8:57 AM

## 2021-12-14 NOTE — PROGRESS NOTES
[facility] unable to accept. Called several agencies,and [facility] is the only agency willing to accept this patient for home care. Discharge planner notified.

## 2021-12-14 NOTE — PROGRESS NOTES
[address]   Daily Progress Note      Admit Date:  12/1/2021    HPI:    Mr. Troy Nava is a 64year old male with biventricular systolic heart failure, LVEF 15-20%, severe MR, TR, MVCAS, DM, tobacco    CABG 4 V LIMA-LAD, SVG-RPDA, sequential SVG-D1-OM), KATHARINE clip, mechanical MVR, TRV annuloplasty ring on 12/11 by Dr Santa Sewell. His BP is on the soft side and not able to take any medications. Subjective:  Patient is being seen for systolic heart failure. There were no acute overnight cardiac events. Weight [phone] He is currently getting an echo at bedside. He denies any shortness of breath, palpitations or edema in his legs. He is not on any of his medications due to soft BP. He is receiving IV lasix    Objective:   BP (!) 87/59   Pulse 101   Temp 98.2 °F (36.8 °C) (Temporal)   Resp 16   Ht 6' 1\" (1.854 m)   Wt 184 lb 15.5 oz (83.9 kg)   SpO2 99%   BMI 24.40 kg/m²     Intake/Output Summary (Last 24 hours) at 12/14/2021 1110  Last data filed at 12/14/2021 1016  Gross per 24 hour   Intake 380 ml   Output 2934 ml   Net -2554 ml          Physical Exam:  General:  Awake, alert, oriented in NAD  Skin:  Warm and dry. No unusual bruising or rash  Neck:  Supple. No JVD or carotid bruit appreciated  Chest:  Normal effort.   Decreased bilateral bases, chest tube in place  Cardiovascular:  RRR, S1/S2, no murmur/gallop/rub  Abdomen:  Soft, nontender, distended  Extremities:  No edema  Neurological: No focal deficits  Psychological: Normal mood and affect      Medications:    ibuprofen  600 mg Oral Q8H    warfarin  5 mg Oral Daily    aspirin  81 mg Oral Daily    sodium chloride flush  10 mL IntraVENous 2 times per day    acetaminophen  1,000 mg Oral Q6H    furosemide  20 mg IntraVENous BID    mupirocin   Nasal BID    potassium chloride  10 mEq Oral TID     sennosides-docusate sodium  1 tablet Oral BID    pantoprazole  40 mg Oral Daily    [Held by provider] carvedilol  3.125 mg Oral BID    [Held by provider] valsartan  80 mg Oral Lunch    [Held by provider] spironolactone  12.5 mg Oral Daily    atorvastatin  40 mg Oral Nightly      sodium chloride      lactated ringers bolus         Lab Data:  CBC:   Recent Labs     12/13/21 0422 12/14/21  0619   WBC 11.6* 11.5*   HGB 8.8* 8.7*   PLT 86* 103*     BMP:    Recent Labs     12/13/21 0422      K 4.5   CO2 22   BUN 40*   CREATININE 1.1     INR:    Recent Labs     12/12/21  0435 12/13/21 0422 12/14/21  0619   INR 1.20* 1.26* 1.61*     BNP:  No results for input(s): PROBNP in the last 72 hours. Diagnostics:  Echo 12/14/2021 pending    Limited Echo 12/6/21:   Summary   Definity administered for endocardial border definition and to rule out LV   thrombus.   Left ventricular cavity size is dilated with normal left ventricular wall   thickness.   Overall, left ventricular systolic function appears severely reduced.   Ejection fraction is visually estimated to be in the 25% range.  (Guajardo's=26%)   There is severe diffuse hypokinesis with akinesis of the distal septum and   apex.   No evidence of left ventricular mass or thrombus noted.   Severe mitral regurgitation directed centrally and posteriorly with systolic   flow reversal in pulmonary veins.   Moderate tricuspid regurgitation with a PASP of 46 mmHg    ECHO 12/2/21:   Summary   Left ventricular cavity size is mildly dilated.   Overall left ventricular systolic function appears severely reduced.   Ejection fraction is visually estimated to be 15-20%. Cannot exclude apical   thrombus as contrast not administered.  Bills Neither is severe diffuse global hypokinesis.   Grade III diastolic dysfunction.   Left atrium is mildly dilated.   The right ventricle is moderately dilated with moderately reduced function   by visual assessment.   Severe mitral regurgitation with single posteriorly directed jet.   Severe tricuspid regurgitation.   [address] size is dilated (>2.1 cm) and collapses < 50% with respiration   consistent with elevated RA pressure (15 mmHg).   Cannot estimate PASP given severe TR, but PASP elevated.      Critical findings relayed to Dr. Naren Olson of severely reduced EF with low   stroke volume.                             [facility] 12/3/21:   Left Heart Cath  Dominance: Right     LM: 30% distal   LAD: large, arborizing, high first diagonal with 70% mid stenosis; LAD has a 95% stenosis after small, diffusely diseased second diagonal and is JOHANNA I flow to the apex   LCx: small, diffusely diseased that is subtotaled after small first marginal and receiving left to left collaterals to smaller second marginal/distal LCX  RCA: 90% proximal, 70% mid, 100% distal stenoses with left to right collaterals to RPDA      LVEDP: 23 mmHg  LVEF: 15% with severe global HK     Impression/Recommendations:  Severe, three vessel coronary disease  Severe cardiomyopathy  Severe mitral regurgitation   CHADS2VASC=3  CTS consult to discuss CABG/ MVR/KATHARINE Clip    Assessment:    1. Acute systolic heart failure  2. CAD status post CABG, status post MVR, KATHARINE clip and TR annuloplasty ring  3. Hypotension   4. Diabetes  5. Smoker    Plan:    1. Continue lasix 20 mg IV bid  2.  Echo pending  3. Aldactone, valsartan and coreg held due to hypotension    Discussed echo with Dr Warden Fish LVEF 20%. Recommend starting dobutamine 2.5 mcg/kg/min. Discussed with bedside nurse to speak with surgery regarding our recommendations    May need to consider adding digoxin   NYHA IV    Discussed with patient who is agreeable with plan of care. Thank you for allowing me to participate in the care of your patient.     Clement Jara, MICHELLE - CNS, CNS

## 2021-12-14 NOTE — PROGRESS NOTES
Verbal order from Dr. Santa Sewell to removed the mediastinal and left pleural chest tubes. . NO air leak and NO crepitus noted. Pt instructed on procedure. .   Dressings removed. Incision within normal limits. Site cleansed and prepped per protocol. Chest tubes X 2 removed without difficulty. Vaseline gauze and dry sterile dressing applied. Bilateral breath sounds audible. O2Sats 98% on room air. Patient tolerated well. Right pleural chest tube remains in place.

## 2021-12-14 NOTE — CARE COORDINATION
CM has spoken with patient daily to determine if he has made PCP appointment for follow up needs or has a home care preference. He reports that his brother Lina Maxwell is assisting him with these decisions. ELIAS will follow up with Ari.     GERALD AustinN, CCM, RN  [facility]  [phone]

## 2021-12-14 NOTE — CARE COORDINATION
[facility] [address] [facility] , liaison aware. Addendum: [facility] unable to staff request, referral to [facility].   GERALD AcevedoN, CCM, RN  [facility]  [phone]

## 2021-12-14 NOTE — DISCHARGE INSTR - COC
Continuity of Care Form    Patient Name: Jose Ramos   :  1960  MRN:  [id number]    Admit date:  2021  Discharge date:  21    Code Status Order: Full Code   Advance Directives:   [facility] Documentation       Date/Time Healthcare Directive Type of Healthcare Directive Copy in [address] [facility] [address] Agent's Name Healthcare Agent's Phone Number    12/10/21 0615 No, patient does not have an advance directive for healthcare treatment -- -- -- -- --            Admitting Physician:  Jose Gutierres MD  PCP: No primary care provider on file. [facility] CVU-2904/2904-01  Discharging Unit Phone Number: [phone]    Emergency Contact:   Extended Emergency Contact Information  Primary Emergency Contact: Harshal Hathaway, [address] [address] Phone: [phone]  Relation: Brother/Sister    Past Surgical History:  Past Surgical History:   Procedure Laterality Date    CORONARY ARTERY BYPASS GRAFT N/A 12/10/2021    CABG X 4 , CORONARY ARTERY BYPASS GRAFT STRONG TO LAD, VEIN GRAFT TO PDA, SVG SEQUENCED TO DIAG AND OM, LIGATION KATHARINE  ATRICLIP 45MM, EVH LEFT SAPHENOUS VEIN, ANTERIOR AND POSTERIOR CHORDAE SPARING MITRAL VALVE REPLACEMENT MEDTRONIC 29MM mechanical valve, TRICUSPID VALVE REPAIR with 28mm Physio tricuspid annuloplasty ring , TOTAL CPB, VERO, Doppler flow verification of bypass grafts, 5 LEVEL BILATERAL        Immunization History: There is no immunization history on file for this patient.     Active Problems:  Patient Active Problem List   Diagnosis Code    Acute on chronic combined systolic and diastolic heart failure (HCC) I50.43    CAD (coronary artery disease) I25.10    Severe mitral regurgitation I34.0    Severe tricuspid regurgitation I07.1    CHF (congestive heart failure), NYHA class I, acute on chronic, combined ([facility] I50.43       Isolation/Infection:   Isolation            No Isolation          Patient Infection Status       Infection Onset Added Last Indicated Last Indicated By Review Planned Expiration Resolved Resolved By    None active    Resolved    COVID-19 (Rule Out) 12/02/21 12/02/21 12/02/21 COVID-19 (Ordered)   12/03/21 Rule-Out Test Resulted            Nurse Assessment:  Last Vital Signs: BP (!) 87/59   Pulse 101   Temp 98.2 °F (36.8 °C) (Temporal)   Resp 16   Ht 6' 1\" (1.854 m)   Wt 184 lb 15.5 oz (83.9 kg)   SpO2 99%   BMI 24.40 kg/m²     Last documented pain score (0-10 scale): Pain Level: 0  Last Weight: 172 lb  Wt Readings from Last 1 Encounters:   12/14/21 184 lb 15.5 oz (83.9 kg)     Mental Status:  oriented and alert    IV Access:  - None    Nursing Mobility/ADLs:  Walking   Independent  Transfer  Independent  Bathing  Independent  Dressing  Independent  [address]  Independent  Med Delivery   whole    Wound Care Documentation and Therapy:        Elimination:  Continence: Bowel: Yes  Bladder: Yes  Urinary Catheter: None   Colostomy/Ileostomy/Ileal Conduit: No       Date of Last BM: 12/19/21    Intake/Output Summary (Last 24 hours) at 12/14/2021 1104  Last data filed at 12/14/2021 1016  Gross per 24 hour   Intake 380 ml   Output 2934 ml   Net -2554 ml     I/O last 3 completed shifts: In: 370 [P.O.:360; I.V.:10]  Out: 2497 [Urine:1075; Chest Tube:1422]    Safety Concerns:     None    Impairments/Disabilities:      None    Nutrition Therapy:  Current Nutrition Therapy:   - Oral Diet:  Cardiac    Routes of Feeding: Oral  Liquids: No Restrictions  Daily Fluid Restriction: yes - amount 2000  Last Modified Barium Swallow with Video (Video Swallowing Test): not done    Treatments at the Time of Hospital Discharge:   Respiratory Treatments: NA  Oxygen Therapy:  is not on home oxygen therapy.   Ventilator:    - No ventilator support    Rehab Therapies: cardiac rehab  Weight Bearing Status/Restrictions: No weight bearing restirctions  Other Medical Equipment (for information only, NOT a DME order):  NA  Other Treatments: NA    Patient's personal belongings (please select all that are sent with patient):  Glasses, clothing, cell phone, wallet    RN SIGNATURE:  Electronically signed by Buffy Jacobson RN on 12/19/21 at 9:49 AM EST    CASE MANAGEMENT/SOCIAL WORK SECTION    Inpatient Status Date: 12/02/2021    Readmission Risk Assessment Score:  Readmission Risk              Risk of Unplanned Readmission:  14           Discharging to Facility/ [address] [place] [address]  [phone]  . / signature: MARLO Buckley, CCM, RN  [facility]  [phone]     PHYSICIAN SECTION    Prognosis: Good    Condition at Discharge: Stable    Rehab Potential (if transferring to Rehab): Good    Recommended Labs or Other Treatments After Discharge:   Home Lab Orders: Draw INR:_12/20/2021________  Call results to Dr. Andreina Ramsey office at [phone] for further coumadin dosing and PT/INR draws     Physician Certification: I certify the above information and transfer of Michaela Penn  is necessary for the continuing treatment of the diagnosis listed and that he requires [address] for less 30 days.      Update Admission H&P: No change in H&P    PHYSICIAN SIGNATURE:  Electronically signed by MICHELLE Morelos CNP on 12/17/21 at 2:33 PM EST

## 2021-12-15 LAB
GLUCOSE BLD-MCNC: 160 MG/DL (ref 70–99)
GLUCOSE BLD-MCNC: 298 MG/DL (ref 70–99)
HEPARIN INDUCED PLATELET ANTIBODY: NEGATIVE
INR BLD: 2.13 (ref 0.88–1.12)
PERFORMED ON: ABNORMAL
PERFORMED ON: ABNORMAL
POTASSIUM SERPL-SCNC: 4.8 MMOL/L (ref 3.5–5.1)
PROTHROMBIN TIME: 24.8 SEC (ref 9.9–12.7)

## 2021-12-15 PROCEDURE — 2140000000 HC CCU INTERMEDIATE R&B

## 2021-12-15 PROCEDURE — 6370000000 HC RX 637 (ALT 250 FOR IP): Performed by: THORACIC SURGERY (CARDIOTHORACIC VASCULAR SURGERY)

## 2021-12-15 PROCEDURE — 6370000000 HC RX 637 (ALT 250 FOR IP): Performed by: NURSE PRACTITIONER

## 2021-12-15 PROCEDURE — 6360000002 HC RX W HCPCS: Performed by: NURSE PRACTITIONER

## 2021-12-15 PROCEDURE — 99232 SBSQ HOSP IP/OBS MODERATE 35: CPT | Performed by: INTERNAL MEDICINE

## 2021-12-15 PROCEDURE — 2580000003 HC RX 258: Performed by: THORACIC SURGERY (CARDIOTHORACIC VASCULAR SURGERY)

## 2021-12-15 PROCEDURE — 85610 PROTHROMBIN TIME: CPT

## 2021-12-15 PROCEDURE — 84132 ASSAY OF SERUM POTASSIUM: CPT

## 2021-12-15 PROCEDURE — 99024 POSTOP FOLLOW-UP VISIT: CPT | Performed by: THORACIC SURGERY (CARDIOTHORACIC VASCULAR SURGERY)

## 2021-12-15 PROCEDURE — 6360000002 HC RX W HCPCS: Performed by: THORACIC SURGERY (CARDIOTHORACIC VASCULAR SURGERY)

## 2021-12-15 RX ORDER — NICOTINE POLACRILEX 4 MG
15 LOZENGE BUCCAL PRN
Status: DISCONTINUED | OUTPATIENT
Start: 2021-12-15 | End: 2021-12-19 | Stop reason: HOSPADM

## 2021-12-15 RX ORDER — INSULIN LISPRO 100 [IU]/ML
0-6 INJECTION, SOLUTION INTRAVENOUS; SUBCUTANEOUS
Status: DISCONTINUED | OUTPATIENT
Start: 2021-12-15 | End: 2021-12-19 | Stop reason: HOSPADM

## 2021-12-15 RX ORDER — INSULIN LISPRO 100 [IU]/ML
0-6 INJECTION, SOLUTION INTRAVENOUS; SUBCUTANEOUS ONCE
Status: DISCONTINUED | OUTPATIENT
Start: 2021-12-15 | End: 2021-12-19 | Stop reason: HOSPADM

## 2021-12-15 RX ORDER — DEXTROSE MONOHYDRATE 25 G/50ML
12.5 INJECTION, SOLUTION INTRAVENOUS PRN
Status: DISCONTINUED | OUTPATIENT
Start: 2021-12-15 | End: 2021-12-19 | Stop reason: HOSPADM

## 2021-12-15 RX ORDER — DEXTROSE MONOHYDRATE 50 MG/ML
100 INJECTION, SOLUTION INTRAVENOUS PRN
Status: DISCONTINUED | OUTPATIENT
Start: 2021-12-15 | End: 2021-12-19 | Stop reason: HOSPADM

## 2021-12-15 RX ORDER — INSULIN LISPRO 100 [IU]/ML
0-3 INJECTION, SOLUTION INTRAVENOUS; SUBCUTANEOUS NIGHTLY
Status: DISCONTINUED | OUTPATIENT
Start: 2021-12-15 | End: 2021-12-19 | Stop reason: HOSPADM

## 2021-12-15 RX ADMIN — ATORVASTATIN CALCIUM 40 MG: 40 TABLET, FILM COATED ORAL at 20:23

## 2021-12-15 RX ADMIN — ACETAMINOPHEN 1000 MG: 500 TABLET ORAL at 21:55

## 2021-12-15 RX ADMIN — Medication 10 ML: at 07:37

## 2021-12-15 RX ADMIN — INSULIN LISPRO 1 UNITS: 100 INJECTION, SOLUTION INTRAVENOUS; SUBCUTANEOUS at 20:23

## 2021-12-15 RX ADMIN — ACETAMINOPHEN 1000 MG: 500 TABLET ORAL at 15:11

## 2021-12-15 RX ADMIN — IBUPROFEN 600 MG: 600 TABLET ORAL at 15:11

## 2021-12-15 RX ADMIN — ACETAMINOPHEN 1000 MG: 500 TABLET ORAL at 10:33

## 2021-12-15 RX ADMIN — MUPIROCIN: 20 OINTMENT TOPICAL at 07:37

## 2021-12-15 RX ADMIN — FUROSEMIDE 20 MG: 10 INJECTION, SOLUTION INTRAMUSCULAR; INTRAVENOUS at 18:33

## 2021-12-15 RX ADMIN — INSULIN LISPRO 3 UNITS: 100 INJECTION, SOLUTION INTRAVENOUS; SUBCUTANEOUS at 17:30

## 2021-12-15 RX ADMIN — SPIRONOLACTONE 12.5 MG: 25 TABLET ORAL at 07:34

## 2021-12-15 RX ADMIN — IBUPROFEN 600 MG: 600 TABLET ORAL at 07:33

## 2021-12-15 RX ADMIN — ASPIRIN 81 MG 81 MG: 81 TABLET ORAL at 07:34

## 2021-12-15 RX ADMIN — DIGOXIN 125 MCG: 250 INJECTION, SOLUTION INTRAMUSCULAR; INTRAVENOUS; PARENTERAL at 07:36

## 2021-12-15 RX ADMIN — Medication 10 ML: at 20:23

## 2021-12-15 RX ADMIN — FUROSEMIDE 20 MG: 10 INJECTION, SOLUTION INTRAMUSCULAR; INTRAVENOUS at 07:35

## 2021-12-15 RX ADMIN — PANTOPRAZOLE SODIUM 40 MG: 40 TABLET, DELAYED RELEASE ORAL at 07:34

## 2021-12-15 RX ADMIN — WARFARIN SODIUM 5 MG: 5 TABLET ORAL at 18:33

## 2021-12-15 RX ADMIN — ZOLPIDEM TARTRATE 5 MG: 5 TABLET ORAL at 21:55

## 2021-12-15 RX ADMIN — IBUPROFEN 600 MG: 600 TABLET ORAL at 00:17

## 2021-12-15 ASSESSMENT — PAIN DESCRIPTION - DESCRIPTORS: DESCRIPTORS: ACHING

## 2021-12-15 ASSESSMENT — PAIN SCALES - GENERAL
PAINLEVEL_OUTOF10: 0
PAINLEVEL_OUTOF10: 3
PAINLEVEL_OUTOF10: 0
PAINLEVEL_OUTOF10: 0
PAINLEVEL_OUTOF10: 5
PAINLEVEL_OUTOF10: 3
PAINLEVEL_OUTOF10: 0
PAINLEVEL_OUTOF10: 2
PAINLEVEL_OUTOF10: 3
PAINLEVEL_OUTOF10: 0

## 2021-12-15 ASSESSMENT — PAIN DESCRIPTION - LOCATION: LOCATION: STERNUM

## 2021-12-15 ASSESSMENT — PAIN DESCRIPTION - PAIN TYPE: TYPE: SURGICAL PAIN

## 2021-12-15 NOTE — PROGRESS NOTES
CC: s/p MVR mech, TV repair, CABG x 4; ICM; hyperkalemia resolved; right pleural effusion from chronic systolic CHF    No c/o  NSR  INR 2.1  K 4.8  CT >100 cc last 2 shifts  CTAB RRR sternum stable  As per CC progressing well  Walking well in the unit  D/C chest tube when < 100 cc / shift x two 8 hour shifts  Continue diuresis  EP evaluation for lifevest pending

## 2021-12-15 NOTE — PROGRESS NOTES
Cardiovascular Progress Note      Chief Complaint:   Postop FU  Impression/Recommendations:    Acute Biventricular Systolic CHF  (LVEF 08%), compensated   MVCAD  Diabetes mellitus   ABLA, stable  TCP, improving  Tobacco     4 V CABG (LIMA-LAD, SVG-RPDA, Sequential SVG-D1-OM), KATHARINE Clip, Mechanical MVR, TrV annuloplasty ring  POD# 5  Warfarin with INR now >2 (goal 3.0)  ASA 81 mg /Statin  Off pressors and oxygen. Unable to tolerate BB/ARB/Aldactone at this time with low blood pressures. Digoxin started. Normal renal function, gentle diuresis in progress. Limited TTE yesterday with well seated Mi and Tr valves, no significant regurgitation,  LVEF 20%. Lifevest upon discharge     Interval History:   Pt. S/E. No events overnight. Resting in chair following breakfast, prior to family visit. Ambulated yesterday without concern. Pain controlled. Today's Wt: 182 lbs (from 184)  Preop: 167 lbs    Chest tube  Sternotomy c/d/i  Alex hose, trace edema     12/14/2021 TTE     Left ventricular cavity size is moderately dilated with normal left   ventricular wall thickness. Overall left ventricular systolic function appears severely reduced. Ejection fraction is visually estimated to be 20%. There is severe diffuse hypokinesis. The right ventricle is mildly enlarged. Right ventricular systolic function   is severely reduced. There is a 29 mm Medtronic mechanical valve well seated with a peak velocity   of 1.9 m/s and a mean gradient of 8 mmHg. No paravalvular leak noted. There is a 28 mm Physio tricuspid annuloplasty ring well seated with a peak   velocity of 2.7 m/s and a mean gradient of 4 mmHg. Mild tricuspid   regurgitation with a PASP of 31 mmHg.        Medications:  insulin lispro (1 Unit Dial) 0-6 Units, TID WC  insulin lispro (1 Unit Dial) 0-3 Units, Nightly  glucose (GLUTOSE) 40 % oral gel 15 g, PRN  dextrose 50 % IV solution, PRN  glucagon (rDNA) injection 1 mg, PRN  dextrose 5 % solution, PRN  insulin lispro (1 Unit Dial) 0-6 Units, Once  ibuprofen (ADVIL;MOTRIN) tablet 600 mg, Q8H  digoxin (LANOXIN) injection 125 mcg, Daily  spironolactone (ALDACTONE) tablet 12.5 mg, Daily  warfarin (COUMADIN) tablet 5 mg, Daily  aspirin chewable tablet 81 mg, Daily  sodium chloride flush 0.9 % injection 10 mL, 2 times per day  sodium chloride flush 0.9 % injection 10 mL, PRN  0.9 % sodium chloride infusion, PRN  ondansetron (ZOFRAN) injection 4 mg, Q6H PRN  metoclopramide (REGLAN) injection 10 mg, Q6H PRN  acetaminophen (TYLENOL) tablet 1,000 mg, Q6H  morphine (PF) injection 2 mg, Q2H PRN  furosemide (LASIX) injection 20 mg, BID  [Held by provider] potassium chloride (KLOR-CON) extended release tablet 10 mEq, TID WC  diphenhydrAMINE (BENADRYL) tablet 25 mg, Nightly PRN  zolpidem (AMBIEN) tablet 5 mg, Nightly PRN  sennosides-docusate sodium (SENOKOT-S) 8.6-50 MG tablet 1 tablet, BID  magnesium hydroxide (MILK OF MAGNESIA) 400 MG/5ML suspension 30 mL, Daily PRN  polyethylene glycol (GLYCOLAX) packet 17 g, Daily PRN  pantoprazole (PROTONIX) tablet 40 mg, Daily  [Held by provider] potassium chloride 20 mEq/50 mL IVPB (Central Line), PRN  magnesium sulfate 2000 mg in 50 mL IVPB premix, PRN  calcium chloride 1,000 mg in sodium chloride 0.9 % 100 mL IVPB, PRN  calcium chloride 2,000 mg in sodium chloride 0.9 % 100 mL IVPB, PRN  albuterol sulfate  (90 Base) MCG/ACT inhaler 2 puff, Q6H PRN  lactated ringers bolus, Continuous PRN  furosemide (LASIX) injection 20 mg, PRN  traMADol (ULTRAM) tablet 50 mg, Q6H PRN   Or  traMADol (ULTRAM) tablet 100 mg, Q6H PRN  [Held by provider] carvedilol (COREG) tablet 3.125 mg, BID  [Held by provider] valsartan (DIOVAN) tablet 80 mg, Lunch  atorvastatin (LIPITOR) tablet 40 mg, Nightly        I/O:     Intake/Output Summary (Last 24 hours) at 12/15/2021 1113  Last data filed at 12/15/2021 1000  Gross per 24 hour   Intake 720 ml   Output 1850 ml   Net -1130 ml       Physical Exam:    BP 97/63 Pulse 99   Temp 97.7 °F (36.5 °C) (Temporal)   Resp 17   Ht 6' 1\" (1.854 m)   Wt 182 lb 15.7 oz (83 kg)   SpO2 98%   BMI 24.14 kg/m²   Wt Readings from Last 3 Encounters:   12/15/21 182 lb 15.7 oz (83 kg)       GENERAL: Well developed, well nourished, no acute distress  NEUROLOGICAL: Alert and oriented x3  PSYCH: Normal mood and affect   SKIN: Warm and dry, without lesions  HEENT: Normocephalic, atraumatic, Sclera non-icteric, mucous membranes moist  NECK: supple, JVP normal, thyroid not enlarged   CAROTID: Normal upstroke, no bruits  CARDIAC: Normal PMI, regular rate and rhythm, normal S1S2, no murmur, rub  RESPIRATORY: Normal respiratory effort, clear to auscultation bilaterally  EXTREMITIES: No cyanosis, clubbing or edema, palpable pulses bilaterally   MUSCULOSKELETAL: No joint swelling or tenderness, no chest wall tenderness  GASTROINTESTINAL:  soft, non-tender, no bruit    Data Review:    CBC:   Recent Labs     12/13/21 0422 12/14/21  0619   WBC 11.6* 11.5*   HGB 8.8* 8.7*   HCT 26.6* 26.3*   MCV 90.3 91.0   PLT 86* 103*     BMP:   Recent Labs     12/13/21  0422 12/14/21  1220 12/15/21  0550    132*  --    K 4.5 5.5* 4.8    98*  --    CO2 22 23  --    BUN 40* 44*  --    CREATININE 1.1 1.2  --    GFRAA >60 >60  --    MG 2.60*  --   --         Recent Labs     12/13/21  0422 12/14/21  0619 12/15/21  0550   PROTIME 14.4* 18.5* 24.8*   INR 1.26* 1.61* 2.13*     APTT:   Recent Labs     12/13/21  1130 12/13/21  1903 12/14/21  0232   APTT 100.0* 53.3* 127.3*     Levi Junior DO, [facility] [place]  Interventional Cardiology     o: [phone]  [employer] [address], [address]      NOTE:  This report was transcribed using voice recognition software. Every effort was made to ensure accuracy; however, inadvertent computerized transcription errors may be present.

## 2021-12-16 LAB
GLUCOSE BLD-MCNC: 152 MG/DL (ref 70–99)
GLUCOSE BLD-MCNC: 182 MG/DL (ref 70–99)
GLUCOSE BLD-MCNC: 197 MG/DL (ref 70–99)
GLUCOSE BLD-MCNC: 219 MG/DL (ref 70–99)
INR BLD: 2.19 (ref 0.88–1.12)
PERFORMED ON: ABNORMAL
PROTHROMBIN TIME: 25.6 SEC (ref 9.9–12.7)

## 2021-12-16 PROCEDURE — 6370000000 HC RX 637 (ALT 250 FOR IP): Performed by: THORACIC SURGERY (CARDIOTHORACIC VASCULAR SURGERY)

## 2021-12-16 PROCEDURE — 94760 N-INVAS EAR/PLS OXIMETRY 1: CPT

## 2021-12-16 PROCEDURE — 2140000000 HC CCU INTERMEDIATE R&B

## 2021-12-16 PROCEDURE — 99024 POSTOP FOLLOW-UP VISIT: CPT | Performed by: THORACIC SURGERY (CARDIOTHORACIC VASCULAR SURGERY)

## 2021-12-16 PROCEDURE — 6360000002 HC RX W HCPCS: Performed by: NURSE PRACTITIONER

## 2021-12-16 PROCEDURE — 2580000003 HC RX 258: Performed by: THORACIC SURGERY (CARDIOTHORACIC VASCULAR SURGERY)

## 2021-12-16 PROCEDURE — 6370000000 HC RX 637 (ALT 250 FOR IP): Performed by: NURSE PRACTITIONER

## 2021-12-16 PROCEDURE — 99232 SBSQ HOSP IP/OBS MODERATE 35: CPT | Performed by: INTERNAL MEDICINE

## 2021-12-16 PROCEDURE — 85610 PROTHROMBIN TIME: CPT

## 2021-12-16 PROCEDURE — 6360000002 HC RX W HCPCS: Performed by: THORACIC SURGERY (CARDIOTHORACIC VASCULAR SURGERY)

## 2021-12-16 RX ORDER — DIGOXIN 125 MCG
125 TABLET ORAL DAILY
Status: DISCONTINUED | OUTPATIENT
Start: 2021-12-17 | End: 2021-12-19 | Stop reason: HOSPADM

## 2021-12-16 RX ADMIN — IBUPROFEN 600 MG: 600 TABLET ORAL at 00:02

## 2021-12-16 RX ADMIN — ACETAMINOPHEN 1000 MG: 500 TABLET ORAL at 10:45

## 2021-12-16 RX ADMIN — ATORVASTATIN CALCIUM 40 MG: 40 TABLET, FILM COATED ORAL at 21:38

## 2021-12-16 RX ADMIN — ZOLPIDEM TARTRATE 5 MG: 5 TABLET ORAL at 22:44

## 2021-12-16 RX ADMIN — SPIRONOLACTONE 12.5 MG: 25 TABLET ORAL at 08:42

## 2021-12-16 RX ADMIN — FUROSEMIDE 20 MG: 10 INJECTION, SOLUTION INTRAMUSCULAR; INTRAVENOUS at 17:54

## 2021-12-16 RX ADMIN — FUROSEMIDE 20 MG: 10 INJECTION, SOLUTION INTRAMUSCULAR; INTRAVENOUS at 08:41

## 2021-12-16 RX ADMIN — ACETAMINOPHEN 1000 MG: 500 TABLET ORAL at 04:13

## 2021-12-16 RX ADMIN — IBUPROFEN 600 MG: 600 TABLET ORAL at 14:46

## 2021-12-16 RX ADMIN — INSULIN LISPRO 1 UNITS: 100 INJECTION, SOLUTION INTRAVENOUS; SUBCUTANEOUS at 20:59

## 2021-12-16 RX ADMIN — TRAMADOL HYDROCHLORIDE 50 MG: 50 TABLET, FILM COATED ORAL at 10:57

## 2021-12-16 RX ADMIN — IBUPROFEN 600 MG: 600 TABLET ORAL at 22:44

## 2021-12-16 RX ADMIN — ACETAMINOPHEN 1000 MG: 500 TABLET ORAL at 16:00

## 2021-12-16 RX ADMIN — IBUPROFEN 600 MG: 600 TABLET ORAL at 06:35

## 2021-12-16 RX ADMIN — ACETAMINOPHEN 1000 MG: 500 TABLET ORAL at 21:38

## 2021-12-16 RX ADMIN — ASPIRIN 81 MG 81 MG: 81 TABLET ORAL at 08:41

## 2021-12-16 RX ADMIN — Medication 10 ML: at 08:44

## 2021-12-16 RX ADMIN — Medication 10 ML: at 21:41

## 2021-12-16 RX ADMIN — WARFARIN SODIUM 6 MG: 5 TABLET ORAL at 17:57

## 2021-12-16 RX ADMIN — INSULIN LISPRO 2 UNITS: 100 INJECTION, SOLUTION INTRAVENOUS; SUBCUTANEOUS at 12:00

## 2021-12-16 RX ADMIN — TRAMADOL HYDROCHLORIDE 100 MG: 50 TABLET, FILM COATED ORAL at 14:39

## 2021-12-16 RX ADMIN — DIGOXIN 125 MCG: 250 INJECTION, SOLUTION INTRAMUSCULAR; INTRAVENOUS; PARENTERAL at 10:45

## 2021-12-16 RX ADMIN — SENNOSIDES AND DOCUSATE SODIUM 1 TABLET: 50; 8.6 TABLET ORAL at 08:42

## 2021-12-16 RX ADMIN — INSULIN LISPRO 1 UNITS: 100 INJECTION, SOLUTION INTRAVENOUS; SUBCUTANEOUS at 17:53

## 2021-12-16 RX ADMIN — PANTOPRAZOLE SODIUM 40 MG: 40 TABLET, DELAYED RELEASE ORAL at 08:41

## 2021-12-16 ASSESSMENT — PAIN SCALES - GENERAL
PAINLEVEL_OUTOF10: 0
PAINLEVEL_OUTOF10: 6
PAINLEVEL_OUTOF10: 2
PAINLEVEL_OUTOF10: 4
PAINLEVEL_OUTOF10: 4
PAINLEVEL_OUTOF10: 5
PAINLEVEL_OUTOF10: 4
PAINLEVEL_OUTOF10: 4
PAINLEVEL_OUTOF10: 6
PAINLEVEL_OUTOF10: 4
PAINLEVEL_OUTOF10: 2
PAINLEVEL_OUTOF10: 4
PAINLEVEL_OUTOF10: 5

## 2021-12-16 ASSESSMENT — PAIN DESCRIPTION - PAIN TYPE: TYPE: SURGICAL PAIN

## 2021-12-16 ASSESSMENT — PAIN DESCRIPTION - LOCATION: LOCATION: STERNUM

## 2021-12-16 NOTE — PROGRESS NOTES
12/16/21 0101   RT Protocol   History Pulmonary Disease 1   Respiratory pattern 0   Breath sounds 2   Cough 0   Indications for Bronchodilator Therapy Decreased or absent breath sounds   Bronchodilator Assessment Score 3

## 2021-12-16 NOTE — RT PROTOCOL NOTE
RT Inhaler-Nebulizer Bronchodilator Protocol Note    There is a bronchodilator order in the chart from a provider indicating to follow the RT Bronchodilator Protocol and there is an Initiate RT Inhaler-Nebulizer Bronchodilator Protocol order as well (see protocol at bottom of note). CXR Findings:  No results found. The findings from the last RT Protocol Assessment were as follows:   History Pulmonary Disease: Smoker 15 pack years or more  Respiratory Pattern: Regular pattern and RR 12-20 bpm  Breath Sounds: Slightly diminished and/or crackles  Cough: Strong, spontaneous, non-productive  Indication for Bronchodilator Therapy: Decreased or absent breath sounds  Bronchodilator Assessment Score: 3    Aerosolized bronchodilator medication orders have been revised according to the RT Inhaler-Nebulizer Bronchodilator Protocol below. Respiratory Therapist to perform RT Therapy Protocol Assessment initially then follow the protocol. Repeat RT Therapy Protocol Assessment PRN for score 0-3 or on second treatment, BID, and PRN for scores above 3. No Indications - adjust the frequency to every 6 hours PRN wheezing or bronchospasm, if no treatments needed after 48 hours then discontinue using Per Protocol order mode. If indication present, adjust the RT bronchodilator orders based on the Bronchodilator Assessment Score as indicated below. Use Inhaler orders unless patient has one or more of the following: on home nebulizer, not able to hold breath for 10 seconds, is not alert and oriented, cannot activate and use MDI correctly, or respiratory rate 25 breaths per minute or more, then use the equivalent nebulizer order(s) with same Frequency and PRN reasons based on the score. If a patient is on this medication at home then do not decrease Frequency below that used at home.     0-3 - enter or revise RT bronchodilator order(s) to equivalent RT Bronchodilator order with Frequency of every 4 hours PRN for wheezing or increased work of breathing using Per Protocol order mode. 4-6 - enter or revise RT Bronchodilator order(s) to two equivalent RT bronchodilator orders with one order with BID Frequency and one order with Frequency of every 4 hours PRN wheezing or increased work of breathing using Per Protocol order mode. 7-10 - enter or revise RT Bronchodilator order(s) to two equivalent RT bronchodilator orders with one order with TID Frequency and one order with Frequency of every 4 hours PRN wheezing or increased work of breathing using Per Protocol order mode. 11-13 - enter or revise RT Bronchodilator order(s) to one equivalent RT bronchodilator order with QID Frequency and an Albuterol order with Frequency of every 4 hours PRN wheezing or increased work of breathing using Per Protocol order mode. Greater than 13 - enter or revise RT Bronchodilator order(s) to one equivalent RT bronchodilator order with every 4 hours Frequency and an Albuterol order with Frequency of every 2 hours PRN wheezing or increased work of breathing using Per Protocol order mode. RT to enter RT Home Evaluation for COPD & MDI Assessment order using Per Protocol order mode.     Electronically signed by Yolanda Gamez RCP on 12/16/2021 at 1:02 AM

## 2021-12-16 NOTE — PROGRESS NOTES
CC: s/p mech MVR / TV repair / CABG x 4; ICM with EF 20%    No c/o.   Walking a lot in halls  NSR  INR 2.19  CT 190cc last night  Weight down 3 kg but still 4 kg above base weight  CTAB RRR sternum stable  As per CC  Increase coumadin to 6 mg PO daily  Continue diuresis and monitor chest tube output -- D/C when < 100cc / 8hours x 2  For lifevest as soon as can be fitted

## 2021-12-16 NOTE — PROGRESS NOTES
Cardiovascular Progress Note      Chief Complaint:   Postop FU  Impression/Recommendations:    Acute Biventricular Systolic CHF  (LVEF 82%), compensated   MVCAD  Diabetes mellitus   ABLA, stable  TCP, improving  Tobacco     4 V CABG (LIMA-LAD, SVG-RPDA, Sequential SVG-D1-OM), KATHARINE Clip, Mechanical MVR, TrV annuloplasty ring 12/10/2021  POD# 6  Warfarin INR goal 3.0  ASA 81 mg /Statin  Eventual BB/ARB/Aldactone following additional diuresis (low blood pressures). Digoxin started. Limited TTE yesterday with well seated Mi and Tr valves, no significant regurgitation,  LVEF 20%. Lifevest upon discharge     Interval History:   Pt. S/E. No events overnight. Resting in chair. No exertional dyspnea/lightheadedness. Today's Wt: 177 lbs (from 182)  Preop: 167 lbs    Chest tube  Sternotomy c/d/i  Alex hose, trace edema     12/14/2021 TTE   Left ventricular cavity size is moderately dilated with normal left   ventricular wall thickness. Overall left ventricular systolic function appears severely reduced. Ejection fraction is visually estimated to be 20%. There is severe diffuse hypokinesis. The right ventricle is mildly enlarged. Right ventricular systolic function   is severely reduced. There is a 29 mm Medtronic mechanical valve well seated with a peak velocity   of 1.9 m/s and a mean gradient of 8 mmHg. No paravalvular leak noted. There is a 28 mm Physio tricuspid annuloplasty ring well seated with a peak   velocity of 2.7 m/s and a mean gradient of 4 mmHg. Mild tricuspid   regurgitation with a PASP of 31 mmHg.        Medications:  warfarin (COUMADIN) tablet 6 mg, Daily  insulin lispro (1 Unit Dial) 0-6 Units, TID WC  insulin lispro (1 Unit Dial) 0-3 Units, Nightly  glucose (GLUTOSE) 40 % oral gel 15 g, PRN  dextrose 50 % IV solution, PRN  glucagon (rDNA) injection 1 mg, PRN  dextrose 5 % solution, PRN  insulin lispro (1 Unit Dial) 0-6 Units, Once  ibuprofen (ADVIL;MOTRIN) tablet 600 mg, Q8H  digoxin (LANOXIN) injection 125 mcg, Daily  spironolactone (ALDACTONE) tablet 12.5 mg, Daily  aspirin chewable tablet 81 mg, Daily  sodium chloride flush 0.9 % injection 10 mL, 2 times per day  sodium chloride flush 0.9 % injection 10 mL, PRN  0.9 % sodium chloride infusion, PRN  ondansetron (ZOFRAN) injection 4 mg, Q6H PRN  metoclopramide (REGLAN) injection 10 mg, Q6H PRN  acetaminophen (TYLENOL) tablet 1,000 mg, Q6H  morphine (PF) injection 2 mg, Q2H PRN  furosemide (LASIX) injection 20 mg, BID  [Held by provider] potassium chloride (KLOR-CON) extended release tablet 10 mEq, TID WC  diphenhydrAMINE (BENADRYL) tablet 25 mg, Nightly PRN  zolpidem (AMBIEN) tablet 5 mg, Nightly PRN  sennosides-docusate sodium (SENOKOT-S) 8.6-50 MG tablet 1 tablet, BID  magnesium hydroxide (MILK OF MAGNESIA) 400 MG/5ML suspension 30 mL, Daily PRN  polyethylene glycol (GLYCOLAX) packet 17 g, Daily PRN  pantoprazole (PROTONIX) tablet 40 mg, Daily  [Held by provider] potassium chloride 20 mEq/50 mL IVPB (Central Line), PRN  magnesium sulfate 2000 mg in 50 mL IVPB premix, PRN  calcium chloride 1,000 mg in sodium chloride 0.9 % 100 mL IVPB, PRN  calcium chloride 2,000 mg in sodium chloride 0.9 % 100 mL IVPB, PRN  albuterol sulfate  (90 Base) MCG/ACT inhaler 2 puff, Q6H PRN  furosemide (LASIX) injection 20 mg, PRN  traMADol (ULTRAM) tablet 50 mg, Q6H PRN   Or  traMADol (ULTRAM) tablet 100 mg, Q6H PRN  [Held by provider] carvedilol (COREG) tablet 3.125 mg, BID  [Held by provider] valsartan (DIOVAN) tablet 80 mg, Lunch  atorvastatin (LIPITOR) tablet 40 mg, Nightly        I/O:     Intake/Output Summary (Last 24 hours) at 12/16/2021 1138  Last data filed at 12/16/2021 0800  Gross per 24 hour   Intake 240 ml   Output 1630 ml   Net -1390 ml       Physical Exam:    /86   Pulse 105   Temp 97 °F (36.1 °C) (Temporal)   Resp 16   Ht 6' 1\" (1.854 m)   Wt 177 lb 4 oz (80.4 kg)   SpO2 99%   BMI 23.39 kg/m²   Wt Readings from Last 3 Encounters: 12/16/21 177 lb 4 oz (80.4 kg)       GENERAL: Well developed, well nourished, no acute distress  NEUROLOGICAL: Alert and oriented x3  PSYCH: Normal mood and affect   SKIN: Warm and dry, without lesions  HEENT: Normocephalic, atraumatic, Sclera non-icteric, mucous membranes moist  NECK: supple, JVP normal, thyroid not enlarged   CAROTID: Normal upstroke, no bruits  CARDIAC: Normal PMI, regular rate and rhythm, normal S1S2, no murmur, rub  RESPIRATORY: Normal respiratory effort, clear to auscultation bilaterally  EXTREMITIES: No cyanosis, clubbing or edema, palpable pulses bilaterally   MUSCULOSKELETAL: No joint swelling or tenderness, no chest wall tenderness  GASTROINTESTINAL:  soft, non-tender, no bruit    Data Review:    CBC:   Recent Labs     12/14/21  0619   WBC 11.5*   HGB 8.7*   HCT 26.3*   MCV 91.0   *     BMP:   Recent Labs     12/14/21  1220 12/15/21  0550   *  --    K 5.5* 4.8   CL 98*  --    CO2 23  --    BUN 44*  --    CREATININE 1.2  --    GFRAA >60  --         Recent Labs     12/14/21  0619 12/15/21  0550 12/16/21  0436   PROTIME 18.5* 24.8* 25.6*   INR 1.61* 2.13* 2.19*     APTT:   Recent Labs     12/13/21  1903 12/14/21  0232   APTT 53.3* 127.3*     Nu Butler DO, [facility]  Interventional Cardiology     o: [phone]  [address] [facility], [address] [facility]      NOTE:  This report was transcribed using voice recognition software. Every effort was made to ensure accuracy; however, inadvertent computerized transcription errors may be present.

## 2021-12-17 LAB
GLUCOSE BLD-MCNC: 162 MG/DL (ref 70–99)
GLUCOSE BLD-MCNC: 173 MG/DL (ref 70–99)
GLUCOSE BLD-MCNC: 192 MG/DL (ref 70–99)
GLUCOSE BLD-MCNC: 263 MG/DL (ref 70–99)
INR BLD: 2.4 (ref 0.88–1.12)
PERFORMED ON: ABNORMAL
PROTHROMBIN TIME: 28.1 SEC (ref 9.9–12.7)

## 2021-12-17 PROCEDURE — 6360000002 HC RX W HCPCS: Performed by: THORACIC SURGERY (CARDIOTHORACIC VASCULAR SURGERY)

## 2021-12-17 PROCEDURE — 6370000000 HC RX 637 (ALT 250 FOR IP): Performed by: NURSE PRACTITIONER

## 2021-12-17 PROCEDURE — 6370000000 HC RX 637 (ALT 250 FOR IP): Performed by: THORACIC SURGERY (CARDIOTHORACIC VASCULAR SURGERY)

## 2021-12-17 PROCEDURE — 99232 SBSQ HOSP IP/OBS MODERATE 35: CPT | Performed by: INTERNAL MEDICINE

## 2021-12-17 PROCEDURE — 99233 SBSQ HOSP IP/OBS HIGH 50: CPT | Performed by: INTERNAL MEDICINE

## 2021-12-17 PROCEDURE — 85610 PROTHROMBIN TIME: CPT

## 2021-12-17 PROCEDURE — 2140000000 HC CCU INTERMEDIATE R&B

## 2021-12-17 PROCEDURE — 99024 POSTOP FOLLOW-UP VISIT: CPT | Performed by: THORACIC SURGERY (CARDIOTHORACIC VASCULAR SURGERY)

## 2021-12-17 PROCEDURE — 2580000003 HC RX 258: Performed by: THORACIC SURGERY (CARDIOTHORACIC VASCULAR SURGERY)

## 2021-12-17 RX ORDER — METHYLPREDNISOLONE 4 MG/1
4 TABLET ORAL
Status: DISCONTINUED | OUTPATIENT
Start: 2021-12-18 | End: 2021-12-19 | Stop reason: HOSPADM

## 2021-12-17 RX ORDER — METHYLPREDNISOLONE 4 MG/1
24 TABLET ORAL ONCE
Status: DISCONTINUED | OUTPATIENT
Start: 2021-12-17 | End: 2021-12-17 | Stop reason: SDUPTHER

## 2021-12-17 RX ORDER — METHYLPREDNISOLONE 4 MG/1
4 TABLET ORAL NIGHTLY
Status: DISCONTINUED | OUTPATIENT
Start: 2021-12-19 | End: 2021-12-19 | Stop reason: HOSPADM

## 2021-12-17 RX ORDER — METHYLPREDNISOLONE 4 MG/1
24 TABLET ORAL ONCE
Status: COMPLETED | OUTPATIENT
Start: 2021-12-17 | End: 2021-12-17

## 2021-12-17 RX ORDER — METHYLPREDNISOLONE 4 MG/1
8 TABLET ORAL NIGHTLY
Status: COMPLETED | OUTPATIENT
Start: 2021-12-18 | End: 2021-12-18

## 2021-12-17 RX ORDER — IBUPROFEN 600 MG/1
600 TABLET ORAL EVERY 8 HOURS PRN
Qty: 120 TABLET | Refills: 3 | Status: CANCELLED | OUTPATIENT
Start: 2021-12-17

## 2021-12-17 RX ORDER — FUROSEMIDE 20 MG/1
40 TABLET ORAL DAILY
Qty: 30 TABLET | Refills: 3 | Status: CANCELLED | OUTPATIENT
Start: 2021-12-17

## 2021-12-17 RX ADMIN — INSULIN LISPRO 3 UNITS: 100 INJECTION, SOLUTION INTRAVENOUS; SUBCUTANEOUS at 12:30

## 2021-12-17 RX ADMIN — DIGOXIN 125 MCG: 125 TABLET ORAL at 08:39

## 2021-12-17 RX ADMIN — INSULIN LISPRO 1 UNITS: 100 INJECTION, SOLUTION INTRAVENOUS; SUBCUTANEOUS at 17:02

## 2021-12-17 RX ADMIN — ACETAMINOPHEN 1000 MG: 500 TABLET ORAL at 21:13

## 2021-12-17 RX ADMIN — ATORVASTATIN CALCIUM 40 MG: 40 TABLET, FILM COATED ORAL at 21:14

## 2021-12-17 RX ADMIN — ASPIRIN 81 MG 81 MG: 81 TABLET ORAL at 08:22

## 2021-12-17 RX ADMIN — SPIRONOLACTONE 12.5 MG: 25 TABLET ORAL at 08:30

## 2021-12-17 RX ADMIN — Medication 10 ML: at 21:23

## 2021-12-17 RX ADMIN — FUROSEMIDE 20 MG: 10 INJECTION, SOLUTION INTRAMUSCULAR; INTRAVENOUS at 08:22

## 2021-12-17 RX ADMIN — Medication 10 ML: at 08:40

## 2021-12-17 RX ADMIN — SENNOSIDES AND DOCUSATE SODIUM 1 TABLET: 50; 8.6 TABLET ORAL at 21:14

## 2021-12-17 RX ADMIN — INSULIN LISPRO 1 UNITS: 100 INJECTION, SOLUTION INTRAVENOUS; SUBCUTANEOUS at 21:20

## 2021-12-17 RX ADMIN — ZOLPIDEM TARTRATE 5 MG: 5 TABLET ORAL at 21:13

## 2021-12-17 RX ADMIN — ACETAMINOPHEN 1000 MG: 500 TABLET ORAL at 05:37

## 2021-12-17 RX ADMIN — FUROSEMIDE 20 MG: 10 INJECTION, SOLUTION INTRAMUSCULAR; INTRAVENOUS at 17:14

## 2021-12-17 RX ADMIN — ACETAMINOPHEN 1000 MG: 500 TABLET ORAL at 16:58

## 2021-12-17 RX ADMIN — WARFARIN SODIUM 6 MG: 5 TABLET ORAL at 17:28

## 2021-12-17 RX ADMIN — METHYLPREDNISOLONE 24 MG: 4 TABLET ORAL at 08:38

## 2021-12-17 RX ADMIN — ACETAMINOPHEN 1000 MG: 500 TABLET ORAL at 10:00

## 2021-12-17 RX ADMIN — INSULIN LISPRO 1 UNITS: 100 INJECTION, SOLUTION INTRAVENOUS; SUBCUTANEOUS at 08:21

## 2021-12-17 RX ADMIN — PANTOPRAZOLE SODIUM 40 MG: 40 TABLET, DELAYED RELEASE ORAL at 08:30

## 2021-12-17 RX ADMIN — IBUPROFEN 600 MG: 600 TABLET ORAL at 16:00

## 2021-12-17 RX ADMIN — TRAMADOL HYDROCHLORIDE 50 MG: 50 TABLET, FILM COATED ORAL at 18:05

## 2021-12-17 RX ADMIN — SENNOSIDES AND DOCUSATE SODIUM 1 TABLET: 50; 8.6 TABLET ORAL at 08:30

## 2021-12-17 ASSESSMENT — PAIN SCALES - GENERAL
PAINLEVEL_OUTOF10: 4
PAINLEVEL_OUTOF10: 6
PAINLEVEL_OUTOF10: 5
PAINLEVEL_OUTOF10: 0
PAINLEVEL_OUTOF10: 6
PAINLEVEL_OUTOF10: 2

## 2021-12-17 NOTE — CARE COORDINATION
CM notified that patient's insurance has denied Life Vest. Per [employer] rep, Dr. Rae Ho is to do a peer to peer today. CM spoke with patient, he doubts he will qualify for financial assistance. He was advised of the monthly cost if he does not get any financial assistance ($2945/ month) which he does not feel he can afford. He continues to defer all decisions to family members who are not present in the room and will not provide phone numbers for (brother Ozzie Woods)    [employer] representatives will attempt to call patient on his cell phone to complete financial assistance. CM will reach out to family members when they arrive on unit. ADDENDUM: CM met with patient, his brother Ozzie Woods and sister Reymundo Lee. CM reinforced to patient and family members that [employer] needs patient to contact them to give financial information to see if he qualifies for financial assistance. Agency needs to be contacted by 5 PM today for financial assistance. Amy/ [employer] rep is available all weekend and can fit patient for the vest in the event of financial clearance or a successful peer to peer. Sobeida Workman can be reached at [phone]. Patient states he will be returning to his home address with multiple family members staying with him at different times. It would be beneficial for Home care to contact patient's sister Reymundo Lee to coordinate visits. Her cell is [phone].     GERALD AndersonN, CCM, RN  [facility]  [phone]

## 2021-12-17 NOTE — PROGRESS NOTES
CC: s/p mech MVR / TV repair / CABG x 4; ICM with EF 20%    No c/o.   Walking in halls  NSR  INR 2.4CT 180cc last night  Weight down 1 kg but still 3 kg above base weight  CTAB RRR sternum stable  As per CC  Will add steroid pulse to try to slow pleural drainage  Continue diuresis and monitor chest tube output -- D/C when < 100cc / 8hours x 2  For lifevest as soon as can be fitted

## 2021-12-17 NOTE — PROGRESS NOTES
Cardiovascular Progress Note      Chief Complaint:   Postop FU  Impression/Recommendations:    Acute Biventricular Systolic CHF  (LVEF 10%), compensated   MVCAD  NSVT  Diabetes mellitus   ABLA, stable  TCP, improving  Tobacco     4 V CABG (LIMA-LAD, SVG-RPDA, Sequential SVG-D1-OM), KATHARINE Clip, Mechanical MVR, TrV annuloplasty ring 12/10/2021  POD# 7  Warfarin INR goal 3.0  ASA 81 mg /Statin  Eventual BB/ARB following additional diuresis (low blood pressures). Tolerating Aldactone/Digoxin  Limited TTE yesterday with well seated Mi and Tr valves, no significant regurgitation,  LVEF 20%. Lifevest recommended upon discharge. Discussed with company representative and will complete peer to peer for [employer] insurance denial.     Interval History:   Pt. S/E. No oxygen requirements. Steroids started. Awaiting chest tube removal. Ambulating without issue. Today's Wt: 175 lbs (from 177)  Preop: 167 lbs    Chest tube  Sternotomy c/d/i  Alex hose, trace edema     12/14/2021 TTE   Left ventricular cavity size is moderately dilated with normal left   ventricular wall thickness. Overall left ventricular systolic function appears severely reduced. Ejection fraction is visually estimated to be 20%. There is severe diffuse hypokinesis. The right ventricle is mildly enlarged. Right ventricular systolic function   is severely reduced. There is a 29 mm Medtronic mechanical valve well seated with a peak velocity   of 1.9 m/s and a mean gradient of 8 mmHg. No paravalvular leak noted. There is a 28 mm Physio tricuspid annuloplasty ring well seated with a peak   velocity of 2.7 m/s and a mean gradient of 4 mmHg. Mild tricuspid   regurgitation with a PASP of 31 mmHg.        Medications:  [START ON 12/18/2021] methylPREDNISolone (MEDROL DOSEPACK) tablet 4 mg, QAM AC  [START ON 12/18/2021] methylPREDNISolone (MEDROL DOSEPACK) tablet 4 mg, Lunch  [START ON 12/18/2021] methylPREDNISolone (MEDROL DOSEPACK) tablet 4 mg, Dinner  [START ON 12/18/2021] methylPREDNISolone (MEDROL DOSEPACK) tablet 8 mg, Nightly  [START ON 12/19/2021] methylPREDNISolone (MEDROL DOSEPACK) tablet 4 mg, Nightly  methylPREDNISolone (MEDROL DOSEPACK) tablet 24 mg, Once  warfarin (COUMADIN) tablet 6 mg, Daily  digoxin (LANOXIN) tablet 125 mcg, Daily  insulin lispro (1 Unit Dial) 0-6 Units, TID WC  insulin lispro (1 Unit Dial) 0-3 Units, Nightly  glucose (GLUTOSE) 40 % oral gel 15 g, PRN  dextrose 50 % IV solution, PRN  glucagon (rDNA) injection 1 mg, PRN  dextrose 5 % solution, PRN  insulin lispro (1 Unit Dial) 0-6 Units, Once  ibuprofen (ADVIL;MOTRIN) tablet 600 mg, Q8H  spironolactone (ALDACTONE) tablet 12.5 mg, Daily  aspirin chewable tablet 81 mg, Daily  sodium chloride flush 0.9 % injection 10 mL, 2 times per day  sodium chloride flush 0.9 % injection 10 mL, PRN  0.9 % sodium chloride infusion, PRN  ondansetron (ZOFRAN) injection 4 mg, Q6H PRN  metoclopramide (REGLAN) injection 10 mg, Q6H PRN  acetaminophen (TYLENOL) tablet 1,000 mg, Q6H  morphine (PF) injection 2 mg, Q2H PRN  furosemide (LASIX) injection 20 mg, BID  [Held by provider] potassium chloride (KLOR-CON) extended release tablet 10 mEq, TID WC  diphenhydrAMINE (BENADRYL) tablet 25 mg, Nightly PRN  zolpidem (AMBIEN) tablet 5 mg, Nightly PRN  sennosides-docusate sodium (SENOKOT-S) 8.6-50 MG tablet 1 tablet, BID  magnesium hydroxide (MILK OF MAGNESIA) 400 MG/5ML suspension 30 mL, Daily PRN  polyethylene glycol (GLYCOLAX) packet 17 g, Daily PRN  pantoprazole (PROTONIX) tablet 40 mg, Daily  [Held by provider] potassium chloride 20 mEq/50 mL IVPB (Central Line), PRN  magnesium sulfate 2000 mg in 50 mL IVPB premix, PRN  calcium chloride 1,000 mg in sodium chloride 0.9 % 100 mL IVPB, PRN  calcium chloride 2,000 mg in sodium chloride 0.9 % 100 mL IVPB, PRN  albuterol sulfate  (90 Base) MCG/ACT inhaler 2 puff, Q6H PRN  furosemide (LASIX) injection 20 mg, PRN  traMADol (ULTRAM) tablet 50 mg, Q6H PRN   Or  traMADol (ULTRAM) tablet 100 mg, Q6H PRN  [Held by provider] carvedilol (COREG) tablet 3.125 mg, BID  [Held by provider] valsartan (DIOVAN) tablet 80 mg, Lunch  atorvastatin (LIPITOR) tablet 40 mg, Nightly        I/O:     Intake/Output Summary (Last 24 hours) at 12/17/2021 0813  Last data filed at 12/17/2021 0556  Gross per 24 hour   Intake 720 ml   Output 1450 ml   Net -730 ml       Physical Exam:    /69   Pulse 105   Temp 97.8 °F (36.6 °C) (Temporal)   Resp 16   Ht 6' 1\" (1.854 m)   Wt 175 lb 14.8 oz (79.8 kg)   SpO2 96%   BMI 23.21 kg/m²   Wt Readings from Last 3 Encounters:   12/17/21 175 lb 14.8 oz (79.8 kg)       GENERAL: Well developed, well nourished, no acute distress  NEUROLOGICAL: Alert and oriented x3  PSYCH: Normal mood and affect   SKIN: Warm and dry, without lesions  HEENT: Normocephalic, atraumatic, Sclera non-icteric, mucous membranes moist  NECK: supple, JVP normal, thyroid not enlarged   CAROTID: Normal upstroke, no bruits  CARDIAC: Normal PMI, regular rate and rhythm, normal S1S2, no murmur, rub  RESPIRATORY: Normal respiratory effort, clear to auscultation bilaterally  EXTREMITIES: No cyanosis, clubbing or edema, palpable pulses bilaterally   MUSCULOSKELETAL: No joint swelling or tenderness, no chest wall tenderness  GASTROINTESTINAL:  soft, non-tender, no bruit    Data Review:    Recent Labs     12/14/21  1220 12/15/21  0550   *  --    K 5.5* 4.8   CL 98*  --    CO2 23  --    BUN 44*  --    CREATININE 1.2  --    GFRAA >60  --         Recent Labs     12/15/21  0550 12/16/21  0436 12/17/21  0515   PROTIME 24.8* 25.6* 28.1*   INR 2.13* 2.19* 2.40*     Ricardo Maldonado DO, [facility] [place]  Interventional Cardiology     o: [phone]  [facility], [address]      NOTE:  This report was transcribed using voice recognition software. Every effort was made to ensure accuracy; however, inadvertent computerized transcription errors may be present.

## 2021-12-17 NOTE — PROGRESS NOTES
EVENTS:POD7 cabg x4, LAAc, MVR, TV repair                 Steroid dose pack added today        Todays wt:79.8kg  Pre op wt: 76.2kg    NEURO:awake alert    CARDIAC:stach low 100. Ct drainage noted. Will dc                Once meets criteria. On digoxin po. Coreg                 Held. Waiting from [facility] to                 Approve lifevest.    RESP:  RA. IS 1000    GI:BM daily. On protonix po. HEME:last h/hct 8.7/26/plt 106             INR 2.6. On coumadin and asa dly. SKIN:intact.     [address]      LINES:RPIV

## 2021-12-17 NOTE — PROGRESS NOTES
Cardiac Electrophysiology Progress Note     Admit Date: 2021     Reason for follow up: cardiomyopathy, had several NSVT    HPI and Interval History:   Patient seen and examined. Clinical notes reviewed. Telemetry reviewed. No new complaint today. NSVT   Continues to heal s/p CABG    Review of System:  Pertinent negatives and positives are mentioned in the HPI and interval history above. The rest are negative. Physical Examination:  Vitals:    21 0815   BP:    Pulse:    Resp: 18   Temp: 97 °F (36.1 °C)   SpO2:         Intake/Output Summary (Last 24 hours) at 2021 1131  Last data filed at 2021 0815  Gross per 24 hour   Intake 840 ml   Output 1500 ml   Net -660 ml     In: 360 [P.O.:360]  Out: 1400    Wt Readings from Last 3 Encounters:   21 175 lb 14.8 oz (79.8 kg)     Temp  Av.5 °F (36.4 °C)  Min: 97 °F (36.1 °C)  Max: 98 °F (36.7 °C)  Pulse  Av  Min: 103  Max: 105  BP  Min: 88/53  Max: 108/69  SpO2  Av.5 %  Min: 96 %  Max: 98 %    · Telemetry: Sinus tachycardia   · Constitutional: Alert. Oriented to person, place, and time. No distress. · HEENT: Normocephalic atraumatic. PEERLA, mucosa moist.   · Neck: supple. · Cardiovascular: Normal rate, regular rhythm. Normal S1&S2. · Pulmonary/Chest: Bilateral respiratory sounds present. No respiratory accessory muscle use. No wheezes, No rhonchi. No rales. · Abdominal: Soft. Normal bowel sounds present. · Musculoskeletal: No tenderness. No pitting edema. · Neurological: Alert and oriented. Grossly intact with no obvious focal neurological deficit. · Skin: Skin is warm and dry. · Psychiatric: No anxiety nor agitation. Labs, diagnostic and imaging results reviewed. Reviewed. Recent Labs     21  1220 12/15/21  0550   *  --    K 5.5* 4.8   CL 98*  --    CO2 23  --    BUN 44*  --    CREATININE 1.2  --      No results for input(s): WBC, HGB, HCT, MCV, PLT in the last 72 hours.   Lab Results Component Value Date    TROPONINI 0.01 12/02/2021    TROPONINI 0.01 12/02/2021    TROPONINI 0.01 12/01/2021     Estimated Creatinine Clearance: 76 mL/min (based on SCr of 1.2 mg/dL). No results found for: BNP  Lab Results   Component Value Date    PROTIME 28.1 12/17/2021    PROTIME 25.6 12/16/2021    PROTIME 24.8 12/15/2021    INR 2.40 12/17/2021    INR 2.19 12/16/2021    INR 2.13 12/15/2021     Lab Results   Component Value Date    CHOL 122 12/03/2021    HDL 37 12/03/2021    TRIG 65 12/03/2021       I independently reviewed the ECG, telemetry, and serology results.     Scheduled Meds:   [START ON 12/18/2021] methylPREDNISolone  4 mg Oral QAM AC    [START ON 12/18/2021] methylPREDNISolone  4 mg Oral Lunch    [START ON 12/18/2021] methylPREDNISolone  4 mg Oral Dinner    [START ON 12/18/2021] methylPREDNISolone  8 mg Oral Nightly    [START ON 12/19/2021] methylPREDNISolone  4 mg Oral Nightly    warfarin  6 mg Oral Daily    digoxin  125 mcg Oral Daily    insulin lispro  0-6 Units SubCUTAneous TID     insulin lispro  0-3 Units SubCUTAneous Nightly    insulin lispro  0-6 Units SubCUTAneous Once    ibuprofen  600 mg Oral Q8H    spironolactone  12.5 mg Oral Daily    aspirin  81 mg Oral Daily    sodium chloride flush  10 mL IntraVENous 2 times per day    acetaminophen  1,000 mg Oral Q6H    furosemide  20 mg IntraVENous BID    [Held by provider] potassium chloride  10 mEq Oral TID     sennosides-docusate sodium  1 tablet Oral BID    pantoprazole  40 mg Oral Daily    [Held by provider] carvedilol  3.125 mg Oral BID    [Held by provider] valsartan  80 mg Oral Lunch    atorvastatin  40 mg Oral Nightly     Continuous Infusions:   dextrose      sodium chloride       PRN Meds:glucose, dextrose, glucagon (rDNA), dextrose, sodium chloride flush, sodium chloride, ondansetron, metoclopramide, morphine, diphenhydrAMINE, zolpidem, magnesium hydroxide, polyethylene glycol, [Held by provider] potassium chloride, magnesium sulfate, calcium chloride IVPB, calcium chloride IVPB, albuterol sulfate HFA, furosemide, traMADol **OR** traMADol     Patient Active Problem List    Diagnosis Date Noted    Acute congestive heart failure (HCC)     CAD (coronary artery disease) 12/09/2021    Severe mitral regurgitation 12/09/2021    Severe tricuspid regurgitation 12/09/2021    CHF (congestive heart failure), NYHA class I, acute on chronic, combined ([facility] 12/09/2021    Acute on chronic combined systolic and diastolic heart failure ([facility] 12/02/2021      Active Hospital Problems    Diagnosis Date Noted    Acute congestive heart failure (HCC) [I50.9]     CAD (coronary artery disease) [I25.10] 12/09/2021    Severe mitral regurgitation [I34.0] 12/09/2021    Severe tricuspid regurgitation [I07.1] 12/09/2021    CHF (congestive heart failure), NYHA class I, acute on chronic, combined ([facility] [I50.43] 12/09/2021    Acute on chronic combined systolic and diastolic heart failure ([facility] [I50.43] 12/02/2021       Assessment and Plan:   1. NSVT - multiple episodes 12/17/2021  2. PVCs  3. Sinus tachycardia. 4. Ischemic cardiomyopathy s/p revascularization. 5. CAD s/p CABG x4 (LIMA/LAD, SVG/RPDA, sequential SVG/D1/OM). 12/11/2021  6. Valvular disease s/p Mechanical MVR and TV repair (annuloplasty) 12/11/2021  7. Heart failure with severely reduced LVEF  8. KATHARINE clip. · Mr. Kieth Paget is in sinus tachycardia understandably from low LVEF. He's also had PVCs and multiple NSVT. In the setting of the Ventricular arrhythmias, low LVEF s/p revascularization 1 week ago, recommend LifeVest upon discharge for primary prevention of SCD for 90 days. · If LVEF does not improve >35% in 90 days since surgery, then ICD will be indicated. · Consider starting metoprolol 6.25 mg bid if MAP >65. · Blood pressure has been prohibitive to initiate HF medications which may delay LV systolic function recovery and hence greater risk for SCD.      No further recommendations from an EP stand point. Thank you for allowing me to participate in the care of this patient. If you have any questions, please do not hesitate to contact me.     Electronically signed by Roya Duran MD on 12/1/2021 at 11:31 AM.    Roya Duran MD  Cardiac Electrophysiology  [facility] [address]

## 2021-12-18 ENCOUNTER — APPOINTMENT (OUTPATIENT)
Dept: GENERAL RADIOLOGY | Age: 61
DRG: 216 | End: 2021-12-18
Payer: COMMERCIAL

## 2021-12-18 LAB
GLUCOSE BLD-MCNC: 173 MG/DL (ref 70–99)
GLUCOSE BLD-MCNC: 218 MG/DL (ref 70–99)
GLUCOSE BLD-MCNC: 220 MG/DL (ref 70–99)
GLUCOSE BLD-MCNC: 301 MG/DL (ref 70–99)
INR BLD: 2.99 (ref 0.88–1.12)
PERFORMED ON: ABNORMAL
PROTHROMBIN TIME: 35.4 SEC (ref 9.9–12.7)

## 2021-12-18 PROCEDURE — 6370000000 HC RX 637 (ALT 250 FOR IP): Performed by: INTERNAL MEDICINE

## 2021-12-18 PROCEDURE — 71045 X-RAY EXAM CHEST 1 VIEW: CPT

## 2021-12-18 PROCEDURE — 99233 SBSQ HOSP IP/OBS HIGH 50: CPT | Performed by: INTERNAL MEDICINE

## 2021-12-18 PROCEDURE — 6370000000 HC RX 637 (ALT 250 FOR IP): Performed by: THORACIC SURGERY (CARDIOTHORACIC VASCULAR SURGERY)

## 2021-12-18 PROCEDURE — 94760 N-INVAS EAR/PLS OXIMETRY 1: CPT

## 2021-12-18 PROCEDURE — 85610 PROTHROMBIN TIME: CPT

## 2021-12-18 PROCEDURE — 2580000003 HC RX 258: Performed by: THORACIC SURGERY (CARDIOTHORACIC VASCULAR SURGERY)

## 2021-12-18 PROCEDURE — 99024 POSTOP FOLLOW-UP VISIT: CPT | Performed by: THORACIC SURGERY (CARDIOTHORACIC VASCULAR SURGERY)

## 2021-12-18 PROCEDURE — 6370000000 HC RX 637 (ALT 250 FOR IP): Performed by: NURSE PRACTITIONER

## 2021-12-18 PROCEDURE — 6360000002 HC RX W HCPCS: Performed by: THORACIC SURGERY (CARDIOTHORACIC VASCULAR SURGERY)

## 2021-12-18 PROCEDURE — 2140000000 HC CCU INTERMEDIATE R&B

## 2021-12-18 RX ORDER — AMIODARONE HYDROCHLORIDE 200 MG/1
200 TABLET ORAL DAILY
Status: DISCONTINUED | OUTPATIENT
Start: 2021-12-18 | End: 2021-12-19 | Stop reason: HOSPADM

## 2021-12-18 RX ORDER — WARFARIN SODIUM 5 MG/1
5 TABLET ORAL DAILY
Status: DISCONTINUED | OUTPATIENT
Start: 2021-12-18 | End: 2021-12-19 | Stop reason: HOSPADM

## 2021-12-18 RX ORDER — FUROSEMIDE 40 MG/1
40 TABLET ORAL DAILY
Status: DISCONTINUED | OUTPATIENT
Start: 2021-12-18 | End: 2021-12-19 | Stop reason: HOSPADM

## 2021-12-18 RX ORDER — CARVEDILOL 3.12 MG/1
3.12 TABLET ORAL 2 TIMES DAILY WITH MEALS
Status: DISCONTINUED | OUTPATIENT
Start: 2021-12-18 | End: 2021-12-19 | Stop reason: HOSPADM

## 2021-12-18 RX ADMIN — METHYLPREDNISOLONE 8 MG: 4 TABLET ORAL at 21:05

## 2021-12-18 RX ADMIN — SENNOSIDES AND DOCUSATE SODIUM 1 TABLET: 50; 8.6 TABLET ORAL at 21:01

## 2021-12-18 RX ADMIN — METHYLPREDNISOLONE 4 MG: 4 TABLET ORAL at 17:36

## 2021-12-18 RX ADMIN — INSULIN LISPRO 1 UNITS: 100 INJECTION, SOLUTION INTRAVENOUS; SUBCUTANEOUS at 17:37

## 2021-12-18 RX ADMIN — INSULIN LISPRO 2 UNITS: 100 INJECTION, SOLUTION INTRAVENOUS; SUBCUTANEOUS at 08:30

## 2021-12-18 RX ADMIN — CARVEDILOL 3.12 MG: 3.12 TABLET, FILM COATED ORAL at 10:17

## 2021-12-18 RX ADMIN — INSULIN LISPRO 2 UNITS: 100 INJECTION, SOLUTION INTRAVENOUS; SUBCUTANEOUS at 21:06

## 2021-12-18 RX ADMIN — ACETAMINOPHEN 1000 MG: 500 TABLET ORAL at 21:03

## 2021-12-18 RX ADMIN — SENNOSIDES AND DOCUSATE SODIUM 1 TABLET: 50; 8.6 TABLET ORAL at 09:00

## 2021-12-18 RX ADMIN — PANTOPRAZOLE SODIUM 40 MG: 40 TABLET, DELAYED RELEASE ORAL at 10:18

## 2021-12-18 RX ADMIN — METHYLPREDNISOLONE 4 MG: 4 TABLET ORAL at 13:26

## 2021-12-18 RX ADMIN — WARFARIN SODIUM 5 MG: 1 TABLET ORAL at 17:33

## 2021-12-18 RX ADMIN — METHYLPREDNISOLONE 4 MG: 4 TABLET ORAL at 09:00

## 2021-12-18 RX ADMIN — ACETAMINOPHEN 1000 MG: 500 TABLET ORAL at 10:17

## 2021-12-18 RX ADMIN — Medication 10 ML: at 21:01

## 2021-12-18 RX ADMIN — Medication 10 ML: at 13:40

## 2021-12-18 RX ADMIN — DIGOXIN 125 MCG: 125 TABLET ORAL at 10:19

## 2021-12-18 RX ADMIN — SPIRONOLACTONE 12.5 MG: 25 TABLET ORAL at 10:19

## 2021-12-18 RX ADMIN — AMIODARONE HYDROCHLORIDE 200 MG: 200 TABLET ORAL at 14:30

## 2021-12-18 RX ADMIN — ACETAMINOPHEN 1000 MG: 500 TABLET ORAL at 15:57

## 2021-12-18 RX ADMIN — ZOLPIDEM TARTRATE 5 MG: 5 TABLET ORAL at 21:32

## 2021-12-18 RX ADMIN — TRAMADOL HYDROCHLORIDE 100 MG: 50 TABLET, FILM COATED ORAL at 15:58

## 2021-12-18 RX ADMIN — ATORVASTATIN CALCIUM 40 MG: 40 TABLET, FILM COATED ORAL at 21:02

## 2021-12-18 RX ADMIN — ASPIRIN 81 MG 81 MG: 81 TABLET ORAL at 10:18

## 2021-12-18 RX ADMIN — INSULIN LISPRO 2 UNITS: 100 INJECTION, SOLUTION INTRAVENOUS; SUBCUTANEOUS at 12:30

## 2021-12-18 RX ADMIN — FUROSEMIDE 40 MG: 40 TABLET ORAL at 10:19

## 2021-12-18 RX ADMIN — CARVEDILOL 3.12 MG: 3.12 TABLET, FILM COATED ORAL at 16:01

## 2021-12-18 ASSESSMENT — PAIN SCALES - GENERAL
PAINLEVEL_OUTOF10: 0
PAINLEVEL_OUTOF10: 3
PAINLEVEL_OUTOF10: 6
PAINLEVEL_OUTOF10: 0
PAINLEVEL_OUTOF10: 5
PAINLEVEL_OUTOF10: 0

## 2021-12-18 NOTE — PROGRESS NOTES
[facility] / [facility] 4; ICM with EF 20%    No c/o.   Walking in halls  NSR  INR 2.9   / 70 last two shifts and D/C'd this morning  Weight down to base weight  SBP up to just over 100  CTAB RRR sternum stable  As per CC  Switch to PO lasix  Will restart coreg  Check CXR  Patient's insurance not covering lifevest, yet despite high risk for sudden cardiac death (SCD)  Monitor rhythm and BP on coreg

## 2021-12-18 NOTE — PROGRESS NOTES
Chest tubes meet criteria to remove per open heart protocol. No  air leak. no crepitus. Pt instructed on procedure. Site cleansed and prepped per protocol. Chest tubes X 1 removed without difficulty. Dry sterile dressing applied with vasaline gauze. Bilateral breath sounds audible. O2 Sats 95 on room air. Patient tolerated well.

## 2021-12-18 NOTE — PROGRESS NOTES
Patient wants to leave. I explained to him the risk of ventricular arrhythmias. He understands that. I think it is acceptable for him to leave knowing the risk as long as we can arrange for him to receive the LifeVest next week. I will start him on amiodarone 200 mg daily to help suppress ventricular ectopy.

## 2021-12-18 NOTE — PROGRESS NOTES
EVENTS:POD8 cabg x4, LAAc, MVR, TV repair          Chest tube dcd today        Todays wt:76.8kg  Pre op wt: 76.2kg    NEURO:awake alert    CARDIAC:stach low 100. Coreg given today. Tolerating well. Seen by EP. Amio po started. Coumadin 5mgs po given. INR 2.9                      RESP:  RA. IS 1000           pcxr done post removal of CT. Results relayed to Dr Love Rankin. GI:BM daily. On protonix p                SKIN:intact. [address]      LINES:RPIV    Pt wanted to go home today. Made aware of discharge plans in am. Agreed to stay.

## 2021-12-18 NOTE — PROGRESS NOTES
[facility]   Electrophysiology Progress Note     Admit Date: 2021     Reason for follow up: , Cardiomyopathy nonsustained VT    HPI and Interval History:   Patient seen and examined. Clinical notes reviewed. Telemetry reviewed. No new complaint today. No major events overnight. Denies having chest pain, shortness of breath, dyspnea on exertion, Orthopnea, PND at the time of this visit. Review of System:  All other systems reviewed and are negative except for that noted above. Pertinent negatives are:     · General: negative for fever, chills   · Ophthalmic ROS: negative for - eye pain or loss of vision  · ENT ROS: negative for - headaches, sore throat   · Respiratory: negative for - cough, sputum  · Cardiovascular: Reviewed in HPI  · Gastrointestinal: negative for - abdominal pain, diarrhea, N/V  · Hematology: negative for - bleeding, blood clots, bruising or jaundice  · Genito-Urinary:  negative for - Dysuria or incontinence  · Musculoskeletal: negative for - Joint swelling, muscle pain  · Neurological: negative for - confusion, dizziness, headaches   · Psychiatric: No anxiety, no depression. · Dermatological: negative for - rash      Physical Examination:  Vitals:    21 1116   BP:    Pulse:    Resp: 18   Temp: 97 °F (36.1 °C)   SpO2: 96%      In: -   Out: 170    Wt Readings from Last 3 Encounters:   21 169 lb 5 oz (76.8 kg)     Temp  Av.3 °F (36.3 °C)  Min: 97 °F (36.1 °C)  Max: 97.6 °F (36.4 °C)  Pulse  Av.3  Min: 103  Max: 111  BP  Min: 101/65  Max: 123/77  SpO2  Av.3 %  Min: 96 %  Max: 97 %    Intake/Output Summary (Last 24 hours) at 2021 1347  Last data filed at 2021 0500  Gross per 24 hour   Intake --   Output 170 ml   Net -170 ml       · Telemetry: Sinus rhythm   · Constitutional: Oriented. No distress. · Head: Normocephalic and atraumatic.    · Mouth/Throat: Oropharynx is clear and moist.   · Eyes: Conjunctivae normal. EOM are normal. · Neck: Neck supple. No rigidity. No JVD present. · Cardiovascular: Normal rate, regular rhythm, S1&S2. · Pulmonary/Chest: Bilateral respiratory sounds. No wheezes, No rhonchi. Chest tube, postthoracotomy  · Abdominal: Soft. Bowel sounds present. No distension, No tenderness. · Musculoskeletal: No tenderness. No edema    · Lymphadenopathy: Has no cervical adenopathy. · Neurological: Alert and oriented. Cranial nerve appears intact, No Gross deficit   · Skin: Skin is warm and dry. No rash noted. · Psychiatric: Has a normal behavior     Labs, diagnostic and imaging results reviewed. Reviewed. No results for input(s): NA, K, CL, CO2, PHOS, BUN, CREATININE in the last 72 hours. Invalid input(s): CA  No results for input(s): WBC, HGB, HCT, MCV, PLT in the last 72 hours. Lab Results   Component Value Date    TROPONINI 0.01 12/02/2021     Estimated Creatinine Clearance: 73 mL/min (based on SCr of 1.2 mg/dL).    No results found for: BNP  Lab Results   Component Value Date    PROTIME 35.4 12/18/2021    PROTIME 28.1 12/17/2021    PROTIME 25.6 12/16/2021    INR 2.99 12/18/2021    INR 2.40 12/17/2021    INR 2.19 12/16/2021     Lab Results   Component Value Date    CHOL 122 12/03/2021    HDL 37 12/03/2021    TRIG 65 12/03/2021       Scheduled Meds:   furosemide  40 mg Oral Daily    carvedilol  3.125 mg Oral BID WC    warfarin  5 mg Oral Daily    methylPREDNISolone  4 mg Oral QAM AC    methylPREDNISolone  4 mg Oral Lunch    methylPREDNISolone  4 mg Oral Dinner    methylPREDNISolone  8 mg Oral Nightly    [START ON 12/19/2021] methylPREDNISolone  4 mg Oral Nightly    digoxin  125 mcg Oral Daily    insulin lispro  0-6 Units SubCUTAneous TID WC    insulin lispro  0-3 Units SubCUTAneous Nightly    insulin lispro  0-6 Units SubCUTAneous Once    spironolactone  12.5 mg Oral Daily    aspirin  81 mg Oral Daily    sodium chloride flush  10 mL IntraVENous 2 times per day    acetaminophen  1,000 mg Oral Q6H    sennosides-docusate sodium  1 tablet Oral BID    pantoprazole  40 mg Oral Daily    atorvastatin  40 mg Oral Nightly     Continuous Infusions:   dextrose      sodium chloride       PRN Meds:glucose, dextrose, glucagon (rDNA), dextrose, sodium chloride flush, sodium chloride, ondansetron, metoclopramide, morphine, diphenhydrAMINE, zolpidem, magnesium hydroxide, polyethylene glycol, [Held by provider] potassium chloride, magnesium sulfate, calcium chloride IVPB, calcium chloride IVPB, albuterol sulfate HFA, furosemide, traMADol **OR** traMADol     Patient Active Problem List    Diagnosis Date Noted    Acute congestive heart failure (HCC)     CAD (coronary artery disease) 12/09/2021    Severe mitral regurgitation 12/09/2021    Severe tricuspid regurgitation 12/09/2021    CHF (congestive heart failure), NYHA class I, acute on chronic, combined ([facility] 12/09/2021    [facility] on chronic [facility] 12/02/2021      Active Hospital Problems    Diagnosis Date Noted    Acute congestive heart failure (HCC) [I50.9]     CAD (coronary artery disease) [I25.10] 12/09/2021    Severe mitral regurgitation [I34.0] 12/09/2021    Severe tricuspid regurgitation [I07.1] 12/09/2021    CHF (congestive heart failure), NYHA class I, acute on chronic, combined ([facility] [I50.43] 12/09/2021    Acute on chronic combined systolic and diastolic heart failure ([facility] [I50.43] 12/02/2021       Assessment:       Plan:     -Acute biventricular systolic heart failure with EF of 20% and ischemic cardiomyopathy    Patient has high risk of sudden cardiac death. Due to severe low ejection fraction and severe congestive heart failure and nonsustained VT he is a candidate for LifeVest.  We will reassess the ejection fraction in the future and if it does not improve he will require ICD. Meanwhile beta-blocker was restarted.   We will add ACE inhibitor or ARB after we make sure that he can tolerate the beta-blocker without any significant hypotension. -CAD  Status post CABG x4  Continue medical therapy.    -Status post mechanical MVR and TV repair  Normal valve function. Status post KATHARINE clip. NOTE: This report was transcribed using voice recognition software. Every effort was made to ensure accuracy, however, inadvertent computerized transcription errors may be present.

## 2021-12-18 NOTE — CARE COORDINATION
REGINA spoke with RN regarding pt's need for a Life Vest and how pt's insurance has denied payment for this needed item. REGINA called Dorothy Booker at [facility], [phone], who is working all weekend. She stated that the peer to peer was only \"scheduled\" yesterday. It was not completed. Garrett Owensch that most likely, Dr. Vinnie Aguila set up a time to complete the peer to peer review on Monday or Tuesday next week since [facility] is closed on the weekends. Stated that [facility] has a financial assistance program but that she attempted multiple times yesterday to call the patient. Patient did not . Dorothy Ade has confirmed she has pt's correct number and pt has his cell phone in the room here at the hospital.      RN reported that pt doesn't feel he should have to go through with this process to get the Life Vest, which could be attributing to why he is not answering his phone when Dorothy Booker calls. Patient may discharge tomorrow. REGINA informed RN to give pt Amy's phone number and have patient call her today to see if he qualifies for financial assistance. REGINA will follow. [facility] called and left a message stating they CANNOT accept pt for [facility] at this time. Talked with Jarek Ontiveros at [facility] who accepted the patient. Discharge Plan:   [place] [facility] will accept for home care needs. Pt needs a Life Vest but insurance is denying and pt would not answer his phone yesterday to complete financial screening w/Amy at [facility].     Electronically signed by PEGGY Austin, MICHELET on 12/18/2021 at 11:28 AM

## 2021-12-19 VITALS
BODY MASS INDEX: 22.8 KG/M2 | SYSTOLIC BLOOD PRESSURE: 114 MMHG | RESPIRATION RATE: 16 BRPM | HEART RATE: 110 BPM | HEIGHT: 73 IN | WEIGHT: 172 LBS | DIASTOLIC BLOOD PRESSURE: 72 MMHG | OXYGEN SATURATION: 100 % | TEMPERATURE: 97 F

## 2021-12-19 LAB
INR BLD: 2.85 (ref 0.88–1.12)
PROTHROMBIN TIME: 33.7 SEC (ref 9.9–12.7)

## 2021-12-19 PROCEDURE — 6370000000 HC RX 637 (ALT 250 FOR IP): Performed by: THORACIC SURGERY (CARDIOTHORACIC VASCULAR SURGERY)

## 2021-12-19 PROCEDURE — APPSS15 APP SPLIT SHARED TIME 0-15 MINUTES: Performed by: NURSE PRACTITIONER

## 2021-12-19 PROCEDURE — APPNB30 APP NON BILLABLE TIME 0-30 MINS: Performed by: NURSE PRACTITIONER

## 2021-12-19 PROCEDURE — 6370000000 HC RX 637 (ALT 250 FOR IP): Performed by: NURSE PRACTITIONER

## 2021-12-19 PROCEDURE — 85610 PROTHROMBIN TIME: CPT

## 2021-12-19 PROCEDURE — 6370000000 HC RX 637 (ALT 250 FOR IP): Performed by: INTERNAL MEDICINE

## 2021-12-19 PROCEDURE — 99024 POSTOP FOLLOW-UP VISIT: CPT | Performed by: THORACIC SURGERY (CARDIOTHORACIC VASCULAR SURGERY)

## 2021-12-19 RX ORDER — SENNA AND DOCUSATE SODIUM 50; 8.6 MG/1; MG/1
1 TABLET, FILM COATED ORAL 2 TIMES DAILY
Qty: 60 TABLET | Refills: 0 | Status: SHIPPED | OUTPATIENT
Start: 2021-12-19 | End: 2022-01-18

## 2021-12-19 RX ORDER — ASPIRIN 81 MG/1
81 TABLET, CHEWABLE ORAL DAILY
Qty: 30 TABLET | Refills: 3 | Status: SHIPPED | OUTPATIENT
Start: 2021-12-19

## 2021-12-19 RX ORDER — DIGOXIN 125 MCG
125 TABLET ORAL DAILY
Qty: 30 TABLET | Refills: 3 | Status: SHIPPED | OUTPATIENT
Start: 2021-12-19 | End: 2022-04-28 | Stop reason: SDUPTHER

## 2021-12-19 RX ORDER — PANTOPRAZOLE SODIUM 40 MG/1
40 TABLET, DELAYED RELEASE ORAL DAILY
Qty: 30 TABLET | Refills: 0 | Status: SHIPPED | OUTPATIENT
Start: 2021-12-19 | End: 2022-02-02

## 2021-12-19 RX ORDER — ATORVASTATIN CALCIUM 40 MG/1
40 TABLET, FILM COATED ORAL NIGHTLY
Qty: 30 TABLET | Refills: 3 | Status: SHIPPED | OUTPATIENT
Start: 2021-12-19

## 2021-12-19 RX ORDER — SPIRONOLACTONE 25 MG/1
12.5 TABLET ORAL DAILY
Qty: 30 TABLET | Refills: 3 | Status: SHIPPED | OUTPATIENT
Start: 2021-12-19

## 2021-12-19 RX ORDER — AMIODARONE HYDROCHLORIDE 200 MG/1
200 TABLET ORAL DAILY
Qty: 30 TABLET | Refills: 3 | Status: SHIPPED | OUTPATIENT
Start: 2021-12-19 | End: 2022-04-28 | Stop reason: SDUPTHER

## 2021-12-19 RX ORDER — WARFARIN SODIUM 6 MG/1
TABLET ORAL
Qty: 30 TABLET | Refills: 3 | Status: SHIPPED | OUTPATIENT
Start: 2021-12-19

## 2021-12-19 RX ORDER — TRAMADOL HYDROCHLORIDE 50 MG/1
50 TABLET ORAL EVERY 6 HOURS PRN
Qty: 28 TABLET | Refills: 0 | Status: SHIPPED | OUTPATIENT
Start: 2021-12-19 | End: 2021-12-26

## 2021-12-19 RX ORDER — CARVEDILOL 3.12 MG/1
3.12 TABLET ORAL 2 TIMES DAILY WITH MEALS
Qty: 60 TABLET | Refills: 3 | Status: SHIPPED | OUTPATIENT
Start: 2021-12-19 | End: 2022-01-05 | Stop reason: DRUGHIGH

## 2021-12-19 RX ORDER — ACETAMINOPHEN 500 MG
1000 TABLET ORAL EVERY 6 HOURS
Qty: 120 TABLET | Refills: 3 | Status: SHIPPED | OUTPATIENT
Start: 2021-12-19

## 2021-12-19 RX ORDER — FUROSEMIDE 40 MG/1
40 TABLET ORAL DAILY
Qty: 60 TABLET | Refills: 3 | Status: SHIPPED | OUTPATIENT
Start: 2021-12-19 | End: 2022-01-12 | Stop reason: DRUGHIGH

## 2021-12-19 RX ORDER — METHYLPREDNISOLONE 4 MG/1
TABLET ORAL
Qty: 1 KIT | Refills: 0 | Status: SHIPPED | OUTPATIENT
Start: 2021-12-19 | End: 2021-12-25

## 2021-12-19 RX ADMIN — DIGOXIN 125 MCG: 125 TABLET ORAL at 09:31

## 2021-12-19 RX ADMIN — CARVEDILOL 3.12 MG: 3.12 TABLET, FILM COATED ORAL at 09:31

## 2021-12-19 RX ADMIN — SPIRONOLACTONE 12.5 MG: 25 TABLET ORAL at 09:31

## 2021-12-19 RX ADMIN — ASPIRIN 81 MG 81 MG: 81 TABLET ORAL at 09:31

## 2021-12-19 RX ADMIN — FUROSEMIDE 40 MG: 40 TABLET ORAL at 09:31

## 2021-12-19 RX ADMIN — PANTOPRAZOLE SODIUM 40 MG: 40 TABLET, DELAYED RELEASE ORAL at 09:31

## 2021-12-19 RX ADMIN — AMIODARONE HYDROCHLORIDE 200 MG: 200 TABLET ORAL at 09:31

## 2021-12-19 ASSESSMENT — PAIN SCALES - GENERAL
PAINLEVEL_OUTOF10: 0
PAINLEVEL_OUTOF10: 0

## 2021-12-19 NOTE — PLAN OF CARE
Problem: Falls - Risk of:  Goal: Will remain free from falls  Description: Will remain free from falls  Outcome: Ongoing  Goal: Absence of physical injury  Description: Absence of physical injury  Outcome: Ongoing     Problem: OXYGENATION/RESPIRATORY FUNCTION  Goal: Patient will maintain patent airway  Outcome: Ongoing  Goal: Patient will achieve/maintain normal respiratory rate/effort  Description: Respiratory rate and effort will be within normal limits for the patient  Outcome: Ongoing     Problem: HEMODYNAMIC STATUS  Goal: Patient has stable vital signs and fluid balance  Outcome: Ongoing     Problem: FLUID AND ELECTROLYTE IMBALANCE  Goal: Fluid and electrolyte balance are achieved/maintained  Outcome: Ongoing     Problem: ACTIVITY INTOLERANCE/IMPAIRED MOBILITY  Goal: Mobility/activity is maintained at optimum level for patient  Outcome: Ongoing     Problem: Pain:  Goal: Pain level will decrease  Description: Pain level will decrease  Outcome: Ongoing  Goal: Control of acute pain  Description: Control of acute pain  Outcome: Ongoing     Problem: Nutrition  Goal: Optimal nutrition therapy  Outcome: Ongoing

## 2021-12-19 NOTE — PROGRESS NOTES
Patient was discharged home before I could see him. He had some ectopy overnight but not significant. He was discharged on p.o. amiodarone. We will try to get the LifeVest approved.

## 2021-12-19 NOTE — PROGRESS NOTES
Patient not on continuous pulse ox. Patient no longer on respiratory treatments, per protocol, no prn's in the last 3 days.

## 2021-12-19 NOTE — PROGRESS NOTES
CC: s/p mech MVR / TV repair / CABG x 4; ICM with EF 20%    No c/o.   Walking in halls  NSR  INR 2.85   / 70 last two shifts and D/C'd this morning  Weight down to below base weight  SBP up to just over 100 with coreg  CTAB RRR sternum stable  As per CC  Patient understands risks and will continue to pursue LifeVest  D/C home today and RTC in 1-2 weeks

## 2021-12-19 NOTE — RT PROTOCOL NOTE
same Frequency and PRN reasons based on the score. If a patient is on this medication at home then do not decrease Frequency below that used at home. 0-3 - enter or revise RT bronchodilator order(s) to equivalent RT Bronchodilator order with Frequency of every 4 hours PRN for wheezing or increased work of breathing using Per Protocol order mode. 4-6 - enter or revise RT Bronchodilator order(s) to two equivalent RT bronchodilator orders with one order with BID Frequency and one order with Frequency of every 4 hours PRN wheezing or increased work of breathing using Per Protocol order mode. 7-10 - enter or revise RT Bronchodilator order(s) to two equivalent RT bronchodilator orders with one order with TID Frequency and one order with Frequency of every 4 hours PRN wheezing or increased work of breathing using Per Protocol order mode. 11-13 - enter or revise RT Bronchodilator order(s) to one equivalent RT bronchodilator order with QID Frequency and an Albuterol order with Frequency of every 4 hours PRN wheezing or increased work of breathing using Per Protocol order mode. Greater than 13 - enter or revise RT Bronchodilator order(s) to one equivalent RT bronchodilator order with every 4 hours Frequency and an Albuterol order with Frequency of every 2 hours PRN wheezing or increased work of breathing using Per Protocol order mode. RT to enter RT Home Evaluation for COPD & MDI Assessment order using Per Protocol order mode.     Electronically signed by Quinn Del Toro RCP on 12/18/2021 at 7:53 PM

## 2021-12-20 ENCOUNTER — TELEPHONE (OUTPATIENT)
Dept: OTHER | Age: 61
End: 2021-12-20

## 2021-12-20 ENCOUNTER — ANTI-COAG VISIT (OUTPATIENT)
Dept: CARDIOTHORACIC SURGERY | Age: 61
End: 2021-12-20

## 2021-12-20 NOTE — PROGRESS NOTES
Discharged on 12/19 w/ [facility], has 6 mg tabs. inr is due today. Dang from [facility] called, was unable to get inr today,(machine not working correctly). Instructed to have Mr. Larayne Blizzard take 1/2 tab or 3 mg this daniella, they will check  INR tomorrow morning.

## 2021-12-20 NOTE — TELEPHONE ENCOUNTER
[address] [facility]. ENCOUNTER FORM    Matty Harrington Jeffrey 1960    Attempted to call patient for HF follow-up. No answer at this time.       Next MD/ Clinic appointment: 1/5/21 with Dr. Gabriela Hutchison, RN 12/20/2021 1:47 PM

## 2021-12-21 ENCOUNTER — TELEPHONE (OUTPATIENT)
Dept: CARDIOLOGY CLINIC | Age: 61
End: 2021-12-21

## 2021-12-21 ENCOUNTER — TELEPHONE (OUTPATIENT)
Dept: OTHER | Age: 61
End: 2021-12-21

## 2021-12-21 ENCOUNTER — ANTI-COAG VISIT (OUTPATIENT)
Dept: CARDIOTHORACIC SURGERY | Age: 61
End: 2021-12-21

## 2021-12-21 LAB — INR BLD: 2.1

## 2021-12-21 NOTE — TELEPHONE ENCOUNTER
Spoke with Dominique at [facility]. Dr. Gonzalez Ann has contacted [place] and provided times for peer to peer. Patient has been offered patient assistance and has declined. He is not returning calls .

## 2021-12-22 ENCOUNTER — TELEPHONE (OUTPATIENT)
Dept: CARDIOTHORACIC SURGERY | Age: 61
End: 2021-12-22

## 2021-12-22 ENCOUNTER — TELEPHONE (OUTPATIENT)
Dept: OTHER | Age: 61
End: 2021-12-22

## 2021-12-22 NOTE — TELEPHONE ENCOUNTER
Called patient to check-in as patient mentioned yesterday that he is pllanning to go out of town over the holiday weekend. Patient is currently under INR and warfarin dosing management by our office. Patient recently underwent open heart surgery with Dr. Maribel De Luna. Advised patient that it would be best if he stayed in town, as [facility]. will discharge him if he leaves town (per [address]). Patient did not answer, left VM to call back ASAP to determine plan for INR checks/warfarin dosing over the weekend. Updated Champ ., CVTS RN on situation.

## 2021-12-22 NOTE — TELEPHONE ENCOUNTER
[address] [address] FAILURE PROGRAM  TELEPHONE ENCOUNTER FORM    Mookie Rachael 1960    Third attempt to call patient for HF follow-up. No answer at this time.             Saundra Zarate RN 12/22/2021 3:37 PM

## 2021-12-23 ENCOUNTER — ANTI-COAG VISIT (OUTPATIENT)
Dept: CARDIOTHORACIC SURGERY | Age: 61
End: 2021-12-23

## 2021-12-23 ENCOUNTER — TELEPHONE (OUTPATIENT)
Dept: CARDIOTHORACIC SURGERY | Age: 61
End: 2021-12-23

## 2021-12-23 LAB — INR BLD: 1.9

## 2021-12-23 NOTE — TELEPHONE ENCOUNTER
Called patient two more times to check-in regarding INR check, coumadin dosing, and his intent to travel over the weekend. No answer, left 2 more VMs. Called patient's sister, Rufino Jones, who is emergency contact. She reports that patient is still in town and has [address] coming around 1230 this afternoon for an INR check. Will await call from [facility]

## 2021-12-23 NOTE — PROGRESS NOTES
Praneeth Age with [facility] [place]. called, INT today 1.9,  Instructed to have him take 6 mg this daniella, 6 mg fir, 3 mg on Sat.  6 mg on Sunday w/ recheck of inr on Monday,

## 2021-12-27 ENCOUNTER — TELEPHONE (OUTPATIENT)
Dept: PHARMACY | Age: 61
End: 2021-12-27

## 2021-12-27 ENCOUNTER — ANTI-COAG VISIT (OUTPATIENT)
Dept: CARDIOTHORACIC SURGERY | Age: 61
End: 2021-12-27

## 2021-12-27 ENCOUNTER — TELEPHONE (OUTPATIENT)
Dept: CARDIOTHORACIC SURGERY | Age: 61
End: 2021-12-27

## 2021-12-27 DIAGNOSIS — Z95.2 MECHANICAL HEART VALVE PRESENT: Primary | ICD-10-CM

## 2021-12-27 LAB — INR BLD: 1.9

## 2021-12-27 NOTE — TELEPHONE ENCOUNTER
Called [facility] w/ referral for the  [facility] to take coumadin management over. I will put order for out pt inrs, he will go to outpat until he is accepted and has appt. With [facility].

## 2021-12-27 NOTE — TELEPHONE ENCOUNTER
Melissa Parra called from Dr. Tate Oliver office to refer patient. Multiple heart procedures plus, MVR. Patient is currently in home care with [facility] and should be discharged next week. Patient of Dr. Megan Mendes's. Faxed collaborative for completion and signature to Dr. Nikia Diggs office.

## 2021-12-27 NOTE — PROGRESS NOTES
Spoke with Mike kern/ [facility], inr today is 1.9, instructed to take 6 mg every daniella w/ recheck on Thursday 12/30/21.

## 2021-12-30 ENCOUNTER — ANTI-COAG VISIT (OUTPATIENT)
Dept: CARDIOTHORACIC SURGERY | Age: 61
End: 2021-12-30

## 2021-12-30 NOTE — TELEPHONE ENCOUNTER
Shahid Carlson do you know what happened with this. I do not see documentation regarding the peer to peer  ?

## 2021-12-30 NOTE — PROGRESS NOTES
Did not receive an INR today, check for outpt lab, no results. I called and left Mr. Troy Nava a message, he called back was supposed to have home care visit, today. He states he was home all day, no one rang his door bell or knocked on his door. I gave him dosing based on his last inr of 1.9, he is to alternate 6 mg / 9mg starting w/ 6 mg this daniella w reheck of inr on Monday.

## 2022-01-03 ENCOUNTER — ANTI-COAG VISIT (OUTPATIENT)
Dept: CARDIOTHORACIC SURGERY | Age: 62
End: 2022-01-03

## 2022-01-03 NOTE — PROGRESS NOTES
Pt was released from [place] [address] on 12/30/21. Pt unaware. Will go to OP lab in the am. [address] to determine cause of discharge from [place] [address]. Will give 6 mg tonight. Attempting to get [place] [address] re instituted as pt unable to drive and has difficulty with transportation.

## 2022-01-03 NOTE — PROGRESS NOTES
According to [facility], patient was discharged from [facility] last week. Our office & patient were not notified. Rocco Mays, CVTS RN spoke with home care group about the need to inform our office of these types of decisions as patient has INR checks and coumadin. Per Ellie kern/ [facility], they will be visiting patient in the morning on 1/4/22. Called patient to inform to take 6 mg coumadin tonight. Patient verbalized understanding and is aware that Joanna Palafox is coming tomorrow to check INR.

## 2022-01-04 ENCOUNTER — ANTI-COAG VISIT (OUTPATIENT)
Dept: CARDIOTHORACIC SURGERY | Age: 62
End: 2022-01-04

## 2022-01-04 LAB — INR BLD: 3.3

## 2022-01-04 NOTE — PROGRESS NOTES
This is Mr. Ryan Zarate last home care visit, his inr today is 3.3, gave [place] instructions to have him take 6 mg q daniella with recheck next Tuesday.

## 2022-01-05 ENCOUNTER — OFFICE VISIT (OUTPATIENT)
Dept: INTERNAL MEDICINE CLINIC | Age: 62
End: 2022-01-05
Payer: COMMERCIAL

## 2022-01-05 VITALS
DIASTOLIC BLOOD PRESSURE: 70 MMHG | BODY MASS INDEX: 21.2 KG/M2 | HEART RATE: 101 BPM | WEIGHT: 160 LBS | HEIGHT: 73 IN | SYSTOLIC BLOOD PRESSURE: 112 MMHG

## 2022-01-05 DIAGNOSIS — Z95.1 STATUS POST AORTO-CORONARY ARTERY BYPASS GRAFT: ICD-10-CM

## 2022-01-05 DIAGNOSIS — Z71.85 VACCINE COUNSELING: ICD-10-CM

## 2022-01-05 DIAGNOSIS — E78.2 MIXED HYPERLIPIDEMIA: ICD-10-CM

## 2022-01-05 DIAGNOSIS — R00.0 TACHYCARDIA: ICD-10-CM

## 2022-01-05 DIAGNOSIS — Z76.89 ENCOUNTER TO ESTABLISH CARE: ICD-10-CM

## 2022-01-05 DIAGNOSIS — Z71.6 TOBACCO ABUSE COUNSELING: ICD-10-CM

## 2022-01-05 DIAGNOSIS — Z76.89 ENCOUNTER TO ESTABLISH CARE: Primary | ICD-10-CM

## 2022-01-05 DIAGNOSIS — I25.10 CORONARY ARTERY DISEASE INVOLVING NATIVE HEART WITHOUT ANGINA PECTORIS, UNSPECIFIED VESSEL OR LESION TYPE: ICD-10-CM

## 2022-01-05 DIAGNOSIS — I25.5 ISCHEMIC CARDIOMYOPATHY: ICD-10-CM

## 2022-01-05 DIAGNOSIS — G47.00 INSOMNIA, UNSPECIFIED TYPE: ICD-10-CM

## 2022-01-05 PROBLEM — Z95.2 HX OF ARTIFICIAL HEART VALVE REPLACEMENT: Status: ACTIVE | Noted: 2022-01-05

## 2022-01-05 PROBLEM — I50.43 ACUTE ON CHRONIC COMBINED SYSTOLIC AND DIASTOLIC HEART FAILURE (HCC): Status: RESOLVED | Noted: 2021-12-02 | Resolved: 2022-01-05

## 2022-01-05 LAB
A/G RATIO: 1.2 (ref 1.1–2.2)
ALBUMIN SERPL-MCNC: 4.1 G/DL (ref 3.4–5)
ALP BLD-CCNC: 314 U/L (ref 40–129)
ALT SERPL-CCNC: 32 U/L (ref 10–40)
ANION GAP SERPL CALCULATED.3IONS-SCNC: 14 MMOL/L (ref 3–16)
AST SERPL-CCNC: 32 U/L (ref 15–37)
BILIRUB SERPL-MCNC: 0.4 MG/DL (ref 0–1)
BUN BLDV-MCNC: 22 MG/DL (ref 7–20)
CALCIUM SERPL-MCNC: 9.6 MG/DL (ref 8.3–10.6)
CHLORIDE BLD-SCNC: 100 MMOL/L (ref 99–110)
CHOLESTEROL, TOTAL: 138 MG/DL (ref 0–199)
CO2: 24 MMOL/L (ref 21–32)
CREAT SERPL-MCNC: 1.2 MG/DL (ref 0.9–1.3)
GFR AFRICAN AMERICAN: >60
GFR NON-AFRICAN AMERICAN: >60
GLUCOSE BLD-MCNC: 144 MG/DL (ref 70–99)
HCT VFR BLD CALC: 38.8 % (ref 40.5–52.5)
HDLC SERPL-MCNC: 62 MG/DL (ref 40–60)
HEMOGLOBIN: 12.9 G/DL (ref 13.5–17.5)
HEPATITIS C ANTIBODY INTERPRETATION: NORMAL
LDL CHOLESTEROL CALCULATED: 63 MG/DL
MCH RBC QN AUTO: 29.5 PG (ref 26–34)
MCHC RBC AUTO-ENTMCNC: 33.3 G/DL (ref 31–36)
MCV RBC AUTO: 88.7 FL (ref 80–100)
PDW BLD-RTO: 16.3 % (ref 12.4–15.4)
PLATELET # BLD: 294 K/UL (ref 135–450)
PMV BLD AUTO: 8.1 FL (ref 5–10.5)
POTASSIUM SERPL-SCNC: 4.8 MMOL/L (ref 3.5–5.1)
RBC # BLD: 4.38 M/UL (ref 4.2–5.9)
SODIUM BLD-SCNC: 138 MMOL/L (ref 136–145)
TOTAL PROTEIN: 7.5 G/DL (ref 6.4–8.2)
TRIGL SERPL-MCNC: 67 MG/DL (ref 0–150)
TSH REFLEX FT4: 2.85 UIU/ML (ref 0.27–4.2)
VLDLC SERPL CALC-MCNC: 13 MG/DL
WBC # BLD: 5.9 K/UL (ref 4–11)

## 2022-01-05 PROCEDURE — 99204 OFFICE O/P NEW MOD 45 MIN: CPT | Performed by: INTERNAL MEDICINE

## 2022-01-05 RX ORDER — CARVEDILOL 3.12 MG/1
6.25 TABLET ORAL 2 TIMES DAILY WITH MEALS
Qty: 60 TABLET | Refills: 3 | Status: SHIPPED
Start: 2022-01-05 | End: 2022-01-12

## 2022-01-05 RX ORDER — TRAZODONE HYDROCHLORIDE 50 MG/1
50 TABLET ORAL NIGHTLY
Qty: 30 TABLET | Refills: 0 | Status: SHIPPED | OUTPATIENT
Start: 2022-01-05

## 2022-01-05 SDOH — ECONOMIC STABILITY: FOOD INSECURITY: WITHIN THE PAST 12 MONTHS, THE FOOD YOU BOUGHT JUST DIDN'T LAST AND YOU DIDN'T HAVE MONEY TO GET MORE.: NEVER TRUE

## 2022-01-05 SDOH — ECONOMIC STABILITY: FOOD INSECURITY: WITHIN THE PAST 12 MONTHS, YOU WORRIED THAT YOUR FOOD WOULD RUN OUT BEFORE YOU GOT MONEY TO BUY MORE.: NEVER TRUE

## 2022-01-05 ASSESSMENT — ENCOUNTER SYMPTOMS
SHORTNESS OF BREATH: 0
BACK PAIN: 0
COLOR CHANGE: 0
ABDOMINAL PAIN: 0
SORE THROAT: 0
CHEST TIGHTNESS: 0
COUGH: 0
WHEEZING: 0
SINUS PAIN: 0
NAUSEA: 0
VOMITING: 0
CONSTIPATION: 0

## 2022-01-05 ASSESSMENT — PATIENT HEALTH QUESTIONNAIRE - PHQ9
2. FEELING DOWN, DEPRESSED OR HOPELESS: 0
SUM OF ALL RESPONSES TO PHQ9 QUESTIONS 1 & 2: 0
1. LITTLE INTEREST OR PLEASURE IN DOING THINGS: 0
SUM OF ALL RESPONSES TO PHQ QUESTIONS 1-9: 0

## 2022-01-05 ASSESSMENT — SOCIAL DETERMINANTS OF HEALTH (SDOH): HOW HARD IS IT FOR YOU TO PAY FOR THE VERY BASICS LIKE FOOD, HOUSING, MEDICAL CARE, AND HEATING?: NOT HARD AT ALL

## 2022-01-05 NOTE — ASSESSMENT & PLAN NOTE
Patient with known history of longtime smoking however he has not smoked since release from hospital about 2 weeks ago encouraged to maintain abstinence from smoking and counseled at length regarding need for complete smoking cessation as he admitted he was contemplating restarting smoking due to his current stress

## 2022-01-05 NOTE — ASSESSMENT & PLAN NOTE
And is clinically stable on today's exam without any evidence of fluid overload, he is tachycardiac, he is scheduled to follow-up with cardiology in 1 week, advised to continue all current medications however will increase Coreg dose from 3.125 twice daily to 6.5 twice daily until able to be reevaluated by cardiology,.   He will remain on long-term anticoagulation which he is tolerating without any major side effects

## 2022-01-05 NOTE — ASSESSMENT & PLAN NOTE
Sleep hygiene recommendations discussed with patient at length occluding need to avoid stimulants and keep regular bedtime routine, he failed melatonin, advised will steer away from controlled substances and benzodiazepines, will do a trial of trazodone 50 mg as needed at bedtime and will titrate dose until desired effect.

## 2022-01-05 NOTE — TELEPHONE ENCOUNTER
Pts last [place] [id number] visit was on 1/4, lvm  asking pt to call [facility] to schedule initial appt. Has an appt w/ BACILIO Varela CNP on 1/12 @9:30 , scheduled initial appt @10:45 to hold spot incase pt wants to come in after cardiology appt. Pt will need to confirm.  Has an appt w/ Dr Ed Watson on 1/6/12

## 2022-01-05 NOTE — ASSESSMENT & PLAN NOTE
Patient counseled at length regarding need to complete both Covid and flu vaccine, reassured about safety and efficacy of both vaccines

## 2022-01-05 NOTE — ASSESSMENT & PLAN NOTE
As stated above will follow up with cardiology and for now will increase Coreg dose, continue digoxin and amiodarone

## 2022-01-05 NOTE — PROGRESS NOTES
Plan:   Patient with known history of longtime smoking however he has not smoked since release from hospital about 2 weeks ago encouraged to maintain abstinence from smoking and counseled at length regarding need for complete smoking cessation as he admitted he was contemplating restarting smoking due to his current stress  9. Vaccine counseling  Assessment & Plan:   Patient counseled at length regarding need to complete both Covid and flu vaccine, reassured about safety and efficacy of both vaccines      Return in about 3 months (around 4/5/2022) for annual physical.     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 Patient has not seen a PCP in several years, states he got sick with acute respiratory illness in October with cough and shortness of breath, he ended up being hospitalized December 1 was found to have severe cardiomyopathy and acute CHF, coronary angiogram showed multivessel blockages along with severe mitral regurg and severe tricuspid regurg. He underwent bypass surgery and released home December 17. He is here today accompanied by his sister, he continues to recover and is scheduled to return to work next week  He is complaining of significant weight loss since his illness and is complaining of insomnia which he had prior to his hospitalization. At one point he was on Ambien, he also tried melatonin which did not work for him. He is denying any history of depression or anxiety. He did not complete the Covid vaccine and he is not sure if is the respiratory illness he had back in October was secondary to Covid because he was not tested.   Prior to admission he was an everyday smoker, he never completed Covid vaccine or flu vaccine      Review of Systems   Constitutional: Negative for activity change, appetite change, fatigue and unexpected weight change. HENT: Negative for congestion, hearing loss, mouth sores, sinus pain and sore throat. Eyes: Negative for visual disturbance. Respiratory: Negative for cough, chest tightness, shortness of breath and wheezing. Cardiovascular: Positive for palpitations. Negative for chest pain and leg swelling. Gastrointestinal: Negative for abdominal pain, constipation, nausea and vomiting. Endocrine: Negative for cold intolerance and heat intolerance. Genitourinary: Positive for frequency. Negative for difficulty urinating, dysuria, hematuria and urgency. Musculoskeletal: Negative for arthralgias, back pain, gait problem and joint swelling. Skin: Negative for color change. Allergic/Immunologic: Negative for environmental allergies and immunocompromised state. Neurological: Negative for dizziness, weakness, light-headedness and headaches. Hematological: Does not bruise/bleed easily. Psychiatric/Behavioral: Positive for sleep disturbance. Negative for behavioral problems and dysphoric mood. OBJECTIVE:    /70   Pulse 101   Ht 6' 1\" (1.854 m)   Wt 160 lb (72.6 kg)   BMI 21.11 kg/m²    Physical Exam  Constitutional:       General: He is not in acute distress. Appearance: Normal appearance. He is normal weight. He is not toxic-appearing. HENT:      Head: Normocephalic. Eyes:      Conjunctiva/sclera: Conjunctivae normal.   Neck:      Vascular: No carotid bruit. Cardiovascular:      Rate and Rhythm: Tachycardia present. Heart sounds: Normal heart sounds. Pulmonary:      Effort: Pulmonary effort is normal. No respiratory distress. Abdominal:      Palpations: Abdomen is soft. Musculoskeletal:         General: Normal range of motion. Cervical back: Neck supple. Right lower leg: No edema. Left lower leg: No edema. Lymphadenopathy:      Cervical: No cervical adenopathy. Skin:     General: Skin is warm and dry.    Neurological:      General: No focal deficit present. Mental Status: He is alert. Cranial Nerves: No cranial nerve deficit. Psychiatric:         Mood and Affect: Mood normal.           Electronically signed by Sai Correia MD on 1/5/2022 at 11:13 AM.    This dictation was generated by voice recognition computer software. Although all attempts are made to edit the dictation for accuracy, there may be errors in the transcription that are not intended.

## 2022-01-05 NOTE — ASSESSMENT & PLAN NOTE
Check levels and adjust statin dose accordingly, reinforced recommendations for maintaining healthy low-fat diet and regular level of physical activity

## 2022-01-06 ENCOUNTER — OFFICE VISIT (OUTPATIENT)
Dept: CARDIOTHORACIC SURGERY | Age: 62
End: 2022-01-06

## 2022-01-06 VITALS
TEMPERATURE: 98.2 F | SYSTOLIC BLOOD PRESSURE: 102 MMHG | HEIGHT: 73 IN | DIASTOLIC BLOOD PRESSURE: 68 MMHG | OXYGEN SATURATION: 100 % | HEART RATE: 97 BPM | WEIGHT: 162 LBS | BODY MASS INDEX: 21.47 KG/M2

## 2022-01-06 DIAGNOSIS — Z98.890 S/P TRICUSPID VALVE REPAIR: ICD-10-CM

## 2022-01-06 DIAGNOSIS — Z95.2 S/P MITRAL VALVE REPLACEMENT: Primary | ICD-10-CM

## 2022-01-06 LAB
ESTIMATED AVERAGE GLUCOSE: 145.6 MG/DL
HBA1C MFR BLD: 6.7 %

## 2022-01-06 PROCEDURE — 99024 POSTOP FOLLOW-UP VISIT: CPT | Performed by: THORACIC SURGERY (CARDIOTHORACIC VASCULAR SURGERY)

## 2022-01-06 NOTE — PROGRESS NOTES
Progress Note    CC:    Chief Complaint   Patient presents with    Post-Op Check     1st post op Urgent MVR mech, TV Repair CABG x 4 w/ KATHARINE Clip 12/10/21       Subjective: Mr. Herb Collado is here for his first post op visit. He has been doing well at home. Vital Signs:                                                 /68 (Site: Left Upper Arm)   Pulse 97   Temp 98.2 °F (36.8 °C) (Infrared)   Ht 6' 1\" (1.854 m)   Wt 162 lb (73.5 kg)   SpO2 100%   BMI 21.37 kg/m²        CV:   RRR with appropriate valve click, no murmur  Pulm: CTAB and symmetric  Incisions:   midsternal incision, CT site and L leg incision all healing well, CT stitch removed without difficulty. Sternum stable to deep palpation  Ext: no lower leg edema noted      Assessment/Plan:    As per CC. Progressing well. I stressed that coumadin is the only anticoagulant he can use for a mechanical valve. I went over risk factor reduction, scar care and recovery expectations with the patient and answered all his questions. From a surgical standpoint, the patient is ready for cardiac rehab now with 5 pound weight limit and may start using arms and lift without restriction on 2/10/2022. As patient is healing well and has excellent medical follow-up, I will see them back on a PRN basis. I encouraged them to call with any questions, or new concerns.     Thank you,    Devorah Crockett MD

## 2022-01-11 ENCOUNTER — ANTI-COAG VISIT (OUTPATIENT)
Dept: CARDIOTHORACIC SURGERY | Age: 62
End: 2022-01-11

## 2022-01-11 NOTE — PROGRESS NOTES
Called and left Mr. Ryan Zarate a vm , instructing him to take the 6 mg of warfarin this daniella and to keep his appt. W/ [facility] on Wed.

## 2022-01-12 ENCOUNTER — ANTI-COAG VISIT (OUTPATIENT)
Dept: PHARMACY | Age: 62
End: 2022-01-12
Payer: COMMERCIAL

## 2022-01-12 ENCOUNTER — OFFICE VISIT (OUTPATIENT)
Dept: CARDIOLOGY CLINIC | Age: 62
End: 2022-01-12
Payer: COMMERCIAL

## 2022-01-12 VITALS
HEIGHT: 73 IN | SYSTOLIC BLOOD PRESSURE: 116 MMHG | BODY MASS INDEX: 22 KG/M2 | OXYGEN SATURATION: 100 % | DIASTOLIC BLOOD PRESSURE: 68 MMHG | HEART RATE: 65 BPM | WEIGHT: 166 LBS

## 2022-01-12 DIAGNOSIS — I25.10 CORONARY ARTERY DISEASE INVOLVING NATIVE CORONARY ARTERY OF NATIVE HEART WITHOUT ANGINA PECTORIS: ICD-10-CM

## 2022-01-12 DIAGNOSIS — I07.1 SEVERE TRICUSPID REGURGITATION: ICD-10-CM

## 2022-01-12 DIAGNOSIS — E78.2 MIXED HYPERLIPIDEMIA: ICD-10-CM

## 2022-01-12 DIAGNOSIS — I34.0 SEVERE MITRAL REGURGITATION: Primary | ICD-10-CM

## 2022-01-12 DIAGNOSIS — I25.5 ISCHEMIC CARDIOMYOPATHY: ICD-10-CM

## 2022-01-12 LAB — INTERNATIONAL NORMALIZATION RATIO, POC: 2.6

## 2022-01-12 PROCEDURE — 99214 OFFICE O/P EST MOD 30 MIN: CPT | Performed by: NURSE PRACTITIONER

## 2022-01-12 PROCEDURE — 99213 OFFICE O/P EST LOW 20 MIN: CPT

## 2022-01-12 PROCEDURE — 85610 PROTHROMBIN TIME: CPT

## 2022-01-12 PROCEDURE — 93000 ELECTROCARDIOGRAM COMPLETE: CPT | Performed by: NURSE PRACTITIONER

## 2022-01-12 RX ORDER — CARVEDILOL 6.25 MG/1
6.25 TABLET ORAL 2 TIMES DAILY WITH MEALS
Qty: 60 TABLET | Refills: 2 | Status: SHIPPED | OUTPATIENT
Start: 2022-01-12 | End: 2022-02-02

## 2022-01-12 RX ORDER — FUROSEMIDE 40 MG/1
20 TABLET ORAL DAILY
Qty: 60 TABLET | Refills: 3 | Status: SHIPPED
Start: 2022-01-12

## 2022-01-12 NOTE — PATIENT INSTRUCTIONS
Of your current dose of carvedilol 3.12 mg , take one tablet in the morning and two tablets in the evening for three days: 1/.12, 1/13, 1/14    ~On the fourth day, 1/15, increase to two tablets twice a day which equals 6.25 mg twice daily    ~Your new prescription is for the increased dose of 6.25 which you will take one tablet twice a day      After one week then decrease lasix / furosemide to 1/2 tablet to 20 mg daily     appt in 3-4 weeks

## 2022-01-12 NOTE — PROGRESS NOTES
Mr. Kendra Valera is a 63 y.o. y/o male with history of Heart Valve Replacement who presents today for anticoagulation monitoring and adjustment. 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  takes amio 200 mg qd (started 12/10/21)    Warfarin was being managed by cards:   12/23 - 6 mg this daniella, 6 mg fir, 3 mg on Sat. 6 mg on Sunday 12/27- INR 1.9- 6 mg qd  12/30- dose adjusted to alternate 9 mg and 6 mg (starting with 6 mg)  1/4 INR 3.3- 6 mg qd    Patient Reported Findings:  Yes     No  [x]   []       Patient verifies current dosing regimen as listed --> confirms 6 mg daily  []   [x]       S/S bleeding/bruising/swelling/SOB --> denies   []   [x]       Blood in urine or stool --> denies   []   [x]       Procedures scheduled in the future at this time --> denies   []   [x]       Missed Dose --> denies   []   [x]       Extra Dose --> denies   []   [x]       Change in medications --> pt started amiodarone 200 mg QD on 12/10/21, no other changes  []   [x]       Change in health/diet/appetite --> eats 4-5 serveings of green/wk (spinanch, broccoli, brussels sprouts)  []   [x]       Change in alcohol use  --> pt was a smoker but had not smoked since card surgery on 12/10/21, denies alcohol   []   [x]       Change in activity  []   [x]       Hospital admission  []   [x]       Emergency department visit  []   [x]       Other complaints --> pt travels a lot and does not know if weekly appts to start will work. Educated patient on the importance of monitoring warfarin closely while first starting on warfarin and with being on amiodarone.      Clinical Outcomes:  Yes     No  []   [x]       Major bleeding event  []   [x]       Thromboembolic event    Duration of warfarin Therapy: indefinitely   INR Range:  2.5-3.5    Educated patient of signs and symptoms of bleeding and clotting, when to seek emergent care, the need to maintain a consistent diet and notification of clinic for any new medications including over the counter medications or abnormal bleeding. Patient acknowledges working in consult agreement with pharmacist as referred by his/her physician. INR is 2.6 today   Patient started amio 200 mg QD 12/10/21. Will not make changes at this appt. May need decrease weekly dose at next appt while amio still continues to have a delayed effect on warf. Continue 6 mg daily. Encouraged to maintain a consistency of vegetables/salads.    Recheck INR in 1 week on 1/19 --> pt reluctant to make weekly appts but educated on importance, agreed to call  to reschedule if out of town    **consult form signed on 1/12/22**    Referring Dr. Hari Abbott   INR (no units)   Date Value   01/12/2022 2.6   01/04/2022 3.30   12/27/2021 1.90   12/23/2021 1.90   12/21/2021 2.10         For Pharmacy Admin Tracking Only     Total # of Interventions Recommended: 0   Total # of Interventions Accepted: 0   Time Spent (min): 15

## 2022-01-12 NOTE — PROGRESS NOTES
[address]     Outpatient Follow Up Note    CHIEF COMPLAINT / HPI: Hospital Follow Up secondary to s/p CABG / MVR / TV repair / KATHARINE ligation 12/10/21    Hospital record has been reviewed  Hospital Course progressed as follows per discharge summary:     64 y. o. male with significant past medical history of diabetes who presented to the hospital on 12/1/2021 with worsening cough over the past 3 weeks.  Left heart angiogram found severe multivessel coronary artery disease.  Echo showed severe mitral valve regurgitation and severe tricuspid regurgitation with an EF of 15%.  CVTS was consulted for surgical evaluation for myocardial revascularization and possible mitral valve replacement.      Reports swelling in bilateral lower extremities. Patient is a current smoker. Fonda Lowing covid diagnosis in October that brought about symptoms.      Hospital Course: The patient underwent CABG X 4, MV replacement with mechanical, TV repair, and KATHARINE on 12/10/2021 . The patient's post-operative course was uneventful. Patient developed thrombocytopenia and HIT sent and resulted negative. Lowest platelet count 60Y. On POD # 2, patient was started on heparin gtt to bridge until INR therapeutic. Patient with soft BP and antihypertensives not tolerated and held. Right pleural chest tube left in until 12/18 when drainage met criteria to discontinue. Post operative  echocardiogram found EF to be 20% (15-20% pre-op per echo) and LifeVest ordered. Unfortunately patient's insurance denied LifeVest coverage despite high risk for sudden cardiac death for severe low ejection fraction and nonsustained ventricular tachycardia. EP continuing to pursue LifeVest on outpatient basis for coverage and peer to peer scheduled. Patient as started on amiodarone 200 mg to help suppress ventricular ectopy. He developed mild hyperkalemia that required potassium supplements to be held.  Pain was controlled with combination of oral and IV medications. Patient was able to ambulate without difficulty and tolerate a regular diet. The patient was discharged on  2021.        Kendra Valera is 63 y.o. male who presents today for a routine follow up after a recent hospitalization related to the above mentioned issues. He recalls being sick a month PTA : coughing and SOB. He eventually developed ankle swelling. Subjective:   Since the time of discharge, the patient admits their symptoms have improved. He's felt fine. He's had no SOB. He denies orthopnea/PND. He's had no swelling. His wt is around ~ 160-61# from 190# PTA. The patient is not experiencing palpitations. His home BP runs ~ 120/76 112/70     These symptoms are improving over the last few weeks. With regard to medication therapy the patient has been compliant with prescribed regimen. They have tolerated therapy to date.      Past Medical History:   Diagnosis Date    Diabetes ([facility] [address]     Hyperlipidemia     Hypertension      Social History:    Social History     Tobacco Use   Smoking Status Former Smoker    Packs/day: 1.00    Years: 15.00    Pack years: 15.00    Types: Cigarettes    Quit date: 12/10/2021    Years since quittin.0   Smokeless Tobacco Never Used     Current Medications:  Current Outpatient Medications   Medication Sig Dispense Refill    carvedilol (COREG) 3.125 MG tablet Take 2 tablets by mouth 2 times daily (with meals) Hold for SBP<90 or P<60 Administer with food to minimize the risk of orthostatic hypotension (Patient taking differently: Take 3.125 mg by mouth 2 times daily (with meals) ) 60 tablet 3    aspirin 81 MG chewable tablet Take 1 tablet by mouth daily 30 tablet 3    atorvastatin (LIPITOR) 40 MG tablet Take 1 tablet by mouth nightly 30 tablet 3    digoxin (LANOXIN) 125 MCG tablet Take 1 tablet by mouth daily 30 tablet 3    spironolactone (ALDACTONE) 25 MG tablet Take 0.5 tablets by mouth daily 30 tablet 3    furosemide (LASIX) 40 MG tablet Take 1 tablet by mouth daily 60 tablet 3    amiodarone (CORDARONE) 200 MG tablet Take 1 tablet by mouth daily 30 tablet 3    warfarin (COUMADIN) 6 MG tablet Take by mouth daily at [facility] as directed (start with 1 tablet) 30 tablet 3    traZODone (DESYREL) 50 MG tablet Take 1 tablet by mouth nightly (Patient not taking: Reported on 1/6/2022) 30 tablet 0    acetaminophen (TYLENOL) 500 MG tablet Take 2 tablets by mouth every 6 hours (Patient not taking: Reported on 1/12/2022) 120 tablet 3    sennosides-docusate sodium (SENOKOT-S) 8.6-50 MG tablet Take 1 tablet by mouth 2 times daily (Patient not taking: Reported on 1/12/2022) 60 tablet 0    pantoprazole (PROTONIX) 40 MG tablet Take 1 tablet by mouth daily (Patient not taking: Reported on 1/12/2022) 30 tablet 0     No current facility-administered medications for this visit. REVIEW OF SYSTEMS:   CONSTITUTIONAL: + major weight loss, - fatigue, weakness, night sweats or fever. There's been no change in energy level, sleep pattern, or activity level. HEENT: No new vision difficulties or ringing in the ears. RESPIRATORY: No new SOB, PND, orthopnea or cough. CARDIOVASCULAR: See HPI  GI: No nausea, vomiting, diarrhea, constipation, abdominal pain or changes in bowel habits. : No urinary frequency, urgency, incontinence hematuria or dysuria. SKIN: No cyanosis or skin lesions. MUSCULOSKELETAL: No new muscle or joint pain. NEUROLOGICAL: No syncope or TIA-like symptoms.   PSYCHIATRIC: No anxiety, pain, insomnia or depression    Objective:   PHYSICAL EXAM:       Vitals:    01/12/22 0931 01/12/22 0957   BP: 110/80 116/68   Pulse: 65    SpO2: 100%    Weight: 166 lb (75.3 kg)    Height: 6' 1\" (1.854 m)         VITALS:  /80   Pulse 65   Ht 6' 1\" (1.854 m)   Wt 166 lb (75.3 kg)   SpO2 100%   BMI 21.90 kg/m²     CONSTITUTIONAL: Cooperative, no apparent distress, and appears well nourished / developed  NEUROLOGIC:  Awake and orientated to person, place and time.  PSYCH: Calm affect. SKIN: Warm and dry; sternal inc unremarkable, sternum stable. HEENT: Sclera non-icteric, normocephalic, neck supple, no elevation of JVP, normal carotid pulses with no bruits and thyroid normal size. LUNGS:  No increased work of breathing and clear to auscultation, no crackles or wheezing. CARDIOVASCULAR:  Regular rate 92 and rhythm with no murmurs, gallops, rubs, or abnormal heart sounds, normal PMI. The apical impulses not displaced. Heart tones are crisp and normal                                                                                            Cervical veins are not engorged                 JVP less than 8 cm H2O                                                                              The carotid upstroke is normal in amplitude and contour without delay or bruit    ABDOMEN:  Normal bowel sounds, non-distended and non-tender to palpation   EXT: No edema, no calf tenderness. Pulses are present bilaterally.     DATA:    Lab Results   Component Value Date    ALT 32 01/05/2022    AST 32 01/05/2022    ALKPHOS 314 (H) 01/05/2022    BILITOT 0.4 01/05/2022     Lab Results   Component Value Date    CREATININE 1.2 01/05/2022    BUN 22 (H) 01/05/2022     01/05/2022    K 4.8 01/05/2022     01/05/2022    CO2 24 01/05/2022     No results found for: TSH, Y3XYDNC, A5YTCSH, THYROIDAB  Lab Results   Component Value Date    WBC 5.9 01/05/2022    HGB 12.9 (L) 01/05/2022    HCT 38.8 (L) 01/05/2022    MCV 88.7 01/05/2022     01/05/2022     No components found for: CHLPL  Lab Results   Component Value Date    TRIG 67 01/05/2022    TRIG 65 12/03/2021     Lab Results   Component Value Date    HDL 62 (H) 01/05/2022    HDL 37 (L) 12/03/2021     Lab Results   Component Value Date    LDLCALC 63 01/05/2022    LDLCALC 72 12/03/2021     Lab Results   Component Value Date    LABVLDL 13 01/05/2022    LABVLDL 13 12/03/2021     Lab Results   Component Value Date    INR 2.6 01/12/2022    INR 3.30 01/04/2022    INR 1.90 12/27/2021    PROTIME 33.7 (H) 12/19/2021    PROTIME 35.4 (H) 12/18/2021    PROTIME 28.1 (H) 12/17/2021         Radiology Review:  Pertinent images / reports were reviewed as a part of this visit and reveals the following:    Angiogram: 12/3/2021  Dominance: Right     LM: 30% distal   LAD: large, arborizing, high first diagonal with 70% mid stenosis; LAD has a 95% stenosis after small, diffusely diseased second diagonal and is JOHANNA I flow to the apex   LCx: small, diffusely diseased that is subtotaled after small first marginal and receiving left to left collaterals to smaller second marginal/distal LCX  RCA: 90% proximal, 70% mid, 100% distal stenoses with left to right collaterals to RPDA      LVEDP: 23 mmHg  LVEF: 15% with severe global HK     ECHO: 12/2/2021   Left ventricular cavity size is mildly dilated.   Overall left ventricular systolic function appears severely reduced.   Ejection fraction is visually estimated to be 15-20%. Cannot exclude apical   thrombus as contrast not administered.  Lei Chi is severe diffuse global hypokinesis.   Grade III diastolic dysfunction.   Left atrium is mildly dilated.   The right ventricle is moderately dilated with moderately reduced function   by visual assessment.   Severe mitral regurgitation with single posteriorly directed jet.   Severe tricuspid regurgitation.    IVC size is dilated (>2.1 cm) and collapses < 50% with respiration   consistent with elevated RA pressure (15 mmHg).   Cannot estimate PASP given severe TR, but PASP elevated.     Procedure:  12/10/2021  CABG X 4 , CORONARY ARTERY BYPASS GRAFT STRONG TO LAD, VEIN GRAFT TO PDA, SVG SEQUENCED TO DIAG AND OM, LIGATION KATHARINE  ATRICLIP 45MM, EVH LEFT SAPHENOUS VEIN, ANTERIOR AND POSTERIOR CHORDAE SPARING MITRAL VALVE REPLACEMENT MEDTRONIC 29MM mechanical valve, TRICUSPID VALVE REPAIR with 28mm Physio tricuspid annuloplasty ring       12/14/2021 TTE   Left ventricular cavity size is moderately dilated with normal left   ventricular wall thickness.   Overall left ventricular systolic function appears severely reduced.   Ejection fraction is visually estimated to be 20%.   There is severe diffuse hypokinesis.   The right ventricle is mildly enlarged. Right ventricular systolic function   is severely reduced.   There is a 29 mm Medtronic mechanical valve well seated with a peak velocity   of 1.9 m/s and a mean gradient of 8 mmHg. No paravalvular leak noted.  Gerald Saldaña is a 28 mm Physio tricuspid annuloplasty ring well seated with a peak   velocity of 2.7 m/s and a mean gradient of 4 mmHg. Mild tricuspid   regurgitation with a PASP of 31 mmHg. Assessment:      Diagnosis Orders   1. Severe mitral regurgitation   ~s/p MVR mechanical prothesis   ~well seated on post-op TTE  ~AC transitioned over to coumadin clinic : INR therapeutic     2. Coronary artery disease involving native coronary artery of native heart without angina pectoris   ~s/p CABG   ~progressing : walks some. Quit smoking at admission    3. Severe tricuspid regurgitation   ~s/p repair with mild regurg post-op    4. Ischemic cardiomyopathy   ~severe global HK, EF 15% pre-op ; 30-35% intra-op. Echo repeated POD#4 d/t low SBP, EF 20%  ~neg for decompensation ; wt down nearly 30# from pre-op  ~lasix 40 mg daily / spironolactone 12.5 mg daily / digoxin 0.125 mg daily  ~BP did not support ace/ARB  ~Life-Vest recommended : insurance denied; peer-peer pending     5. Mixed hyperlipidemia   ~HDL high  ~atorvastatin       Patient  is stable since hospital discharge.     Plan:   Increase carvedilol to 6.25 mg bid : 3.125 mg in am with 6.25 mg in pm for three days then increase to 6.25 mg  bid thereafter   After one week, decrease lasix to 20 mg daily (wt loss); BUN/creatinine improving   Consider Entresto at next OV   F/U in 3-4 weeks    I have addressed the patient's cardiac risk factors and adjusted pharmacologic treatment as needed. In addition, I have reinforced the need for patient directed risk factor modification. Further evaluation will be based upon the patient's clinical course and testing results. All questions and concerns were addressed to the patient/family. Alternatives to  treatment were discussed. The patient  currently  is not smoking: not since surgery. The risks related to smoking were reviewed with the patient. Recommend maintaining a smoke-free lifestyle. Daily weight, low sodium diet were discussed. Patient instructed to call the office with a weight gain: > 3 # over night or 5# in one week; swelling, SOB/orthopnea/PND     Antiplatelet therapy / anti-coagulation has been recommended / prescribed for this patient. Education conducted on adverse reactions including bleeding was discussed. The patient verbalizes understanding. Pt is on a BB  Pt is not on an ace-i/ARB : BP did not support  Pt is on a statin      Saturated fat/SATHYA diet discussed : est fluid intake / day < 64 oz  Exercise program discussed : walking some  ~occupation :      Thank you for allowing to us to participate in the care of [facility]. 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

## 2022-01-19 ENCOUNTER — ANTI-COAG VISIT (OUTPATIENT)
Dept: PHARMACY | Age: 62
End: 2022-01-19
Payer: COMMERCIAL

## 2022-01-19 LAB — INTERNATIONAL NORMALIZATION RATIO, POC: 2.2

## 2022-01-19 PROCEDURE — 99212 OFFICE O/P EST SF 10 MIN: CPT

## 2022-01-19 PROCEDURE — 85610 PROTHROMBIN TIME: CPT

## 2022-01-19 NOTE — PROGRESS NOTES
Mr. Marylene Enter is a 63 y.o. y/o male with history of Heart Valve Replacement who presents today for anticoagulation monitoring and adjustment. 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  takes amio 200 mg qd (started 12/10/21)        Patient Reported Findings:  Yes     No  [x]   []       Patient verifies current dosing regimen as listed --> confirms 6 mg daily  []   [x]       S/S bleeding/bruising/swelling/SOB --> denies   []   [x]       Blood in urine or stool --> denies   []   [x]       Procedures scheduled in the future at this time --> denies   []   [x]       Missed Dose --> denies   []   [x]       Extra Dose --> denies   []   [x]       Change in medications --> pt started amiodarone 200 mg QD on 12/10/21, no other changes  []   [x]       Change in health/diet/appetite --> eats 4-5 servings of green/wk (spinanch, broccoli, brussels sprouts) --> no changes   []   [x]       Change in alcohol use  --> pt was a smoker but had not smoked since card surgery on 12/10/21, denies alcohol   []   [x]       Change in activity  []   [x]       Hospital admission  []   [x]       Emergency department visit  []   [x]       Other complaints --> pt travels a lot and does not know if weekly appts to start will work. Educated patient on the importance of monitoring warfarin closely while first starting on warfarin and with being on amiodarone. Warfarin was being managed by cards:   12/23 - 6 mg this daniella, 6 mg fri, 3 mg on Sat. 6 mg on Sunday 12/27- INR 1.9- 6 mg qd  12/30- dose adjusted to alternate 9 mg and 6 mg (starting with 6 mg)  1/4 INR 3.3- 6 mg qd    Clinical Outcomes:  Yes     No  []   [x]       Major bleeding event  []   [x]       Thromboembolic event    Duration of warfarin Therapy: indefinitely   INR Range:  2.5-3.5    INR is 2.2 today   Increase weekly dose to 9 mg on Sun and Wed and 6 mg all other days of the week (14% inc)  Encouraged to maintain a consistency of vegetables/salads.    Recheck INR in 2 weeks, 2/2    **consult form signed on 1/12/22**    Referring Dr. Ary Severs   INR (no units)   Date Value   01/12/2022 2.6   01/04/2022 3.30   12/27/2021 1.90   12/23/2021 1.90   12/21/2021 2.10       For Pharmacy Admin Tracking Only     Intervention Detail: Dose Adjustment: 1, reason: Therapy Optimization   Total # of Interventions Recommended: 1   Total # of Interventions Accepted: 1   Time Spent (min): 15

## 2022-01-21 ENCOUNTER — TELEPHONE (OUTPATIENT)
Dept: CARDIOLOGY CLINIC | Age: 62
End: 2022-01-21

## 2022-01-21 DIAGNOSIS — I25.5 ISCHEMIC CARDIOMYOPATHY: Primary | ICD-10-CM

## 2022-01-21 DIAGNOSIS — I34.0 SEVERE MITRAL REGURGITATION: ICD-10-CM

## 2022-01-21 NOTE — TELEPHONE ENCOUNTER
Per [facility] have Claire Course complete limited echocardiogram to evaluate EF since starting medication and revascularization.   Please schedule at [facility] office for sooner availability  TY

## 2022-01-24 NOTE — TELEPHONE ENCOUNTER
I spoke with pt and relayed message per [place] [facility]. Pt verbalized understanding. I advised I will have scheduling to reach out to him to schedule the appt.

## 2022-02-02 ENCOUNTER — ANTI-COAG VISIT (OUTPATIENT)
Dept: PHARMACY | Age: 62
End: 2022-02-02
Payer: COMMERCIAL

## 2022-02-02 ENCOUNTER — OFFICE VISIT (OUTPATIENT)
Dept: CARDIOLOGY CLINIC | Age: 62
End: 2022-02-02
Payer: COMMERCIAL

## 2022-02-02 ENCOUNTER — TELEPHONE (OUTPATIENT)
Dept: CARDIOLOGY CLINIC | Age: 62
End: 2022-02-02

## 2022-02-02 VITALS
BODY MASS INDEX: 23.19 KG/M2 | OXYGEN SATURATION: 98 % | WEIGHT: 175 LBS | HEART RATE: 80 BPM | DIASTOLIC BLOOD PRESSURE: 74 MMHG | SYSTOLIC BLOOD PRESSURE: 128 MMHG | HEIGHT: 73 IN

## 2022-02-02 DIAGNOSIS — Z95.2 HX OF ARTIFICIAL HEART VALVE REPLACEMENT: Primary | ICD-10-CM

## 2022-02-02 DIAGNOSIS — I25.10 CORONARY ARTERY DISEASE INVOLVING NATIVE CORONARY ARTERY OF NATIVE HEART WITHOUT ANGINA PECTORIS: ICD-10-CM

## 2022-02-02 DIAGNOSIS — I34.0 SEVERE MITRAL REGURGITATION: ICD-10-CM

## 2022-02-02 DIAGNOSIS — I25.5 ISCHEMIC CARDIOMYOPATHY: Primary | ICD-10-CM

## 2022-02-02 DIAGNOSIS — I07.1 SEVERE TRICUSPID REGURGITATION: ICD-10-CM

## 2022-02-02 DIAGNOSIS — E78.2 MIXED HYPERLIPIDEMIA: ICD-10-CM

## 2022-02-02 LAB — INTERNATIONAL NORMALIZATION RATIO, POC: 2.1

## 2022-02-02 PROCEDURE — 99212 OFFICE O/P EST SF 10 MIN: CPT

## 2022-02-02 PROCEDURE — 99214 OFFICE O/P EST MOD 30 MIN: CPT | Performed by: NURSE PRACTITIONER

## 2022-02-02 PROCEDURE — 85610 PROTHROMBIN TIME: CPT

## 2022-02-02 RX ORDER — CARVEDILOL 12.5 MG/1
12.5 TABLET ORAL 2 TIMES DAILY WITH MEALS
Qty: 60 TABLET | Refills: 3 | Status: SHIPPED | OUTPATIENT
Start: 2022-02-02

## 2022-02-02 NOTE — TELEPHONE ENCOUNTER
Pt needs echo & ov bnn in 4wks per stu. There is no echo openings nor Dr Florecita Zuleta openings not until April is it ok to wait that long? Please let me know.

## 2022-02-02 NOTE — PROGRESS NOTES
[address]     Outpatient Follow Up Note    Maddy Ly is 63 y.o. male who presents today with a history of CAD & valvular disease s/p CABG / MVR / TV repair / KATHARINE ligation 12/10/21; CM/EF 15-20% pre-op, 20% post-op and hyperlipidemia    CHIEF COMPLAINT / HPI:  Follow Up secondary to CM titrating carvedilol to 6.25 mg bid    Subjective:   He denies significant chest pain. There is no SOB/WEST. The patient denies orthopnea/PND. The patient does not have swelling. The patients weight now stable at 175#;  up 9#/3 weeks by office scales. The patient is not experiencing palpitations or dizziness. These symptoms are improving since the last OV. With regard to medication therapy the patient has been compliant with prescribed regimen. They have tolerated therapy to date.      Past Medical History:   Diagnosis Date    Diabetes ([facility] [address]     Hyperlipidemia     Hypertension      Social History:    Social History     Tobacco Use   Smoking Status Former Smoker    Packs/day: 1.00    Years: 15.00    Pack years: 15.00    Types: Cigarettes    Quit date: 12/10/2021    Years since quittin.1   Smokeless Tobacco Never Used     Current Medications:  Current Outpatient Medications   Medication Sig Dispense Refill    carvedilol (COREG) 6.25 MG tablet Take 1 tablet by mouth 2 times daily (with meals) Hold for SBP<90 or P<60 Administer with food to minimize the risk of orthostatic hypotension (Patient taking differently: Take 6.25 mg by mouth 2 times daily (with meals) ) 60 tablet 2    furosemide (LASIX) 40 MG tablet Take 0.5 tablets by mouth daily 60 tablet 3    aspirin 81 MG chewable tablet Take 1 tablet by mouth daily 30 tablet 3    atorvastatin (LIPITOR) 40 MG tablet Take 1 tablet by mouth nightly 30 tablet 3    digoxin (LANOXIN) 125 MCG tablet Take 1 tablet by mouth daily 30 tablet 3    spironolactone (ALDACTONE) 25 MG tablet Take 0.5 tablets by mouth daily 30 tablet 3    amiodarone (CORDARONE) 200 MG tablet Take 1 tablet by mouth daily 30 tablet 3    warfarin (COUMADIN) 6 MG tablet Take by mouth daily at [facility] as directed (start with 1 tablet) 30 tablet 3    traZODone (DESYREL) 50 MG tablet Take 1 tablet by mouth nightly (Patient not taking: Reported on 2/2/2022) 30 tablet 0    acetaminophen (TYLENOL) 500 MG tablet Take 2 tablets by mouth every 6 hours (Patient not taking: Reported on 2/2/2022) 120 tablet 3     No current facility-administered medications for this visit. REVIEW OF SYSTEMS:    CONSTITUTIONAL: No major weight gain or loss, fatigue, weakness, night sweats or fever. HEENT: No new vision difficulties or ringing in the ears. RESPIRATORY: No new SOB, PND, orthopnea or cough. CARDIOVASCULAR: See HPI  GI: No nausea, vomiting, diarrhea, constipation, abdominal pain or changes in bowel habits. : No urinary frequency, urgency, incontinence hematuria or dysuria. SKIN: No cyanosis or skin lesions. MUSCULOSKELETAL: No new muscle or joint pain. NEUROLOGICAL: No syncope or TIA-like symptoms. PSYCHIATRIC: No anxiety, pain, insomnia or depression    Objective:   PHYSICAL EXAM:        Vitals:    02/02/22 1451 02/02/22 1510   BP: 110/60 128/74   Site: Right Upper Arm Right Upper Arm   Position: Sitting    Cuff Size: Medium Adult Medium Adult   Pulse: 80    SpO2: 98%    Weight: 175 lb (79.4 kg)    Height: 6' 1\" (1.854 m)        VITALS:  /60 (Site: Right Upper Arm, Position: Sitting, Cuff Size: Medium Adult)   Pulse 80   Ht 6' 1\" (1.854 m)   Wt 175 lb (79.4 kg)   SpO2 98%   BMI 23.09 kg/m²   CONSTITUTIONAL: Cooperative, no apparent distress, and appears well nourished / developed  NEUROLOGIC:  Awake and orientated to person, place and time. PSYCH: Calm affect. SKIN: Warm and dry. HEENT: Sclera non-icteric, normocephalic, neck supple, no elevation of JVP, normal carotid pulses with no bruits and thyroid normal size.   LUNGS:  No increased work of breathing and clear to auscultation, no moderately dilated with normal left   ventricular wall thickness.   Overall left ventricular systolic function appears severely reduced.   Ejection fraction is visually estimated to be 20%.   There is severe diffuse hypokinesis.   The right ventricle is mildly enlarged. Right ventricular systolic function   is severely reduced.   There is a 29 mm Medtronic mechanical valve well seated with a peak velocity   of 1.9 m/s and a mean gradient of 8 mmHg. No paravalvular leak noted.  Satira Cones is a 28 mm Physio tricuspid annuloplasty ring well seated with a peak   velocity of 2.7 m/s and a mean gradient of 4 mmHg. Mild tricuspid   regurgitation with a PASP of 31 mmHg. Assessment:      Diagnosis Orders   1. Ischemic cardiomyopathy   ~tolerating increased dose of coreg  ~severe global HK, EF 15% pre-op ; 30-35% intra-op. Echo repeated POD#4 d/t low SBP, EF 20%  ~neg for decompensation ; wt down nearly 30# from pre-op and now stable  ~lasix 20 mg daily / spironolactone 12.5 mg daily / digoxin 0.125 mg daily  ~BP did not support ace/ARB  ~Life-Vest recommended : insurance denied : amiodarone prophylactic   Echo limited   2. 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 Severe mitral regurgitation   ~stable : s/p MVR mechanical prothesis  ~warfarin managed in coumadin clinic    4. Severe tricuspid regurgitation   ~s/p repair Dec '21    5. Mixed hyperlipidemia   ~controlled with high HDL  ~atorvastatin         I had the opportunity to review the clinical symptoms and presentation of Buck Dewey. Plan:     1. Increase carvedilol to 12.5 mg bid for continued drug titration   2. Limited echo as recommended (reassess LVF ; ins did not cover Life Vest)  3.  F/U in 4-5 weeks with Dr. Anjum Correia   ~consider introducing ace/ARB as GDMT    Overall the patient is stable from CV standpoint    I have addresed the patient's cardiac risk factors and adjusted pharmacologic treatment as needed. In addition, I have reinforced the need for patient directed risk factor modification. Further evaluation will be based upon the patient's clinical course and testing results. All questions and concerns were addressed to the patient/family. Alternatives to my treatment were discussed. The patient is not currently smoking. The risks related to smoking were reviewed with the patient. Recommend maintaining a smoke-free lifestyle. Patient is on a beta-blocker  Patient is not on an ace-i/ARB : BP did not support  Patient is on a statin    antiplatelet therapy / anti-coagulation has been recommended / prescribed for this patient. Education conducted on adverse reactions including bleeding was discussed. Daily weight, low sodium diet were discussed. Patient instructed to call the office with a weight gain: > 3 # over night or 5# in one week; swelling, SOB/orthopnea/PND    The patient verbalizes understanding not to stop medications without discussing with us. Discussed exercise: 30-60 minutes 7 days/week  Discussed Low saturated fat/SATHYA diet. Thank you for allowing to us to participate in the care of Herlinda Giang.     MICHELLE Alvarado    Documentation of today's visit sent to PCP

## 2022-02-02 NOTE — PROGRESS NOTES
did for the first week   Increase weekly dose to 9 mg on Sun and Wed and 6 mg all other days of the week (14% inc)  Encouraged to maintain a consistency of vegetables/salads.    Recheck INR in 2 weeks, 2/16    **consult form signed on 1/12/22**    Referring Dr. Cher Wilson   INR (no units)   Date Value   01/19/2022 2.2   01/12/2022 2.6   01/04/2022 3.30   12/27/2021 1.90   12/23/2021 1.90   12/21/2021 2.10       For Pharmacy Admin Tracking Only     Intervention Detail: Dose Adjustment: 1, reason: Therapy Optimization   Total # of Interventions Recommended: 1   Total # of Interventions Accepted: 1   Time Spent (min): 15

## 2022-02-02 NOTE — PATIENT INSTRUCTIONS
Of your current dose of carvedilol 6.25 mg , take one tablet in the morning and two tablets in the evening for three days : 2/2, 2/3, 2/4    ~On the fourth day, 2/5, increase to two tablets twice a day which equals 12.5 mg twice a day    ~Your new prescription is for the increased dose of 12.5 mg which you will take one tablet twice a day    Limited echocardiogram : relook at your heart's squeeze / improvement    appt in four weeks

## 2022-02-16 ENCOUNTER — ANTI-COAG VISIT (OUTPATIENT)
Dept: PHARMACY | Age: 62
End: 2022-02-16
Payer: COMMERCIAL

## 2022-02-16 DIAGNOSIS — Z95.2 HX OF ARTIFICIAL HEART VALVE REPLACEMENT: Primary | ICD-10-CM

## 2022-02-16 LAB — INTERNATIONAL NORMALIZATION RATIO, POC: 2.4

## 2022-02-16 PROCEDURE — 85610 PROTHROMBIN TIME: CPT

## 2022-02-16 PROCEDURE — 99211 OFF/OP EST MAY X REQ PHY/QHP: CPT

## 2022-02-16 NOTE — PROGRESS NOTES
Mr. Jhony Jackson is a 63 y.o. y/o male with history of Heart Valve Replacement who presents today for anticoagulation monitoring and adjustment. 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  takes amio 200 mg qd (started 12/10/21)        Patient Reported Findings:  Yes     No  []   [x]       Patient verifies current dosing regimen as listed --> confirms 6 mg daily --> took 9 mg on sun and wed and 6 mg all other days for one week then returned to 6 mg daily   []   [x]       S/S bleeding/bruising/swelling/SOB --> denies   []   [x]       Blood in urine or stool --> denies   []   [x]       Procedures scheduled in the future at this time --> denies   []   [x]       Missed Dose --> sun and wed of past week only took 6 mg --> denies   []   [x]       Extra Dose --> denies   [x]   []       Change in medications --> pt started amiodarone 200 mg QD on 12/10/21, no other changes --> coreg inc  []   [x]       Change in health/diet/appetite --> eats 4-5 servings of green/wk (spinanch, broccoli, brussels sprouts) --> no changes   []   [x]       Change in alcohol use  --> pt was a smoker but had not smoked since card surgery on 12/10/21, denies alcohol   []   [x]       Change in activity  []   [x]       Hospital admission  []   [x]       Emergency department visit  []   [x]       Other complaints --> pt travels a lot and does not know if weekly appts to start will work. Educated patient on the importance of monitoring warfarin closely while first starting on warfarin and with being on amiodarone. Warfarin was being managed by cards:   12/23 - 6 mg this daniella, 6 mg fri, 3 mg on Sat.  6 mg on Sunday 12/27- INR 1.9- 6 mg qd  12/30- dose adjusted to alternate 9 mg and 6 mg (starting with 6 mg)  1/4 INR 3.3- 6 mg qd    Clinical Outcomes:  Yes     No  []   [x]       Major bleeding event  []   [x]       Thromboembolic event    Duration of warfarin Therapy: indefinitely   INR Range:  2.5-3.5    INR is 2.4 today after inc dose at last appt and no other significant changes. Increase weekly dose to 9 mg on Mon, Wed, and Fri and 6 mg all other days of the week (6.2%)   Encouraged to maintain a consistency of vegetables/salads. RF called into [facility].    Recheck INR in 2 weeks, 3/2    **consult form signed on 1/12/22**    Referring Dr. Cher Wilson   INR (no units)   Date Value   02/16/2022 2.4   02/02/2022 2.1   01/19/2022 2.2   01/12/2022 2.6   01/04/2022 3.30   12/27/2021 1.90   12/23/2021 1.90   12/21/2021 2.10       For Pharmacy Admin Tracking Only     Intervention Detail: Dose Adjustment: 1, reason: Therapy Optimization and Refill(s) Provided   Total # of Interventions Recommended: 2   Total # of Interventions Accepted: 2   Time Spent (min): 15

## 2022-02-16 NOTE — TELEPHONE ENCOUNTER
Warfarin prescription phoned into [facility] to [address] [facility] under Dr. Kervin Celis   Warfarin 6 mg tabs  Take 9 mg every Mon, Wed, Fri; 6 mg all other days    90 days   2 refills  Bryanna Huff, PharmD  Pharmacy Resident

## 2022-03-02 ENCOUNTER — ANTI-COAG VISIT (OUTPATIENT)
Dept: PHARMACY | Age: 62
End: 2022-03-02
Payer: COMMERCIAL

## 2022-03-02 DIAGNOSIS — Z95.2 HX OF ARTIFICIAL HEART VALVE REPLACEMENT: Primary | ICD-10-CM

## 2022-03-02 LAB — INTERNATIONAL NORMALIZATION RATIO, POC: 2.2

## 2022-03-02 PROCEDURE — 85610 PROTHROMBIN TIME: CPT

## 2022-03-02 PROCEDURE — 99212 OFFICE O/P EST SF 10 MIN: CPT

## 2022-03-02 NOTE — PROGRESS NOTES
Mr. Talon Julien is a 63 y.o. y/o male with history of Heart Valve Replacement who presents today for anticoagulation monitoring and adjustment. 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  takes amio 200 mg qd (started 12/10/21)        Patient Reported Findings:  Yes     No  []   [x]       Patient verifies current dosing regimen as listed --> confirms 6 mg daily --> took 9 mg on sun and wed and 6 mg all other days for one week then returned to 6 mg daily ---> confirmed   []   [x]       S/S bleeding/bruising/swelling/SOB --> denies   []   [x]       Blood in urine or stool --> denies   []   [x]       Procedures scheduled in the future at this time --> denies   []   [x]       Missed Dose --> sun and wed of past week only took 6 mg --> denies---> not sure but may have forgotten last night's dose    []   [x]       Extra Dose --> denies   [x]   []       Change in medications --> pt started amiodarone 200 mg QD on 12/10/21, no other changes --> coreg inc---> no changes   []   [x]       Change in health/diet/appetite --> eats 4-5 servings of green/wk (spinanch, broccoli, brussels sprouts) --> no changes---> maybe less greens, no NVD   []   [x]       Change in alcohol use  --> pt was a smoker but had not smoked since card surgery on 12/10/21, denies alcohol   []   [x]       Change in activity  []   [x]       Hospital admission  []   [x]       Emergency department visit  []   [x]       Other complaints --> pt travels a lot and does not know if weekly appts to start will work. Educated patient on the importance of monitoring warfarin closely while first starting on warfarin and with being on amiodarone. Warfarin was being managed by cards:   12/23 - 6 mg this daniella, 6 mg fri, 3 mg on Sat.  6 mg on Sunday 12/27- INR 1.9- 6 mg qd  12/30- dose adjusted to alternate 9 mg and 6 mg (starting with 6 mg)  1/4 INR 3.3- 6 mg qd    Clinical Outcomes:  Yes     No  []   [x]       Major bleeding event  []   [x]       Thromboembolic event    Duration of warfarin Therapy: indefinitely   INR Range:  2.5-3.5    INR is 2.2 today despite dose increase at last visit but pt thinks he could have missed last night's dose. Will boost and continue but if low at next visit increase again. Take 12mg tonight then continue taking dose of 9 mg on Mon, Wed, and Fri and 6 mg all other days of the week. Encouraged to maintain a consistency of vegetables/salads.    Recheck INR in 2 weeks, 3/16    **consult form signed on 22**    Referring Dr. Khan Console   INR (no units)   Date Value   2022 2.2   2022 2.4   2022 2.1   2022 2.2   2022 3.30   2021 1.90   2021 1.90   2021 2.10       For Pharmacy Admin Tracking Only     Intervention Detail: Adherence Monitorin and Dose Adjustment: 1, reason: Therapy Optimization   Total # of Interventions Recommended: 2   Total # of Interventions Accepted: 2   Time Spent (min): 15

## 2022-03-09 ENCOUNTER — TELEPHONE (OUTPATIENT)
Dept: CARDIOLOGY CLINIC | Age: 62
End: 2022-03-09

## 2022-03-09 NOTE — TELEPHONE ENCOUNTER
Frederick with [employer] [facility] called stating the pt was approved for the Home INR Device, however he cannot reach the     called the pt. LM for pt to call [phone] option 1      It staff has any questions call Katerina Bonner at [phone] do not give this number to the pt it is for staff only.

## 2022-03-16 ENCOUNTER — ANTI-COAG VISIT (OUTPATIENT)
Dept: PHARMACY | Age: 62
End: 2022-03-16
Payer: COMMERCIAL

## 2022-03-16 DIAGNOSIS — Z95.2 HX OF ARTIFICIAL HEART VALVE REPLACEMENT: Primary | ICD-10-CM

## 2022-03-16 LAB — INTERNATIONAL NORMALIZATION RATIO, POC: 2.7

## 2022-03-16 PROCEDURE — 99211 OFF/OP EST MAY X REQ PHY/QHP: CPT

## 2022-03-16 PROCEDURE — 85610 PROTHROMBIN TIME: CPT

## 2022-03-16 NOTE — PROGRESS NOTES
Mr. Fermin Orta is a 63 y.o. y/o male with history of Heart Valve Replacement who presents today for anticoagulation monitoring and adjustment. 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  takes amio 200 mg qd (started 12/10/21)        Patient Reported Findings:  Yes     No  []   [x]       Patient verifies current dosing regimen as listed --> confirms 6 mg daily --> took 9 mg on sun and wed and 6 mg all other days for one week then returned to 6 mg daily ---> confirmed   []   [x]       S/S bleeding/bruising/swelling/SOB --> denies---> a nosebleed but better now   []   [x]       Blood in urine or stool --> denies   []   [x]       Procedures scheduled in the future at this time --> denies   []   [x]       Missed Dose --> sun and wed of past week only took 6 mg --> denies---> not sure but may have forgotten last night's dose---> denies     []   [x]       Extra Dose --> denies   [x]   []       Change in medications --> pt started amiodarone 200 mg QD on 12/10/21, no other changes --> coreg inc---> no changes   []   [x]       Change in health/diet/appetite --> eats 4-5 servings of green/wk (spinanch, broccoli, brussels sprouts) --> no changes---> maybe less greens, no NVD---> no changes, no NVD   []   [x]       Change in alcohol use  --> pt was a smoker but had not smoked since card surgery on 12/10/21, denies alcohol   []   [x]       Change in activity  []   [x]       Hospital admission  []   [x]       Emergency department visit  []   [x]       Other complaints --> pt travels a lot and does not know if weekly appts to start will work. Educated patient on the importance of monitoring warfarin closely while first starting on warfarin and with being on amiodarone. Warfarin was being managed by cards:   12/23 - 6 mg this daniella, 6 mg fri, 3 mg on Sat.  6 mg on Sunday 12/27- INR 1.9- 6 mg qd  12/30- dose adjusted to alternate 9 mg and 6 mg (starting with 6 mg)  1/4 INR 3.3- 6 mg qd    Clinical Outcomes:  Yes     No  [] [x]       Major bleeding event  []   [x]       Thromboembolic event    Duration of warfarin Therapy: indefinitely   INR Range:  2.5-3.5    INR is 2.7 today   Continue taking dose of 9 mg on Mon, Wed, and Fri and 6 mg all other days of the week. Encouraged to maintain a consistency of vegetables/salads.    Recheck INR in 4 weeks, 4/13 per request    **consult form signed on 1/12/22**    Referring Dr. Dakota Enriquez   INR (no units)   Date Value   03/16/2022 2.7   03/02/2022 2.2   02/16/2022 2.4   02/02/2022 2.1   01/04/2022 3.30   12/27/2021 1.90   12/23/2021 1.90   12/21/2021 2.10       For Pharmacy Admin Tracking Only     Total # of Interventions Recommended: 0   Total # of Interventions Accepted: 0   Time Spent (min): 15

## 2022-04-13 ENCOUNTER — TELEPHONE (OUTPATIENT)
Dept: PHARMACY | Age: 62
End: 2022-04-13

## 2022-04-20 NOTE — LETTER
[facility]  [address] [id number]  Phone: [phone]  Fax: [phone] [facility], [facility]        April 20, 2022     Danilo [place]      Dear Mr. Vazquez Balbir: We missed seeing you for a scheduled appointment with the anticoagulation clinic on 4/13/2022. We're sorry you were unable to keep your appointment and hope that you are doing well. We ask that you please call 24 hours in advance if you are unable to make your appointment, so that we can give that time to another patient in need. We care about you and the management of your healthcare and want to make sure that you follow up as recommended. To provide quality care and timely appointments to all our patients, you may be dismissed from the practice if you do not show for three (3) scheduled appointments within a 12-month period. We would like to continue treating your healthcare needs. Please call the office to reschedule your appointment, if needed.      Sincerely,        Dane Yadav RPH         CC: Farnaz Comer MD

## 2022-04-26 RX ORDER — AMIODARONE HYDROCHLORIDE 200 MG/1
TABLET ORAL
Qty: 30 TABLET | Refills: 3 | OUTPATIENT
Start: 2022-04-26

## 2022-04-26 RX ORDER — DIGOXIN 125 MCG
125 TABLET ORAL DAILY
Qty: 30 TABLET | Refills: 3 | OUTPATIENT
Start: 2022-04-26

## 2022-04-28 RX ORDER — DIGOXIN 125 MCG
125 TABLET ORAL DAILY
Qty: 30 TABLET | Refills: 3 | Status: SHIPPED | OUTPATIENT
Start: 2022-04-28

## 2022-04-28 RX ORDER — AMIODARONE HYDROCHLORIDE 200 MG/1
200 TABLET ORAL DAILY
Qty: 30 TABLET | Refills: 3 | Status: SHIPPED | OUTPATIENT
Start: 2022-04-28

## 2022-05-05 NOTE — PROGRESS NOTES
He has not shown to the Coumadin Clinic twice and has a mechanical Mi valve. Last seen by [facility] in 2/2022.   TY

## 2022-05-16 ENCOUNTER — TELEPHONE (OUTPATIENT)
Dept: CARDIOLOGY CLINIC | Age: 62
End: 2022-05-16

## 2022-05-16 NOTE — TELEPHONE ENCOUNTER
Called cardiology and spoke [place]. Explained patient's non-compliance with clinic appts. She will inform Dr. Belgica Marquez and see what she would like to do.

## 2022-05-16 NOTE — TELEPHONE ENCOUNTER
[facility] made aware.  There have been attempts to contact patient in the past. Please send patient a letter advising him to follow up at the coumadin clinic

## 2022-05-16 NOTE — TELEPHONE ENCOUNTER
ROGELIO  [facility] called stating they have made several attempts to contact the pt since March and have not been able to reach the pt.     Pls advise thank you

## 2022-05-16 NOTE — LETTER
[address] [place] [address] Cardiology [place] [address] [phone] [phone]  Phone: [phone]  Fax: [phone]    Pedro Blake DO        May 16, 2022     Patient: Damien Shrestha   YOB: 1960   Date of Visit: 5/16/2022       To Whom It May Concern:     Jeferson Edwards :  Several attempts have been made to contact you about warfarin management at the coumadin clinic. They have been unsuccessful. Please contact the coumadin clinic for a follow visit. Noncomplainance may result in dismissal from clinic. If you have any questions or concerns, please don't hesitate to call.     Sincerely,        Pedro Blake DO

## 2022-06-06 ENCOUNTER — ANTI-COAG VISIT (OUTPATIENT)
Dept: PHARMACY | Age: 62
End: 2022-06-06
Payer: COMMERCIAL

## 2022-06-06 DIAGNOSIS — Z95.2 HX OF ARTIFICIAL HEART VALVE REPLACEMENT: Primary | ICD-10-CM

## 2022-06-06 LAB — INTERNATIONAL NORMALIZATION RATIO, POC: 1.4

## 2022-06-06 PROCEDURE — 99212 OFFICE O/P EST SF 10 MIN: CPT

## 2022-06-06 PROCEDURE — 85610 PROTHROMBIN TIME: CPT

## 2022-06-06 NOTE — PROGRESS NOTES
Mr. Cynthia Dias is a 63 y.o. y/o male with history of Heart Valve Replacement who presents today for anticoagulation monitoring and adjustment. 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  takes amio 200 mg qd (started 12/10/21)    Patient Reported Findings:  Yes     No  []   [x]       Patient verifies current dosing regimen as listed --> confirms 6 mg daily --> took 9 mg on sun and wed and 6 mg all other days for one week then returned to 6 mg daily ---> confirmed dose but unclear what he is taking  []   [x]       S/S bleeding/bruising/swelling/SOB --> denies   []   [x]       Blood in urine or stool --> denies   []   [x]       Procedures scheduled in the future at this time --> denies   []   [x]       Missed Dose --> sun and wed of past week only took 6 mg --> denies---> not sure but may have forgotten last night's dose---> denies ---> unclear     []   [x]       Extra Dose --> denies   [x]   []       Change in medications --> pt started amiodarone 200 mg QD on 12/10/21, no other changes --> coreg inc---> no changes   []   [x]       Change in health/diet/appetite --> eats 4-5 servings of green/wk (spinanch, broccoli, brussels sprouts) --> no changes---> maybe less greens, no NVD---> no changes, no NVD--->spinach daily, no NVD   []   [x]       Change in alcohol use  --> pt was a smoker but had not smoked since card surgery on 12/10/21, denies alcohol   []   [x]       Change in activity  []   [x]       Hospital admission  []   [x]       Emergency department visit  []   [x]       Other complaints --> pt travels a lot and does not know if weekly appts to start will work. Educated patient on the importance of monitoring warfarin closely while first starting on warfarin and with being on amiodarone. Warfarin was being managed by cards:   12/23 - 6 mg this daniella, 6 mg fri, 3 mg on Sat.  6 mg on Sunday 12/27- INR 1.9- 6 mg qd  12/30- dose adjusted to alternate 9 mg and 6 mg (starting with 6 mg)  1/4 INR 3.3- 6 mg qd    Clinical Outcomes:  Yes     No  []   [x]       Major bleeding event  []   [x]       Thromboembolic event    Duration of warfarin Therapy: indefinitely   INR Range:  2.5-3.5     Was due back . Non-compliant. INR is 1.4 today dt spinach daily and unclear if pt has been taking dose appropriately or what he is taking since he is non-compliant. Take 12mg tonight then continue taking dose of 9 mg on Mon, Wed, and Fri and 6 mg all other days of the week. Encouraged to maintain a consistency of vegetables/salads. Recheck INR in 1 week,   Explained risks of INR being this low and not being monitored. Pt presented with someone today (wife?) who took notes on her phone. Pt still resistant to taking warfarin/coming for apts.      **consult form signed on 22**    Referring Dr. Beatriz Ambrose   INR (no units)   Date Value   2022 2.7   2022 2.2   2022 2.4   2022 2.1   2022 3.30   2021 1.90   2021 1.90   2021 2.10     For Pharmacy Admin Tracking Only     Intervention Detail: Adherence Monitorin and Dose Adjustment: 1, reason: Therapy Optimization   Total # of Interventions Recommended: 2   Total # of Interventions Accepted: 2   Time Spent (min): 15

## 2022-06-14 ENCOUNTER — ANTI-COAG VISIT (OUTPATIENT)
Dept: PHARMACY | Age: 62
End: 2022-06-14
Payer: COMMERCIAL

## 2022-06-14 DIAGNOSIS — Z95.2 HX OF ARTIFICIAL HEART VALVE REPLACEMENT: Primary | ICD-10-CM

## 2022-06-14 LAB — INTERNATIONAL NORMALIZATION RATIO, POC: 1.4

## 2022-06-14 PROCEDURE — 99211 OFF/OP EST MAY X REQ PHY/QHP: CPT

## 2022-06-14 PROCEDURE — 85610 PROTHROMBIN TIME: CPT

## 2022-06-14 PROCEDURE — 99212 OFFICE O/P EST SF 10 MIN: CPT

## 2022-06-14 NOTE — PROGRESS NOTES
Mr. Barber Carroll is a 63 y.o. y/o male with history of Heart Valve Replacement who presents today for anticoagulation monitoring and adjustment. 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  takes amio 200 mg qd (started 12/10/21)    Patient Reported Findings:  Yes     No  []   [x]       Patient verifies current dosing regimen as listed --> confirms 6 mg daily --> took 9 mg on sun and wed and 6 mg all other days for one week then returned to 6 mg daily ---> confirmed dose but unclear what he is taking  []   [x]       S/S bleeding/bruising/swelling/SOB --> denies   []   [x]       Blood in urine or stool --> denies   []   [x]       Procedures scheduled in the future at this time --> denies   []   [x]       Missed Dose --> sun and wed of past week only took 6 mg --> denies---> not sure but may have forgotten last night's dose---> denies ---> unclear     []   [x]       Extra Dose --> denies   [x]   []       Change in medications --> pt started amiodarone 200 mg QD on 12/10/21, no other changes --> coreg inc---> no changes   []   [x]       Change in health/diet/appetite --> eats 4-5 servings of green/wk (spinanch, broccoli, brussels sprouts) --> no changes---> maybe less greens, no NVD---> no changes, no NVD--->spinach daily, no NVD --> no greens, no NVD   []   [x]       Change in alcohol use  --> pt was a smoker but had not smoked since card surgery on 12/10/21, denies alcohol   []   [x]       Change in activity  []   [x]       Hospital admission  []   [x]       Emergency department visit  []   [x]       Other complaints --> pt travels a lot and does not know if weekly appts to start will work. Educated patient on the importance of monitoring warfarin closely while first starting on warfarin and with being on amiodarone. Warfarin was being managed by cards:   12/23 - 6 mg this eve, 6 mg fri, 3 mg on Sat.  6 mg on Sunday 12/27- INR 1.9- 6 mg qd  12/30- dose adjusted to alternate 9 mg and 6 mg (starting with 6 mg)  1/4 INR 3.3- 6 mg qd    Clinical Outcomes:  Yes     No  []   [x]       Major bleeding event  []   [x]       Thromboembolic event    Duration of warfarin Therapy: indefinitely   INR Range:  2.5-3.5     Was due back 4/13. Non-compliant. INR is 1.4 today again after boosted dose last week. Patient states no greens over the last week. Will boost dose again and increase dose. Take 12mg tonight then increase dose to 6mg on Tues and Sat and 9mg all other days   Encouraged to maintain a consistency of vegetables/salads. Recheck INR in 1 week, 6/21  Explained risks of INR being this low and not being monitored. Pt presented with someone today (wife?) who took notes on her phone. Pt still resistant to taking warfarin/coming for apts.      **consult form signed on 1/12/22**    Referring Dr. Brina Herman   INR (no units)   Date Value   01/04/2022 3.30   12/27/2021 1.90   12/23/2021 1.90   12/21/2021 2.10     INR,(POC) (no units)   Date Value   06/06/2022 1.4   03/16/2022 2.7   03/02/2022 2.2   02/16/2022 2.4     For Pharmacy Admin Tracking Only     Intervention Detail: Dose Adjustment: 1, reason: Therapy Optimization   Total # of Interventions Recommended: 1   Total # of Interventions Accepted: 1   Time Spent (min): 15

## 2022-06-21 ENCOUNTER — ANTI-COAG VISIT (OUTPATIENT)
Dept: PHARMACY | Age: 62
End: 2022-06-21
Payer: COMMERCIAL

## 2022-06-21 DIAGNOSIS — Z95.2 HX OF ARTIFICIAL HEART VALVE REPLACEMENT: Primary | ICD-10-CM

## 2022-06-21 LAB — INTERNATIONAL NORMALIZATION RATIO, POC: 1.4

## 2022-06-21 PROCEDURE — 85610 PROTHROMBIN TIME: CPT

## 2022-06-21 PROCEDURE — 99212 OFFICE O/P EST SF 10 MIN: CPT

## 2022-06-21 NOTE — PROGRESS NOTES
Mr. Oscar Joiner is a 63 y.o. y/o male with history of Heart Valve Replacement who presents today for anticoagulation monitoring and adjustment. 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  takes amio 200 mg qd (started 12/10/21)    Patient Reported Findings:  Yes     No  []   [x]       Patient verifies current dosing regimen as listed --> confirms 6 mg daily --> took 9 mg on sun and wed and 6 mg all other days for one week then returned to 6 mg daily ---> confirmed dose but unclear what he is taking --> states used avs to take doses, unclear dose   []   [x]       S/S bleeding/bruising/swelling/SOB --> denies   []   [x]       Blood in urine or stool --> denies   []   [x]       Procedures scheduled in the future at this time --> denies   []   [x]       Missed Dose --> sun and wed of past week only took 6 mg --> denies---> not sure but may have forgotten last night's dose---> denies ---> unclear --> denies    []   [x]       Extra Dose --> denies   [x]   [x]       Change in medications --> pt started amiodarone 200 mg QD on 12/10/21, no other changes --> coreg inc---> no changes   []   [x]       Change in health/diet/appetite --> eats 4-5 servings of green/wk (spinanch, broccoli, brussels sprouts) --> no changes---> maybe less greens, no NVD--->spinach daily, no NVD --> no greens, no NVD ---> no changes, no NVD  []   [x]       Change in alcohol use  --> pt was a smoker but had not smoked since card surgery on 12/10/21, denies alcohol   []   [x]       Change in activity  []   [x]       Hospital admission  []   [x]       Emergency department visit  []   [x]       Other complaints --> pt travels a lot and does not know if weekly appts to start will work. Educated patient on the importance of monitoring warfarin closely while first starting on warfarin and with being on amiodarone. Warfarin was being managed by cards:   12/23 - 6 mg this daniella, 6 mg fri, 3 mg on Sat.  6 mg on Sunday 12/27- INR 1.9- 6 mg qd  12/30- dose adjusted to alternate 9 mg and 6 mg (starting with 6 mg)  1/4 INR 3.3- 6 mg qd    Clinical Outcomes:  Yes     No  []   [x]       Major bleeding event  []   [x]       Thromboembolic event    Duration of warfarin Therapy: indefinitely   INR Range:  2.5-3.5     INR is 1.4 today again after boosted dose and increased dose last week. Discussed INR not moving despite continuing to increase weekly dose   Take 12mg tonight and tomorrow then increase dose to 12 mg on Sun and 9 mg all other days of the week (15% inc)  Encouraged to maintain a consistency of vegetables/salads. Refilled warfarin at op pharmacy   Recheck INR in 1 week, 6/28    Explained risks of INR being this low and not being monitored. Pt presented with someone today (wife?) who took notes on her phone. Pt still resistant to taking warfarin/coming for apts.      **consult form signed on 1/12/22**    Referring Dr. Keeley Cortez   INR (no units)   Date Value   01/04/2022 3.30   12/27/2021 1.90   12/23/2021 1.90   12/21/2021 2.10     INR,(POC) (no units)   Date Value   06/14/2022 1.4   06/06/2022 1.4   03/16/2022 2.7   03/02/2022 2.2     For Pharmacy Admin Tracking Only     Intervention Detail: Dose Adjustment: 1, reason: Therapy Optimization and Refill(s) Provided   Total # of Interventions Recommended: 2   Total # of Interventions Accepted: 2   Time Spent (min): 15

## 2022-06-28 ENCOUNTER — ANTI-COAG VISIT (OUTPATIENT)
Dept: PHARMACY | Age: 62
End: 2022-06-28
Payer: COMMERCIAL

## 2022-06-28 DIAGNOSIS — Z95.2 HX OF ARTIFICIAL HEART VALVE REPLACEMENT: Primary | ICD-10-CM

## 2022-06-28 LAB — INTERNATIONAL NORMALIZATION RATIO, POC: 2

## 2022-06-28 PROCEDURE — 99212 OFFICE O/P EST SF 10 MIN: CPT

## 2022-06-28 PROCEDURE — 85610 PROTHROMBIN TIME: CPT

## 2022-06-28 NOTE — PROGRESS NOTES
is 2 today again after boosted dose and increased dose last week. Increase dose to 9 mg on Mon, Wed and Fri and 12 mg all other days of the week  (10% inc)  Encouraged to maintain a consistency of vegetables/salads. Recheck INR in 2 weeks, 7/12    Explained risks of INR being this low and not being monitored. Pt presented with someone today (wife?) who took notes on her phone. Pt still resistant to taking warfarin/coming for apts.      **consult form signed on 1/12/22**    Referring Dr. Gabi Ellis   INR (no units)   Date Value   01/04/2022 3.30   12/27/2021 1.90   12/23/2021 1.90   12/21/2021 2.10     INR,(POC) (no units)   Date Value   06/21/2022 1.4   06/14/2022 1.4   06/06/2022 1.4   03/16/2022 2.7     For Pharmacy Admin Tracking Only     Intervention Detail: Dose Adjustment: 1, reason: Therapy Optimization   Total # of Interventions Recommended: 1   Total # of Interventions Accepted: 1   Time Spent (min): 15

## 2022-07-12 ENCOUNTER — ANTI-COAG VISIT (OUTPATIENT)
Dept: PHARMACY | Age: 62
End: 2022-07-12
Payer: COMMERCIAL

## 2022-07-12 DIAGNOSIS — Z95.2 HX OF ARTIFICIAL HEART VALVE REPLACEMENT: Primary | ICD-10-CM

## 2022-07-12 LAB — INTERNATIONAL NORMALIZATION RATIO, POC: 3.2

## 2022-07-12 PROCEDURE — 99211 OFF/OP EST MAY X REQ PHY/QHP: CPT

## 2022-07-12 PROCEDURE — 85610 PROTHROMBIN TIME: CPT

## 2022-07-12 NOTE — PROGRESS NOTES
Mr. Stephani Banks is a 63 y.o. y/o male with history of Heart Valve Replacement who presents today for anticoagulation monitoring and adjustment. 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  takes amio 200 mg qd (started 12/10/21)    Patient Reported Findings:  Yes     No  []   [x]       Patient verifies current dosing regimen as listed --> confirms 6 mg daily --> took 9 mg on sun and wed and 6 mg all other days for one week then returned to 6 mg daily ---> confirmed dose but unclear what he is taking --> states used avs to take doses, unclear dose   []   [x]       S/S bleeding/bruising/swelling/SOB --> denies   []   [x]       Blood in urine or stool --> denies   []   [x]       Procedures scheduled in the future at this time --> denies   []   [x]       Missed Dose --> denies     []   [x]       Extra Dose --> denies   []   [x]       Change in medications --> pt started amiodarone 200 mg QD on 12/10/21, no other changes --> coreg inc---> no changes   []   [x]       Change in health/diet/appetite --> eats 4-5 servings of green/wk (spinanch, broccoli, brussels sprouts) --> no changes---> maybe less greens, no NVD--->spinach daily, no NVD --> no greens, no NVD ---> no changes, no NVD  []   [x]       Change in alcohol use  --> pt was a smoker but had not smoked since card surgery on 12/10/21, denies alcohol   []   [x]       Change in activity  []   [x]       Hospital admission  []   [x]       Emergency department visit  []   [x]       Other complaints --> pt travels a lot and does not know if weekly appts to start will work. Warfarin was being managed by cards:   12/23 - 6 mg this daniella, 6 mg fri, 3 mg on Sat.  6 mg on Sunday 12/27- INR 1.9- 6 mg qd  12/30- dose adjusted to alternate 9 mg and 6 mg (starting with 6 mg)  1/4 INR 3.3- 6 mg qd    Clinical Outcomes:  Yes     No  []   [x]       Major bleeding event  []   [x]       Thromboembolic event    Duration of warfarin Therapy: indefinitely   INR Range:  2.5-3.5     INR is 3.2 today  Continue dose of 9 mg on Mon, Wed and Fri and 12 mg all other days of the week   Encouraged to maintain a consistency of vegetables/salads. Recheck INR in 2 weeks, 7/26    Explained risks of INR being this low and not being monitored. Pt presented with someone today (wife?) who took notes on her phone. Pt still resistant to taking warfarin/coming for apts.      **consult form signed on 1/12/22**    Referring Dr. Germán Solares   INR (no units)   Date Value   01/04/2022 3.30   12/27/2021 1.90   12/23/2021 1.90   12/21/2021 2.10     INR,(POC) (no units)   Date Value   06/28/2022 2.0   06/21/2022 1.4   06/14/2022 1.4   06/06/2022 1.4     For Pharmacy Admin Tracking Only     Total # of Interventions Recommended: 0   Total # of Interventions Accepted: 0   Time Spent (min): 15

## 2022-07-26 ENCOUNTER — TELEPHONE (OUTPATIENT)
Dept: PHARMACY | Age: 62
End: 2022-07-26

## 2022-09-15 ENCOUNTER — TELEPHONE (OUTPATIENT)
Dept: CARDIOLOGY CLINIC | Age: 62
End: 2022-09-15

## 2022-09-15 NOTE — TELEPHONE ENCOUNTER
Salma called to inform the office that the Pt is non-compliance and will be dismiss in 7 days. Caleb Mcbride is requesting that the office call the Pt to see if he will call to schedule an appt with the [facility] office.   Please advise

## 2022-09-15 NOTE — TELEPHONE ENCOUNTER
LMOM for pt to call back for message from [facility] at the [address] [place] [address].

## 2022-09-26 NOTE — TELEPHONE ENCOUNTER
Tried to reach patient x2. No answer.   30 day discharge notice sent to patient Via [employer]:  [id number]

## 2022-09-29 NOTE — TELEPHONE ENCOUNTER
Call placed to patient who was not available for message below. Lmor to call the office for Urgent Message.

## 2022-10-31 ENCOUNTER — ANTI-COAG VISIT (OUTPATIENT)
Dept: PHARMACY | Age: 62
End: 2022-10-31

## 2022-10-31 PROBLEM — Z95.2 HX OF ARTIFICIAL HEART VALVE REPLACEMENT: Status: RESOLVED | Noted: 2022-01-05 | Resolved: 2022-10-31

## 2022-12-23 NOTE — TELEPHONE ENCOUNTER
1st no show letter sent. Kailey Leyva is a 46 

## 2023-01-25 ENCOUNTER — TELEPHONE (OUTPATIENT)
Dept: CARDIOLOGY CLINIC | Age: 63
End: 2023-01-25

## 2023-02-19 ENCOUNTER — HOSPITAL ENCOUNTER (INPATIENT)
Age: 63
LOS: 3 days | Discharge: HOME HEALTH | DRG: 280 | End: 2023-02-22
Attending: EMERGENCY MEDICINE | Admitting: INTERNAL MEDICINE
Payer: COMMERCIAL

## 2023-02-19 ENCOUNTER — APPOINTMENT (OUTPATIENT)
Dept: GENERAL RADIOLOGY | Age: 63
DRG: 280 | End: 2023-02-19
Payer: COMMERCIAL

## 2023-02-19 DIAGNOSIS — R79.1 SUBTHERAPEUTIC INTERNATIONAL NORMALIZED RATIO (INR): ICD-10-CM

## 2023-02-19 DIAGNOSIS — R60.9 EDEMA, UNSPECIFIED TYPE: ICD-10-CM

## 2023-02-19 DIAGNOSIS — R77.8 ELEVATED TROPONIN: ICD-10-CM

## 2023-02-19 DIAGNOSIS — Z91.14 NON COMPLIANCE W MEDICATION REGIMEN: ICD-10-CM

## 2023-02-19 DIAGNOSIS — I50.9 ACUTE ON CHRONIC CONGESTIVE HEART FAILURE, UNSPECIFIED HEART FAILURE TYPE (HCC): ICD-10-CM

## 2023-02-19 DIAGNOSIS — E11.10 DIABETIC KETOACIDOSIS WITHOUT COMA ASSOCIATED WITH TYPE 2 DIABETES MELLITUS (HCC): Primary | ICD-10-CM

## 2023-02-19 PROBLEM — I21.4 NSTEMI (NON-ST ELEVATED MYOCARDIAL INFARCTION) (HCC): Status: ACTIVE | Noted: 2023-02-19

## 2023-02-19 LAB
A/G RATIO: 1.3 (ref 1.1–2.2)
ALBUMIN SERPL-MCNC: 3.8 G/DL (ref 3.4–5)
ALP BLD-CCNC: 301 U/L (ref 40–129)
ALT SERPL-CCNC: 64 U/L (ref 10–40)
ANION GAP SERPL CALCULATED.3IONS-SCNC: 17 MMOL/L (ref 3–16)
AST SERPL-CCNC: 52 U/L (ref 15–37)
BASE EXCESS VENOUS: -6.3 MMOL/L (ref -3–3)
BASOPHILS ABSOLUTE: 0 K/UL (ref 0–0.2)
BASOPHILS RELATIVE PERCENT: 0.4 %
BETA-HYDROXYBUTYRATE: 3.1 MMOL/L (ref 0–0.27)
BILIRUB SERPL-MCNC: 3.2 MG/DL (ref 0–1)
BUN BLDV-MCNC: 33 MG/DL (ref 7–20)
CALCIUM SERPL-MCNC: 10.1 MG/DL (ref 8.3–10.6)
CARBOXYHEMOGLOBIN: 3 % (ref 0–1.5)
CHLORIDE BLD-SCNC: 95 MMOL/L (ref 99–110)
CO2: 15 MMOL/L (ref 21–32)
CREAT SERPL-MCNC: 1.5 MG/DL (ref 0.8–1.3)
EOSINOPHILS ABSOLUTE: 0 K/UL (ref 0–0.6)
EOSINOPHILS RELATIVE PERCENT: 0.1 %
GFR SERPL CREATININE-BSD FRML MDRD: 53 ML/MIN/{1.73_M2}
GLUCOSE BLD-MCNC: 404 MG/DL (ref 70–99)
GLUCOSE BLD-MCNC: 481 MG/DL (ref 70–99)
HCO3 VENOUS: 19.8 MMOL/L (ref 23–29)
HCT VFR BLD CALC: 47.4 % (ref 40.5–52.5)
HEMOGLOBIN, VEN, REDUCED: 40 %
HEMOGLOBIN: 15.2 G/DL (ref 13.5–17.5)
INR BLD: 1.52 (ref 0.87–1.14)
LYMPHOCYTES ABSOLUTE: 1.5 K/UL (ref 1–5.1)
LYMPHOCYTES RELATIVE PERCENT: 19 %
MCH RBC QN AUTO: 30.5 PG (ref 26–34)
MCHC RBC AUTO-ENTMCNC: 32.1 G/DL (ref 31–36)
MCV RBC AUTO: 95 FL (ref 80–100)
METHEMOGLOBIN VENOUS: 0.2 %
MONOCYTES ABSOLUTE: 0.7 K/UL (ref 0–1.3)
MONOCYTES RELATIVE PERCENT: 9 %
NEUTROPHILS ABSOLUTE: 5.7 K/UL (ref 1.7–7.7)
NEUTROPHILS RELATIVE PERCENT: 71.5 %
O2 CONTENT, VEN: 13 VOL %
O2 SAT, VEN: 59 %
O2 THERAPY: ABNORMAL
PCO2, VEN: 40.5 MMHG (ref 40–50)
PDW BLD-RTO: 15.7 % (ref 12.4–15.4)
PERFORMED ON: ABNORMAL
PH VENOUS: 7.3 (ref 7.35–7.45)
PLATELET # BLD: 122 K/UL (ref 135–450)
PMV BLD AUTO: 10.1 FL (ref 5–10.5)
PO2, VEN: 37.7 MMHG (ref 25–40)
POTASSIUM SERPL-SCNC: 4.9 MMOL/L (ref 3.5–5.1)
PRO-BNP: 4811 PG/ML (ref 0–124)
PROTHROMBIN TIME: 18.3 SEC (ref 11.7–14.5)
RAPID INFLUENZA  B AGN: NEGATIVE
RAPID INFLUENZA A AGN: NEGATIVE
RBC # BLD: 4.99 M/UL (ref 4.2–5.9)
SARS-COV-2, NAAT: NOT DETECTED
SODIUM BLD-SCNC: 127 MMOL/L (ref 136–145)
TCO2 CALC VENOUS: 47 MMOL/L
TOTAL PROTEIN: 6.8 G/DL (ref 6.4–8.2)
TROPONIN: 0.04 NG/ML
WBC # BLD: 7.9 K/UL (ref 4–11)

## 2023-02-19 PROCEDURE — 83880 ASSAY OF NATRIURETIC PEPTIDE: CPT

## 2023-02-19 PROCEDURE — 84484 ASSAY OF TROPONIN QUANT: CPT

## 2023-02-19 PROCEDURE — 85610 PROTHROMBIN TIME: CPT

## 2023-02-19 PROCEDURE — 80053 COMPREHEN METABOLIC PANEL: CPT

## 2023-02-19 PROCEDURE — 36415 COLL VENOUS BLD VENIPUNCTURE: CPT

## 2023-02-19 PROCEDURE — 87635 SARS-COV-2 COVID-19 AMP PRB: CPT

## 2023-02-19 PROCEDURE — 1200000000 HC SEMI PRIVATE

## 2023-02-19 PROCEDURE — 82803 BLOOD GASES ANY COMBINATION: CPT

## 2023-02-19 PROCEDURE — 71045 X-RAY EXAM CHEST 1 VIEW: CPT

## 2023-02-19 PROCEDURE — 82010 KETONE BODYS QUAN: CPT

## 2023-02-19 PROCEDURE — 6370000000 HC RX 637 (ALT 250 FOR IP): Performed by: NURSE PRACTITIONER

## 2023-02-19 PROCEDURE — 87804 INFLUENZA ASSAY W/OPTIC: CPT

## 2023-02-19 PROCEDURE — 83605 ASSAY OF LACTIC ACID: CPT

## 2023-02-19 PROCEDURE — 99285 EMERGENCY DEPT VISIT HI MDM: CPT

## 2023-02-19 PROCEDURE — 85025 COMPLETE CBC W/AUTO DIFF WBC: CPT

## 2023-02-19 PROCEDURE — 6360000002 HC RX W HCPCS: Performed by: NURSE PRACTITIONER

## 2023-02-19 RX ORDER — ASPIRIN 81 MG/1
324 TABLET, CHEWABLE ORAL ONCE
Status: COMPLETED | OUTPATIENT
Start: 2023-02-19 | End: 2023-02-19

## 2023-02-19 RX ORDER — SODIUM CHLORIDE 0.9 % (FLUSH) 0.9 %
10 SYRINGE (ML) INJECTION EVERY 12 HOURS SCHEDULED
Status: DISCONTINUED | OUTPATIENT
Start: 2023-02-19 | End: 2023-02-22 | Stop reason: HOSPADM

## 2023-02-19 RX ORDER — SPIRONOLACTONE 25 MG/1
12.5 TABLET ORAL DAILY
Status: DISCONTINUED | OUTPATIENT
Start: 2023-02-20 | End: 2023-02-20

## 2023-02-19 RX ORDER — POTASSIUM CHLORIDE 7.45 MG/ML
10 INJECTION INTRAVENOUS PRN
Status: DISCONTINUED | OUTPATIENT
Start: 2023-02-19 | End: 2023-02-22 | Stop reason: HOSPADM

## 2023-02-19 RX ORDER — PROMETHAZINE HYDROCHLORIDE 25 MG/1
12.5 TABLET ORAL EVERY 6 HOURS PRN
Status: DISCONTINUED | OUTPATIENT
Start: 2023-02-19 | End: 2023-02-22 | Stop reason: HOSPADM

## 2023-02-19 RX ORDER — ATORVASTATIN CALCIUM 40 MG/1
40 TABLET, FILM COATED ORAL NIGHTLY
Status: DISCONTINUED | OUTPATIENT
Start: 2023-02-20 | End: 2023-02-22 | Stop reason: HOSPADM

## 2023-02-19 RX ORDER — DIGOXIN 125 MCG
125 TABLET ORAL DAILY
Status: DISCONTINUED | OUTPATIENT
Start: 2023-02-20 | End: 2023-02-22 | Stop reason: HOSPADM

## 2023-02-19 RX ORDER — DEXTROSE MONOHYDRATE 100 MG/ML
INJECTION, SOLUTION INTRAVENOUS CONTINUOUS PRN
Status: DISCONTINUED | OUTPATIENT
Start: 2023-02-19 | End: 2023-02-22 | Stop reason: HOSPADM

## 2023-02-19 RX ORDER — ACETAMINOPHEN 325 MG/1
650 TABLET ORAL EVERY 6 HOURS PRN
Status: DISCONTINUED | OUTPATIENT
Start: 2023-02-19 | End: 2023-02-22 | Stop reason: HOSPADM

## 2023-02-19 RX ORDER — FUROSEMIDE 10 MG/ML
40 INJECTION INTRAMUSCULAR; INTRAVENOUS 2 TIMES DAILY
Status: DISCONTINUED | OUTPATIENT
Start: 2023-02-20 | End: 2023-02-21

## 2023-02-19 RX ORDER — SODIUM CHLORIDE 0.9 % (FLUSH) 0.9 %
10 SYRINGE (ML) INJECTION PRN
Status: DISCONTINUED | OUTPATIENT
Start: 2023-02-19 | End: 2023-02-22 | Stop reason: HOSPADM

## 2023-02-19 RX ORDER — DEXTROSE MONOHYDRATE 100 MG/ML
INJECTION, SOLUTION INTRAVENOUS CONTINUOUS PRN
Status: DISCONTINUED | OUTPATIENT
Start: 2023-02-19 | End: 2023-02-19 | Stop reason: SDUPTHER

## 2023-02-19 RX ORDER — ONDANSETRON 2 MG/ML
4 INJECTION INTRAMUSCULAR; INTRAVENOUS EVERY 6 HOURS PRN
Status: DISCONTINUED | OUTPATIENT
Start: 2023-02-19 | End: 2023-02-22 | Stop reason: HOSPADM

## 2023-02-19 RX ORDER — CARVEDILOL 6.25 MG/1
12.5 TABLET ORAL 2 TIMES DAILY WITH MEALS
Status: DISCONTINUED | OUTPATIENT
Start: 2023-02-20 | End: 2023-02-22 | Stop reason: HOSPADM

## 2023-02-19 RX ORDER — INSULIN LISPRO 100 [IU]/ML
0-8 INJECTION, SOLUTION INTRAVENOUS; SUBCUTANEOUS EVERY 4 HOURS
Status: DISCONTINUED | OUTPATIENT
Start: 2023-02-19 | End: 2023-02-22 | Stop reason: HOSPADM

## 2023-02-19 RX ORDER — SODIUM CHLORIDE 9 MG/ML
INJECTION, SOLUTION INTRAVENOUS PRN
Status: DISCONTINUED | OUTPATIENT
Start: 2023-02-19 | End: 2023-02-22 | Stop reason: HOSPADM

## 2023-02-19 RX ORDER — INSULIN GLARGINE 100 [IU]/ML
15 INJECTION, SOLUTION SUBCUTANEOUS NIGHTLY
Status: DISCONTINUED | OUTPATIENT
Start: 2023-02-19 | End: 2023-02-22 | Stop reason: HOSPADM

## 2023-02-19 RX ORDER — ALPRAZOLAM 0.5 MG/1
0.5 TABLET ORAL ONCE
Status: COMPLETED | OUTPATIENT
Start: 2023-02-19 | End: 2023-02-20

## 2023-02-19 RX ORDER — ACETAMINOPHEN 650 MG/1
650 SUPPOSITORY RECTAL EVERY 6 HOURS PRN
Status: DISCONTINUED | OUTPATIENT
Start: 2023-02-19 | End: 2023-02-22 | Stop reason: HOSPADM

## 2023-02-19 RX ORDER — ASPIRIN 81 MG/1
81 TABLET, CHEWABLE ORAL DAILY
Status: DISCONTINUED | OUTPATIENT
Start: 2023-02-20 | End: 2023-02-22 | Stop reason: HOSPADM

## 2023-02-19 RX ORDER — MAGNESIUM SULFATE IN WATER 40 MG/ML
2000 INJECTION, SOLUTION INTRAVENOUS PRN
Status: DISCONTINUED | OUTPATIENT
Start: 2023-02-19 | End: 2023-02-22 | Stop reason: HOSPADM

## 2023-02-19 RX ORDER — FUROSEMIDE 10 MG/ML
40 INJECTION INTRAMUSCULAR; INTRAVENOUS ONCE
Status: COMPLETED | OUTPATIENT
Start: 2023-02-19 | End: 2023-02-19

## 2023-02-19 RX ORDER — AMIODARONE HYDROCHLORIDE 200 MG/1
200 TABLET ORAL DAILY
Status: DISCONTINUED | OUTPATIENT
Start: 2023-02-20 | End: 2023-02-22 | Stop reason: HOSPADM

## 2023-02-19 RX ADMIN — FUROSEMIDE 40 MG: 10 INJECTION, SOLUTION INTRAMUSCULAR; INTRAVENOUS at 22:32

## 2023-02-19 RX ADMIN — ASPIRIN 324 MG: 81 TABLET, CHEWABLE ORAL at 22:32

## 2023-02-19 ASSESSMENT — ENCOUNTER SYMPTOMS
ABDOMINAL PAIN: 0
NAUSEA: 0
SHORTNESS OF BREATH: 1
VOMITING: 0
CHEST TIGHTNESS: 1
DIARRHEA: 0

## 2023-02-19 ASSESSMENT — PAIN - FUNCTIONAL ASSESSMENT: PAIN_FUNCTIONAL_ASSESSMENT: NONE - DENIES PAIN

## 2023-02-20 PROBLEM — I95.9 HYPOTENSION: Status: ACTIVE | Noted: 2023-02-20

## 2023-02-20 PROBLEM — R79.89 ELEVATED TROPONIN: Status: ACTIVE | Noted: 2023-02-20

## 2023-02-20 PROBLEM — R77.8 ELEVATED TROPONIN: Status: ACTIVE | Noted: 2023-02-20

## 2023-02-20 PROBLEM — N17.9 ACUTE KIDNEY INJURY (HCC): Status: ACTIVE | Noted: 2023-02-20

## 2023-02-20 PROBLEM — Z91.14 NON COMPLIANCE W MEDICATION REGIMEN: Status: ACTIVE | Noted: 2023-02-20

## 2023-02-20 PROBLEM — E11.10 DIABETIC KETOACIDOSIS WITHOUT COMA ASSOCIATED WITH TYPE 2 DIABETES MELLITUS (HCC): Status: ACTIVE | Noted: 2023-02-20

## 2023-02-20 PROBLEM — R74.01 TRANSAMINITIS: Status: ACTIVE | Noted: 2023-02-20

## 2023-02-20 PROBLEM — E87.1 HYPONATREMIA: Status: ACTIVE | Noted: 2023-02-20

## 2023-02-20 PROBLEM — R79.89 ELEVATED LACTIC ACID LEVEL: Status: ACTIVE | Noted: 2023-02-20

## 2023-02-20 PROBLEM — R79.1 SUBTHERAPEUTIC INTERNATIONAL NORMALIZED RATIO (INR): Status: ACTIVE | Noted: 2023-02-20

## 2023-02-20 PROBLEM — Z91.148 NON COMPLIANCE W MEDICATION REGIMEN: Status: ACTIVE | Noted: 2023-02-20

## 2023-02-20 PROBLEM — N18.30 ACUTE RENAL FAILURE SUPERIMPOSED ON STAGE 3 CHRONIC KIDNEY DISEASE (HCC): Status: ACTIVE | Noted: 2023-02-20

## 2023-02-20 LAB
A/G RATIO: 1.3 (ref 1.1–2.2)
ALBUMIN SERPL-MCNC: 3.7 G/DL (ref 3.4–5)
ALP BLD-CCNC: 302 U/L (ref 40–129)
ALT SERPL-CCNC: 120 U/L (ref 10–40)
AMPHETAMINE SCREEN, URINE: NORMAL
ANION GAP SERPL CALCULATED.3IONS-SCNC: 14 MMOL/L (ref 3–16)
ANION GAP SERPL CALCULATED.3IONS-SCNC: 17 MMOL/L (ref 3–16)
AST SERPL-CCNC: 125 U/L (ref 15–37)
BACTERIA: ABNORMAL /HPF
BARBITURATE SCREEN URINE: NORMAL
BASOPHILS ABSOLUTE: 0.1 K/UL (ref 0–0.2)
BASOPHILS RELATIVE PERCENT: 0.8 %
BENZODIAZEPINE SCREEN, URINE: NORMAL
BILIRUB SERPL-MCNC: 3.1 MG/DL (ref 0–1)
BILIRUBIN URINE: NEGATIVE
BLOOD, URINE: NEGATIVE
BUN BLDV-MCNC: 33 MG/DL (ref 7–20)
BUN BLDV-MCNC: 34 MG/DL (ref 7–20)
CALCIUM SERPL-MCNC: 10.4 MG/DL (ref 8.3–10.6)
CALCIUM SERPL-MCNC: 9.6 MG/DL (ref 8.3–10.6)
CANNABINOID SCREEN URINE: NORMAL
CHLORIDE BLD-SCNC: 92 MMOL/L (ref 99–110)
CHLORIDE BLD-SCNC: 98 MMOL/L (ref 99–110)
CHLORIDE URINE RANDOM: 97 MMOL/L
CLARITY: CLEAR
CO2: 19 MMOL/L (ref 21–32)
CO2: 20 MMOL/L (ref 21–32)
COCAINE METABOLITE SCREEN URINE: NORMAL
COLOR: YELLOW
CREAT SERPL-MCNC: 1.4 MG/DL (ref 0.8–1.3)
CREAT SERPL-MCNC: 1.6 MG/DL (ref 0.8–1.3)
EKG ATRIAL RATE: 117 BPM
EKG DIAGNOSIS: NORMAL
EKG P AXIS: 32 DEGREES
EKG P-R INTERVAL: 174 MS
EKG Q-T INTERVAL: 340 MS
EKG QRS DURATION: 110 MS
EKG QTC CALCULATION (BAZETT): 474 MS
EKG R AXIS: 89 DEGREES
EKG T AXIS: 37 DEGREES
EKG VENTRICULAR RATE: 117 BPM
EOSINOPHILS ABSOLUTE: 0 K/UL (ref 0–0.6)
EOSINOPHILS RELATIVE PERCENT: 0.2 %
EPITHELIAL CELLS, UA: 0 /HPF (ref 0–5)
ESTIMATED AVERAGE GLUCOSE: 297.7 MG/DL
FENTANYL SCREEN, URINE: NORMAL
GFR SERPL CREATININE-BSD FRML MDRD: 49 ML/MIN/{1.73_M2}
GFR SERPL CREATININE-BSD FRML MDRD: 57 ML/MIN/{1.73_M2}
GLUCOSE BLD-MCNC: 149 MG/DL (ref 70–99)
GLUCOSE BLD-MCNC: 158 MG/DL (ref 70–99)
GLUCOSE BLD-MCNC: 162 MG/DL (ref 70–99)
GLUCOSE BLD-MCNC: 189 MG/DL (ref 70–99)
GLUCOSE BLD-MCNC: 290 MG/DL (ref 70–99)
GLUCOSE BLD-MCNC: 314 MG/DL (ref 70–99)
GLUCOSE BLD-MCNC: 482 MG/DL (ref 70–99)
GLUCOSE URINE: >=1000 MG/DL
HBA1C MFR BLD: 12 %
HCT VFR BLD CALC: 45.6 % (ref 40.5–52.5)
HEMOGLOBIN: 14.8 G/DL (ref 13.5–17.5)
HYALINE CASTS: 14 /LPF (ref 0–8)
HYALINE CASTS: PRESENT
INR BLD: 1.76 (ref 0.87–1.14)
KETONES, URINE: ABNORMAL MG/DL
LACTIC ACID: 4 MMOL/L (ref 0.4–2)
LACTIC ACID: 4.4 MMOL/L (ref 0.4–2)
LEUKOCYTE ESTERASE, URINE: NEGATIVE
LV EF: 10 %
LVEF MODALITY: NORMAL
LYMPHOCYTES ABSOLUTE: 2.1 K/UL (ref 1–5.1)
LYMPHOCYTES RELATIVE PERCENT: 24.4 %
Lab: NORMAL
MAGNESIUM: 2 MG/DL (ref 1.8–2.4)
MCH RBC QN AUTO: 30.4 PG (ref 26–34)
MCHC RBC AUTO-ENTMCNC: 32.5 G/DL (ref 31–36)
MCV RBC AUTO: 93.3 FL (ref 80–100)
METHADONE SCREEN, URINE: NORMAL
MICROSCOPIC EXAMINATION: YES
MONOCYTES ABSOLUTE: 1 K/UL (ref 0–1.3)
MONOCYTES RELATIVE PERCENT: 11.3 %
MUCUS: PRESENT
NEUTROPHILS ABSOLUTE: 5.4 K/UL (ref 1.7–7.7)
NEUTROPHILS RELATIVE PERCENT: 63.3 %
NITRITE, URINE: NEGATIVE
OPIATE SCREEN URINE: NORMAL
OSMOLALITY URINE: 453 MOSM/KG (ref 390–1070)
OSMOLALITY: 320 MOSM/KG (ref 280–301)
OXYCODONE URINE: NORMAL
PDW BLD-RTO: 15.5 % (ref 12.4–15.4)
PERFORMED ON: ABNORMAL
PH UA: 5
PH UA: 5 (ref 5–8)
PHENCYCLIDINE SCREEN URINE: NORMAL
PLATELET # BLD: 120 K/UL (ref 135–450)
PMV BLD AUTO: 9.9 FL (ref 5–10.5)
POTASSIUM REFLEX MAGNESIUM: 4.1 MMOL/L (ref 3.5–5.1)
POTASSIUM SERPL-SCNC: 5.1 MMOL/L (ref 3.5–5.1)
POTASSIUM, UR: 22.5 MMOL/L
PROTEIN UA: ABNORMAL MG/DL
PROTHROMBIN TIME: 20.5 SEC (ref 11.7–14.5)
RBC # BLD: 4.89 M/UL (ref 4.2–5.9)
RBC UA: 0 /HPF (ref 0–4)
SODIUM BLD-SCNC: 126 MMOL/L (ref 136–145)
SODIUM BLD-SCNC: 134 MMOL/L (ref 136–145)
SODIUM URINE: 77 MMOL/L
SODIUM URINE: 77 MMOL/L
SPECIFIC GRAVITY UA: 1.01 (ref 1–1.03)
TOTAL PROTEIN: 6.6 G/DL (ref 6.4–8.2)
TROPONIN: 0.04 NG/ML
TROPONIN: 0.05 NG/ML
TSH REFLEX: 4.01 UIU/ML (ref 0.27–4.2)
UREA NITROGEN, UR: 498.3 MG/DL (ref 800–1666)
URIC ACID, SERUM: 10.8 MG/DL (ref 3.5–7.2)
URINE REFLEX TO CULTURE: ABNORMAL
URINE TYPE: ABNORMAL
UROBILINOGEN, URINE: 1 E.U./DL
WBC # BLD: 8.5 K/UL (ref 4–11)
WBC UA: 1 /HPF (ref 0–5)

## 2023-02-20 PROCEDURE — 93005 ELECTROCARDIOGRAM TRACING: CPT | Performed by: INTERNAL MEDICINE

## 2023-02-20 PROCEDURE — 93010 ELECTROCARDIOGRAM REPORT: CPT | Performed by: INTERNAL MEDICINE

## 2023-02-20 PROCEDURE — 84550 ASSAY OF BLOOD/URIC ACID: CPT

## 2023-02-20 PROCEDURE — 84540 ASSAY OF URINE/UREA-N: CPT

## 2023-02-20 PROCEDURE — 6370000000 HC RX 637 (ALT 250 FOR IP): Performed by: INTERNAL MEDICINE

## 2023-02-20 PROCEDURE — 36415 COLL VENOUS BLD VENIPUNCTURE: CPT

## 2023-02-20 PROCEDURE — 6370000000 HC RX 637 (ALT 250 FOR IP): Performed by: EMERGENCY MEDICINE

## 2023-02-20 PROCEDURE — 83036 HEMOGLOBIN GLYCOSYLATED A1C: CPT

## 2023-02-20 PROCEDURE — 80307 DRUG TEST PRSMV CHEM ANLYZR: CPT

## 2023-02-20 PROCEDURE — 84300 ASSAY OF URINE SODIUM: CPT

## 2023-02-20 PROCEDURE — 83935 ASSAY OF URINE OSMOLALITY: CPT

## 2023-02-20 PROCEDURE — 2580000003 HC RX 258: Performed by: INTERNAL MEDICINE

## 2023-02-20 PROCEDURE — 99223 1ST HOSP IP/OBS HIGH 75: CPT | Performed by: INTERNAL MEDICINE

## 2023-02-20 PROCEDURE — 93306 TTE W/DOPPLER COMPLETE: CPT

## 2023-02-20 PROCEDURE — 83930 ASSAY OF BLOOD OSMOLALITY: CPT

## 2023-02-20 PROCEDURE — 85025 COMPLETE CBC W/AUTO DIFF WBC: CPT

## 2023-02-20 PROCEDURE — 1200000000 HC SEMI PRIVATE

## 2023-02-20 PROCEDURE — 85610 PROTHROMBIN TIME: CPT

## 2023-02-20 PROCEDURE — 84484 ASSAY OF TROPONIN QUANT: CPT

## 2023-02-20 PROCEDURE — 82436 ASSAY OF URINE CHLORIDE: CPT

## 2023-02-20 PROCEDURE — 80053 COMPREHEN METABOLIC PANEL: CPT

## 2023-02-20 PROCEDURE — 81001 URINALYSIS AUTO W/SCOPE: CPT

## 2023-02-20 PROCEDURE — 83735 ASSAY OF MAGNESIUM: CPT

## 2023-02-20 PROCEDURE — 84133 ASSAY OF URINE POTASSIUM: CPT

## 2023-02-20 PROCEDURE — 84443 ASSAY THYROID STIM HORMONE: CPT

## 2023-02-20 PROCEDURE — 6360000002 HC RX W HCPCS: Performed by: INTERNAL MEDICINE

## 2023-02-20 PROCEDURE — 83605 ASSAY OF LACTIC ACID: CPT

## 2023-02-20 RX ORDER — SODIUM BICARBONATE 650 MG/1
1300 TABLET ORAL ONCE
Status: COMPLETED | OUTPATIENT
Start: 2023-02-20 | End: 2023-02-20

## 2023-02-20 RX ORDER — MIDODRINE HYDROCHLORIDE 5 MG/1
5 TABLET ORAL ONCE
Status: COMPLETED | OUTPATIENT
Start: 2023-02-20 | End: 2023-02-20

## 2023-02-20 RX ORDER — ZOLPIDEM TARTRATE 10 MG/1
10 TABLET ORAL NIGHTLY PRN
Status: DISCONTINUED | OUTPATIENT
Start: 2023-02-20 | End: 2023-02-22 | Stop reason: HOSPADM

## 2023-02-20 RX ORDER — LORAZEPAM 1 MG/1
1 TABLET ORAL 2 TIMES DAILY PRN
Status: DISCONTINUED | OUTPATIENT
Start: 2023-02-20 | End: 2023-02-22 | Stop reason: HOSPADM

## 2023-02-20 RX ORDER — ENOXAPARIN SODIUM 100 MG/ML
1 INJECTION SUBCUTANEOUS 2 TIMES DAILY
Status: DISCONTINUED | OUTPATIENT
Start: 2023-02-20 | End: 2023-02-22 | Stop reason: HOSPADM

## 2023-02-20 RX ADMIN — LORAZEPAM 1 MG: 1 TABLET ORAL at 20:15

## 2023-02-20 RX ADMIN — DIGOXIN 125 MCG: 125 TABLET ORAL at 08:10

## 2023-02-20 RX ADMIN — WARFARIN SODIUM 6 MG: 5 TABLET ORAL at 18:31

## 2023-02-20 RX ADMIN — ENOXAPARIN SODIUM 80 MG: 100 INJECTION SUBCUTANEOUS at 08:14

## 2023-02-20 RX ADMIN — CARVEDILOL 12.5 MG: 6.25 TABLET, FILM COATED ORAL at 08:13

## 2023-02-20 RX ADMIN — ZOLPIDEM TARTRATE 10 MG: 10 TABLET, FILM COATED ORAL at 20:16

## 2023-02-20 RX ADMIN — INSULIN GLARGINE 15 UNITS: 100 INJECTION, SOLUTION SUBCUTANEOUS at 00:25

## 2023-02-20 RX ADMIN — SPIRONOLACTONE 12.5 MG: 25 TABLET ORAL at 08:13

## 2023-02-20 RX ADMIN — INSULIN LISPRO 4 UNITS: 100 INJECTION, SOLUTION INTRAVENOUS; SUBCUTANEOUS at 04:14

## 2023-02-20 RX ADMIN — INSULIN LISPRO 8 UNITS: 100 INJECTION, SOLUTION INTRAVENOUS; SUBCUTANEOUS at 00:24

## 2023-02-20 RX ADMIN — Medication 10 ML: at 20:16

## 2023-02-20 RX ADMIN — ENOXAPARIN SODIUM 80 MG: 100 INJECTION SUBCUTANEOUS at 02:10

## 2023-02-20 RX ADMIN — AMIODARONE HYDROCHLORIDE 200 MG: 200 TABLET ORAL at 08:10

## 2023-02-20 RX ADMIN — ALPRAZOLAM 0.5 MG: 0.5 TABLET ORAL at 01:39

## 2023-02-20 RX ADMIN — ATORVASTATIN CALCIUM 40 MG: 40 TABLET, FILM COATED ORAL at 20:15

## 2023-02-20 RX ADMIN — Medication 10 ML: at 00:23

## 2023-02-20 RX ADMIN — ASPIRIN 81 MG: 81 TABLET, CHEWABLE ORAL at 08:13

## 2023-02-20 RX ADMIN — ATORVASTATIN CALCIUM 40 MG: 40 TABLET, FILM COATED ORAL at 01:39

## 2023-02-20 RX ADMIN — ENOXAPARIN SODIUM 80 MG: 100 INJECTION SUBCUTANEOUS at 20:16

## 2023-02-20 RX ADMIN — Medication 10 ML: at 08:14

## 2023-02-20 RX ADMIN — SODIUM BICARBONATE 1300 MG: 650 TABLET ORAL at 08:10

## 2023-02-20 RX ADMIN — FUROSEMIDE 40 MG: 10 INJECTION, SOLUTION INTRAMUSCULAR; INTRAVENOUS at 08:14

## 2023-02-20 RX ADMIN — INSULIN GLARGINE 15 UNITS: 100 INJECTION, SOLUTION SUBCUTANEOUS at 20:16

## 2023-02-20 RX ADMIN — MIDODRINE HYDROCHLORIDE 5 MG: 5 TABLET ORAL at 13:06

## 2023-02-20 NOTE — PROGRESS NOTES
Completed admission. Oriented to room, bed, and call light. Very friendly family in room. Patient a little hesitant to accept care (example allowing EKG) because of being tired, a little anxious, and just not feeling well. Passed scheduled meds. Ambulates independently, informed to call if needing assist. Informed hospitalist of elevated lactis and troponin.

## 2023-02-20 NOTE — PROGRESS NOTES
Cross cover notified of lactic 4.0. patient set off sepsis protocol d/t , RR 32. Patient afebrile and WBC WNL. VS likely 2/2 CHF exacerbation and respiratory distress. No IVF ordered at this time d/t CHF treatment with lasix. Repeat lactic ordered.    Samira Aj, APRN - CNP

## 2023-02-20 NOTE — DISCHARGE SUMMARY
The [place] Sleepiness Scale        The [place] Sleepiness Scale is widely used in the field of sleep medicine as a subjective measure of a patient's sleepiness. The test is a list of eight situations in which you rate your tendency to become sleepy on a scale of 0, no chance to 3, high chance of dozing. Your score is based on a scale of 0 to 24. The scale estimates whether you are experiencing excessive sleepiness that possibly requires medical attention. How Sleepy Are You? How sleepy are you to doze off or fall asleep in the following situations? You should rate your chances of dozing off, not just feeling tired. Even if you have not done some of these things recently try to determine how they would have affected you.  For each situation, decide whether or not you would have:      0 = No chance of dozing         1 = Slight chance of dozing      2 = Moderate chance of  dozing         3 = High change of dozing                  Situation                                                                                                                                                                                                                                                       Chance of Dozing    Sitting and reading                                                                                                                            0 =  []  1 =    [] 2 =    [x] 3 =    []     Watching TV                                                                                                                                     0 =  [x]  1 =    [] 2 =    [] 3 =    []                                                                                                                                                              Sitting inactive in public place (e.g., a theater or a meeting)                                                            0 =  [x]  1 =    [] 2 =    [] 3 =    []     As a passenger in a car for an hour without a break                                                                        0  =  []  1 =    [x] 2 =    [] 3 =    []     Lying down to rest in the afternoon when circumstances permit                                                      0 =  [x]  1 =    [] 2 =    [] 3 =    []     Sitting and talking to someone                                                                                                          0 =  []  1 =    [x] 2 =    [] 3 =    []                            Sitting quietly after a lunch without alcohol                                                                                       0 =  []  1 =    [x] 2 =    [] 3 =    []     In a car, while stopped for a few minutes in traffic                                                                            0 =  [x]  1 =    [] 2 =    [] 3 =    []     Total Score = 5     If your total score is 10 or greater, you are experiencing excessive sleepiness and should consider seeking a medical follow-up. Take a copy of this screening test to your primary care physician on your next office visit. Interpretation:       0 -   7: It is unlikely that you are abnormally sleepy. 8 -   9: You have an average amount of daytime sleepiness. 10 - 15: You may be excessively sleepy depending on the situation. You may want to consider seeking medical attention. 16 - 24: You are excessively sleepy and should consider seeking medical attention.        Electronically signed by Ivette So RCP on 2/20/2023 at 5:54 PM

## 2023-02-20 NOTE — CONSULTS
[facility], [place], operated by [facility]  Advanced CHF/Pulmonary Hypertension   Cardiac Evaluation      [address] [facility]  YOB: 1960    Requesting PHysician:  Dr. Suzanna Ruiz      Chief Complaint   Patient presents with    Leg Swelling     BLE edema x about 2 weeks. History of Present Illness:  Marilyn Butcher is a 62 yo male who presents to the ED with bilateral leg swelling up to his knees. He thinks he has gained at least 5 pounds in several days. He has increasing fatigue, shortness of breath with any activity. He is unable to lay flat. He has a history of chronic systolic HF, CAD s/p CABG, history of valvular heart disease. He underwent CABG x 4, mitral valve replacement mechanical Medtronic valve, tricuspid valve repair 12/10/21. His ejection fraction was 15% at that surgery. Since then, he has been extremely unreliable with follow up for coumadin checks and office visits. He has been off all of his medications for at least one month. He has essentially been \"lost to follow up\" from the coumadin clinic since 7/2022. As for cardiology follow up, he has only been seen once since his open heart surgery. His explanation for this includes 2 covid bouts with inability to take his meds, etc.    He states that he manages his health independently. He appears to have poor insight into his health problems. He is surrounded by family in his room who appear to have no idea about his lack of medications. Denies headache, fever, lightheadedness, dizziness, visual disturbances. No neck or back pain. No abdominal pain, nausea, vomiting, diarrhea, constipation, or dysuria. We are consulted for management of HF. His echo today shows LVEF around 10%. He has diuresed since admission and is feeling better. His blood pressure is very low making it difficult to adjust CHF meds. His troponin is elevated on admission but he denies chest pain.     Labs:  Sodium 134  K 4.1  BUN/Cre 33/1.4  Lactate 4.4  Bnp 4811 (was 4485 on 21)  Troponin 0.04, 0.05, 0.04  H/H 14.8/45.6  INR 1.5    Echo today:  (EF 20% on 21)   Summary   Overall, left ventricular systolic function is severely depressed. Ejection fraction is visually estimated to be 10%. There is severe diffuse hypokinesis. Cannot determine diastology due to   mitral valve replacement. 29 mm Medtronic mechanical valve. Max P mmHg. Mean P mmHg. 28 mm Phisio TV annuloplasty. Max P mmHg. Mild to moderate tricuspid   regurgitation. RVSP = 37 mmHG. The right ventricle is normal in size with reduced function. Biatrial enlargement. EKG:  sinus tach, old anterior MI    CXR:  Impression   No acute airspace disease identified.        Meds prior to admission: (not taking at all)  Digoxin 125  Amiodarone 200 qd  Coreg 12.5 bid  Lasix 20 qd  Aspirin 81 qd  Lipitor 40 qd  Spironolactone 12.5 qd  warfarin      No Known Allergies  Current Facility-Administered Medications   Medication Dose Route Frequency Provider Last Rate Last Admin    warfarin placeholder: dosing by pharmacy   Other RX Placeholder Marci Cristobal DO        enoxaparin (LOVENOX) injection 80 mg  1 mg/kg SubCUTAneous BID Marci Cristobal DO   80 mg at 23 6426    warfarin (COUMADIN) tablet 6 mg  6 mg Oral Daily Marci Cristobal DO        zolpidem (AMBIEN) tablet 10 mg  10 mg Oral Nightly PRN Tino Pena MD        LORazepam (ATIVAN) tablet 1 mg  1 mg Oral BID PRN Tino Pena MD        insulin glargine (LANTUS) injection vial 15 Units  15 Units SubCUTAneous Nightly Marci Cristobal DO   15 Units at 23 0025    amiodarone (CORDARONE) tablet 200 mg  200 mg Oral Daily Marci Cristobal DO   200 mg at 23 0810    aspirin chewable tablet 81 mg  81 mg Oral Daily Marci Cristobal DO   81 mg at 23 0813    atorvastatin (LIPITOR) tablet 40 mg  40 mg Oral Nightly Marci Cristobal DO   40 mg at 23 0139    carvedilol (COREG) tablet 12.5 mg  12.5 mg Oral BID  Ahmad A Susu, DO   12.5 mg at 02/20/23 0813    digoxin (LANOXIN) tablet 125 mcg  125 mcg Oral Daily Ahmad A Susu, DO   125 mcg at 02/20/23 0810    spironolactone (ALDACTONE) tablet 12.5 mg  12.5 mg Oral Daily Ahmad A Susu, DO   12.5 mg at 02/20/23 0813    dextrose bolus 10% 125 mL  125 mL IntraVENous PRN Ahmad A Susu, DO        Or    dextrose bolus 10% 250 mL  250 mL IntraVENous PRN Ahmad A Susu, DO        glucagon (rDNA) injection 1 mg  1 mg SubCUTAneous PRN Ahmad A Susu, DO        dextrose 10 % infusion   IntraVENous Continuous PRN Ahmad A Susu, DO        sodium chloride flush 0.9 % injection 10 mL  10 mL IntraVENous 2 times per day James Peed A Susu, DO   10 mL at 02/20/23 0814    sodium chloride flush 0.9 % injection 10 mL  10 mL IntraVENous PRN Ahmad A Susu, DO        0.9 % sodium chloride infusion   IntraVENous PRN Ahmad A Susu, DO        potassium chloride 10 mEq/100 mL IVPB (Peripheral Line)  10 mEq IntraVENous PRN James Peed A Susu, DO        magnesium sulfate 2000 mg in 50 mL IVPB premix  2,000 mg IntraVENous PRN James Peed A Susu, DO        promethazine (PHENERGAN) tablet 12.5 mg  12.5 mg Oral Q6H PRN Ahmad A Susu, DO        Or    ondansetron (ZOFRAN) injection 4 mg  4 mg IntraVENous Q6H PRN Ahmad A Susu, DO        acetaminophen (TYLENOL) tablet 650 mg  650 mg Oral Q6H PRN Ahmad A Susu, DO        Or    acetaminophen (TYLENOL) suppository 650 mg  650 mg Rectal Q6H PRN Ahmad A Susu, DO        furosemide (LASIX) injection 40 mg  40 mg IntraVENous BID Ahmad A Susu, DO   40 mg at 02/20/23 2891    insulin lispro (HUMALOG) injection vial 0-8 Units  0-8 Units SubCUTAneous Q4H Ahmad A Susu, DO   4 Units at 02/20/23 6534       Past Medical History:   Diagnosis Date    Diabetes ([address]     Hyperlipidemia     Hypertension      Past Surgical History:   Procedure Laterality Date    CARDIAC SURGERY      CORONARY ARTERY BYPASS GRAFT N/A 12/10/2021    CABG X 4 , CORONARY ARTERY BYPASS GRAFT STRONG TO LAD, VEIN GRAFT TO PDA, SVG SEQUENCED TO DIAG AND OM, LIGATION KATHARINE  ATRICLIP 45MM, EVH LEFT SAPHENOUS VEIN, ANTERIOR AND POSTERIOR CHORDAE SPARING MITRAL VALVE REPLACEMENT MEDTRONIC 29MM mechanical valve, TRICUSPID VALVE REPAIR with 28mm Physio tricuspid annuloplasty ring , TOTAL CPB, VERO, Doppler flow verification of bypass grafts, 5 LEVEL BILATERAL      Family History   Problem Relation Age of Onset    Cancer Mother         colon cancer    Heart Disease Father     High Blood Pressure Father     Diabetes Father     High Cholesterol Father      Social History     Socioeconomic History    Marital status: Single     Spouse name: Not on file    Number of children: 2    Years of education: Not on file    Highest education level: Not on file   Occupational History    Not on file   Tobacco Use    Smoking status: Former     Packs/day: 1.00     Years: 15.00     Pack years: 15.00     Types: Cigarettes     Quit date: 12/10/2021     Years since quittin.1    Smokeless tobacco: Never   Vaping Use    Vaping Use: Never used   Substance and Sexual Activity    Alcohol use: Yes     Comment: occasionally    Drug use: Never    Sexual activity: Not on file   Other Topics Concern    Not on file   Social History Narrative    Not on file     Social Determinants of Health     Financial Resource Strain: Not on file   Food Insecurity: Not on file   Transportation Needs: Not on file   Physical Activity: Not on file   Stress: Not on file   Social Connections: Not on file   Intimate Partner Violence: Not on file   Housing Stability: Not on file       Review of Systems:   Constitutional: there has been no unanticipated weight loss. There's been no change in energy level, sleep pattern, or activity level. Eyes: No visual changes or diplopia. No scleral icterus. ENT: No Headaches, hearing loss or vertigo. No mouth sores or sore throat. Cardiovascular: Reviewed in HPI  Respiratory: No cough or wheezing, no sputum production. No hematemesis. Gastrointestinal: No abdominal pain, appetite loss, blood in stools. No change in bowel or bladder habits. Genitourinary: No dysuria, trouble voiding, or hematuria. Musculoskeletal:  No gait disturbance, weakness or joint complaints. Integumentary: No rash or pruritis. Neurological: No headache, diplopia, change in muscle strength, numbness or tingling. No change in gait, balance, coordination, mood, affect, memory, mentation, behavior. Psychiatric: No anxiety, no depression. Endocrine: No malaise, fatigue or temperature intolerance. No excessive thirst, fluid intake, or urination. No tremor. Hematologic/Lymphatic: No abnormal bruising or bleeding, blood clots or swollen lymph nodes. Allergic/Immunologic: No nasal congestion or hives. Physical Examination:    Vitals:    02/20/23 0754 02/20/23 1248 02/20/23 1249 02/20/23 1430   BP: 104/73 (!) 76/52 (!) 85/57 90/60   Pulse: (!) 108 85  84   Resp: 16 16     Temp: 97.5 °F (36.4 °C) 97.2 °F (36.2 °C)  97.2 °F (36.2 °C)   TempSrc: Oral Oral  Oral   SpO2: 98%   91%   Weight:       Height:         Body mass index is 24.91 kg/m². Wt Readings from Last 3 Encounters:   02/20/23 188 lb 12.8 oz (85.6 kg)   02/02/22 175 lb (79.4 kg)   01/12/22 166 lb (75.3 kg)     BP Readings from Last 3 Encounters:   02/20/23 90/60   02/02/22 128/74   01/12/22 116/68     Constitutional and General Appearance:   WD/WN in NAD  HEENT:  NC/AT  LEOLA  No problems with hearing  Skin:  Warm, dry  Respiratory:  Normal excursion and expansion without use of accessory muscles  Resp Auscultation: Normal breath sounds without dullness  Cardiovascular: The apical impulses not displaced  Heart tones are crisp and normal  Cervical veins are not engorged  The carotid upstroke is normal in amplitude and contour without delay or bruit  JVP 10-11 cm H2O  RRR with nl S1 and S2 without m,r,g  Peripheral pulses are symmetrical and full  There is no clubbing, cyanosis of the extremities.   1+ bilateral edema  Femoral Arteries: 2+ and equal  Pedal Pulses: 2+ and equal   Neck:  No thyromegaly  Abdomen:  No masses or tenderness  Liver/Spleen: No Abnormalities Noted  Neurological/Psychiatric:  Alert and oriented in all spheres  Moves all extremities well  Exhibits normal gait balance and coordination  No abnormalities of mood, affect, memory, mentation, or behavior are noted    Labs were reviewed including labs from other hospital systems through [facility]. Cardiac testing was reviewed including echos, nuclear scans, cardiac catheterization, including from other hospital systems through [facility]. Assessment:    1. Diabetic ketoacidosis without coma associated with type 2 diabetes mellitus ([facility] [address]    2. Acute on chronic congestive heart failure, unspecified heart failure type (HCC)    3. Elevated troponin    4. Edema, unspecified type    5. Non compliance w medication regimen    6. Subtherapeutic international normalized ratio (INR)     7. Mechanical mitral valve not taking warfarin  8. CAD, not on statin or AC  9. Hypotension due to severe CHF  10. Acute on chronic kidney failure    Plan:   Agree with lasix another 24 hours, 40 IV bid  Restart lipitor, aspirin for CAD  Restart carvedilol 12.5 mg po bid  I would not restart spironolactone due to his noncompliance with follow up. This could be added in the office if he is compliant with office visits  Anticoagulation with warfarin due to mechanical mitral valve. Stressed importance of staying on this medication to prevent heart valve from clotting off  Continue lovenox injections until INR > 2  Agree with digoxin 125 mcg qd  Ultimately he needs to be on ACEI/ARB/ARNI, but given his worsening renal function and hypotension, will hold off  He could be a candidate for Jardiance for DM and heart failure. This would not affect blood pressure  His elevated troponin is likely due to CHF and hypotension.   Would not pursue work up at this point.  CHF education reinforced. ~salt restriction  ~fluid restriction  ~medication compliance  ~daily weights and notify of any significant weight gain/loss  ~establish with CHF nurse  ~outpatient follow-up with our CHF team    The patient was seen for > 75 minutes. I reviewed interval history, physical exam, review of data including labs, imaging, development and implementation of treatment plan and coordination of complex care. More than 50% of the time was devoted to counseling the patient on their diagnoses/treatments, as well as coordination of care with the other care teams      I appreciate the opportunity of cooperating in the care of this patient.     Radha Roman M.D., [facility]

## 2023-02-20 NOTE — PROGRESS NOTES
The [place] Sleepiness Scale       The [place] Sleepiness Scale is widely used in the field of sleep medicine as a subjective measure of a patient's sleepiness. The test is a list of eight situations in which you rate your tendency to become sleepy on a scale of 0, no chance to 3, high chance of dozing. Your score is based on a scale of 0 to 24. The scale estimates whether you are experiencing excessive sleepiness that possibly requires medical attention. How Sleepy Are You? How sleepy are you to doze off or fall asleep in the following situations? You should rate your chances of dozing off, not just feeling tired. Even if you have not done some of these things recently try to determine how they would have affected you.  For each situation, decide whether or not you would have:     0 = No chance of dozing 1 = Slight chance of dozing   2 = Moderate chance of  dozing 3 = High change of dozing       Situation                                                                                     Chance of Dozing    Sitting and reading  0 =  []  1 =    [] 2 =    [x] 3 =    []    Watching TV  0 =  [x]  1 =    [] 2 =    [] 3 =    []      Sitting inactive in public place (e.g., a theater or a meeting)  0 =  [x]  1 =    [] 2 =    [] 3 =    []    As a passenger in a car for an hour without a break          0  =  []  1 =    [x] 2 =    [] 3 =    []    Lying down to rest in the afternoon when circumstances permit    0 =  [x]  1 =    [] 2 =    [] 3 =    []    Sitting and talking to someone  0 =  []  1 =    [x] 2 =    [] 3 =    []      Sitting quietly after a lunch without alcohol  0 =  []  1 =    [x] 2 =    [] 3 =    []    In a car, while stopped for a few minutes in traffic                                                                      0 =  [x]  1 =    [] 2 =    [] 3 =    []    Total Score = 5    If your total score is 10 or greater, you are experiencing excessive sleepiness and should consider seeking a medical follow-up. Take a copy of this screening test to your primary care physician on your next office visit. Interpretation:      0 -   7: It is unlikely that you are abnormally sleepy. 8 -   9: You have an average amount of daytime sleepiness. 10 - 15: You may be excessively sleepy depending on the situation. You may want to consider seeking medical attention. 16 - 24: You are excessively sleepy and should consider seeking medical attention.       Electronically signed by Negin Ardon RCP on 2/20/2023 at 5:54 PM

## 2023-02-20 NOTE — PROGRESS NOTES
Message sent to Dr. Satnam Butler: BP 90/64 right arm, 85/64 left arm. HR 87 SR, coreg 12.5mg and lasix 40mg due. Hold? Cards is following. New orders to hold medication. Notified covering RN and pt.

## 2023-02-20 NOTE — CONSULTS
MD Ana Mcbride MD Lincoln Dolores, MD                Office: [phone]                      Fax: [phone]               [email or web address]                   Nephrology consult received  . -- full consult report will follow. Chart reviewed. .   Thank you for allowing me to participate in this patient's care. Please do not hesitate to contact us anytime. We will follow along with you. Kaushik Lorenzana MD  [facility] [address] [place] [address]   [phone] or [facility]    =====================================================       Labs reviewed by me     CBC:   Recent Labs     02/19/23 2020 02/20/23  0506   WBC 7.9 8.5   HGB 15.2 14.8   HCT 47.4 45.6   MCV 95.0 93.3   * 120*     BMP:   Recent Labs     02/19/23 2020 02/19/23  2333 02/20/23  0506   * 126* 134*   K 4.9 5.1 4.1   CL 95* 92* 98*   CO2 15* 20* 19*   BUN 33* 34* 33*   CREATININE 1.5* 1.6* 1.4*     Magnesium:   Lab Results   Component Value Date/Time    MG 2.00 02/20/2023 05:06 AM    MG 2.60 12/13/2021 04:22 AM    MG 2.40 12/12/2021 04:35 AM       Arterial Blood Gasses  No results for input(s): PH, PCO2, PO2 in the last 72 hours.     Invalid input(s): [id number], INSPIREDO2    UA:  Recent Labs     02/20/23  0145   COLORU Yellow   PHUR 5.0  5.0   WBCUA 1   RBCUA 0   MUCUS Present*   BACTERIA None Seen   CLARITYU Clear   SPECGRAV 1.015   LEUKOCYTESUR Negative   UROBILINOGEN 1.0   BILIRUBINUR Negative   BLOODU Negative   GLUCOSEU >=1000*       LIVER PROFILE:   Recent Labs     02/19/23 2020 02/20/23  0506   AST 52* 125*   ALT 64* 120*   BILITOT 3.2* 3.1*   ALKPHOS 301* 302*     PT/INR:    Lab Results   Component Value Date/Time    PROTIME 20.5 02/20/2023 05:06 AM    PROTIME 18.3 02/19/2023 08:20 PM    PROTIME 33.7 12/19/2021 05:15 AM    INR 1.76 02/20/2023 05:06 AM    INR 1.52 02/19/2023 08:20 PM    INR 3.2 07/12/2022 03:07 PM    INR 2.0 06/28/2022 03:24 PM    INR 1.4 06/21/2022 03:25 PM    INR 3.30 01/04/2022 12:00 AM     PTT:    Lab Results   Component Value Date/Time    APTT 127.3 12/14/2021 02:32 AM    APTT 53.3 12/13/2021 07:03 PM    APTT 100.0 12/13/2021 11:30 AM     CHRISTINA:  No results found for: ANATITER, CHRISTINA        RADIOLOGY:    XR CHEST PORTABLE    Result Date: 2/19/2023  EXAMINATION: ONE XRAY VIEW OF THE CHEST 2/19/2023 8:10 pm COMPARISON: 12/18/2021 HISTORY: ORDERING SYSTEM PROVIDED HISTORY: SOB TECHNOLOGIST PROVIDED HISTORY: Reason for exam:->SOB Reason for Exam: SOB FINDINGS: The cardiomediastinal silhouette is unchanged in appearance. Status post median sternotomy and valve replacement. Cardiac silhouette enlargement again noted. There is no consolidation, pneumothorax, or evidence of edema. Blunting of the right costophrenic angle appears unchanged. No significant residual left pleural effusion. The osseous structures are unchanged in appearance. No acute airspace disease identified. Imaging Results.   Chest X Ray reviwed by me

## 2023-02-20 NOTE — H&P
Hospital Medicine History & Physical      PCP: Gigi Cristobal MD    Date of Admission: 2/19/2023    Date of Service: Pt seen/examined on 2/19/2023    Pt seen/examined face to face on and admitted as inpatient with expected LOS to be two days but can change depending on diagnostic work up and treatment response.     Chief Complaint:    Chief Complaint   Patient presents with    Leg Swelling     BLE edema x about 2 weeks.         History Of Present Illness:      62 y.o. male who presented to [facility] with past medical history of hypertension, hyperlipidemia, type 2 diabetes, HFrEF presented to ED with chief complaint of leg swelling    Patient reported has been feeling ill appearing for the past 2 to 3 weeks coughing for viral-like symptoms reported that he is gotten better but the past 2 weeks he has not been taking his medications feeling very sick and has been having worsening lower extremity swelling in addition to shortness of breath with laying flat no known alleviating factor, exacerbated with laying flat and exertion.  Patient reported he has been taking his medications as needed as prescribed when he was better,    Past Medical History:          Diagnosis Date    Diabetes (HCC)     Hyperlipidemia     Hypertension        Past Surgical History:          Procedure Laterality Date    CARDIAC SURGERY      CORONARY ARTERY BYPASS GRAFT N/A 12/10/2021    CABG X 4 , CORONARY ARTERY BYPASS GRAFT STRONG TO LAD, VEIN GRAFT TO PDA, SVG SEQUENCED TO DIAG AND OM, LIGATION KATHARINE  ATRICLIP 45MM, EVH LEFT SAPHENOUS VEIN, ANTERIOR AND POSTERIOR CHORDAE SPARING MITRAL VALVE REPLACEMENT MEDTRONIC 29MM mechanical valve, TRICUSPID VALVE REPAIR with 28mm Physio tricuspid annuloplasty ring , TOTAL CPB, VERO, Doppler flow verification of bypass grafts, 5 LEVEL BILATERAL        Medications Prior to Admission:      Prior to Admission medications    Medication Sig Start Date End Date Taking? Authorizing Provider   digoxin  (LANOXIN) 125 MCG tablet Take 1 tablet by mouth daily 4/28/22   MICHELLE Carias CNP   amiodarone (CORDARONE) 200 MG tablet Take 1 tablet by mouth daily 4/28/22   MICHELLE Carias CNP   carvedilol (COREG) 12.5 MG tablet Take 1 tablet by mouth 2 times daily (with meals) 2/2/22   MICHELLE Carias CNP   furosemide (LASIX) 40 MG tablet Take 0.5 tablets by mouth daily 1/12/22   MICHELLE Carias CNP   traZODone (DESYREL) 50 MG tablet Take 1 tablet by mouth nightly  Patient not taking: Reported on 2/2/2022 1/5/22   Cruz Villalobos MD   acetaminophen (TYLENOL) 500 MG tablet Take 2 tablets by mouth every 6 hours  Patient not taking: Reported on 2/2/2022 12/19/21   Duglas Priest MD   aspirin 81 MG chewable tablet Take 1 tablet by mouth daily 12/19/21   Duglas Priest MD   atorvastatin (LIPITOR) 40 MG tablet Take 1 tablet by mouth nightly 12/19/21   Duglas Priest MD   spironolactone (ALDACTONE) 25 MG tablet Take 0.5 tablets by mouth daily 12/19/21   Duglas Priest MD   warfarin (COUMADIN) 6 MG tablet Take by mouth daily at [facility] as directed (start with 1 tablet) 12/19/21   Duglas Priest MD       Allergies:  Patient has no known allergies. Social History:          TOBACCO:   reports that he quit smoking about 14 months ago. His smoking use included cigarettes. He has a 15.00 pack-year smoking history. He has never used smokeless tobacco.  ETOH:   reports current alcohol use.   E-cigarette/Vaping       Questions Responses    E-cigarette/Vaping Use Never User    Start Date     Passive Exposure     Quit Date     Counseling Given     Comments               Family History:      Family History reviewed with patient, and does not pertain and non-contributory to the current illness        Problem Relation Age of Onset    Cancer Mother         colon cancer    Heart Disease Father     High Blood Pressure Father     Diabetes Father     High Cholesterol Father        REVIEW OF SYSTEMS: Constitutional:  No Fever, No Chills, No Night Sweats  ENT/Mouth:  No Nasal Congestion,  No Hoarseness, No new mouth lesion  Eyes:  No Eye Pain, No Redness, No Discharge  Cardiovascular:  No Chest Pain, + orthopnea, No Palpitations  Respiratory: + Cough, No Sputum, No Dyspnea  Gastrointestinal: No Vomiting, No Diarrhea, No abdominal pain  Genitourinary: No Urinary Frequency, No Hematuria, No Urinary pain  Musculoskeletal:  No worsening Arthralgias, No worsening Myalgias  Skin:  No new Skin Lesions, No new skin rash  Neuro:  No new weakness, No new numbness. Psych:  No suicial ideation, No Violence ideation    PHYSICAL EXAM PERFORMED:    /75   Pulse (!) 117   Temp 97.8 °F (36.6 °C) (Oral)   Resp 20   Wt 170 lb 3.1 oz (77.2 kg)   SpO2 94%   BMI 22.45 kg/m²     General appearance:  mild acute distress, appears older than stated age  HEENT:   atraumatic, sclera anicteric, Conjunctivae clear. Neck: Supple,Trachea midline, no goiter JVD present  Respiratory:minimal accessory muscle usage, Normal respiratory effort. crackles bilaterally  Cardiovascular:  Regular rate and rhythm, capillary refill 2 seconds  Abdomen: Soft, non-tender, non-distended with normal bowel sounds. Musculoskeletal:  No clubbing, cyanosis. 2+ edema LE bilaterally. Skin: turgor normal.  No new rashes or lesions. Neurologic: Alert and oriented x4, no new focal sensory/motor deficits.      Labs:     Recent Labs     02/19/23 2020   WBC 7.9   HGB 15.2   HCT 47.4   *     Recent Labs     02/19/23 2020   *   K 4.9   CL 95*   CO2 15*   BUN 33*   CREATININE 1.5*   CALCIUM 10.1     Recent Labs     02/19/23 2020   AST 52*   ALT 64*   BILITOT 3.2*   ALKPHOS 301*     Recent Labs     02/19/23 2020   INR 1.52*     Recent Labs     02/19/23 2020   TROPONINI 0.04*       Urinalysis:      Lab Results   Component Value Date/Time    NITRU Negative 12/02/2021 04:04 AM    BLOODU Negative 12/02/2021 04:04 AM    SPECGRAV 1.020 12/02/2021 04:04 AM    GLUCOSEU Negative 12/02/2021 04:04 AM       Radiology:     CXR: I have reviewed the CXR with the following interpretation:   No acute process  EKG:  I have reviewed the EKG with the following interpretation:   pending  XR CHEST PORTABLE   Final Result   No acute airspace disease identified. ASSESSMENT AND PLAN:    Active Hospital Problems    Diagnosis Date Noted    NSTEMI (non-ST elevated myocardial infarction) ([facility] [address] [I21.4] 02/19/2023     Priority: Medium     Acute on chronic HFrEF EF 25%  Etiology secondary to noncompliance  Strict I's and O's, Daily weights  Lasix 40 IV twice daily  Continued home medications  Heart failure protocol set ordered  Continue to check response     NSTEMI: Type II in the setting of acute exacerbation    Transaminitis with elevated bilirubin: Recheck in a.m. Fractionated lab    Thrombocytopenia:  Smear, ultrasound liver    Elevated PT/INR: Patient does take Coumadin intermittently  Warfarin consulted    Acute renal failure: Suspected cardiorenal  Lasix,    Uncontrolled type 2 Diabetes: Reviewed patient's medications. Patient is not taking any medications nor did he know that he was diabetic  Levemir ordered with ISS    Anxiety: Xanax ordered  Essential Hypertension: Reviewed patient's medications and plan is to continue home medication  Hyperlipidemia: Controlled on home Statin. Outpatient PCP follow up post-discharge  Paroxysmal Atrial fibrilliation: unspecified and clinically unable to determine etiology. Controlled on home medication. currently Anticoagulated and Monitored on tele    Face-to-face discussion with the primary ER physician in regards to symptoms, history, physical exam, diagnosis and treatment, collaborative decision was to admit the patient.     Diet: diabetic renal cardiac diet    DVT Prophylaxis: warfarin    Dispo:   Expected LOS of two days         Darrion Pleitez DO

## 2023-02-20 NOTE — CARE COORDINATION
Discharge Planning      Patient admitted from home, A&O, independent  and appears to have has minimal needs for discharge at this time. Discussed with patients nurse and requested that case management be notified if discharge needs are identified. Patient has active insurance with [employer] . Dr Magi Leone is listed as the PCP. Case management will continue to follow progress and update discharge plan as needed.

## 2023-02-20 NOTE — ED PROVIDER NOTES
Patrick Ochoa        Pt Name: Evan Parker  MRN: [id number]  [place] 1960  Date of evaluation: 2/19/2023  Provider: MICHELLE Chairez - BRIANNE  PCP: Abby Muller MD  Note Started: 8:42 PM EST 2/19/23       I have seen and evaluated this patient with my supervising physician Callie Proctor, [address]       Chief Complaint   Patient presents with    Leg Swelling     BLE edema x about 2 weeks. HISTORY OF PRESENT ILLNESS: 1 or more Elements     History from : Patient    Limitations to history : None    Evan Parker is a 63 y.o. male who presents to the emergency department bilateral leg swelling that travels above the knee. States that he has also gained at least 5 pounds in several days. Fatigue, shortness of breath with any activity and unable to lay flat. Hx of congestive heart failure with coronary artery bypass graft in December 2021. Patient is unclear/uncertain which medications that he is taking, he is probably out of his warfarin, he is unsure. Will check PT/INR. Unclear at the last time that he saw cardiology or primary care. States that he had \"extra medicine\" that he was trying to make last but does not have a documented cardiology appointment since February 2022. States that he does manage his health independently. Denies any headache, fever, lightheadedness, dizziness, visual disturbances. No neck or back pain. No abdominal pain, nausea, vomiting, diarrhea, constipation, or dysuria. No rash. Nursing Notes were all reviewed and agreed with or any disagreements were addressed in the HPI. REVIEW OF SYSTEMS :      Review of Systems   Constitutional:  Positive for fatigue and unexpected weight change. Negative for activity change, chills and fever. Respiratory:  Positive for chest tightness and shortness of breath. Cardiovascular:  Positive for leg swelling. Negative for chest pain.    Gastrointestinal: Negative for abdominal pain, diarrhea, nausea and vomiting. Genitourinary:  Negative for dysuria. All other systems reviewed and are negative. Positives and Pertinent negatives as per HPI.      SURGICAL HISTORY     Past Surgical History:   Procedure Laterality Date    CARDIAC SURGERY      CORONARY ARTERY BYPASS GRAFT N/A 12/10/2021    CABG X 4 , CORONARY ARTERY BYPASS GRAFT STRONG TO LAD, VEIN GRAFT TO PDA, SVG SEQUENCED TO DIAG AND OM, LIGATION KATHARNIE  ATRICLIP 45MM, EVH LEFT SAPHENOUS VEIN, ANTERIOR AND POSTERIOR CHORDAE SPARING MITRAL VALVE REPLACEMENT MEDTRONIC 29MM mechanical valve, TRICUSPID VALVE REPAIR with 28mm Physio tricuspid annuloplasty ring , TOTAL CPB, VERO, Doppler flow verification of bypass grafts, 5 LEVEL BILATERAL        CURRENTMEDICATIONS       Current Discharge Medication List        CONTINUE these medications which have NOT CHANGED    Details   digoxin (LANOXIN) 125 MCG tablet Take 1 tablet by mouth daily  Qty: 30 tablet, Refills: 3      amiodarone (CORDARONE) 200 MG tablet Take 1 tablet by mouth daily  Qty: 30 tablet, Refills: 3      carvedilol (COREG) 12.5 MG tablet Take 1 tablet by mouth 2 times daily (with meals)  Qty: 60 tablet, Refills: 3      furosemide (LASIX) 40 MG tablet Take 0.5 tablets by mouth daily  Qty: 60 tablet, Refills: 3      traZODone (DESYREL) 50 MG tablet Take 1 tablet by mouth nightly  Qty: 30 tablet, Refills: 0    Associated Diagnoses: Insomnia, unspecified type      acetaminophen (TYLENOL) 500 MG tablet Take 2 tablets by mouth every 6 hours  Qty: 120 tablet, Refills: 3      aspirin 81 MG chewable tablet Take 1 tablet by mouth daily  Qty: 30 tablet, Refills: 3      atorvastatin (LIPITOR) 40 MG tablet Take 1 tablet by mouth nightly  Qty: 30 tablet, Refills: 3      spironolactone (ALDACTONE) 25 MG tablet Take 0.5 tablets by mouth daily  Qty: 30 tablet, Refills: 3      warfarin (COUMADIN) 6 MG tablet Take by mouth daily at [facility] as directed (start with 1 tablet)  Qty: 30 tablet, Refills: 3             ALLERGIES     Patient has no known allergies.    FAMILYHISTORY       Family History   Problem Relation Age of Onset    Cancer Mother         colon cancer    Heart Disease Father     High Blood Pressure Father     Diabetes Father     High Cholesterol Father         SOCIAL HISTORY       Social History     Tobacco Use    Smoking status: Former     Packs/day: 1.00     Years: 15.00     Pack years: 15.00     Types: Cigarettes     Quit date: 12/10/2021     Years since quittin.1    Smokeless tobacco: Never   Vaping Use    Vaping Use: Never used   Substance Use Topics    Alcohol use: Yes     Comment: occasionally    Drug use: Never       SCREENINGS        [place] Coma Scale  Eye Opening: Spontaneous  Best Verbal Response: Oriented  Best Motor Response: Obeys commands  [place] Coma Scale Score: 15                CIWA Assessment  BP: 101/75  Heart Rate: (!) 117           PHYSICAL EXAM  1 or more Elements     ED Triage Vitals [23]   BP Temp Temp Source Heart Rate Resp SpO2 Height Weight   (!) 122/90 98.9 °F (37.2 °C) Oral (!) 122 18 97 % -- --       Physical Exam  Vitals and nursing note reviewed.   Constitutional:       Appearance: He is well-developed. He is not diaphoretic.   HENT:      Head: Normocephalic and atraumatic.      Right Ear: External ear normal.      Left Ear: External ear normal.   Eyes:      General:         Right eye: No discharge.         Left eye: No discharge.   Neck:      Vascular: No JVD.   Cardiovascular:      Rate and Rhythm: Tachycardia present.      Pulses: Normal pulses.   Pulmonary:      Effort: Pulmonary effort is normal. No respiratory distress.      Breath sounds: Rales present.   Abdominal:      Palpations: Abdomen is soft.      Tenderness: There is no abdominal tenderness.   Musculoskeletal:         General: Normal range of motion.      Right lower leg: Edema present.      Left lower leg: Edema present.   Skin:     General: Skin is warm and dry.     Coloration: Skin is not pale. Neurological:      Mental Status: He is alert.    Psychiatric:         Behavior: Behavior normal.           DIAGNOSTIC RESULTS   LABS:    Labs Reviewed   CBC WITH AUTO DIFFERENTIAL - Abnormal; Notable for the following components:       Result Value    RDW 15.7 (*)     Platelets 718 (*)     All other components within normal limits   COMPREHENSIVE METABOLIC PANEL - Abnormal; Notable for the following components:    Sodium 127 (*)     Chloride 95 (*)     CO2 15 (*)     Anion Gap 17 (*)     Glucose 481 (*)     BUN 33 (*)     Creatinine 1.5 (*)     Est, Glom Filt Rate 53 (*)     Total Bilirubin 3.2 (*)     Alkaline Phosphatase 301 (*)     ALT 64 (*)     AST 52 (*)     All other components within normal limits   BRAIN NATRIURETIC PEPTIDE - Abnormal; Notable for the following components:    Pro-BNP 4,811 (*)     All other components within normal limits   TROPONIN - Abnormal; Notable for the following components:    Troponin 0.04 (*)     All other components within normal limits   PROTIME-INR - Abnormal; Notable for the following components:    Protime 18.3 (*)     INR 1.52 (*)     All other components within normal limits   BLOOD GAS, VENOUS - Abnormal; Notable for the following components:    pH, Brad 7.297 (*)     HCO3, Venous 19.8 (*)     Base Excess, Brad -6.3 (*)     Carboxyhemoglobin 3.0 (*)     All other components within normal limits   BETA-HYDROXYBUTYRATE - Abnormal; Notable for the following components:    Beta-Hydroxybutyrate 3.10 (*)     All other components within normal limits   POCT GLUCOSE - Abnormal; Notable for the following components:    POC Glucose 404 (*)     All other components within normal limits   RAPID INFLUENZA A/B ANTIGENS   COVID-19, RAPID   URINALYSIS WITH REFLEX TO CULTURE   HEMOGLOBIN A1C   CBC WITH AUTO DIFFERENTIAL   TROPONIN   COMPREHENSIVE METABOLIC PANEL W/ REFLEX TO MG FOR LOW K   TSH WITH REFLEX   LACTIC ACID   URINE DRUG SCREEN   TROPONIN SODIUM, URINE, RANDOM   OSMOLALITY, URINE   OSMOLALITY   BASIC METABOLIC PANEL   MAGNESIUM   POCT GLUCOSE   POCT GLUCOSE   POCT GLUCOSE   POCT GLUCOSE   POCT GLUCOSE   POCT GLUCOSE   POCT GLUCOSE   POCT GLUCOSE   POCT GLUCOSE   POCT GLUCOSE   POCT GLUCOSE   POCT GLUCOSE   POCT GLUCOSE   POCT GLUCOSE   POCT GLUCOSE   POCT GLUCOSE   POCT GLUCOSE   POCT GLUCOSE   POCT GLUCOSE       When ordered only abnormal lab results are displayed. All other labs were within normal range or not returned as of this dictation. EKG: When ordered, EKG's are interpreted by the Emergency Department Physician in the absence of a cardiologist.  Please see their note for interpretation of EKG. RADIOLOGY:   Non-plain film images such as CT, Ultrasound and MRI are read by the radiologist. Plain radiographic images are visualized and preliminarily interpreted by the ED Provider with the below findings:        Interpretation per the Radiologist below, if available at the time of this note:    XR CHEST PORTABLE   Final Result   No acute airspace disease identified. No results found. No results found. PROCEDURES   Unless otherwise noted below, none     Procedures    CRITICAL CARE TIME (.cctime)   There was a high probability of life-threatening clinical deterioration in the patient's condition requiring my urgent intervention. I personally saw the patient and independently provided 42 minutes of non-concurrent critical care out of the total shared critical care time provided, excluding separately reportable procedures. PAST MEDICAL HISTORY      has a past medical history of Diabetes ([address] Hyperlipidemia, and Hypertension.      Chronic Conditions affecting Care: heart failure, CABG, Diabetes, hyperlipidemia, HTN    EMERGENCY DEPARTMENT COURSE and DIFFERENTIAL DIAGNOSIS/MDM:   Vitals:    Vitals:    02/19/23 1930 02/19/23 2116 02/19/23 2315   BP: (!) 122/90  101/75   Pulse: (!) 122  (!) 117   Resp: 18  20   Temp: 98.9 °F (37.2 °C)  97.8 °F (36.6 °C)   TempSrc: Oral  Oral   SpO2: 97%  94%   Weight:  170 lb 3.1 oz (77.2 kg)        Patient was given the following medications:  Medications   ALPRAZolam (XANAX) tablet 0.5 mg (has no administration in time range)   insulin glargine (LANTUS) injection vial 15 Units (has no administration in time range)   amiodarone (CORDARONE) tablet 200 mg (has no administration in time range)   aspirin chewable tablet 81 mg (has no administration in time range)   atorvastatin (LIPITOR) tablet 40 mg (has no administration in time range)   carvedilol (COREG) tablet 12.5 mg (has no administration in time range)   digoxin (LANOXIN) tablet 125 mcg (has no administration in time range)   spironolactone (ALDACTONE) tablet 12.5 mg (has no administration in time range)   dextrose bolus 10% 125 mL (has no administration in time range)     Or   dextrose bolus 10% 250 mL (has no administration in time range)   glucagon (rDNA) injection 1 mg (has no administration in time range)   dextrose 10 % infusion (has no administration in time range)   sodium chloride flush 0.9 % injection 10 mL (has no administration in time range)   sodium chloride flush 0.9 % injection 10 mL (has no administration in time range)   0.9 % sodium chloride infusion (has no administration in time range)   potassium chloride 10 mEq/100 mL IVPB (Peripheral Line) (has no administration in time range)   magnesium sulfate 2000 mg in 50 mL IVPB premix (has no administration in time range)   promethazine (PHENERGAN) tablet 12.5 mg (has no administration in time range)     Or   ondansetron (ZOFRAN) injection 4 mg (has no administration in time range)   acetaminophen (TYLENOL) tablet 650 mg (has no administration in time range)     Or   acetaminophen (TYLENOL) suppository 650 mg (has no administration in time range)   furosemide (LASIX) injection 40 mg (has no administration in time range)   insulin lispro (HUMALOG) injection vial 0-8 Units (has no administration in time range)   furosemide (LASIX) injection 40 mg (40 mg IntraVENous Given 2/19/23 2232)   aspirin chewable tablet 324 mg (324 mg Oral Given 2/19/23 2232)             Is this patient to be included in the SEP-1 Core Measure due to severe sepsis or septic shock? No   Exclusion criteria - the patient is NOT to be included for SEP-1 Core Measure due to: Infection is not suspected    CONSULTS: (Who and What was discussed)  PHARMACY TO DOSE WARFARIN  IP CONSULT TO NEPHROLOGY  IP CONSULT TO HEART FAILURE NURSE/COORDINATOR  IP CONSULT TO DIETITIAN  IP CONSULT TO SPIRITUAL SERVICES  Discussion with Other Profesionals : Admitting Team max    Social Determinants : medication non-compliance    Records Reviewed : Inpatient Notes CABG 12/2021 and Outpatient Notes cardiology 2/2022    CC/HPI Summary, DDx, ED Course, and Reassessment:     Briefly, this is a 63year old male who presents to the emergency department bilateral leg swelling that travels above the knee. States that he has also gained at least 5 pounds in several days. Fatigue, shortness of breath with any activity and unable to lay flat. Hx of congestive heart failure with coronary artery bypass graft in December 2021. Patient is unclear/uncertain which medications that he is taking, he is probably out of his warfarin, he is unsure. Will check PT/INR. Unclear at the last time that he saw cardiology or primary care. States that he had \"extra medicine\" that he was trying to make last but does not have a documented cardiology appointment since February 2022. States that he does manage his health independently. XR CHEST PORTABLE (Final result)  Result time 02/19/23 21:14:08  Final result by Soto Boone MD (02/19/23 21:14:08)                Impression:    No acute airspace disease identified. Rapid influenza is negative. COVID-negative. Blood gas does show pH of 7.297. CBC is unremarkable.   CMP did show a pseudohyponatremia with a sodium of 127 anion gap of 17 CO2 of 15 with a glucose of 481. The patient is a type II diabetic, obviously noncompliant and not taking medications. His BUN is 33 creatinine 1.5 and GFR 53. BNP 4811. Troponin 0.04. INR 1.52, the patient is not taking his warfarin as directed and is subtherapeutic. Beta-hydroxybutyrate 3. 10. XR CHEST PORTABLE (Final result)  Result time 02/19/23 21:14:08  Final result by Chay Benjamin MD (02/19/23 21:14:08)                Impression:    No acute airspace disease identified. Diabetic ketoacidosis with insulin drip, admitted to the ICU, concern for BENOIT, and medical noncompliance. Patient will be diuresed and given aspirin as well. Hospitalist agreeable to admit this patient. Patient and family agreeable regarding plan of care. Disposition Considerations (include 1 Tests not done, Shared Decision Making, Pt Expectation of Test or Tx.): I did consider CT chest    Shared decision making used throughout. Admit icu      I am the Primary Clinician of Record. FINAL IMPRESSION      1. Diabetic ketoacidosis without coma associated with type 2 diabetes mellitus ([facility] [address]    2. Acute on chronic congestive heart failure, unspecified heart failure type (HCC)    3. Elevated troponin    4. Edema, unspecified type    5. Non compliance w medication regimen    6. Subtherapeutic international normalized ratio (INR)          DISPOSITION/PLAN     DISPOSITION Admitted 02/19/2023 10:16:21 PM      PATIENT REFERRED TO:  No follow-up provider specified.     DISCHARGE MEDICATIONS:  Current Discharge Medication List          DISCONTINUED MEDICATIONS:  Current Discharge Medication List                 (Please note that portions of this note were completed with a voice recognition program.  Efforts were made to edit the dictations but occasionally words are mis-transcribed.)    MICHELLE Yanez - CNP (electronically signed)            Jennifer Johnson MICHELLE - CNP  02/20/23 0008

## 2023-02-20 NOTE — CONSULTS
Kendall Cardenas 1960    History:  Past Medical History:   Diagnosis Date    Diabetes ([facility] [address]     Hyperlipidemia     Hypertension        ECHO:  23      Summary   Overall, left ventricular systolic function is severely depressed. Ejection fraction is visually estimated to be 10%. There is severe diffuse hypokinesis. Cannot determine diastology due to   mitral valve replacement. 29 mm Medtronic mechanical valve. Max P mmHg. Mean P mmHg. 28 mm Phisio TV annuloplasty. Max P mmHg. Mild to moderate tricuspid   regurgitation. RVSP = 37 mmHG. The right ventricle is normal in size with reduced function. Biatrial enlargement. ACE/ARB/ARNi: could not tolerate in past d/t hypotension  BB: coreg 12.5 mg bid  Aldosterone Antagonist: aldactone 12.5 mg daily  SGLT2: Patient not on- consider if not contraindicated       History of sleep apnea: No [place] Screen ordered: Yes    DM History: Yes  Consult for DM educator: Yes      Last Hospital Admission: 21 with CABG  Code Status: full   Discharge plans: from home    Family Present: yes    Christiano Boyle was admitted to the hospital with increased shortness of breath. Patient with hx HFrEF and CAD hx. Patient was noted to have lower leg edema, dyspnea, and fatigue. He had not been compliant with medications or follow ups. Patient not following sodium or fluid restriction. Patient provided with both written and verbal education on CHF signs/ symptoms, causes, discharge medications, daily weights, low sodium diet, activity, and follow-up. Pt to call if gains 3 pounds in one day or 5 pounds in one week. Mutually agreed upon goals were discussed such as calling the MD as soon as they recognize symptoms and weight gain, maintaining proper diet, taking medications as prescribed, joining cardiac rehab when able. Also reviewed importance of risk factor reduction.  Patient provided with CHF Zone Management tool and CHF symptoms magnet. Discussed importance of lifestyle changes: stressed importance of medication compliance    PATIENT/CAREGIVER TEACHING:    Level of patient/caregiver understanding able to:   [x ] Verbalize understanding [ ] Demonstrate understanding [ ] Teach back   [ ] Needs reinforcement [ ] Other:       Time spent teachin mins    1. WEIGHT: Admit Weight: 170 lb 3.1 oz (77.2 kg)      Today  Weight: 188 lb 12.8 oz (85.6 kg)   2. I/O   Intake/Output Summary (Last 24 hours) at 2023 1620  Last data filed at 2023 1249  Gross per 24 hour   Intake 840 ml   Output 900 ml   Net -60 ml       Recommendations:   1. Patient needs one week hospital follow up at discharge  2. Educate further on fluid restriction 48 oz- 64 oz during inpatient stay so he can understand how to measure intake at home. 3. Continue to educate on S/S.   4. Emphasize daily weights, diet, and knowing when and who to call  5. Provided patient with CHF Resource Line for questions and concerns. 6. Patient would benefit from cardiac rehab as an outpatient. Flyer provided.        AVEL EDWARD RN 2023 4:20 PM

## 2023-02-20 NOTE — PROGRESS NOTES
Hospitalist Progress Note      PCP: Edmond Wheat MD    Date of Admission: 2/19/2023    LOS: 1    Chief Complaint:   Chief Complaint   Patient presents with    Leg Swelling     BLE edema x about 2 weeks. Case Summary:   57-year-old gentleman with history of hypertension, hyperlipidemia, type 2 diabetes, chronic systolic heart failure, mechanical mitral valve replacement on anticoagulation noncompliant with medications who presents with shortness of breath with the dyspnea on minimal exertion, lethargy, increased peripheral edema, orthopnea found to have acute decompensated systolic heart failure due to medication noncompliance and dietary indiscretion. Patient reports to have stopped taking medications about a month ago including Coumadin. Active Hospital Problems    Diagnosis Date Noted    Hyponatremia [E87.1] 02/20/2023     Priority: Medium    Acute kidney injury ([address] [N17.9] 02/20/2023     Priority: Medium    Elevated lactic acid level [R79.89] 02/20/2023     Priority: Medium    Transaminitis [R74.01] 02/20/2023     Priority: Medium    NSTEMI (non-ST elevated myocardial infarction) ([address] [I21.4] 02/19/2023     Priority: Medium    Mixed hyperlipidemia [E78.2] 01/05/2022    Ischemic cardiomyopathy [I25.5]     CHF (congestive heart failure), NYHA class I, acute on chronic, combined ([address] [I50.43] 12/09/2021         Principal Problem:    CHF (congestive heart failure), NYHA class I, acute on chronic, combined ([address]): In the setting of medication noncompliance and dietary discretion. Presented with orthopnea, ankle swelling, lethargy. Reports feeling better this morning.  - Continue diuresis  - Get echocardiogram  - Cardiology consult  - Monitor renal function and electrolytes  - Continue Coreg, spironolactone    Active Problems:    NSTEMI (non-ST elevated myocardial infarction) [facility]): Likely type II NSTEMI in the setting of fluid overload/CHF.   No chest pains.  - Get echocardiogram  - Hold off anticoagulation  - Cardiology consult  - Resume antianginal therapy with aspirin, statin, Coreg      Atrial fibrillation: Rate controlled on digoxin, Coreg. Therapeutic enoxaparin To bridge Coumadin to therapeutic INR      Mechanical mitral valve: Patient has not been taking his Coumadin. Restarted anticoagulation with Coumadin and Lovenox bridge to therapeutic INR      Hyponatremia: In the setting of CHF. Will monitor on diuresis    Acute kidney injury [facility]): Likely hypoperfusion due to CHF. Continue diuresis and monitor renal function and electrolytes. Get ultrasound renal to rule out obstruction. Nephrology following    Elevated lactic acid level: In the setting of hypotension and poor perfusion. Will monitor. No evidence of sepsis    Transaminitis: With underlying hepatic congestion likely secondary to CHF. Anticipate improvement with diuresis. Will monitor. Get ultrasound abdomen    Mixed hyperlipidemia: On statin      Medications:  Reviewed  Infusion Medications    dextrose      sodium chloride       Scheduled Medications    warfarin placeholder: dosing by pharmacy   Other RX Placeholder    enoxaparin  1 mg/kg SubCUTAneous BID    warfarin  6 mg Oral Daily    insulin glargine  15 Units SubCUTAneous Nightly    amiodarone  200 mg Oral Daily    aspirin  81 mg Oral Daily    atorvastatin  40 mg Oral Nightly    carvedilol  12.5 mg Oral BID WC    digoxin  125 mcg Oral Daily    spironolactone  12.5 mg Oral Daily    sodium chloride flush  10 mL IntraVENous 2 times per day    furosemide  40 mg IntraVENous BID    insulin lispro  0-8 Units SubCUTAneous Q4H     PRN Meds: dextrose bolus **OR** dextrose bolus, glucagon (rDNA), dextrose, sodium chloride flush, sodium chloride, potassium chloride, magnesium sulfate, promethazine **OR** ondansetron, acetaminophen **OR** acetaminophen      DVT Prophylaxis: Subcut enoxaparin  Diet: ADULT DIET;  Regular; 3 carb choices (45 gm/meal)  Code Status: Full Code    Dispo: Anticipate discharge in 2 to 3 days    ____________________________________________________________________________    Subjective:   Overnight Events:   Uneventful overnight  Keen for discharge but not optimized  Reports poor sleep with mechanical valve lactic      Physical Exam:  BP (!) 85/57   Pulse 85   Temp 97.2 °F (36.2 °C) (Oral)   Resp 16   Ht 6' 1\" (1.854 m)   Wt 188 lb 12.8 oz (85.6 kg)   SpO2 98%   BMI 24.91 kg/m²   General appearance: No apparent distress, appears stated age and cooperative. HEENT: Normocephalic, atraumatic, MMM, No sclera icterus/conjuctival palor  Neck: Supple, no thyromegally. No jugular venous distention. Respiratory:  Normal respiratory effort. Clear to auscultation, no Rales/Wheezes/Rhonchi. Cardiovascular: S1/S2 without murmurs with mechanical click, rubs or gallops. RRR  Abdomen: Soft, non-tender, non-distended, bowel sounds present. Musculoskeletal: No clubbing, cyanosis. Pitting edema bilaterally. Skin: Skin color, texture, turgor normal.  No rashes or lesions.   Neurologic:  Cranial nerves: II-XII intact, BRENNAN, No focal sensory/motor deficits        Intake/Output Summary (Last 24 hours) at 2/20/2023 1342  Last data filed at 2/20/2023 1249  Gross per 24 hour   Intake 840 ml   Output 900 ml   Net -60 ml       Labs:   Recent Labs     02/19/23 2020 02/20/23  0506   WBC 7.9 8.5   HGB 15.2 14.8   HCT 47.4 45.6   * 120*      Recent Labs     02/19/23 2020 02/19/23  2333 02/20/23  0506   * 126* 134*   K 4.9 5.1 4.1   CL 95* 92* 98*   CO2 15* 20* 19*   BUN 33* 34* 33*   CREATININE 1.5* 1.6* 1.4*   CALCIUM 10.1 10.4 9.6   AST 52*  --  125*   ALT 64*  --  120*   BILITOT 3.2*  --  3.1*   ALKPHOS 301*  --  302*     Recent Labs     02/19/23 2020 02/19/23  2333 02/20/23  0506   TROPONINI 0.04* 0.05* 0.04*       Urinalysis:    Lab Results   Component Value Date/Time    NITRU Negative 02/20/2023 01:45 AM    WBCUA 1 02/20/2023 01:45 AM    BACTERIA None Seen 02/20/2023 01:45 AM    RBCUA 0 02/20/2023 01:45 AM    BLOODU Negative 02/20/2023 01:45 AM    SPECGRAV 1.015 02/20/2023 01:45 AM    GLUCOSEU >=1000 02/20/2023 01:45 AM       Radiology:  XR CHEST PORTABLE   Final Result   No acute airspace disease identified. US ABDOMEN COMPLETE    (Results Pending)   US RETROPERITONEAL COMPLETE    (Results Pending)           Uvaldo Cordero MD      Please excuse brevity and/or typos. This report was transcribed using voice recognition software. Every effort was made to ensure accuracy, however, inadvertent computerized transcription errors may be present.

## 2023-02-20 NOTE — CONSULTS
MD Wesly Padilla MD Samir Brahmbhatt, MD               Office: [phone]                      Fax: [phone]             [facility] [employer] [facility]                   NEPHROLOGY INITIAL CONSULT NOTE:     PATIENT NAME: Kaveh Murphy  : 1960   MRN: [id number]  REASON FOR CONSULT: For evaluation and management of Hyponatremia . (My recommendations will be communicated by way of shared medical record.)      IMPRESSION:       Admitted on  2023  for:  Elevated troponin [R77.8]  NSTEMI (non-ST elevated myocardial infarction) (HCC) [I21.4]  Subtherapeutic international normalized ratio (INR) [R79.1]  Non compliance w medication regimen [Z91.14]  Diabetic ketoacidosis without coma associated with type 2 diabetes mellitus (HCC) [E11.10]  Edema, unspecified type [R60.9]  Acute on chronic congestive heart failure, unspecified heart failure type ([facility]) [I50.9]      Hyponatremia :   : Not acute but likely worsened underlying uncontrolled, chronic,   : Moderate range, : mildly symptomatic,   : hyper-volemic:   -Lowest sodium recently 32, in , in  was normal, this admission on presentation 127-worsening 126, now   improving, 134,    - No Reported Severe symptoms (such as seizures, obtundation, coma, or respiratory arrest).   - no need for Hypertonic saline or acute reversal     - Risk factors for hyponatremia: Heart failure, fluid overload,  + Pseudo effect with hyperglycemia, with DKA presentation,    - Patient is at   risk of developing Osmotic Demyelination Syndrome.    - Call us urgently if any/worsening neurological findings.         RECOMMENDATIONS:     Work up:  - Serum Osm , UOsm , Marcia , Urine K, Uric acid,   - renal function at baseline creatinine 1.1-1.2 as of 2022, this admission has been around 1.5-1.6  = Likely due to advancing CKD.  With stage IIIa    -UA results reviewed showing severe hyperglycemia, mild ketones, trace protein, hyaline cast,  likely suggestive of diabetes CKD    - other lytes stable overall  - BP slightly lower but not in shock so unlikely, unlikely AI  - TSH control  -Hyperglycemia, with DKA, with lactic acidosis, elevated beta hydroxybutyrate, A1c uncontrolled at 12.0    -Get kidney ultrasound to establish baseline      Management:  - Monitor Serum Na daily  - Goal Serum Na mid 130s, by next /24 hours     -Agree with current Lasix, with Aldactone  -Suggest to hold other RAAS blockade, SGL 2 inhibitors during active diuresis. -Follow-up with cardiology, ECHO    -Ongoing management of DKA,    - Minimize use any hypotonic/isotonic fluids such as D5W, NS, LR with other medications/drips ,   - Concentrate continuous drip fluids as much as possible,   - Avoid IVF , unless needed for resuscitation/hypovolemia w/ hypotension+tachycardia,      - minimize SSRI, NSAIDs use    - Strict I/O with daily weights. --- Call us urgently if any/worsening neurological findings. Discharge plans from renal standpoint-  in 24-48 hrs whenever Na ~130s, stable w/o neurological s/s         Other major problems: Management per primary and other consulting teams.   //         Hospital Problems             Last Modified POA    * (Principal) NSTEMI (non-ST elevated myocardial infarction) ([facility] 2/19/2023 Yes     : other supportive care :   - Check daily renal function panel with electrolytes-phosphorus  - Strict monitoring of I/Os, daily weight  - non-Renal feeds/diet  - Current medications reviewed. Please refer to the orders. High Complexity. Multiple complex problems. Discussed with patient and treatment team-    Time spent > 30 (~35) minutes. Thank you for allowing me to participate in this patient's care. Please do not hesitate to contact me anytime. We will follow along with you.        Joan Collins MD,  [facility] [id number] [facility]: [phone] or [employer]  Fax: [phone]        =======================================================================================   =======================================================================================      CHIEF COMPLAINT:   Chief Complaint   Patient presents with    Leg Swelling     BLE edema x about 2 weeks. History Obtained From:  patient + treatment team + Electronic Medical Records    HPI: Mr. Griselda Temple is a 63 y.o. male with significant past medical history as below:   Past Medical History:   Diagnosis Date    Diabetes ([address]     Hyperlipidemia     Hypertension       Presents with Leg Swelling (BLE edema x about 2 weeks. )    Admitted with Elevated troponin [R77.8]  NSTEMI (non-ST elevated myocardial infarction) ([address] [I21.4]  Subtherapeutic international normalized ratio (INR) [R79.1]  Non compliance w medication regimen [Z91.14]  Diabetic ketoacidosis without coma associated with type 2 diabetes mellitus ([facility] [E11.10]  Edema, unspecified type [R60.9]  Acute on chronic congestive heart failure, unspecified heart failure type ([facility] [I50.9]   Found to have Hyponatremia , so we are called for that. No current active complaints. Patient denied chest pain / dizziness/lightheadedness/syncope  + SOB   + leg edema. *  Regarding: Hyponatremia   Duration: chronic  Location: kidneys  Severity: Severe   Timing: continous  Context (ex: related to condition):  , refer to my assessment / impression. Modifying factors (ex: medications, interventions):  , refer to my plan / recommendation. Associated signs & symptoms (ex: edema, SOB): As mentioned above in CC and HPI      Past medical, Surgical, Social, Family medical history reviewed by me.      PAST MEDICAL HISTORY:   Past Medical History:   Diagnosis Date    Diabetes ([facility]     Hyperlipidemia     Hypertension      PAST SURGICAL HISTORY:   Past Surgical History:   Procedure Laterality Date    CARDIAC SURGERY      CORONARY ARTERY BYPASS GRAFT N/A 12/10/2021    CABG X 4 , CORONARY ARTERY BYPASS GRAFT STRONG TO LAD, VEIN GRAFT TO PDA, SVG SEQUENCED TO DIAG AND OM, LIGATION KATHARINE  ATRICLIP 45MM, EVH LEFT SAPHENOUS VEIN, ANTERIOR AND POSTERIOR CHORDAE SPARING MITRAL VALVE REPLACEMENT MEDTRONIC 29MM mechanical valve, TRICUSPID VALVE REPAIR with 28mm Physio tricuspid annuloplasty ring , TOTAL CPB, VERO, Doppler flow verification of bypass grafts, 5 LEVEL BILATERAL      FAMILY HISTORY:   Family History   Problem Relation Age of Onset    Cancer Mother         colon cancer    Heart Disease Father     High Blood Pressure Father     Diabetes Father     High Cholesterol Father      SOCIAL HISTORY:   Social History     Socioeconomic History    Marital status: Single     Spouse name: None    Number of children: 2    Years of education: None    Highest education level: None   Tobacco Use    Smoking status: Former     Packs/day: 1.00     Years: 15.00     Pack years: 15.00     Types: Cigarettes     Quit date: 12/10/2021     Years since quittin.1    Smokeless tobacco: Never   Vaping Use    Vaping Use: Never used   Substance and Sexual Activity    Alcohol use: Yes     Comment: occasionally    Drug use: Never         MEDICATIONS: reviewed by me. Medications Prior to Admission:  No current facility-administered medications on file prior to encounter.      Current Outpatient Medications on File Prior to Encounter   Medication Sig Dispense Refill    digoxin (LANOXIN) 125 MCG tablet Take 1 tablet by mouth daily 30 tablet 3    amiodarone (CORDARONE) 200 MG tablet Take 1 tablet by mouth daily 30 tablet 3    carvedilol (COREG) 12.5 MG tablet Take 1 tablet by mouth 2 times daily (with meals) 60 tablet 3    furosemide (LASIX) 40 MG tablet Take 0.5 tablets by mouth daily 60 tablet 3    traZODone (DESYREL) 50 MG tablet Take 1 tablet by mouth nightly (Patient not taking: No sig reported) 30 tablet 0    acetaminophen (TYLENOL) 500 MG tablet Take 2 tablets by mouth every 6 hours (Patient not taking: No sig reported) 120 tablet 3    aspirin 81 MG chewable tablet Take 1 tablet by mouth daily 30 tablet 3    atorvastatin (LIPITOR) 40 MG tablet Take 1 tablet by mouth nightly 30 tablet 3    spironolactone (ALDACTONE) 25 MG tablet Take 0.5 tablets by mouth daily 30 tablet 3    warfarin (COUMADIN) 6 MG tablet Take by mouth daily at [facility] as directed (start with 1 tablet) 30 tablet 3         Current Facility-Administered Medications:     warfarin placeholder: dosing by pharmacy, , Other, RX Placeholder, Marci Cristobal DO    enoxaparin (LOVENOX) injection 80 mg, 1 mg/kg, SubCUTAneous, BID, Marci Cristobal, DO, 80 mg at 02/20/23 0814    insulin glargine (LANTUS) injection vial 15 Units, 15 Units, SubCUTAneous, Nightly, Marci Cristobal, DO, 15 Units at 02/20/23 0025    amiodarone (CORDARONE) tablet 200 mg, 200 mg, Oral, Daily, Ahmad A Susu, DO, 200 mg at 02/20/23 0810    aspirin chewable tablet 81 mg, 81 mg, Oral, Daily, Ahmad A Susu, DO, 81 mg at 02/20/23 0813    atorvastatin (LIPITOR) tablet 40 mg, 40 mg, Oral, Nightly, Ahmad A Susu, DO, 40 mg at 02/20/23 0139    carvedilol (COREG) tablet 12.5 mg, 12.5 mg, Oral, BID WC, Ahmad A Susu, DO, 12.5 mg at 02/20/23 0813    digoxin (LANOXIN) tablet 125 mcg, 125 mcg, Oral, Daily, Ahmad A Susu, DO, 125 mcg at 02/20/23 9721    spironolactone (ALDACTONE) tablet 12.5 mg, 12.5 mg, Oral, Daily, Ahmad A Susu, DO, 12.5 mg at 02/20/23 0813    dextrose bolus 10% 125 mL, 125 mL, IntraVENous, PRN **OR** dextrose bolus 10% 250 mL, 250 mL, IntraVENous, PRN, Marci Cristobal DO    glucagon (rDNA) injection 1 mg, 1 mg, SubCUTAneous, PRN, Marci Cristobal, DO    dextrose 10 % infusion, , IntraVENous, Continuous PRN, Marci Cristobal, DO    sodium chloride flush 0.9 % injection 10 mL, 10 mL, IntraVENous, 2 times per day, Marci Cristobal, DO, 10 mL at 02/20/23 0814    sodium chloride flush 0.9 % injection 10 mL, 10 mL, IntraVENous, PRN, Marci Cristobal, DO    0.9 % sodium chloride infusion, , IntraVENous, PRN, Ahmad A Susu, DO    potassium chloride 10 mEq/100 mL IVPB (Peripheral Line), 10 mEq, IntraVENous, PRN, Ahmad A Susu, DO    magnesium sulfate 2000 mg in 50 mL IVPB premix, 2,000 mg, IntraVENous, PRN, Ahmad A Susu, DO    promethazine (PHENERGAN) tablet 12.5 mg, 12.5 mg, Oral, Q6H PRN **OR** ondansetron (ZOFRAN) injection 4 mg, 4 mg, IntraVENous, Q6H PRN, Ahmad A Susu, DO    acetaminophen (TYLENOL) tablet 650 mg, 650 mg, Oral, Q6H PRN **OR** acetaminophen (TYLENOL) suppository 650 mg, 650 mg, Rectal, Q6H PRN, Ahmad A Susu, DO    furosemide (LASIX) injection 40 mg, 40 mg, IntraVENous, BID, Ahmad A Susu, DO, 40 mg at 02/20/23 0814    insulin lispro (HUMALOG) injection vial 0-8 Units, 0-8 Units, SubCUTAneous, Q4H, Ahmad A Susu, DO, 4 Units at 02/20/23 8012      Allergies reviewed by me: Patient has no known allergies. REVIEW OF SYSTEMS:  As mentioned in chief complaint and HPI , Subjective   All other 10-point review of systems: negative.        =======================================================================================     PHYSICAL EXAM:  Recent vital signs and recent I/Os reviewed by me.      Wt Readings from Last 3 Encounters:   02/20/23 188 lb 12.8 oz (85.6 kg)   02/02/22 175 lb (79.4 kg)   01/12/22 166 lb (75.3 kg)     BP Readings from Last 3 Encounters:   02/20/23 104/73   02/02/22 128/74   01/12/22 116/68     Patient Vitals for the past 24 hrs:   BP Temp Temp src Pulse Resp SpO2 Height Weight   02/20/23 0754 104/73 97.5 °F (36.4 °C) Oral (!) 108 16 98 % -- --   02/20/23 0406 105/77 97.1 °F (36.2 °C) Oral (!) 118 20 98 % -- --   02/20/23 0215 -- -- -- -- -- -- -- 188 lb 12.8 oz (85.6 kg)   02/19/23 2315 101/75 97.8 °F (36.6 °C) Oral (!) 117 20 94 % 6' 1\" (1.854 m) --   02/19/23 2116 -- -- -- -- -- -- -- 170 lb 3.1 oz (77.2 kg)   02/19/23 1930 (!) 122/90 98.9 °F (37.2 °C) Oral (!) 122 18 97 % -- --       Intake/Output Summary (Last 24 hours) at 2/20/2023 1046  Last data filed at 2/20/2023 1023  Gross per 24 hour   Intake 720 ml   Output 400 ml   Net 320 ml         Physical Exam  General: Awake, Alert, obese   HENT: Atraumatic, normocephalic   Eyes: Normal conjunctiva, Non-incteral sclera. Neck: Supple, JVD not visible. CVS:  Heart sounds are normal. No loud murmur. RS: Normal respiratory effort, Breat sound: diminished at bases. Abd: Soft , bowel sounds are normal, no distension and no tenderness . Skin: No rash , some bruises,   CNS: Awake Oriented , No focal.   Extremities/MSK: + Edema, no cyanosis.        =======================================================================================     DATA:  Diagnostic tests reviewed by me for today's visit:   (AS NEEDED FOR MY EVALUATION AND MANAGEMENT). Recent Labs     02/19/23 2020 02/20/23  0506   WBC 7.9 8.5   HCT 47.4 45.6   * 120*     Iron Saturation:  No components found for: PERCENTFE  FERRITIN:  No results found for: FERRITIN  IRON:  No results found for: IRON  TIBC:  No results found for: TIBC    Recent Labs     02/19/23 2020 02/19/23 2333 02/20/23  0506   * 126* 134*   K 4.9 5.1 4.1   CL 95* 92* 98*   CO2 15* 20* 19*   BUN 33* 34* 33*   CREATININE 1.5* 1.6* 1.4*     Recent Labs     02/19/23 2020 02/19/23 2333 02/20/23  0506   CALCIUM 10.1 10.4 9.6   MG  --   --  2.00     No results for input(s): PH, PCO2, PO2 in the last 72 hours.     Invalid input(s): Hugh Seth    ABG:  No results found for: PH, PCO2, PO2, HCO3, BE, THGB, TCO2, O2SAT  VBG:    Lab Results   Component Value Date/Time    PHVEN 7.297 02/19/2023 09:33 PM    TAE6ILR 40.5 02/19/2023 09:33 PM    BEVEN -6.3 02/19/2023 09:33 PM    S5LGFDBS 59 02/19/2023 09:33 PM       LDH:  No results found for: LDH  Uric Acid:  No results found for: LABURIC, URICACID    PT/INR:    Lab Results   Component Value Date/Time    PROTIME 20.5 02/20/2023 05:06 AM    INR 1.76 02/20/2023 05:06 AM     Warfarin PT/INR:  No components found for: PTPATWAR, PTINRWAR  PTT:    Lab Results   Component Value Date/Time    APTT 127.3 12/14/2021 02:32 AM   [APTT}  Last 3 Troponin:    Lab Results   Component Value Date/Time    TROPONINI 0.04 02/20/2023 05:06 AM    TROPONINI 0.05 02/19/2023 11:33 PM    TROPONINI 0.04 02/19/2023 08:20 PM       U/A:    Lab Results   Component Value Date/Time    COLORU Yellow 02/20/2023 01:45 AM    PROTEINU TRACE 02/20/2023 01:45 AM    PHUR 5.0 02/20/2023 01:45 AM    PHUR 5.0 02/20/2023 01:45 AM    WBCUA 1 02/20/2023 01:45 AM    RBCUA 0 02/20/2023 01:45 AM    MUCUS Present 02/20/2023 01:45 AM    BACTERIA None Seen 02/20/2023 01:45 AM    CLARITYU Clear 02/20/2023 01:45 AM    SPECGRAV 1.015 02/20/2023 01:45 AM    LEUKOCYTESUR Negative 02/20/2023 01:45 AM    UROBILINOGEN 1.0 02/20/2023 01:45 AM    BILIRUBINUR Negative 02/20/2023 01:45 AM    BLOODU Negative 02/20/2023 01:45 AM    GLUCOSEU >=1000 02/20/2023 01:45 AM     Microalbumen/Creatinine ratio:  No components found for: RUCREAT  24 Hour Urine for Protein:  No components found for: RAWUPRO, UHRS3, NUEZ71LT, UTV3  24 Hour Urine for Creatinine Clearance:  No components found for: CREAT4, UHRS10, UTV10  Urine Toxicology:  No components found for: IAMMENTA, IBARBIT, IBENZO, ICOCAINE, IMARTHC, IOPIATES, IPHENCYC    HgBA1c:    Lab Results   Component Value Date/Time    LABA1C 12.0 02/20/2023 05:06 AM     RPR:  No results found for: RPR  HIV:  No results found for: HIV  CHRISTINA:  No results found for: ANATITER, CHRISTINA  RF:  No results found for: RF  DSDNA:  No components found for: DNA  AMYLASE:  No results found for: AMYLASE  LIPASE:  No results found for: LIPASE  Fibrinogen Level:  No components found for: FIB       BELOW MENTIONED RADIOLOGY STUDY RESULTS BY ME (AS NEEDED FOR MY EVALUATION AND MANAGEMENT).      XR CHEST PORTABLE    Result Date: 2/19/2023  EXAMINATION: ONE XRAY VIEW OF THE CHEST 2/19/2023 8:10 pm COMPARISON: 12/18/2021 HISTORY: ORDERING SYSTEM PROVIDED HISTORY: SOB TECHNOLOGIST PROVIDED HISTORY: Reason for exam:->SOB Reason for Exam: SOB FINDINGS: The cardiomediastinal silhouette is unchanged in appearance. Status post median sternotomy and valve replacement. Cardiac silhouette enlargement again noted. There is no consolidation, pneumothorax, or evidence of edema. Blunting of the right costophrenic angle appears unchanged. No significant residual left pleural effusion. The osseous structures are unchanged in appearance. No acute airspace disease identified.

## 2023-02-20 NOTE — PROGRESS NOTES
Nutrition Education    Provided heart failure diet education. Education included low sodium diet guidelines (3-4 gm Na+/day) and fluid restriction (64 oz/day). Reviewed foods to choose and foods to avoid, along with label reading and ways to add flavor to food. Pt currently tries to follow a low sodium diet at home and does not add salt to food. Pt states understanding of education. Time spent with patient: 10 minutes. Learners: Family  Readiness: Acceptance  Method: Explanation and Handout  Response: Verbalizes Understanding  Contact name and number provided.     Paige Pereira RD, LD  Contact Number: [phone]

## 2023-02-21 ENCOUNTER — APPOINTMENT (OUTPATIENT)
Dept: ULTRASOUND IMAGING | Age: 63
DRG: 280 | End: 2023-02-21
Payer: COMMERCIAL

## 2023-02-21 PROBLEM — N18.9 ACUTE KIDNEY INJURY SUPERIMPOSED ON CHRONIC KIDNEY DISEASE (HCC): Status: ACTIVE | Noted: 2023-02-20

## 2023-02-21 PROBLEM — I50.23 ACUTE ON CHRONIC SYSTOLIC HEART FAILURE (HCC): Status: ACTIVE | Noted: 2021-12-09

## 2023-02-21 PROBLEM — Z91.199 NON-COMPLIANCE: Status: ACTIVE | Noted: 2023-02-20

## 2023-02-21 PROBLEM — Z98.890 S/P MVR (MITRAL VALVE REPAIR): Status: ACTIVE | Noted: 2023-02-21

## 2023-02-21 LAB
A/G RATIO: 1.2 (ref 1.1–2.2)
ALBUMIN SERPL-MCNC: 3.3 G/DL (ref 3.4–5)
ALP BLD-CCNC: 377 U/L (ref 40–129)
ALT SERPL-CCNC: 125 U/L (ref 10–40)
ANION GAP SERPL CALCULATED.3IONS-SCNC: 10 MMOL/L (ref 3–16)
AST SERPL-CCNC: 114 U/L (ref 15–37)
BASOPHILS ABSOLUTE: 0.1 K/UL (ref 0–0.2)
BASOPHILS RELATIVE PERCENT: 0.9 %
BILIRUB SERPL-MCNC: 3.1 MG/DL (ref 0–1)
BUN BLDV-MCNC: 34 MG/DL (ref 7–20)
CALCIUM SERPL-MCNC: 9.6 MG/DL (ref 8.3–10.6)
CHLORIDE BLD-SCNC: 105 MMOL/L (ref 99–110)
CO2: 24 MMOL/L (ref 21–32)
CREAT SERPL-MCNC: 1.5 MG/DL (ref 0.8–1.3)
EOSINOPHILS ABSOLUTE: 0 K/UL (ref 0–0.6)
EOSINOPHILS RELATIVE PERCENT: 0.6 %
GFR SERPL CREATININE-BSD FRML MDRD: 53 ML/MIN/{1.73_M2}
GLUCOSE BLD-MCNC: 133 MG/DL (ref 70–99)
GLUCOSE BLD-MCNC: 185 MG/DL (ref 70–99)
GLUCOSE BLD-MCNC: 78 MG/DL (ref 70–99)
GLUCOSE BLD-MCNC: 91 MG/DL (ref 70–99)
GLUCOSE BLD-MCNC: 94 MG/DL (ref 70–99)
HCT VFR BLD CALC: 46.5 % (ref 40.5–52.5)
HEMOGLOBIN: 15.1 G/DL (ref 13.5–17.5)
INR BLD: 1.57 (ref 0.87–1.14)
LYMPHOCYTES ABSOLUTE: 2.6 K/UL (ref 1–5.1)
LYMPHOCYTES RELATIVE PERCENT: 38.4 %
MAGNESIUM: 2 MG/DL (ref 1.8–2.4)
MCH RBC QN AUTO: 30.2 PG (ref 26–34)
MCHC RBC AUTO-ENTMCNC: 32.5 G/DL (ref 31–36)
MCV RBC AUTO: 92.9 FL (ref 80–100)
MONOCYTES ABSOLUTE: 0.7 K/UL (ref 0–1.3)
MONOCYTES RELATIVE PERCENT: 9.6 %
NEUTROPHILS ABSOLUTE: 3.4 K/UL (ref 1.7–7.7)
NEUTROPHILS RELATIVE PERCENT: 50.5 %
PDW BLD-RTO: 15.8 % (ref 12.4–15.4)
PERFORMED ON: ABNORMAL
PERFORMED ON: ABNORMAL
PERFORMED ON: NORMAL
PERFORMED ON: NORMAL
PLATELET # BLD: 108 K/UL (ref 135–450)
PMV BLD AUTO: 9.6 FL (ref 5–10.5)
POTASSIUM REFLEX MAGNESIUM: 3.5 MMOL/L (ref 3.5–5.1)
PROTHROMBIN TIME: 18.7 SEC (ref 11.7–14.5)
RBC # BLD: 5.01 M/UL (ref 4.2–5.9)
SODIUM BLD-SCNC: 139 MMOL/L (ref 136–145)
TOTAL PROTEIN: 6 G/DL (ref 6.4–8.2)
WBC # BLD: 6.8 K/UL (ref 4–11)

## 2023-02-21 PROCEDURE — 2580000003 HC RX 258: Performed by: INTERNAL MEDICINE

## 2023-02-21 PROCEDURE — 6360000002 HC RX W HCPCS: Performed by: INTERNAL MEDICINE

## 2023-02-21 PROCEDURE — 1200000000 HC SEMI PRIVATE

## 2023-02-21 PROCEDURE — 6370000000 HC RX 637 (ALT 250 FOR IP): Performed by: INTERNAL MEDICINE

## 2023-02-21 PROCEDURE — 76700 US EXAM ABDOM COMPLETE: CPT

## 2023-02-21 PROCEDURE — 85610 PROTHROMBIN TIME: CPT

## 2023-02-21 PROCEDURE — 6370000000 HC RX 637 (ALT 250 FOR IP): Performed by: PHYSICIAN ASSISTANT

## 2023-02-21 PROCEDURE — 99233 SBSQ HOSP IP/OBS HIGH 50: CPT | Performed by: CLINICAL NURSE SPECIALIST

## 2023-02-21 PROCEDURE — 85025 COMPLETE CBC W/AUTO DIFF WBC: CPT

## 2023-02-21 PROCEDURE — 6370000000 HC RX 637 (ALT 250 FOR IP): Performed by: NURSE PRACTITIONER

## 2023-02-21 PROCEDURE — 6370000000 HC RX 637 (ALT 250 FOR IP): Performed by: CLINICAL NURSE SPECIALIST

## 2023-02-21 PROCEDURE — 36415 COLL VENOUS BLD VENIPUNCTURE: CPT

## 2023-02-21 PROCEDURE — 94760 N-INVAS EAR/PLS OXIMETRY 1: CPT

## 2023-02-21 PROCEDURE — 83735 ASSAY OF MAGNESIUM: CPT

## 2023-02-21 PROCEDURE — 80053 COMPREHEN METABOLIC PANEL: CPT

## 2023-02-21 PROCEDURE — 97161 PT EVAL LOW COMPLEX 20 MIN: CPT

## 2023-02-21 RX ORDER — FUROSEMIDE 40 MG/1
40 TABLET ORAL 2 TIMES DAILY
Status: DISCONTINUED | OUTPATIENT
Start: 2023-02-21 | End: 2023-02-22 | Stop reason: HOSPADM

## 2023-02-21 RX ORDER — WARFARIN SODIUM 5 MG/1
10 TABLET ORAL DAILY
Status: DISCONTINUED | OUTPATIENT
Start: 2023-02-21 | End: 2023-02-22 | Stop reason: HOSPADM

## 2023-02-21 RX ORDER — FLUTICASONE PROPIONATE 50 MCG
1 SPRAY, SUSPENSION (ML) NASAL DAILY
Status: DISCONTINUED | OUTPATIENT
Start: 2023-02-21 | End: 2023-02-22 | Stop reason: HOSPADM

## 2023-02-21 RX ORDER — GLUCOSAMINE HCL/CHONDROITIN SU 500-400 MG
CAPSULE ORAL
Qty: 100 STRIP | Refills: 3 | Status: SHIPPED | OUTPATIENT
Start: 2023-02-21

## 2023-02-21 RX ORDER — MIDODRINE HYDROCHLORIDE 5 MG/1
2.5 TABLET ORAL
Status: DISCONTINUED | OUTPATIENT
Start: 2023-02-21 | End: 2023-02-22 | Stop reason: HOSPADM

## 2023-02-21 RX ORDER — LANCETS 30 GAUGE
1 EACH MISCELLANEOUS DAILY
Qty: 100 EACH | Refills: 3 | Status: SHIPPED | OUTPATIENT
Start: 2023-02-21

## 2023-02-21 RX ORDER — WARFARIN SODIUM 7.5 MG/1
7.5 TABLET ORAL DAILY
Status: DISCONTINUED | OUTPATIENT
Start: 2023-02-21 | End: 2023-02-21

## 2023-02-21 RX ADMIN — LORAZEPAM 1 MG: 1 TABLET ORAL at 21:01

## 2023-02-21 RX ADMIN — MIDODRINE HYDROCHLORIDE 2.5 MG: 5 TABLET ORAL at 19:03

## 2023-02-21 RX ADMIN — MIDODRINE HYDROCHLORIDE 2.5 MG: 5 TABLET ORAL at 14:04

## 2023-02-21 RX ADMIN — ASPIRIN 81 MG: 81 TABLET, CHEWABLE ORAL at 09:48

## 2023-02-21 RX ADMIN — AMIODARONE HYDROCHLORIDE 200 MG: 200 TABLET ORAL at 09:49

## 2023-02-21 RX ADMIN — WARFARIN SODIUM 10 MG: 5 TABLET ORAL at 18:59

## 2023-02-21 RX ADMIN — CARVEDILOL 12.5 MG: 6.25 TABLET, FILM COATED ORAL at 19:03

## 2023-02-21 RX ADMIN — FLUTICASONE PROPIONATE 1 SPRAY: 50 SPRAY, METERED NASAL at 22:07

## 2023-02-21 RX ADMIN — DIGOXIN 125 MCG: 125 TABLET ORAL at 09:49

## 2023-02-21 RX ADMIN — ENOXAPARIN SODIUM 80 MG: 100 INJECTION SUBCUTANEOUS at 21:01

## 2023-02-21 RX ADMIN — ENOXAPARIN SODIUM 80 MG: 100 INJECTION SUBCUTANEOUS at 09:49

## 2023-02-21 RX ADMIN — ATORVASTATIN CALCIUM 40 MG: 40 TABLET, FILM COATED ORAL at 21:00

## 2023-02-21 RX ADMIN — Medication 10 ML: at 09:53

## 2023-02-21 RX ADMIN — FUROSEMIDE 40 MG: 10 INJECTION, SOLUTION INTRAMUSCULAR; INTRAVENOUS at 11:25

## 2023-02-21 RX ADMIN — INSULIN GLARGINE 15 UNITS: 100 INJECTION, SOLUTION SUBCUTANEOUS at 21:02

## 2023-02-21 RX ADMIN — CARVEDILOL 12.5 MG: 6.25 TABLET, FILM COATED ORAL at 09:44

## 2023-02-21 RX ADMIN — Medication 10 ML: at 21:01

## 2023-02-21 ASSESSMENT — PAIN SCALES - GENERAL
PAINLEVEL_OUTOF10: 0
PAINLEVEL_OUTOF10: 0

## 2023-02-21 NOTE — PROGRESS NOTES
[facility]    2/21/2023 11:51 AM        Name: Stefany Wakefield . Admitted: 2/19/2023  Primary Care Provider: Agusto Phillips MD (Tel: [phone])      Brief History: 64 yo male with hx HTN, HLD, DM2, mechanical mitral valve on warfarin, chronic sCHF (15-20%). He has hx noncompliance with medications. He presented with worsening shortness of breath, edema, fatigue. He had quit taking meds for past month. Admitted with CHF exacerbation. Subjective:  Resting in bed, sister at bedside. Palliative care NP in to see patient. Patient reports some improvement in breathing. Remains fatigued, still with swelling. Appears tired.      Reviewed interval ancillary notes    Current Medications  warfarin placeholder: dosing by pharmacy, RX Placeholder  enoxaparin (LOVENOX) injection 80 mg, BID  warfarin (COUMADIN) tablet 6 mg, Daily  zolpidem (AMBIEN) tablet 10 mg, Nightly PRN  LORazepam (ATIVAN) tablet 1 mg, BID PRN  perflutren lipid microspheres (DEFINITY) injection 1.5 mL, ONCE PRN  insulin glargine (LANTUS) injection vial 15 Units, Nightly  amiodarone (CORDARONE) tablet 200 mg, Daily  aspirin chewable tablet 81 mg, Daily  atorvastatin (LIPITOR) tablet 40 mg, Nightly  carvedilol (COREG) tablet 12.5 mg, BID WC  digoxin (LANOXIN) tablet 125 mcg, Daily  dextrose bolus 10% 125 mL, PRN   Or  dextrose bolus 10% 250 mL, PRN  glucagon (rDNA) injection 1 mg, PRN  dextrose 10 % infusion, Continuous PRN  sodium chloride flush 0.9 % injection 10 mL, 2 times per day  sodium chloride flush 0.9 % injection 10 mL, PRN  0.9 % sodium chloride infusion, PRN  potassium chloride 10 mEq/100 mL IVPB (Peripheral Line), PRN  magnesium sulfate 2000 mg in 50 mL IVPB premix, PRN  promethazine (PHENERGAN) tablet 12.5 mg, Q6H PRN   Or  ondansetron (ZOFRAN) injection 4 mg, Q6H PRN  acetaminophen (TYLENOL) tablet 650 mg, Q6H PRN Or  acetaminophen (TYLENOL) suppository 650 mg, Q6H PRN  furosemide (LASIX) injection 40 mg, BID  insulin lispro (HUMALOG) injection vial 0-8 Units, Q4H        Objective:  BP 99/69   Pulse 86   Temp 97.4 °F (36.3 °C) (Oral)   Resp 16   Ht 6' 1\" (1.854 m)   Wt 191 lb 3.2 oz (86.7 kg)   SpO2 97%   BMI 25.23 kg/m²     Intake/Output Summary (Last 24 hours) at 2/21/2023 1151  Last data filed at 2/20/2023 1249  Gross per 24 hour   Intake 120 ml   Output 500 ml   Net -380 ml      Wt Readings from Last 3 Encounters:   02/21/23 191 lb 3.2 oz (86.7 kg)   02/02/22 175 lb (79.4 kg)   01/12/22 166 lb (75.3 kg)       General appearance:  Appears comfortable  ENT: Moist oral mucosa. Cardiovascular: Regular rhythm, normal S1, S2. No murmur. + BLE edema   Respiratory: Not using accessory muscles. Good inspiratory effort. Diminished in bases, no wheeze or crackles. GI: Abdomen soft, no tenderness, not distended, normal bowel sounds  Musculoskeletal: No cyanosis in digits, neck supple  Neurology: CN 2-12 grossly intact. No speech or motor deficits  Psych: Appears depressed. Alert and oriented in time, place and person  Skin: Warm, dry, normal turgor    Labs and Tests:  CBC:   Recent Labs     02/19/23 2020 02/20/23  0506 02/21/23  0511   WBC 7.9 8.5 6.8   HGB 15.2 14.8 15.1   * 120* 108*     BMP:    Recent Labs     02/19/23 2333 02/20/23  0506 02/21/23  0511   * 134* 139   K 5.1 4.1 3.5   CL 92* 98* 105   CO2 20* 19* 24   BUN 34* 33* 34*   CREATININE 1.6* 1.4* 1.5*   GLUCOSE 482* 314* 91     Hepatic:   Recent Labs     02/19/23 2020 02/20/23  0506 02/21/23  0511   AST 52* 125* 114*   ALT 64* 120* 125*   BILITOT 3.2* 3.1* 3.1*   ALKPHOS 301* 302* 377*     CXR 2/19/2023:  No acute airspace disease identified. Echo 2/19/2023:  Summary   Overall, left ventricular systolic function is severely depressed. Ejection fraction is visually estimated to be 10%. There is severe diffuse hypokinesis.  Cannot determine diastology due to mitral valve replacement. 29 mm Medtronic mechanical valve. Max P mmHg. Mean P mmHg. 28 mm Phisio TV annuloplasty. Max P mmHg. Mild to moderate tricuspid regurgitation. RVSP = 37 mmHG. The right ventricle is normal in size with reduced function. Biatrial enlargement. US abdomen 2023:  1. Unremarkable abdominal ultrasound. 2. Mild amount of nonspecific layering debris within the urinary bladder,   which could represent intraluminal hemorrhage in the correct clinical   setting. Clinical correlation is advised. Problem List  Principal Problem:    CHF (congestive heart failure), NYHA class I, acute on chronic, combined ([facility]  Active Problems:    NSTEMI (non-ST elevated myocardial infarction) ([facility]    Hyponatremia    Acute renal failure superimposed on stage 3 chronic kidney disease (HCC)    Elevated lactic acid level    Transaminitis    Diabetic ketoacidosis without coma associated with type 2 diabetes mellitus (HCC)    Elevated troponin    Non compliance w medication regimen    Subtherapeutic international normalized ratio (INR)    Hypotension    Coronary artery disease involving native heart without angina pectoris    Acute on chronic congestive heart failure (HCC)    Ischemic cardiomyopathy    Mixed hyperlipidemia    H/O mitral valve replacement with mechanical valve  Resolved Problems:    * No resolved hospital problems. *       Assessment & Plan:   Acute on chronic systolic CHF. Presented with shortness of breath, orthopnea, edema, fatigue, proBNP 4800. Has been noncompliant with meds and dietary restrictions. Placed on IV Lasix, transitioned to po . Echo this admission with EF 10%, compared to 15-20% (2021). Resumed carvedilol, digoxin. No ACE/ARB/ARNI due to BENOIT, no spironolactone  noncompliance. Appreciate cardiology recs. NSTEMI / hx CABG. Troponin 0.04->0.05->0.04, no acute changes on EKG. In setting volume overload.  Echo with EF 10%, compared to 15-20% on prior, severe diffuse hypokinesis. Denies chest pain Evaluated by cardiology, continue medical management with asa, beta blocker and statin. Mechanical mitral valve. Patient has not been taking his Coumadin. Restarted with lovenox bridge. DM2 / DKA.  on presentation, pH 7.297, CO2 15, beta hydroxybutyrate 3.10. Not taking antidiabetic meds at home. Given insulin, started on Lantus with medium dose correction. Reviewed BG values, much improved, . Continue to follow. Diabetic educator consult pending  Hyponatremia. Serum sodium 127 on presentation. In setting of CHF and blood glucose 481. Resolved with diuresis and correction of blood glucose. Appreciate nephrology recs. BENOIT. Creatinine 1.5 on presentation, baseline closer to 1.1 but that was in 12/2021. Likely cardiorenal / hypoperfusion fro CHF. Has remained stable with diuresis. No evidence of hydronephrosis on ultrasound. Continue to follow. Appreciate nephrology recs. Transaminitis. Likely secondary to hepatic congestion from CHF. No acute findings on abdominal ultrasound. Continue to monitor. Depression / anxiety. Likely contributing factor to med noncompliance. Patient requesting psych consult. Disposition: Anticipate home when medically stable. PT/OT consults pending. Diet: ADULT DIET; Regular; 3 carb choices (45 gm/meal);  No Added Salt (3-4 gm)  Code:Full Code  DVT PPX: lovenox      MICHELLE Boykin CNP   2/21/2023 11:51 AM

## 2023-02-21 NOTE — PROGRESS NOTES
Physical Therapy    [facility] [address]   Phone: [phone]    Physical Therapy    [x] Initial Evaluation            [] Daily Treatment Note         [] Discharge Summary      Patient: Anabelle Fernandez   : 1960   MRN: [id number]   Date of Service:  2023  Admitting Diagnosis: Acute on chronic systolic heart failure [facility])  Current Admission Summary: 81-year-old gentleman with history of hypertension, hyperlipidemia, type 2 diabetes, chronic systolic heart failure, mechanical mitral valve replacement on anticoagulation noncompliant with medications who presents with shortness of breath with the dyspnea on minimal exertion, lethargy, increased peripheral edema, orthopnea found to have acute decompensated systolic heart failure due to medication noncompliance and dietary indiscretion. Patient reports to have stopped taking medications about a month ago including Coumadin. Past Medical History:  has a past medical history of Diabetes ([address] Hyperlipidemia, and Hypertension. Past Surgical History:  has a past surgical history that includes Coronary artery bypass graft (N/A, 12/10/2021) and Cardiac surgery. Discharge Recommendations: Anabelle Fernandez scored a 20/24 on the AM-PAC short mobility form. At this time, no further PT is recommended upon discharge due to patient presenting at baseline function with all functional mobility at this time. Recommend patient returns to prior setting with prior services.      DME Required For Discharge: no DME required at discharge  Precautions/Restrictions: medium fall risk  Weight Bearing Restrictions: no restrictions  [] Right Upper Extremity  [] Left Upper Extremity [] Right Lower Extremity  [] Left Lower Extremity     Required Braces/Orthotics: no braces required   [] Right  [] Left  Positional Restrictions:no positional restrictions    Pre-Admission Information   Lives With: alone    Type of Home: condo  Home Layout: one level  Home Access:  8 step to enter with handrail. Handrails are located on both side. 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   Hobbies: Enjoys playing [employer] and "[employer] [address]: Patient denies fall    Examination   Vision:   Vision Gross Assessment: Impaired and Vision Corrective Device: wears glasses at all times  Hearing:   [facility]  Observation:   General Observation:  Heart monitor, B LE edema  Posture:   WFL  Sensation:   WFL  Proprioception:    WFL  Tone:   Normotonic  Coordination Testing:   WFL    ROM:   (B) LE AROM WFL  Strength:   (B) LE strength grossly WFL  Therapist Clinical Decision Making (Complexity): low complexity  Clinical Presentation: stable      Subjective  General: Patient lying supine in bed with HOB elevated upon arrival. Patient is agreeable to PT evaluation.   Pain: 0/10  Pain Interventions: not applicable       Functional Mobility  Bed Mobility  Supine to Sit: Independent  Sit to Supine: Independent  Scooting: Independent  Comments: Patient completed with HOB flat and no need for handrails  Transfers  Sit to stand transfer: supervision  Stand to sit transfer: supervision  Toilet transfer: supervision  Comments: Patient completed STS 2x from EOB with no AD and one time from toilet with use of L handrail   Ambulation  Surface:level surface  Assistive Device: no device  Assistance: supervision  Distance: 76'  Gait Mechanics: Slightly unsteady  Comments:  Patient ambulating within room, reporting light causing mild head discomfort from hallway. Patient reporting feeling \"foggy\" from medication   Stair Mobility  Stair mobility not completed on this date. Comments:  Wheelchair Mobility:  No w/c mobility completed on this date. Comments:  Balance  Static Sitting Balance: good(+): independent with high level dynamic balance in unsupported position  Dynamic Sitting Balance: good: independent with functional balance in unsupported position  Static Standing Balance: fair (+): maintains balance at SBA/supervision without use of UE support  Dynamic Standing Balance: fair (+): maintains balance at SBA/supervision without use of UE support  Comments:    Other Therapeutic Interventions  Positioned recliner chair and donned with sheet for patient   Functional Outcomes  AM-PAC Inpatient Mobility Raw Score : 20              Cognition  WFL  Orientation:    alert and oriented x 4  Command Following:   [facility]    Education  Barriers To Learning: none  Patient Education: patient educated on goals, PT role and benefits, plan of care, functional mobility training, discharge recommendations  Learning Assessment:  patient verbalizes and demonstrates understanding    Assessment  Activity Tolerance: Good; slightly unsteady from medication  Impairments Requiring Therapeutic Intervention: none - eval with same day discharge  Prognosis: good  Clinical Assessment: Patient is 64 y/o male presenting to [facility] secondary to B LE swelling. Patient currently presents at baseline function with all functional mobility at this time. Patient requires supervision due to patient feeling \"foggy\" from medication resulting in mild unsteadiness with transfers and ambulation with no LOB. Patient will be d/c from PT caseload and is deemed safe functionally to return home once medically stable.   Safety Interventions: patient left in bed, call light within reach, patient at risk for falls, nurse notified, and family/caregiver present    Plan  Frequency: Eval with same day discharge. No follow up required. Current Treatment Recommendations: not applicable, evaluation completed with same day discharge. Goals  Patient Goals: Return home   Short Term Goals:  Time Frame: N/A  Patient eval with same day discharge. No goals set as patient is at baseline mobility status.       Therapy Session Time      Individual Group Co-treatment   Time In 1440       Time Out 1457       Minutes 17         Timed Code Treatment Minutes:  2 Minutes  Total Treatment Minutes:  17 Minutes       Electronically Signed By: Pamela Leger, PT  Pamela Leger PT, DPT, [id number]

## 2023-02-21 NOTE — PROGRESS NOTES
MD Meera Miller MD Melanie Certain, MD               Office: [phone]                      Fax: [phone]             [facility]                   NEPHROLOGY INPATIENT PROGRESS NOTE:     PATIENT NAME: Daniel Carrera  : 1960   MRN: [id number]       IMPRESSION:       Admitted on  2023  for:  Elevated troponin [R77.8]  NSTEMI (non-ST elevated myocardial infarction) ([facility] [I21.4]  Subtherapeutic international normalized ratio (INR) [R79.1]  Non compliance w medication regimen [Z91.14]  Diabetic ketoacidosis without coma associated with type 2 diabetes mellitus ([facility] [E11.10]  Edema, unspecified type [R60.9]  Acute on chronic congestive heart failure, unspecified heart failure type ([facility] [I50.9]      Hyponatremia :   : Not acute but likely worsened underlying uncontrolled, chronic,   : Moderate range, : mildly symptomatic,   : hyper-volemic:   -Lowest sodium recently 32, in , in  was normal, this admission on presentation 127-worsening 126, now   improving, 134,    - No Reported Severe symptoms (such as seizures, obtundation, coma, or respiratory arrest). - no need for Hypertonic saline or acute reversal     - Risk factors for hyponatremia: Heart failure, fluid overload,  + Pseudo effect with hyperglycemia, with DKA presentation,    - Patient is at   risk of developing Osmotic Demyelination Syndrome.    - Call us urgently if any/worsening neurological findings. RECOMMENDATIONS:     Work up: Likely suggestive of fluid retention, hypovolemia,  - renal function at baseline creatinine 1.1-1.2 as of 2022, this admission has been around 1.5-1.6  = Likely due to advancing CKD.   With stage IIIa    -UA results reviewed showing severe hyperglycemia, mild ketones, trace protein, hyaline cast, likely suggestive of diabetes CKD    - other lytes stable overall  - BP slightly lower but not in shock so unlikely, unlikely AI  - TSH control  -Hyperglycemia, with DKA, with lactic acidosis, elevated beta hydroxybutyrate, A1c uncontrolled at 12.0    - s/p  kidney ultrasound to establish baseline on 2-  \"     KIDNEYS: The kidneys are unremarkable in appearance without evidence of   hydronephrosis. The right kidney measures 11.0 x 5.1 x 5.8 cm. The left   kidney measures 10.7 x 6.5 x 5.4 cm. Management:  - Monitor Serum Na daily  - Goal Serum Na mid 130s, by next /24 hours     -Agree with current Lasix, with Aldactone-continue,  -Suggest to hold other RAAS blockade, SGL 2 inhibitors during active diuresis. -Follow-up with cardiology, ECHO    -Ongoing management of DKA, needs better diabetes control    -Midodrine added for slight hypotension, needing diuresis    - Minimize use any hypotonic/isotonic fluids such as D5W, NS, LR with other medications/drips    - Concentrate continuous drip fluids as much as possible,   - Avoid IVF , unless needed for resuscitation/hypovolemia w/ hypotension+tachycardia,      - minimize SSRI, NSAIDs use    - Strict I/O with daily weights. --- Call us urgently if any/worsening neurological findings. Discharge plans from renal standpoint-, improving hrs whenever Na ~130s, stable w/o neurological s/s   +   : f/u w/ me at [address] in 1 mon  after d/c.  (I will arrange.)    Discussed with patient, his sister, primary team, nurse,          Other major problems: Management per primary and other consulting teams.   //         Hospital Problems             Last Modified POA    * (Principal) Acute on chronic systolic heart failure ([address] 2/21/2023 Yes    NSTEMI (non-ST elevated myocardial infarction) ([address] 2/20/2023 Yes    Hyponatremia 2/20/2023 Yes    Acute kidney injury superimposed on chronic kidney disease ([address] 2/21/2023 Yes    Elevated lactic acid level 2/20/2023 Yes    Transaminitis 2/20/2023 Yes    Diabetic ketoacidosis without coma associated with type 2 diabetes mellitus ([address] 2/20/2023 Yes    Elevated troponin 2/20/2023 Yes    Non-compliance 2/21/2023 Yes    Subtherapeutic international normalized ratio (INR) 2/20/2023 Yes    Hypotension 2/20/2023 Yes    S/P MVR (mitral valve repair) 2/21/2023 Yes    Coronary artery disease involving native heart without angina pectoris 2/20/2023 Yes    Acute on chronic congestive heart failure ([address] 2/20/2023 Yes    Ischemic cardiomyopathy 2/20/2023 Yes    Mixed hyperlipidemia 2/20/2023 Yes    S/P CABG (coronary artery bypass graft) 2/21/2023 Yes    H/O mitral valve replacement with mechanical valve 2/20/2023 Yes     : other supportive care :   - Check daily renal function panel with electrolytes-phosphorus  - Strict monitoring of I/Os, daily weight  - non-Renal feeds/diet  - Current medications reviewed. Please refer to the orders. High Complexity. Multiple complex problems. Discussed with patient and treatment team-    Time spent > 30 (~35) minutes. Thank you for allowing me to participate in this patient's care. Please do not hesitate to contact me anytime. We will follow along with you. Van Marroquin MD,  [facility] [id number] [place] [address] [place]: [phone] or [employer]  Fax: [phone]        =======================================================================================   =======================================================================================      CHIEF COMPLAINT:   Chief Complaint   Patient presents with    Leg Swelling     BLE edema x about 2 weeks.       History Obtained From:  patient + treatment team + Electronic Medical Records    HPI: Mr. Bernard Canela is a 63 y.o. male with significant past medical history as below:   Past Medical History:   Diagnosis Date    Diabetes ([facility]     Hyperlipidemia     Hypertension       Presents with Leg Swelling (BLE edema x about 2 weeks. )    Admitted with Elevated troponin [R77.8]  NSTEMI (non-ST elevated myocardial infarction) ([facility] [I21.4]  Subtherapeutic international normalized ratio (INR) [R79.1]  Non compliance w medication regimen [Z91.14]  Diabetic ketoacidosis without coma associated with type 2 diabetes mellitus ([address] [E11.10]  Edema, unspecified type [R60.9]  Acute on chronic congestive heart failure, unspecified heart failure type ([address] [I50.9]   Found to have Hyponatremia , so we are called for that. No current active complaints. Patient denied chest pain / dizziness/lightheadedness/syncope  + SOB   + leg edema. *  Regarding: Hyponatremia   Duration: chronic  Location: kidneys  Severity: Severe   Timing: continous  Context (ex: related to condition):  , refer to my assessment / impression. Modifying factors (ex: medications, interventions):  , refer to my plan / recommendation. Associated signs & symptoms (ex: edema, SOB): As mentioned above in CC and HPI      Past medical, Surgical, Social, Family medical history reviewed by me.      PAST MEDICAL HISTORY:   Past Medical History:   Diagnosis Date    Diabetes ([facility]     Hyperlipidemia     Hypertension      PAST SURGICAL HISTORY:   Past Surgical History:   Procedure Laterality Date    CARDIAC SURGERY      CORONARY ARTERY BYPASS GRAFT N/A 12/10/2021    CABG X 4 , CORONARY ARTERY BYPASS GRAFT STRONG TO LAD, VEIN GRAFT TO PDA, SVG SEQUENCED TO DIAG AND OM, LIGATION KATHARINE  ATRICLIP 45MM, EVH LEFT SAPHENOUS VEIN, ANTERIOR AND POSTERIOR CHORDAE SPARING MITRAL VALVE REPLACEMENT MEDTRONIC 29MM mechanical valve, TRICUSPID VALVE REPAIR with 28mm Physio tricuspid annuloplasty ring , TOTAL CPB, VERO, Doppler flow verification of bypass grafts, 5 LEVEL BILATERAL      FAMILY HISTORY:   Family History   Problem Relation Age of Onset    Cancer Mother         colon cancer    Heart Disease Father     High Blood Pressure Father     Diabetes Father     High Cholesterol Father      SOCIAL HISTORY:   Social History     Socioeconomic History    Marital status: Single     Spouse name: None    Number of children: 2    Years of education: None    Highest education level: None   Tobacco Use    Smoking status: Former     Packs/day: 1.00     Years: 15.00     Pack years: 15.00     Types: Cigarettes     Quit date: 12/10/2021     Years since quittin.2    Smokeless tobacco: Never   Vaping Use    Vaping Use: Never used   Substance and Sexual Activity    Alcohol use: Yes     Comment: occasionally    Drug use: Never         MEDICATIONS: reviewed by me.   Medications Prior to Admission:  No current facility-administered medications on file prior to encounter.     Current Outpatient Medications on File Prior to Encounter   Medication Sig Dispense Refill    digoxin (LANOXIN) 125 MCG tablet Take 1 tablet by mouth daily 30 tablet 3    amiodarone (CORDARONE) 200 MG tablet Take 1 tablet by mouth daily 30 tablet 3    carvedilol (COREG) 12.5 MG tablet Take 1 tablet by mouth 2 times daily (with meals) 60 tablet 3    furosemide (LASIX) 40 MG tablet Take 0.5 tablets by mouth daily 60 tablet 3    traZODone (DESYREL) 50 MG tablet Take 1 tablet by mouth nightly (Patient not taking: No sig reported) 30 tablet 0    acetaminophen (TYLENOL) 500 MG tablet Take 2 tablets by mouth every 6 hours (Patient not taking: No sig reported) 120 tablet 3    aspirin 81 MG chewable tablet Take 1 tablet by mouth daily 30 tablet 3    atorvastatin (LIPITOR) 40 MG tablet Take 1 tablet by mouth nightly 30 tablet 3    spironolactone (ALDACTONE) 25 MG tablet Take 0.5 tablets by mouth daily 30 tablet 3    warfarin (COUMADIN) 6 MG tablet Take by mouth daily at 6PM as directed (start with 1 tablet) 30 tablet 3         Current Facility-Administered Medications:     furosemide (LASIX) tablet 40 mg, 40 mg, Oral, BID, Magi Zaragoza, APRN - CNS    midodrine (PROAMATINE) tablet 2.5 mg, 2.5 mg, Oral, TID , Magi Zaragoza, APRN - CNS, 2.5 mg at 23 1404    warfarin (COUMADIN) tablet 10 mg, 10 mg, Oral, Daily, Lenora Simpson APRN - CNP    warfarin  placeholder: dosing by pharmacy, , Other, RX Placeholder, Marci Cristobal DO    enoxaparin (LOVENOX) injection 80 mg, 1 mg/kg, SubCUTAneous, BID, Marci Cristobal, DO, 80 mg at 02/21/23 0949    zolpidem (AMBIEN) tablet 10 mg, 10 mg, Oral, Nightly PRN, Tino Pena MD, 10 mg at 02/20/23 2016    LORazepam (ATIVAN) tablet 1 mg, 1 mg, Oral, BID PRN, Tino Pena MD, 1 mg at 02/20/23 2015    perflutren lipid microspheres (DEFINITY) injection 1.5 mL, 1.5 mL, IntraVENous, ONCE PRN, Sean Skinner DO    insulin glargine (LANTUS) injection vial 15 Units, 15 Units, SubCUTAneous, Nightly, Marci Cristobal DO, 15 Units at 02/20/23 2016    amiodarone (CORDARONE) tablet 200 mg, 200 mg, Oral, Daily, Ahmad MARBELLA Gonzaleszi, DO, 200 mg at 02/21/23 5551    aspirin chewable tablet 81 mg, 81 mg, Oral, Daily, Ahmad MARBELLA Gonzaleszi, DO, 81 mg at 02/21/23 0948    atorvastatin (LIPITOR) tablet 40 mg, 40 mg, Oral, Nightly, Ahmad A Susu, DO, 40 mg at 02/20/23 2015    carvedilol (COREG) tablet 12.5 mg, 12.5 mg, Oral, BID WC, Ahmad A Susu, DO, 12.5 mg at 02/21/23 0944    digoxin (LANOXIN) tablet 125 mcg, 125 mcg, Oral, Daily, Ahmad MARBELLA MimsSusu, DO, 125 mcg at 02/21/23 0949    dextrose bolus 10% 125 mL, 125 mL, IntraVENous, PRN **OR** dextrose bolus 10% 250 mL, 250 mL, IntraVENous, PRN, Marci Cristobal, DO    glucagon (rDNA) injection 1 mg, 1 mg, SubCUTAneous, PRN, Marci Cristobal, DO    dextrose 10 % infusion, , IntraVENous, Continuous PRN, Marci Cristobal DO    sodium chloride flush 0.9 % injection 10 mL, 10 mL, IntraVENous, 2 times per day, Marci Cristobal, DO, 10 mL at 02/21/23 0953    sodium chloride flush 0.9 % injection 10 mL, 10 mL, IntraVENous, PRN, Unrulyd MARBELLA Cristobal, DO    0.9 % sodium chloride infusion, , IntraVENous, PRN, Unrulyd MARBELLA Gonzaleszi, DO    potassium chloride 10 mEq/100 mL IVPB (Peripheral Line), 10 mEq, IntraVENous, PRN, Unrulyd MARBELLA MimsSusu, DO    magnesium sulfate 2000 mg in 50 mL IVPB premix, 2,000 mg, IntraVENous, PRN, Ab Nares, DO    promethazine (PHENERGAN) tablet 12.5 mg, 12.5 mg, Oral, Q6H PRN **OR** ondansetron (ZOFRAN) injection 4 mg, 4 mg, IntraVENous, Q6H PRN, Marci Cristobal, DO    acetaminophen (TYLENOL) tablet 650 mg, 650 mg, Oral, Q6H PRN **OR** acetaminophen (TYLENOL) suppository 650 mg, 650 mg, Rectal, Q6H PRN, Marci Cristobal DO    insulin lispro (HUMALOG) injection vial 0-8 Units, 0-8 Units, SubCUTAneous, Q4H, Marci Cristobal, DO, 4 Units at 02/20/23 0414      Allergies reviewed by me: Patient has no known allergies. REVIEW OF SYSTEMS:  As mentioned in chief complaint and HPI , Subjective   All other 10-point review of systems: negative.        =======================================================================================     PHYSICAL EXAM:  Recent vital signs and recent I/Os reviewed by me. Wt Readings from Last 3 Encounters:   02/21/23 191 lb 3.2 oz (86.7 kg)   02/02/22 175 lb (79.4 kg)   01/12/22 166 lb (75.3 kg)     BP Readings from Last 3 Encounters:   02/21/23 91/71   02/02/22 128/74   01/12/22 116/68     Patient Vitals for the past 24 hrs:   BP Temp Temp src Pulse Resp SpO2 Weight   02/21/23 1312 91/71 97.4 °F (36.3 °C) Oral 80 16 97 % --   02/21/23 1110 -- -- -- 86 -- -- --   02/21/23 1000 -- -- -- 84 -- -- --   02/21/23 0924 -- -- -- -- 16 97 % --   02/21/23 0802 -- -- -- 85 -- -- --   02/21/23 0800 99/69 97.4 °F (36.3 °C) Oral 85 18 97 % --   02/21/23 0415 91/69 -- -- 87 18 94 % 191 lb 3.2 oz (86.7 kg)   02/21/23 0000 98/73 -- -- 86 18 98 % --   02/20/23 2000 91/67 97.4 °F (36.3 °C) Oral 83 18 97 % --   02/20/23 1806 90/64 -- -- 82 -- -- --   02/20/23 1801 85/64 97.3 °F (36.3 °C) Oral 86 18 95 % --   02/20/23 1700 93/63 97.2 °F (36.2 °C) Oral 85 16 99 % --     No intake or output data in the 24 hours ending 02/21/23 1441        Physical Exam  General: Awake, Alert, obese   HENT: Atraumatic, normocephalic   Eyes: Normal conjunctiva, Non-incteral sclera. Neck: Supple, JVD not visible.    CVS:  Heart sounds are normal. No loud murmur. RS: Normal respiratory effort, Breat sound: diminished at bases. Abd: Soft , bowel sounds are normal, no distension and no tenderness . Skin: No rash , some bruises,   CNS: Awake Oriented , No focal.   Extremities/MSK: + Edema, no cyanosis.        =======================================================================================     DATA:  Diagnostic tests reviewed by me for today's visit:   (AS NEEDED FOR MY EVALUATION AND MANAGEMENT). Recent Labs     02/19/23 2020 02/20/23 0506 02/21/23  0511   WBC 7.9 8.5 6.8   HCT 47.4 45.6 46.5   * 120* 108*     Iron Saturation:  No components found for: PERCENTFE  FERRITIN:  No results found for: FERRITIN  IRON:  No results found for: IRON  TIBC:  No results found for: TIBC    Recent Labs     02/19/23 2020 02/19/23 2333 02/20/23 0506 02/21/23  0511   * 126* 134* 139   K 4.9 5.1 4.1 3.5   CL 95* 92* 98* 105   CO2 15* 20* 19* 24   BUN 33* 34* 33* 34*   CREATININE 1.5* 1.6* 1.4* 1.5*     Recent Labs     02/19/23 2020 02/19/23 2333 02/20/23 0506 02/21/23  0511   CALCIUM 10.1 10.4 9.6 9.6   MG  --   --  2.00 2.00     No results for input(s): PH, PCO2, PO2 in the last 72 hours.     Invalid input(s): Scott Escalona    ABG:  No results found for: PH, PCO2, PO2, HCO3, BE, THGB, TCO2, O2SAT  VBG:    Lab Results   Component Value Date/Time    PHVEN 7.297 02/19/2023 09:33 PM    ULP8LIF 40.5 02/19/2023 09:33 PM    BEVEN -6.3 02/19/2023 09:33 PM    Q1PXARUH 59 02/19/2023 09:33 PM       LDH:  No results found for: LDH  Uric Acid:    Lab Results   Component Value Date/Time    LABURIC 10.8 02/20/2023 05:06 AM       PT/INR:    Lab Results   Component Value Date/Time    PROTIME 18.7 02/21/2023 05:11 AM    INR 1.57 02/21/2023 05:11 AM     Warfarin PT/INR:  No components found for: PTPATWAR, PTINRWAR  PTT:    Lab Results   Component Value Date/Time    APTT 127.3 12/14/2021 02:32 AM   [APTT}  Last 3 Troponin:    Lab Results   Component Value Date/Time    TROPONINI 0.04 02/20/2023 05:06 AM    TROPONINI 0.05 02/19/2023 11:33 PM    TROPONINI 0.04 02/19/2023 08:20 PM       U/A:    Lab Results   Component Value Date/Time    COLORU Yellow 02/20/2023 01:45 AM    PROTEINU TRACE 02/20/2023 01:45 AM    PHUR 5.0 02/20/2023 01:45 AM    PHUR 5.0 02/20/2023 01:45 AM    WBCUA 1 02/20/2023 01:45 AM    RBCUA 0 02/20/2023 01:45 AM    MUCUS Present 02/20/2023 01:45 AM    BACTERIA None Seen 02/20/2023 01:45 AM    CLARITYU Clear 02/20/2023 01:45 AM    SPECGRAV 1.015 02/20/2023 01:45 AM    LEUKOCYTESUR Negative 02/20/2023 01:45 AM    UROBILINOGEN 1.0 02/20/2023 01:45 AM    BILIRUBINUR Negative 02/20/2023 01:45 AM    BLOODU Negative 02/20/2023 01:45 AM    GLUCOSEU >=1000 02/20/2023 01:45 AM     Microalbumen/Creatinine ratio:  No components found for: RUCREAT  24 Hour Urine for Protein:  No components found for: RAWUPRO, UHRS3, PEWU74IE, UTV3  24 Hour Urine for Creatinine Clearance:  No components found for: CREAT4, UHRS10, UTV10  Urine Toxicology:  No components found for: IAMMENTA, IBARBIT, IBENZO, ICOCAINE, IMARTHC, IOPIATES, IPHENCYC    HgBA1c:    Lab Results   Component Value Date/Time    LABA1C 12.0 02/20/2023 05:06 AM     RPR:  No results found for: RPR  HIV:  No results found for: HIV  CHRISTINA:  No results found for: ANATITER, CHRISTINA  RF:  No results found for: RF  DSDNA:  No components found for: DNA  AMYLASE:  No results found for: AMYLASE  LIPASE:  No results found for: LIPASE  Fibrinogen Level:  No components found for: FIB       BELOW MENTIONED RADIOLOGY STUDY RESULTS BY ME (AS NEEDED FOR MY EVALUATION AND MANAGEMENT). XR CHEST PORTABLE    Result Date: 2/19/2023  EXAMINATION: ONE XRAY VIEW OF THE CHEST 2/19/2023 8:10 pm COMPARISON: 12/18/2021 HISTORY: ORDERING SYSTEM PROVIDED HISTORY: SOB TECHNOLOGIST PROVIDED HISTORY: Reason for exam:->SOB Reason for Exam: SOB FINDINGS: The cardiomediastinal silhouette is unchanged in appearance.   Status post median sternotomy and valve replacement. Cardiac silhouette enlargement again noted. There is no consolidation, pneumothorax, or evidence of edema. Blunting of the right costophrenic angle appears unchanged. No significant residual left pleural effusion. The osseous structures are unchanged in appearance. No acute airspace disease identified.

## 2023-02-21 NOTE — PROGRESS NOTES
the patient stated he is willing to learn how to check blood sugar & how to give self insulin and willing to continue these 2 things at home. Glucose meter ordered. Marcie Morrison RN, BSN, [place].

## 2023-02-21 NOTE — PROGRESS NOTES
[facility]  Diabetes Education   Progress Note       NAME:  Travis Godinez  MEDICAL RECORD NUMBER:  [id number]  AGE: 63 y.o. GENDER: male  : 1960  TODAY'S DATE:  2023    Subjective   Reason for Diabetes Education Evaluation and Assessment: Hyperglycemia with DKA on admission, hemoglobin A1C 12    Admitted to another hospital with DKA in  right after his mom , never picked up prescriptions (amaryl, metformin, lantus) or glucose meter from pharmacy after discharge, doesn't remember getting inpatient diabetes education that was documented. Has not been taking medications at home consistently & told cardiology CNP he is not sure he will take his medications at discharge.     Visit Type: evaluation      Travis Godinez is a 63 y.o. male referred by:  [x] Physician  [] Nursing  [] Chart Review   [] Other:     PAST MEDICAL HISTORY        Diagnosis Date    Diabetes ([address]     Hyperlipidemia     Hypertension        PAST SURGICAL HISTORY    Past Surgical History:   Procedure Laterality Date    CARDIAC SURGERY      CORONARY ARTERY BYPASS GRAFT N/A 12/10/2021    CABG X 4 , CORONARY ARTERY BYPASS GRAFT STRONG TO LAD, VEIN GRAFT TO PDA, SVG SEQUENCED TO DIAG AND OM, LIGATION KATHARINE  ATRICLIP 45MM, EVH LEFT SAPHENOUS VEIN, ANTERIOR AND POSTERIOR CHORDAE SPARING MITRAL VALVE REPLACEMENT MEDTRONIC 29MM mechanical valve, TRICUSPID VALVE REPAIR with 28mm Physio tricuspid annuloplasty ring , TOTAL CPB, VERO, Doppler flow verification of bypass grafts, 5 LEVEL BILATERAL        FAMILY HISTORY    Family History   Problem Relation Age of Onset    Cancer Mother         colon cancer    Heart Disease Father     High Blood Pressure Father     Diabetes Father     High Cholesterol Father        SOCIAL HISTORY    Social History     Tobacco Use    Smoking status: Former     Packs/day: 1.00     Years: 15.00     Pack years: 15.00     Types: Cigarettes     Quit date: 12/10/2021     Years since quittin.2 Smokeless tobacco: Never   Vaping Use    Vaping Use: Never used   Substance Use Topics    Alcohol use: Yes     Comment: occasionally    Drug use: Never       ALLERGIES    No Known Allergies    MEDICATIONS     furosemide  40 mg Oral BID    midodrine  2.5 mg Oral TID WC    warfarin  10 mg Oral Daily    warfarin placeholder: dosing by pharmacy   Other RX Placeholder    enoxaparin  1 mg/kg SubCUTAneous BID    insulin glargine  15 Units SubCUTAneous Nightly    amiodarone  200 mg Oral Daily    aspirin  81 mg Oral Daily    atorvastatin  40 mg Oral Nightly    carvedilol  12.5 mg Oral BID WC    digoxin  125 mcg Oral Daily    sodium chloride flush  10 mL IntraVENous 2 times per day    insulin lispro  0-8 Units SubCUTAneous Q4H       Objective        Patient Active Problem List   Diagnosis Code    Coronary artery disease involving native heart without angina pectoris I25.10    Severe mitral regurgitation I34.0    Severe tricuspid regurgitation I07.1    Acute on chronic systolic heart failure (HCC) I50.23    Acute on chronic congestive heart failure (HCC) I50.9    Ischemic cardiomyopathy I25.5    NSVT (nonsustained ventricular tachycardia) I47.29    Insomnia G47.00    Mixed hyperlipidemia E78.2    Tachycardia R00.0    S/P CABG (coronary artery bypass graft) Z95.1    Tobacco abuse counseling Z71.6    H/O mitral valve replacement with mechanical valve Z95.2    Vaccine counseling Z71.85    NSTEMI (non-ST elevated myocardial infarction) ([facility] [address] I21.4    Hyponatremia E87.1    Acute kidney injury superimposed on chronic kidney disease (HCC) N17.9, N18.9    Elevated lactic acid level R79.89    Transaminitis R74.01    Diabetic ketoacidosis without coma associated with type 2 diabetes mellitus (HCC) E11.10    Elevated troponin R77.8    Non-compliance Z91.199    Subtherapeutic international normalized ratio (INR) R79.1    Hypotension I95.9    S/P MVR (mitral valve repair) Z98.890        BP 90/63   Pulse 79   Temp 96.9 °F (36.1 °C) (Axillary)   Resp 16   Ht 6' 1\" (1.854 m)   Wt 191 lb 3.2 oz (86.7 kg)   SpO2 96%   BMI 25.23 kg/m²     HgBA1c:    Lab Results   Component Value Date/Time    LABA1C 12.0 02/20/2023 05:06 AM       Recent Labs     02/20/23 2012 02/21/23  0742 02/21/23  1121 02/21/23  1614   POCGLU 189* 78 94 133*       BUN/Creatinine:    Lab Results   Component Value Date/Time    BUN 34 02/21/2023 05:11 AM    CREATININE 1.5 02/21/2023 05:11 AM       Assessment        Diabetes Management and Education    Does the patient have a Primary Care Physician? Yes, Herb Yarbrough, hasn't seen since 1-5-2022     Does the patient require new medication instruction? Yes, insulin    Person responsible for administration of Insulin/Medication:     [x] Self     [] Caregiver       [] Spouse       [] Other Family Member   []  Other      Does the patient/caregiver monitor Blood Glucoses? No: no meter    Does the patient/caregiver follow a Meal Plan? No: handout given   Reviewed importance of eating three meals per day and plate method for consistent carb intake.       Level of patient/caregiver understanding:     [x] Verbalized Understanding   [] Demonstrated Understanding       [] Teach Back       [x] Needs Reinforcement     []  Other:        Plan        Dietitian Consult Made:  Yes  Social Service Consult Made:  Yes  Hospice Care: maybe,   home hospice consult being considered by pt & family after told cardiology CNP that he was not sure he would take any medications at home    Given Diabetic Meter: Yes   [] Thomasene Libman   [] One Touch   [] AccuCheck  [] Freestyle  [x] Other: ordered    Discharge Plan:  Recommend Outpatient Diabetes Education Classes : Yes  Placement for patient upon discharge: home with support      Teaching Time Diabetes Education:  60 minutes     Electronically signed by Troy Alcaraz RN [address] on 2/21/2023 at 5:32 PM

## 2023-02-21 NOTE — ACP (ADVANCE CARE PLANNING)
Advance Care Planning   Healthcare Decision Maker:    Primary Decision Maker: Scottdina Brennan Child - [phone]    Secondary Decision Maker: Isabelle Smith - Brother/Sister - [phone]    Secondary Decision Maker: Jorge L Howell - Brother/Sister - [phone]    Secondary Decision Maker: Varsha Quintanilla - Brother/Sister - [phone]    Documented decision maker(s) consistent with Legal Next of Kin hierarchy.

## 2023-02-21 NOTE — CONSULTS
Pharmacy to Dose Warfarin    Pharmacy consulted to dose warfarin for valve. INR Goal: 2.5-3.5    INR today: 1.57    Assessment/Plan:  - Plan to increase dose to 7.5 mg daily. Continue lovenox bridge at 1 mg/kg.  - Possible concomitant drug-drug interactions include: amiodarone, spironolactone     Pharmacy will continue to follow.     Damián Arreguin PharmD, [address] [facility] Pharmacy Specialist  [id number]

## 2023-02-21 NOTE — CONSULTS
PALLIATIVE MEDICINE CONSULTATION     Patient name:Kaveh Mcknight   PY    :1960  Room/Bed:[facility]3325/3325-01   LOS: 2 days         Date of consult:2023    Consult Information  Palliative Medicine Consult performed by: MICHELLE Espinosa CNP      Inpatient consult to Palliative Care  Consult performed by: MICHELLE Espinosa CNP  Consult ordered by: Tino Camp MD  Reason for consult: GOC and code status        ASSESSMENT/RECOMMENDATIONS     63 y.o. male with cough and edema d/t CHF with EF 10%      Symptom Management:  Cough- pt contributes it to COVID and states that the reason he was not taking his medications or following up he had no COVID tests per his report  Edema- pt reports increased edema the last 2-3 weeks and increased fatigue he lives alone   Goals of Care- talked to pt and three sisters two at bedside and one by phone Aline aKplan who is a retired RN. Explains pts poor prognosis with low EF and history of non-compliance discussed that pt would benefit from support of Hospice at home to help aggressively managing his fluid overload and encourage him to take his medications. We discussed that medical management was the option available to him at this point and that [facility] would maintain his current medication plan if his EF improves and he miraculously gets much better then he can revoke hospice and see if there are any additional options at that time. Family all understanding the message they will discuss and I will follow-up with pt tomorrow. Patient/Family Goals of Care :    talked to pt and three sisters two at bedside and one by phone Aline [place] who is a retired RN. Explains pts poor prognosis with low EF and history of non-compliance discussed that pt would benefit from support of Hospice at home to help aggressively managing his fluid overload and encourage him to take his medications.  We discussed that medical management was the option available to him at this point and that [address] would maintain his current medication plan if his EF improves and he miraculously gets much better then he can revoke hospice and see if there are any additional options at that time. Family all understanding the message they will discuss and I will follow-up with pt tomorrow. Disposition/Discharge Plan:   pending    Advance Directives:  Surrogate Decision Maker: pt has 8 siblings no 1 HCPOA  Code status:  Full Code    Case discussed with: patient, floor RN, Kymberly Alva and Chalo Howard  Thank you for allowing us to participate in the care of this patient. HISTORY     CC: edema  HPI: The patient is a 63 y.o. male with history of chronic systolic HF, CAD s/p CABG, history of valvular heart disease. He underwent CABG x 4, mitral valve replacement mechanical Medtronic valve, tricuspid valve repair 12/10/21. His ejection fraction was 15% at that surgery. Since then, he has been extremely unreliable with follow up for coumadin checks and office visits. Palliative Medicine SymptomScreening/ROS:  Review of Systems -   History obtained from chart review and the patient  General ROS: positive for  - fatigue, sleep disturbance, and weight gain  Psychological ROS: positive for - depression  Respiratory ROS: positive for - cough  Cardiovascular ROS: positive for - dyspnea on exertion and edema  Gastrointestinal ROS: positive for - appetite loss  Musculoskeletal ROS: positive for - muscular weakness  Dermatological ROS: positive for - dry skin           Home med list and hospital medications reviewed in chart as of 2/21/2023     Palliative Performance Scale:     [x] 60%  Amb reduced; Sig dz. Can't do hobbies/housework; Intake normal or reduced, Occasional assist; LOC full/confusion   [] 50%  Mainly sit/lie; Extensive disease. Mainly assist, Intake normal or reduced;  Occasional assist; LOC full/confusion   [] 40%  Mainly in bed; Extensive disease; Mainly assist; Intake normal or reduced; Occasional assist; LOC full/confusion   [] 30%  Bed bound, Extensive disease; Total care; Intake reduced; LOC full/confusion   [] 20%  Bed bound; Extensive disease; Total care; Intake minimal; Drowsy/coma   [] 10%  Bed bound; Extensive disease;  Total care; Mouth care only; Drowsy/coma   []  0%   Death        EXAM     Vitals:    02/21/23 0802   BP:    Pulse: 85   Resp:    Temp:    SpO2:        Physical Examination:   General appearance - alert, well appearing, and in no distress and chronically ill appearing  Mental status - alert, oriented to person, place, and time, depressed mood  Mouth - mucous membranes moist, pharynx normal without lesions  Chest - clear to auscultation, no wheezes, rales or rhonchi, symmetric air entry  Heart - normal rate, regular rhythm, normal S1, S2, no murmurs, rubs, clicks or gallops  Abdomen - soft, nontender, nondistended, no masses or organomegaly  Back exam - full range of motion, no tenderness, palpable spasm or pain on motion  Neurological - alert, oriented, normal speech, no focal findings or movement disorder noted  Extremities - peripheral pulses normal, no pedal edema, no clubbing or cyanosis, pedal edema 3 +        Current labs in the epic chart reviewed as of 2/21/2023   Review of previous notes, admits, labs, radiology and testing relevant to this consult done in this chart today 2/21/2023      DATA:    Data Reviewed related to this consultation:    Review of prior external note(s) from each unique source relevant to today's visit: Hospitalist, Cardiologist    Unique test results reviewed: CBC and BMP, Echo         I have spent a total of 75 minutes on: Performing a medical appropriate examination and/or evaluation    Counseling and educating the patient/family/caregiver    Preparing to see the patient (e.g., review of tests)    Documenting clinical information in the electronic health record     Signed By: Electronically signed by MICHELLE Gandara CNP on 2/21/2023 at 8:10 AM  Palliative Medicine     February 21, 2023

## 2023-02-21 NOTE — PROGRESS NOTES
[address]   Daily Progress Note      Admit Date:  2/19/2023    HPI:    Mr. Griselda Temple is a 63years old male with history of systolic heart failure, CAD status post CABG, valvular disease, tricuspid valve repair 12/10/2021, LVEF 15% at surgery time    He is admitted with bilateral leg swelling up to his knees and about 5 pounds up. He is not reliable with taking his medication and follow up visits. LVEF today is 10%. He is being diuresed with IV lasix      Subjective:  Patient is being seen for acute on chronic systolic heart failure. There were no acute overnight cardiac events. He is sitting on the side of the bed. His sister is at his side. He has a hoodie jacket on and it is very dark in the room. He tells me he is not sure he will take his medications at discharge. Creat 1.4-1.5 bnp 4811     Objective:   BP 99/69   Pulse 85   Temp 97.4 °F (36.3 °C) (Oral)   Resp 16   Ht 6' 1\" (1.854 m)   Wt 191 lb 3.2 oz (86.7 kg)   SpO2 97%   BMI 25.23 kg/m²     Intake/Output Summary (Last 24 hours) at 2/21/2023 1051  Last data filed at 2/20/2023 1249  Gross per 24 hour   Intake 120 ml   Output 500 ml   Net -380 ml          Physical Exam:  General:  Awake, alert, oriented in NAD  Skin:  Warm and dry. No unusual bruising or rash  Neck:  Supple. No JVD or carotid bruit appreciated  Chest:  Normal effort.   Bilaterally decreased in bases   Cardiovascular:  RRR, S1/S2, no murmur/gallop/rub  Abdomen:  Soft, nontender, +bowel sounds  Extremities:  bilateral ankle to mid calf edema  Neurological: No focal deficits  Psychological: Normal mood and affect      Medications:    warfarin placeholder: dosing by pharmacy   Other RX Placeholder    enoxaparin  1 mg/kg SubCUTAneous BID    warfarin  6 mg Oral Daily    insulin glargine  15 Units SubCUTAneous Nightly    amiodarone  200 mg Oral Daily    aspirin  81 mg Oral Daily    atorvastatin  40 mg Oral Nightly    carvedilol  12.5 mg Oral BID WC    digoxin  125 mcg Oral Daily    sodium chloride flush  10 mL IntraVENous 2 times per day    furosemide  40 mg IntraVENous BID    insulin lispro  0-8 Units SubCUTAneous Q4H      dextrose      sodium chloride         Lab Data:  CBC:   Recent Labs     23  0506 23  0511   WBC 7.9 8.5 6.8   HGB 15.2 14.8 15.1   * 120* 108*     BMP:    Recent Labs     23  2333 23  0506 23  0511   * 134* 139   K 5.1 4.1 3.5   CO2 * 24   BUN 34* 33* 34*   CREATININE 1.6* 1.4* 1.5*     INR:    Recent Labs     23  05023  0511   INR 1.52* 1.76* 1.57*     BNP:    Recent Labs     23   PROBNP 4,811*         Diagnostics:    Echo 23:  (EF 20% on 21)   Summary   Overall, left ventricular systolic function is severely depressed. Ejection fraction is visually estimated to be 10%. There is severe diffuse hypokinesis. Cannot determine diastology due to   mitral valve replacement. 29 mm Medtronic mechanical valve. Max P mmHg. Mean P mmHg. 28 mm Phisio TV annuloplasty. Max P mmHg. Mild to moderate tricuspid   regurgitation. RVSP = 37 mmHG. The right ventricle is normal in size with reduced function. Biatrial enlargement. Assessment:    1. Diabetic ketoacidosis  2. Acute on chronic systolic heart failure    3. Non compliance with medications  4. Status post CABG (not on statin) and mechanical MV (not compliance with coumadin)  5. Hypotension due to severe heart failure  6. Acute on chronic kidney failure      Plan: Will stop IV Lasix and change to oral lasix 40 mg twice a day   Continue lipitor and aspirin for CAD  Continue coreg 12.5 mg twice a day  No aldactone due to none compliance  Coumadin per pharmacy  Continue digoxin 125 mcg daily  CHF nurse following for diet education, fluid restriction and daily weights  Agree with palliative care consult.   Patient had declined this with me but I agree he either has to choose medications or death  Could decide to add jardiance if he agrees to take his medications   Will add midodrine 2.5 mg three times a day for better BP control    NYHA IV    Discussed with patient who is agreeable with plan of care. Thank you for allowing me to participate in the care of your patient.     Hemal Guerrero, MICHELLE - CNS, CNS

## 2023-02-22 ENCOUNTER — TELEPHONE (OUTPATIENT)
Dept: PHARMACY | Age: 63
End: 2023-02-22

## 2023-02-22 ENCOUNTER — TELEPHONE (OUTPATIENT)
Dept: CARDIOLOGY CLINIC | Age: 63
End: 2023-02-22

## 2023-02-22 VITALS
HEART RATE: 80 BPM | SYSTOLIC BLOOD PRESSURE: 104 MMHG | WEIGHT: 188.7 LBS | BODY MASS INDEX: 25.01 KG/M2 | DIASTOLIC BLOOD PRESSURE: 72 MMHG | HEIGHT: 73 IN | RESPIRATION RATE: 16 BRPM | OXYGEN SATURATION: 95 % | TEMPERATURE: 97.4 F

## 2023-02-22 PROBLEM — F32.A DEPRESSION: Status: ACTIVE | Noted: 2023-02-22

## 2023-02-22 PROBLEM — F41.9 ANXIETY DISORDER: Status: ACTIVE | Noted: 2023-02-22

## 2023-02-22 LAB
ALBUMIN SERPL-MCNC: 3.2 G/DL (ref 3.4–5)
ALP BLD-CCNC: 476 U/L (ref 40–129)
ALT SERPL-CCNC: 142 U/L (ref 10–40)
ANION GAP SERPL CALCULATED.3IONS-SCNC: 11 MMOL/L (ref 3–16)
AST SERPL-CCNC: 123 U/L (ref 15–37)
BILIRUB SERPL-MCNC: 2.4 MG/DL (ref 0–1)
BILIRUBIN DIRECT: 1 MG/DL (ref 0–0.3)
BILIRUBIN, INDIRECT: 1.4 MG/DL (ref 0–1)
BUN BLDV-MCNC: 32 MG/DL (ref 7–20)
CALCIUM SERPL-MCNC: 9.2 MG/DL (ref 8.3–10.6)
CHLORIDE BLD-SCNC: 105 MMOL/L (ref 99–110)
CO2: 21 MMOL/L (ref 21–32)
CREAT SERPL-MCNC: 1.6 MG/DL (ref 0.8–1.3)
GFR SERPL CREATININE-BSD FRML MDRD: 49 ML/MIN/{1.73_M2}
GLUCOSE BLD-MCNC: 112 MG/DL (ref 70–99)
GLUCOSE BLD-MCNC: 123 MG/DL (ref 70–99)
GLUCOSE BLD-MCNC: 76 MG/DL (ref 70–99)
GLUCOSE BLD-MCNC: 77 MG/DL (ref 70–99)
HAV IGM SER IA-ACNC: NORMAL
HEPATITIS B CORE IGM ANTIBODY: NORMAL
HEPATITIS B SURFACE ANTIGEN INTERPRETATION: NORMAL
HEPATITIS C ANTIBODY INTERPRETATION: NORMAL
INR BLD: 1.74 (ref 0.87–1.14)
LACTIC ACID: 2 MMOL/L (ref 0.4–2)
LV EF: 10 %
LVEF MODALITY: NORMAL
MAGNESIUM: 2 MG/DL (ref 1.8–2.4)
PERFORMED ON: ABNORMAL
PERFORMED ON: ABNORMAL
PERFORMED ON: NORMAL
PHOSPHORUS: 3.7 MG/DL (ref 2.5–4.9)
POTASSIUM SERPL-SCNC: 3.5 MMOL/L (ref 3.5–5.1)
PRO-BNP: 3277 PG/ML (ref 0–124)
PROTHROMBIN TIME: 20.3 SEC (ref 11.7–14.5)
SODIUM BLD-SCNC: 137 MMOL/L (ref 136–145)
TOTAL PROTEIN: 6 G/DL (ref 6.4–8.2)

## 2023-02-22 PROCEDURE — 6370000000 HC RX 637 (ALT 250 FOR IP): Performed by: PHYSICIAN ASSISTANT

## 2023-02-22 PROCEDURE — 6360000002 HC RX W HCPCS: Performed by: INTERNAL MEDICINE

## 2023-02-22 PROCEDURE — 6370000000 HC RX 637 (ALT 250 FOR IP): Performed by: CLINICAL NURSE SPECIALIST

## 2023-02-22 PROCEDURE — 83735 ASSAY OF MAGNESIUM: CPT

## 2023-02-22 PROCEDURE — 80074 ACUTE HEPATITIS PANEL: CPT

## 2023-02-22 PROCEDURE — 83516 IMMUNOASSAY NONANTIBODY: CPT

## 2023-02-22 PROCEDURE — 99232 SBSQ HOSP IP/OBS MODERATE 35: CPT | Performed by: CLINICAL NURSE SPECIALIST

## 2023-02-22 PROCEDURE — 85610 PROTHROMBIN TIME: CPT

## 2023-02-22 PROCEDURE — 6370000000 HC RX 637 (ALT 250 FOR IP): Performed by: NURSE PRACTITIONER

## 2023-02-22 PROCEDURE — 2580000003 HC RX 258: Performed by: INTERNAL MEDICINE

## 2023-02-22 PROCEDURE — 86015 ACTIN ANTIBODY EACH: CPT

## 2023-02-22 PROCEDURE — 80069 RENAL FUNCTION PANEL: CPT

## 2023-02-22 PROCEDURE — 6370000000 HC RX 637 (ALT 250 FOR IP): Performed by: INTERNAL MEDICINE

## 2023-02-22 PROCEDURE — 80076 HEPATIC FUNCTION PANEL: CPT

## 2023-02-22 PROCEDURE — 86038 ANTINUCLEAR ANTIBODIES: CPT

## 2023-02-22 PROCEDURE — C8924 2D TTE W OR W/O FOL W/CON,FU: HCPCS

## 2023-02-22 PROCEDURE — 83880 ASSAY OF NATRIURETIC PEPTIDE: CPT

## 2023-02-22 PROCEDURE — 36415 COLL VENOUS BLD VENIPUNCTURE: CPT

## 2023-02-22 PROCEDURE — 83605 ASSAY OF LACTIC ACID: CPT

## 2023-02-22 RX ORDER — ENOXAPARIN SODIUM 100 MG/ML
1 INJECTION SUBCUTANEOUS 2 TIMES DAILY
Qty: 6.4 ML | Refills: 0 | Status: SHIPPED | OUTPATIENT
Start: 2023-02-22 | End: 2023-02-26

## 2023-02-22 RX ORDER — ENOXAPARIN SODIUM 100 MG/ML
1 INJECTION SUBCUTANEOUS 2 TIMES DAILY
Qty: 12.8 ML | Refills: 0 | Status: SHIPPED | OUTPATIENT
Start: 2023-02-22 | End: 2023-02-22 | Stop reason: SDUPTHER

## 2023-02-22 RX ORDER — MIDODRINE HYDROCHLORIDE 2.5 MG/1
2.5 TABLET ORAL
Qty: 90 TABLET | Refills: 1 | Status: SHIPPED | OUTPATIENT
Start: 2023-02-22

## 2023-02-22 RX ORDER — FUROSEMIDE 40 MG/1
40 TABLET ORAL 2 TIMES DAILY
Qty: 60 TABLET | Refills: 1 | Status: SHIPPED | OUTPATIENT
Start: 2023-02-22

## 2023-02-22 RX ORDER — CARVEDILOL 12.5 MG/1
12.5 TABLET ORAL 2 TIMES DAILY WITH MEALS
Qty: 60 TABLET | Refills: 3 | Status: SHIPPED | OUTPATIENT
Start: 2023-02-22

## 2023-02-22 RX ORDER — AMIODARONE HYDROCHLORIDE 200 MG/1
200 TABLET ORAL DAILY
Qty: 30 TABLET | Refills: 0 | Status: SHIPPED | OUTPATIENT
Start: 2023-02-22

## 2023-02-22 RX ORDER — ENOXAPARIN SODIUM 100 MG/ML
1 INJECTION SUBCUTANEOUS ONCE
Status: COMPLETED | OUTPATIENT
Start: 2023-02-22 | End: 2023-02-22

## 2023-02-22 RX ORDER — WARFARIN SODIUM 5 MG/1
TABLET ORAL
Qty: 30 TABLET | Refills: 1 | Status: SHIPPED | OUTPATIENT
Start: 2023-02-22

## 2023-02-22 RX ORDER — GLIPIZIDE 5 MG/1
5 TABLET ORAL DAILY
Qty: 30 TABLET | Refills: 1 | Status: SHIPPED | OUTPATIENT
Start: 2023-02-22

## 2023-02-22 RX ORDER — DIGOXIN 125 MCG
62.5 TABLET ORAL DAILY
Qty: 15 TABLET | Refills: 1 | Status: SHIPPED | OUTPATIENT
Start: 2023-02-22

## 2023-02-22 RX ORDER — ASPIRIN 81 MG/1
81 TABLET, CHEWABLE ORAL DAILY
Qty: 30 TABLET | Refills: 3 | Status: SHIPPED | OUTPATIENT
Start: 2023-02-22

## 2023-02-22 RX ADMIN — DIGOXIN 125 MCG: 125 TABLET ORAL at 08:27

## 2023-02-22 RX ADMIN — MIDODRINE HYDROCHLORIDE 2.5 MG: 5 TABLET ORAL at 08:28

## 2023-02-22 RX ADMIN — MIDODRINE HYDROCHLORIDE 2.5 MG: 5 TABLET ORAL at 17:43

## 2023-02-22 RX ADMIN — FUROSEMIDE 40 MG: 40 TABLET ORAL at 17:43

## 2023-02-22 RX ADMIN — WARFARIN SODIUM 10 MG: 5 TABLET ORAL at 17:43

## 2023-02-22 RX ADMIN — AMIODARONE HYDROCHLORIDE 200 MG: 200 TABLET ORAL at 08:28

## 2023-02-22 RX ADMIN — ENOXAPARIN SODIUM 80 MG: 100 INJECTION SUBCUTANEOUS at 08:26

## 2023-02-22 RX ADMIN — CARVEDILOL 12.5 MG: 6.25 TABLET, FILM COATED ORAL at 08:28

## 2023-02-22 RX ADMIN — Medication 10 ML: at 08:49

## 2023-02-22 RX ADMIN — ENOXAPARIN SODIUM 80 MG: 100 INJECTION SUBCUTANEOUS at 17:42

## 2023-02-22 RX ADMIN — CARVEDILOL 12.5 MG: 6.25 TABLET, FILM COATED ORAL at 17:43

## 2023-02-22 RX ADMIN — MIDODRINE HYDROCHLORIDE 2.5 MG: 5 TABLET ORAL at 12:05

## 2023-02-22 RX ADMIN — ASPIRIN 81 MG: 81 TABLET, CHEWABLE ORAL at 08:28

## 2023-02-22 RX ADMIN — FUROSEMIDE 40 MG: 40 TABLET ORAL at 08:48

## 2023-02-22 RX ADMIN — LORAZEPAM 1 MG: 1 TABLET ORAL at 17:54

## 2023-02-22 RX ADMIN — FLUTICASONE PROPIONATE 1 SPRAY: 50 SPRAY, METERED NASAL at 08:26

## 2023-02-22 ASSESSMENT — PAIN SCALES - GENERAL
PAINLEVEL_OUTOF10: 0

## 2023-02-22 NOTE — TELEPHONE ENCOUNTER
[facility] care coordinator states pt was released from hospital today and asking if [facility] will sign for home care orders.  Please call to advise

## 2023-02-22 NOTE — DISCHARGE SUMMARY
[facility] DISCHARGE SUMMARY    Patient Demographics    Patient. New Gomez  Date of Birth. 1960  MRN. [id number]     Primary care provider. Susannah Sims MD  (Tel: [phone])    Admit date: 2/19/2023    Discharge date (blank if same as Note Date): Note Date: 2/22/2023     Reason for Hospitalization. Chief Complaint   Patient presents with    Leg Swelling     BLE edema x about 2 weeks. Significant Findings. Principal Problem:    Acute on chronic systolic heart failure (HCC)  Active Problems:    NSTEMI (non-ST elevated myocardial infarction) ([facility])    Hyponatremia    Acute kidney injury superimposed on chronic kidney disease ([facility])    Elevated lactic acid level    Transaminitis    Diabetic ketoacidosis without coma associated with type 2 diabetes mellitus ([facility])    Elevated troponin    Non-compliance    Subtherapeutic international normalized ratio (INR)    Hypotension    S/P MVR (mitral valve repair)    Depression    Anxiety disorder    Coronary artery disease involving native heart without angina pectoris    Acute on chronic congestive heart failure (HCC)    Ischemic cardiomyopathy    Mixed hyperlipidemia    S/P CABG (coronary artery bypass graft)    H/O mitral valve replacement with mechanical valve  Resolved Problems:    * No resolved hospital problems. *       Problems and results from this hospitalization that need follow up. CHF. CHF clinic follow up in 1 week. DM2. PCP follow up for further management. Elevated LFTs. Recheck LFTs, home health to draw. BENOIT. Nephrology follow up. Depression. Started on sertraline. Mechanical mitral valve. Coumadin clinic to manage INR results. Significant test results and incidental findings. CXR 2/19/2023:  No acute airspace disease identified.      Echo 2/19/2023:  Summary   Overall, left ventricular systolic function is severely depressed.   Ejection fraction is visually estimated to be 10%.   There is severe diffuse hypokinesis. Cannot determine diastology due to mitral valve replacement.   29 mm Medtronic mechanical valve. Max P mmHg. Mean P mmHg.   28 mm Phisio TV annuloplasty. Max P mmHg. Mild to moderate tricuspid regurgitation. RVSP = 37 mmHG.   The right ventricle is normal in size with reduced function.   Biatrial enlargement.     US abdomen 2023:  1. Unremarkable abdominal ultrasound.   2. Mild amount of nonspecific layering debris within the urinary bladder,   which could represent intraluminal hemorrhage in the correct clinical   setting.  Clinical correlation is advised.       Invasive procedures and treatments.   None     Problem-based Hospital Course.  61 yo male with hx HTN, HLD, DM2, mechanical mitral valve on warfarin, chronic sCHF (15-20%). He has hx noncompliance with medications. He presented with worsening shortness of breath, edema, fatigue. He had quit taking meds for past several months. Admitted with CHF exacerbation.     Acute on chronic systolic CHF. Presented with shortness of breath, orthopnea, edema, fatigue, proBNP 4800. Has been noncompliant with meds and dietary restrictions. Placed on IV Lasix, transitioned to po . Echo this admission with EF 10%, compared to 15-20% (2021). Resumed carvedilol, digoxin. No ACE/ARB/ARNI due to BENOIT, no spironolactone  noncompliance. Has follow up arranged with CHF clinic.     NSTEMI / hx CABG. Troponin 0.04->0.05->0.04, no acute changes on EKG. In setting of volume overload. Echo with EF 10%, compared to 15-20% on prior, severe diffuse hypokinesis. Denies chest pain Evaluated by cardiology, continue medical management with asa, beta blocker and statin.   Mechanical mitral valve. Patient has not been taking his Coumadin. Restarted warfarin with lovenox bridge. INR today 1.74. Discharged on lovenox to bridge until INR therapeutic. Home health nurse to  obtain INR and send results to [facility] for management. Patient has 2 sisters who are retired nurses and willing to assist with lovenox administration. DM2 / DKA.  on presentation, pH 7.297, CO2 15, beta hydroxybutyrate 3.10. Not on antidiabetic meds at home. Given insulin, started on Lantus with medium dose correction. Reviewed BG values, much improved, . Continue to follow. Diabetic educator worked with patient. Transitioned to  sulfonylurea on DC, unable to use metformin given creatinine level. Hyponatremia. Serum sodium 127 on presentation. In setting of CHF and blood glucose 481. Resolved with diuresis and correction of blood glucose. BENOIT. Creatinine 1.5 on presentation, baseline closer to 1.1 but that was in 12/2021. Likely cardiorenal / hypoperfusion from CHF. Has remained stable with diuresis. No evidence of hydronephrosis on ultrasound. Transaminitis. Likely secondary to hepatic congestion from CHF. No acute findings on abdominal ultrasound. GI evaluated, suspect likely congestive hepatopathy. Depression / anxiety. Likely contributing factor to med noncompliance. Psych evaluated and started on Seroquel. Patient feels better, eager for DC home. Family have been involved in care here and will be assisting patient with meds and office follow up. Reviewed DC plans, follow up and medications. Expresses understanding. Questions answered. Consults. PHARMACY TO DOSE WARFARIN  IP CONSULT TO HEART FAILURE NURSE/COORDINATOR  IP CONSULT TO DIETITIAN  IP CONSULT TO SPIRITUAL SERVICES  IP CONSULT TO CARDIOLOGY  IP CONSULT TO NEPHROLOGY  IP CONSULT TO PALLIATIVE CARE  IP CONSULT TO DIABETES EDUCATOR  IP CONSULT TO PSYCHIATRY  IP CONSULT TO DIETITIAN  IP CONSULT TO GI  IP CONSULT TO HOME CARE NEEDS  IP CONSULT TO DIETITIAN    Physical examination on discharge day.    BP 99/69   Pulse 68   Temp 97.4 °F (36.3 °C) (Oral)   Resp 16   Ht 6' 1\" (1.854 m)   Wt 188 lb 11.2 oz (85.6 kg)   SpO2 95% BMI 24.90 kg/m²   General appearance. Alert. Looks comfortable. HEENT. Sclera clear. Moist mucus membranes. Cardiovascular. Regular rate and rhythm, normal S1, S2. No murmur, mechanical heart sounds. Respiratory. Not using accessory muscles. Clear to auscultation bilaterally, no wheeze. Gastrointestinal. Abdomen soft, non-tender, not distended, normal bowel sounds  Neurology. Facial symmetry. No speech deficits. Moving all extremities equally. Extremities. No edema in lower extremities. Skin. Warm, dry, normal turgor    Condition at time of discharge stable    Medication instructions provided to patient at discharge. Medication List        START taking these medications      blood glucose monitor kit and supplies  Dispense sufficient amount for indicated testing frequency plus additional to accommodate PRN testing needs. Dispense all needed supplies to include: monitor, strips, lancing device, lancets, control solutions, alcohol swabs. blood glucose test strips  Test 3 times a day & as needed for symptoms of irregular blood glucose. Dispense sufficient amount for indicated testing frequency plus additional to accommodate PRN testing needs.      enoxaparin 80 MG/0.8ML  Commonly known as: LOVENOX  Inject 0.8 mLs into the skin 2 times daily for 4 days  Notes to patient: Enoxaparin (Lovenox)  Use: prevent blood clots, treat blood clots, to prevent a stroke  Side effects: increased risk of bleeding; irritation/bruising around the injection site     glipiZIDE 5 MG tablet  Commonly known as: GLUCOTROL  Take 1 tablet by mouth daily  Notes to patient: Use: it is used to lower blood sugar in patients with high blood sugar  Side effects: dizziness, diarrhea, gas     Lancets Misc  1 each by Does not apply route daily     midodrine 2.5 MG tablet  Commonly known as: PROAMATINE  Take 1 tablet by mouth 3 times daily (with meals)  Notes to patient: Use: Constricts (narrows) blood vessels to increase blood pressure  Side effects: blurred vision, headaches  Note: If side effects bother you, do not just stop taking this medication. Call the prescribing doctor for suggestions or medication change     sertraline 50 MG tablet  Commonly known as: ZOLOFT  Take 1 tablet by mouth daily  Start taking on: February 23, 2023  Notes to patient: Use: SSRI, depression & OCD. Side effects: nausea, dizziness, dry mouth. CHANGE how you take these medications      digoxin 125 MCG tablet  Commonly known as: LANOXIN  Take 0.5 tablets by mouth daily  What changed: how much to take     furosemide 40 MG tablet  Commonly known as: LASIX  Take 1 tablet by mouth in the morning and 1 tablet in the evening. What changed:   how much to take  when to take this  Notes to patient: Use: gets rid of extra volume the heart would otherwise have to process through the body.   Decreases swelling and decreases workload on the heart  Side effects: low potassium, muscle cramps, increased urination      warfarin 5 MG tablet  Commonly known as: COUMADIN  Take by mouth daily at [facility] as directed (start with 1 tablet)  What changed: medication strength            CONTINUE taking these medications      amiodarone 200 MG tablet  Commonly known as: CORDARONE  Take 1 tablet by mouth daily     aspirin 81 MG chewable tablet  Take 1 tablet by mouth daily     carvedilol 12.5 MG tablet  Commonly known as: COREG  Take 1 tablet by mouth 2 times daily (with meals)            STOP taking these medications      acetaminophen 500 MG tablet  Commonly known as: TYLENOL     atorvastatin 40 MG tablet  Commonly known as: LIPITOR     spironolactone 25 MG tablet  Commonly known as: ALDACTONE     traZODone 50 MG tablet  Commonly known as: DESYREL               Where to Get Your Medications        [facility] [place] [address] [facility] [address]      Phone: [phone]   blood glucose monitor kit and supplies  blood glucose test strips  enoxaparin 80 MG/0.8ML  furosemide 40 MG tablet  glipiZIDE 5 MG tablet  Lancets Misc  midodrine 2.5 MG tablet  sertraline 50 MG tablet       [id number] Nilsa Witt [phone]  [address] [place] [id number]      Hours: 24-hours Phone: [phone]   amiodarone 200 MG tablet  aspirin 81 MG chewable tablet  carvedilol 12.5 MG tablet  digoxin 125 MCG tablet  warfarin 5 MG tablet     Received meds to beds. After outpatient pharmacy closed, received [employer] from nursing that sister reports patient has not refilled meds in quite awhile and need refills sent to [employer] [facility]. Discharge recommendations given to patient. Follow Up. CHF clinic in 1 week, PCP in 1-2 weeks   Disposition. home  Activity. activity as tolerated  Diet: ADULT DIET; Regular; 3 carb choices (45 gm/meal); No Added Salt (3-4 gm)      Spent > 30 minutes in discharge process.     Signed:  MICHELLE Whitten CNP     2/22/2023 3:38 PM

## 2023-02-22 NOTE — PROGRESS NOTES
[facility]    2/22/2023 8:22 AM        Name: Terese Cooley . Admitted: 2/19/2023  Primary Care Provider: Cheryl Peguero MD (Tel: [phone])      Brief History: 62 yo male with hx HTN, HLD, DM2, mechanical mitral valve on warfarin, chronic sCHF (15-20%). He has hx noncompliance with medications. He presented with worsening shortness of breath, edema, fatigue. He had quit taking meds for past month. Admitted with CHF exacerbation. Subjective:  Resting in bed, brothers at bedside, he has 9 siblings. Patient reports he feels much better compared to admission. Denies shortness of breath, chest pain, palpitations, abdominal pain, nausea. Agreeable to meds, says the only reason he stopped taking meds were because he felt so poorly. Hopes for DC today.       Reviewed interval ancillary notes    Current Medications  furosemide (LASIX) tablet 40 mg, BID  midodrine (PROAMATINE) tablet 2.5 mg, TID WC  warfarin (COUMADIN) tablet 10 mg, Daily  fluticasone (FLONASE) 50 MCG/ACT nasal spray 1 spray, Daily  warfarin placeholder: dosing by pharmacy, RX Placeholder  enoxaparin (LOVENOX) injection 80 mg, BID  zolpidem (AMBIEN) tablet 10 mg, Nightly PRN  LORazepam (ATIVAN) tablet 1 mg, BID PRN  perflutren lipid microspheres (DEFINITY) injection 1.5 mL, ONCE PRN  insulin glargine (LANTUS) injection vial 15 Units, Nightly  amiodarone (CORDARONE) tablet 200 mg, Daily  aspirin chewable tablet 81 mg, Daily  atorvastatin (LIPITOR) tablet 40 mg, Nightly  carvedilol (COREG) tablet 12.5 mg, BID WC  digoxin (LANOXIN) tablet 125 mcg, Daily  dextrose bolus 10% 125 mL, PRN   Or  dextrose bolus 10% 250 mL, PRN  glucagon (rDNA) injection 1 mg, PRN  dextrose 10 % infusion, Continuous PRN  sodium chloride flush 0.9 % injection 10 mL, 2 times per day  sodium chloride flush 0.9 % injection 10 mL, PRN  0.9 % sodium chloride infusion, PRN  potassium chloride 10 mEq/100 mL IVPB (Peripheral Line), PRN  magnesium sulfate 2000 mg in 50 mL IVPB premix, PRN  promethazine (PHENERGAN) tablet 12.5 mg, Q6H PRN   Or  ondansetron (ZOFRAN) injection 4 mg, Q6H PRN  acetaminophen (TYLENOL) tablet 650 mg, Q6H PRN   Or  acetaminophen (TYLENOL) suppository 650 mg, Q6H PRN  insulin lispro (HUMALOG) injection vial 0-8 Units, Q4H      Objective:  /74   Pulse 79   Temp 97.7 °F (36.5 °C) (Oral)   Resp 18   Ht 6' 1\" (1.854 m)   Wt 188 lb 11.2 oz (85.6 kg)   SpO2 93%   BMI 24.90 kg/m²     Intake/Output Summary (Last 24 hours) at 2/22/2023 5873  Last data filed at 2/21/2023 2202  Gross per 24 hour   Intake 240 ml   Output 550 ml   Net -310 ml      Wt Readings from Last 3 Encounters:   02/22/23 188 lb 11.2 oz (85.6 kg)   02/02/22 175 lb (79.4 kg)   01/12/22 166 lb (75.3 kg)     General:  Awake, alert, oriented in NAD  Skin:  Warm and dry. No unusual bruising or rash  Neck:  Supple. No JVD appreciated  Chest:  Normal effort. Clear to auscultation, no wheezes/rhonchi/rales  Cardiovascular:  RRR, normal S1/S2, no murmur/gallop/rub, mechanical heart sounds  Abdomen:  Soft, nontender, +bowel sounds  Extremities:  No edema  Neurological: No focal deficits  Psychological: Normal mood and affect        Labs and Tests:  CBC:   Recent Labs     02/19/23 2020 02/20/23  0506 02/21/23  0511   WBC 7.9 8.5 6.8   HGB 15.2 14.8 15.1   * 120* 108*     BMP:    Recent Labs     02/20/23  0506 02/21/23  0511 02/22/23  0458   * 139 137   K 4.1 3.5 3.5   CL 98* 105 105   CO2 19* 24 21   BUN 33* 34* 32*   CREATININE 1.4* 1.5* 1.6*   GLUCOSE 314* 91 76     Hepatic:   Recent Labs     02/20/23  0506 02/21/23  0511 02/22/23  0458   * 114* 123*   * 125* 142*   BILITOT 3.1* 3.1* 2.4*   ALKPHOS 302* 377* 476*     CXR 2/19/2023:  No acute airspace disease identified.     Echo 2/19/2023:  Summary   Overall, left ventricular systolic function is severely depressed. Ejection fraction is visually estimated to be 10%. There is severe diffuse hypokinesis. Cannot determine diastology due to mitral valve replacement. 29 mm Medtronic mechanical valve. Max P mmHg. Mean P mmHg. 28 mm Phisio TV annuloplasty. Max P mmHg. Mild to moderate tricuspid regurgitation. RVSP = 37 mmHG. The right ventricle is normal in size with reduced function. Biatrial enlargement. US abdomen 2023:  1. Unremarkable abdominal ultrasound. 2. Mild amount of nonspecific layering debris within the urinary bladder,   which could represent intraluminal hemorrhage in the correct clinical   setting. Clinical correlation is advised. Problem List  Principal Problem:    Acute on chronic systolic heart failure (HCC)  Active Problems:    NSTEMI (non-ST elevated myocardial infarction) ([address]    Hyponatremia    Acute kidney injury superimposed on chronic kidney disease (HCC)    Elevated lactic acid level    Transaminitis    Diabetic ketoacidosis without coma associated with type 2 diabetes mellitus ([facility])    Elevated troponin    Non-compliance    Subtherapeutic international normalized ratio (INR)    Hypotension    S/P MVR (mitral valve repair)    Coronary artery disease involving native heart without angina pectoris    Acute on chronic congestive heart failure (HCC)    Ischemic cardiomyopathy    Mixed hyperlipidemia    S/P CABG (coronary artery bypass graft)    H/O mitral valve replacement with mechanical valve  Resolved Problems:    * No resolved hospital problems. *       Assessment & Plan:   Acute on chronic systolic CHF. Presented with shortness of breath, orthopnea, edema, fatigue, proBNP 4800. Has been noncompliant with meds and dietary restrictions. Placed on IV Lasix, transitioned to po . Echo this admission with EF 10%, compared to 15-20% (2021). Resumed carvedilol, digoxin. No ACE/ARB/ARNI due to BENOIT, no spironolactone 2/ noncompliance.  Appreciate cardiology recs. NSTEMI / hx CABG. Troponin 0.04->0.05->0.04, no acute changes on EKG. In setting of volume overload. Echo with EF 10%, compared to 15-20% on prior, severe diffuse hypokinesis. Denies chest pain Evaluated by cardiology, continue medical management with asa, beta blocker and statin. Mechanical mitral valve. Patient has not been taking his Coumadin. Restarted warfarin with lovenox bridge. INR today 1.74  DM2 / DKA.  on presentation, pH 7.297, CO2 15, beta hydroxybutyrate 3.10. Not on antidiabetic meds at home. Given insulin, started on Lantus with medium dose correction. Reviewed BG values, much improved, . Continue to follow. Diabetic educator worked with patient. Anticipate DC on sulfonylurea, unable to use metformin given creatinine level. Hyponatremia. Serum sodium 127 on presentation. In setting of CHF and blood glucose 481. Resolved with diuresis and correction of blood glucose. Appreciate nephrology recs. BENOIT. Creatinine 1.5 on presentation, baseline closer to 1.1 but that was in 12/2021. Likely cardiorenal / hypoperfusion from CHF. Has remained stable with diuresis. No evidence of hydronephrosis on ultrasound. Continue to follow. Appreciate nephrology recs. Transaminitis. Likely secondary to hepatic congestion from CHF. No acute findings on abdominal ultrasound. Numbers continue to trend up despite diuresis. Consult GI. Depression / anxiety. Likely contributing factor to med noncompliance. Psych consult pending. Disposition: Anticipate home when medically stable. PT/OT evaluated, no further therapy recommended on DC. Would benefit from home health nurse to assist with glucose management and INR monitoring. Patient willing to consider this. Consults placed. Diet: ADULT DIET; Regular; 3 carb choices (45 gm/meal);  No Added Salt (3-4 gm)  Code:Full Code  DVT PPX: MICHELLE Marcano - CNP   2/22/2023 8:22 AM

## 2023-02-22 NOTE — CONSULTS
PSYCHIATRY CONSULT, INITIAL EVALUATION    Attending Provider:  Marlon Dunn MD    CC/Reason for Consult: depression    HPI:   context: 64 yo M with hx CHF, DM type II, who presented with leg swelling. He had been non-adherent with medication for the last month. Dx with CHF exacerbation, NSTEMI, DKA. Has mechanical valve and had not been taking coumadin. Consult placed for depression which was felt to have been impacting non-adherence. associated symptoms:   Was doing well mood wise prior to December. After this he had several bouts of COVID and was feeling physically ill from that as well as exacerbation of other health problems. Coupled with this he at times felt hopeless about his condition, very fatigued, couldn't sleep, reduced appetite and very nauseated. He denies any suicidal ideation. States he didn;t take his medications consistent because he just felt physically unwell for so long that he stopped being consistent with it. He was also unaware of the importance of some of his treatments like coumadin - didn't know it needed to be a long term medication. He now feels much better as he feels a weight is off of him due to diuresis. Appetite is a little better and sleep is improved. He still has a lot of anxiety about his health problems.      modifying factors: reports financial stress as another concern  Timing: subacute  duration: 3 months  severity: moderate    ROS:   Gen: no fevers or chills, +fatigue HEENT: no vision or hearing problems, no HA CV: no cp, no palpitations RESP: no dyspnea : no dysuria MSK: +general weakness GI: no n/v/d, no abd pain  SKIN: no rashes NEURO:  no numbness ENDO:  +reduced appetite    Past Psychiatric History:   Hosp: denies  Diagnoses: denies  Med trials: denies  Outpt: denies  NSSI: denies  Suicide Attempts: denies    Substance Use History:  Nicotine: denies  Alcohol: denies  Illicits: denies    Past Medical History:   Diagnosis Date    Diabetes ([facility] [address] Hyperlipidemia     Hypertension        Social/Developmental History:   Relationship: single  Children: 2 children - good relationship with both  Supports: children, siblings. He is one of 9 siblings  Housing: lives alone  Occ/Inc: currently employed    Family History   Problem Relation Age of Onset    Cancer Mother         colon cancer    Heart Disease Father     High Blood Pressure Father     Diabetes Father     High Cholesterol Father      Psychiatric: denies    No Known Allergies    Medications:  Scheduled Meds:   furosemide  40 mg Oral BID    midodrine  2.5 mg Oral TID WC    warfarin  10 mg Oral Daily    fluticasone  1 spray Each Nostril Daily    warfarin placeholder: dosing by pharmacy   Other RX Placeholder    enoxaparin  1 mg/kg SubCUTAneous BID    insulin glargine  15 Units SubCUTAneous Nightly    amiodarone  200 mg Oral Daily    aspirin  81 mg Oral Daily    atorvastatin  40 mg Oral Nightly    carvedilol  12.5 mg Oral BID WC    digoxin  125 mcg Oral Daily    sodium chloride flush  10 mL IntraVENous 2 times per day    insulin lispro  0-8 Units SubCUTAneous Q4H       OBJECTIVE:  Weight: 188 lb 11.2 oz (85.6 kg), BP: 99/69, Pain 0-10: Pain Level: 0;     MSE:   Appearance    alert, cooperative, lying in a dark room  Motor: No abnormal movements, tics or mannerisms.   Speech    spontaneous, normal rate, and normal volume  Language    0 - no aphasia, normal  Mood/Affect    \"better\" / blunted  Thought Process    linear, goal directed, and coherent  Thought Content    intact , no suicidal ideation, future oriented  Associations    logical connections  Attention/Concentration    intact  Orientation    oriented to person, place, time, and general circumstances  Memory    recent and remote memory intact  Fund of Knowledge    intact  Insight/Judgement    Good / Intact      ASSESSMENT:   Although recent depressive symptoms are heavily influenced by his general health condition, it is clear that depression may have then exacerbated his health problems and led to reduced motivation to maintain adherence to treatment. He would benefit from an antidepressant trial to help maintain his stability. He is doing better at this time, eating, shows motivation to maintain adherence with his medications and medical treatments. Unspecified depressive disorder  Unspecified anxiety disorder    RECOMMENDATIONS:   1. Start sertraline 50mg daily. Discussed r/b/se with patient. Can titrate upward if no response after 6-8 weeks. 2. Recommend he follow up with PCP for further monitoring and dose titration. Dispo: Does not require inpatient psychiatric admission. He does not appear to be an imminent safety risk at this time. Thank you for this consult, please call the psychiatry consult line for further questions.  I will sign off at this time            Marilyn Hinojosa MD   Psychiatrist

## 2023-02-22 NOTE — PLAN OF CARE
Problem: Discharge Planning  Goal: Discharge to home or other facility with appropriate resources  Outcome: Progressing     Problem: Safety - Adult  Goal: Free from fall injury  2/22/2023 1559 by Adele Gan RN  Outcome: Progressing     Problem: Chronic Conditions and Co-morbidities  Goal: Patient's chronic conditions and co-morbidity symptoms are monitored and maintained or improved  Outcome: Progressing     Problem: Pain  Goal: Verbalizes/displays adequate comfort level or baseline comfort level  Outcome: Progressing

## 2023-02-22 NOTE — CARE COORDINATION
[facility]    Received referral for homecare services. [facility] is not in network with insurance. One [facility] coumadin clinic called to see if client active. Clinic stated that patient was discharged from service due to non-compliance. Megan Mendes D.o-cardiology called and office states patient has not been seen in over a year and will not sign orders. Dr Cristobal's(pcp listed) office called and patient has not been seen in over a year. Dr Eloy Dior not back in office until Monday. Patient would need to call pcp office and schedule hospital follow up and attend appt for services. Carlaarabella made aware. Electronically signed by Viv Mendosa LPN on 0/58/40 at 1:24 PM EST          [address] Transition Nurse  [phone]

## 2023-02-22 NOTE — DISCHARGE INSTR - COC
Continuity of Care Form    Patient Name: Monica Mayfield   :  1960  MRN:  [id number]    Admit date:  2023  Discharge date:  23    Code Status Order: Full Code   Advance Directives:     Admitting Physician:  Terrell Huntley DO  PCP: Octavio Moctezuma MD    Discharging Nurse: [facility] Unit/Room#: 0MM-5728/4166-33  Discharging Unit Phone Number: [phone]    Emergency Contact:   Extended Emergency Contact Information  Primary Emergency Contact: [address] Phone: [phone]  Relation: Brother/Sister  Secondary Emergency Contact: [address] [facility]  Mobile Phone: [phone]  Relation: Child    Past Surgical History:  Past Surgical History:   Procedure Laterality Date    CARDIAC SURGERY      CORONARY ARTERY BYPASS GRAFT N/A 12/10/2021    CABG X 4 , CORONARY ARTERY BYPASS GRAFT STRONG TO LAD, VEIN GRAFT TO PDA, SVG SEQUENCED TO DIAG AND OM, LIGATION KATHARINE  ATRICLIP 45MM, EVH LEFT SAPHENOUS VEIN, ANTERIOR AND POSTERIOR CHORDAE SPARING MITRAL VALVE REPLACEMENT MEDTRONIC 29MM mechanical valve, TRICUSPID VALVE REPAIR with 28mm Physio tricuspid annuloplasty ring , TOTAL CPB, VERO, Doppler flow verification of bypass grafts, 5 LEVEL BILATERAL        Immunization History: There is no immunization history on file for this patient.     Active Problems:  Patient Active Problem List   Diagnosis Code    Coronary artery disease involving native heart without angina pectoris I25.10    Severe mitral regurgitation I34.0    Severe tricuspid regurgitation I07.1    Acute on chronic systolic heart failure (HCC) I50.23    Acute on chronic congestive heart failure (HCC) I50.9    Ischemic cardiomyopathy I25.5    NSVT (nonsustained ventricular tachycardia) I47.29    Insomnia G47.00    Mixed hyperlipidemia E78.2    Tachycardia R00.0    S/P CABG (coronary artery bypass graft) Z95.1    Tobacco abuse counseling Z71.6    H/O mitral valve replacement with mechanical valve Z95.2    Vaccine counseling Z71.85 NSTEMI (non-ST elevated myocardial infarction) ([facility]) I21.4    Hyponatremia E87.1    Acute kidney injury superimposed on chronic kidney disease ([facility]) N17.9, N18.9    Elevated lactic acid level R79.89    Transaminitis R74.01    Diabetic ketoacidosis without coma associated with type 2 diabetes mellitus ([facility]) E11.10    Elevated troponin R77.8    Non-compliance Z91.199    Subtherapeutic international normalized ratio (INR) R79.1    Hypotension I95.9    S/P MVR (mitral valve repair) Z98.890       Isolation/Infection:   Isolation            No Isolation          Patient Infection Status       Infection Onset Added Last Indicated Last Indicated By Review Planned Expiration Resolved Resolved By    None active    Resolved    COVID-19 (Rule Out) 02/19/23 02/19/23 02/19/23 COVID-19 & Influenza Combo (Ordered)   02/19/23 Rule-Out Test Canceled    COVID-19 (Rule Out) 12/02/21 12/02/21 12/02/21 COVID-19 (Ordered)   12/03/21 Rule-Out Test Resulted            Nurse Assessment:  Last Vital Signs: /73   Pulse 75   Temp 97.4 °F (36.3 °C) (Oral)   Resp 16   Ht 6' 1\" (1.854 m)   Wt 188 lb 11.2 oz (85.6 kg)   SpO2 91%   BMI 24.90 kg/m²     Last documented pain score (0-10 scale): Pain Level: 0  Last Weight:   Wt Readings from Last 1 Encounters:   02/22/23 188 lb 11.2 oz (85.6 kg)     Mental Status:  oriented and alert    IV Access:  - None    Nursing Mobility/ADLs:  Walking   Independent  Transfer  Independent  Bathing  Independent  Dressing  Independent  Toileting  Independent  Feeding  Independent  Med Admin  Assisted  Med Delivery   whole and subq injection     Wound Care Documentation and Therapy:  Incision 12/10/21 Knee Anterior; Left (Active)   Number of days: 439       Incision 12/10/21 Sternum Anterior (Active)   Number of days: 439        Elimination:  Continence:    Bowel: Yes  Bladder: Yes  Urinary Catheter: None   Colostomy/Ileostomy/Ileal Conduit: No       Date of Last BM: unknown    Intake/Output Summary (Last 24 hours) at 2/22/2023 1138  Last data filed at 2/22/2023 1116  Gross per 24 hour   Intake 240 ml   Output 850 ml   Net -610 ml     I/O last 3 completed shifts: In: 240 [P.O.:240]  Out: 550 [Urine:550]    Safety Concerns: At Risk for Falls    Impairments/Disabilities:      None    Nutrition Therapy:  Current Nutrition Therapy:   - Oral Diet:  General and Carb Control 5 carbs/meal (2000kcals/day)    Routes of Feeding: Oral  Liquids: CHF restrictions   Daily Fluid Restriction: yes - amount chf, 8 cups of fluids a day   Last Modified Barium Swallow with Video (Video Swallowing Test): not done    Treatments at the Time of Hospital Discharge:   Respiratory Treatments: none  Oxygen Therapy:  is not on home oxygen therapy. Ventilator:    - No ventilator support    Rehab Therapies: home nursing care, iNR levels drawn   Weight Bearing Status/Restrictions: No weight bearing restrictions  Other Medical Equipment (for information only, NOT a DME order):  none  Other Treatments: INR levels to be checked by home health agency. Coumadin clinic to manage dosing.      Patient's personal belongings (please select all that are sent with patient):  None    RN SIGNATURE:  Electronically signed by Esmer Mahan RN on 2/22/23 at 4:34 PM EST        CASE MANAGEMENT/SOCIAL WORK SECTION    Inpatient Status Date: 2/19/2023    Readmission Risk Assessment Score:  Readmission Risk              Risk of Unplanned Readmission:  21           Discharging to Facility/ Agency   Name:  [address], [address] [facility], [address] [place] [address]  Phone: [phone]  Fax:  [phone]   Address:  Phone:  Fax:    Dialysis Facility (if applicable)   Name:  Address:  Dialysis Schedule:  Phone:  Fax:    / signature: Electronically signed by Leary Alpers, RN on 2/22/23 at 11:51 AM EST    PHYSICIAN SECTION    Prognosis: Guarded    Condition at Discharge: Stable    Rehab Potential (if transferring to Rehab): N/A    Recommended Labs or Other Treatments After Discharge: INR bi-weekly while on lovenox bridge then weekly x 4 then monthly if INR stable. CMP weekly x 2. Physician Certification: I certify the above information and transfer of Eddie Huertas  is necessary for the continuing treatment of the diagnosis listed and that he requires [address] for greater 30 days.      Update Admission H&P: No change in H&P    PHYSICIAN SIGNATURE:  Electronically signed by MICHELLE Corey CNP on 2/22/23 at 11:39 AM EST

## 2023-02-22 NOTE — PROGRESS NOTES
Nutrition Education    Educated on [facility], Low Na, warfarin  Learners: Patient and Family  Readiness: Eager  Method: Explanation and Handout  Response: Verbalizes Understanding  Contact name and number provided.     Paige Pereira RD, LD  Contact Number: [phone]

## 2023-02-22 NOTE — CONSULTS
Gastroenterology Consult Note        Patient: Armand Al  : 1960  Acct#:      Date:  2023    Subjective:       History of Present Illness  Patient is a 63 y.o.  male admitted with Elevated troponin [R77.8]  NSTEMI (non-ST elevated myocardial infarction) (HCC) [I21.4]  Subtherapeutic international normalized ratio (INR) [R79.1]  Non compliance w medication regimen [Z91.14]  Diabetic ketoacidosis without coma associated with type 2 diabetes mellitus ([facility] [E11.10]  Edema, unspecified type [R60.9]  Acute on chronic congestive heart failure, unspecified heart failure type ([facility] [I50.9] who is seen in consult for elevated liver enzymes. H/o systolic CHF with EF 68-80%, CAD, CABG, s/p mechanical MVR on coumadin, h/o noncompliance, DM. Presented on  with SOB, edema, and fatigue. Echo shows EF down to 10%. He has been undergoing diuresis. He denies prior history of liver disease. Not aware of prior elevated liver enzymes in the denies abdominal pain, nausea, vomiting, melena, hematochezia. Denies alcohol or drug use. No tattoos. Past Medical History:   Diagnosis Date    Diabetes ([facility]     Hyperlipidemia     Hypertension       Past Surgical History:   Procedure Laterality Date    CARDIAC SURGERY      CORONARY ARTERY BYPASS GRAFT N/A 12/10/2021    CABG X 4 , CORONARY ARTERY BYPASS GRAFT STRONG TO LAD, VEIN GRAFT TO PDA, SVG SEQUENCED TO DIAG AND OM, LIGATION KATHARINE  ATRICLIP 45MM, EVH LEFT SAPHENOUS VEIN, ANTERIOR AND POSTERIOR CHORDAE SPARING MITRAL VALVE REPLACEMENT MEDTRONIC 29MM mechanical valve, TRICUSPID VALVE REPAIR with 28mm Physio tricuspid annuloplasty ring , TOTAL CPB, VERO, Doppler flow verification of bypass grafts, 5 LEVEL BILATERAL       Past Endoscopic History: none in chart    Admission Meds  No current facility-administered medications on file prior to encounter.      Current Outpatient Medications on File Prior to Encounter   Medication Sig Dispense Refill    digoxin (LANOXIN) 125 MCG tablet Take 1 tablet by mouth daily 30 tablet 3    amiodarone (CORDARONE) 200 MG tablet Take 1 tablet by mouth daily 30 tablet 3    carvedilol (COREG) 12.5 MG tablet Take 1 tablet by mouth 2 times daily (with meals) 60 tablet 3    furosemide (LASIX) 40 MG tablet Take 0.5 tablets by mouth daily 60 tablet 3    traZODone (DESYREL) 50 MG tablet Take 1 tablet by mouth nightly (Patient not taking: No sig reported) 30 tablet 0    acetaminophen (TYLENOL) 500 MG tablet Take 2 tablets by mouth every 6 hours (Patient not taking: No sig reported) 120 tablet 3    aspirin 81 MG chewable tablet Take 1 tablet by mouth daily 30 tablet 3    atorvastatin (LIPITOR) 40 MG tablet Take 1 tablet by mouth nightly 30 tablet 3    spironolactone (ALDACTONE) 25 MG tablet Take 0.5 tablets by mouth daily 30 tablet 3    warfarin (COUMADIN) 6 MG tablet Take by mouth daily at [facility] as directed (start with 1 tablet) 30 tablet 3            Allergies  No Known Allergies   Social   Social History     Tobacco Use    Smoking status: Former     Packs/day: 1.00     Years: 15.00     Pack years: 15.00     Types: Cigarettes     Quit date: 12/10/2021     Years since quittin.2    Smokeless tobacco: Never   Substance Use Topics    Alcohol use: Yes     Comment: occasionally        Family History   Problem Relation Age of Onset    Cancer Mother         colon cancer    Heart Disease Father     High Blood Pressure Father     Diabetes Father     High Cholesterol Father                 Physical Exam  Blood pressure 112/74, pulse 79, temperature 97.7 °F (36.5 °C), temperature source Oral, resp. rate 18, height 6' 1\" (1.854 m), weight 188 lb 11.2 oz (85.6 kg), SpO2 93 %.     General appearance: alert, cooperative, no distress, appears stated age  Eyes: Anicteric  Head: Normocephalic, without obvious abnormality  Lungs: clear to auscultation bilaterally, Normal Effort  Heart: regular rate and rhythm, normal S1 and S2, no murmurs or rubs  Abdomen: soft, non-tender. Bowel sounds normal. No masses,  no organomegaly. Extremities: atraumatic, no cyanosis. BLE edema  Skin: warm and dry, no jaundice  Neuro: Grossly intact, A&OX3  Musculoskeletal: 5/5  strength BUE      Data Review:    Recent Labs     02/19/23 2020 02/20/23  0506 02/21/23  0511   WBC 7.9 8.5 6.8   HGB 15.2 14.8 15.1   HCT 47.4 45.6 46.5   MCV 95.0 93.3 92.9   * 120* 108*     Recent Labs     02/20/23  0506 02/21/23  0511 02/22/23  0458   * 139 137   K 4.1 3.5 3.5   CL 98* 105 105   CO2 19* 24 21   PHOS  --   --  3.7   BUN 33* 34* 32*   CREATININE 1.4* 1.5* 1.6*     Recent Labs     02/20/23  0506 02/21/23  0511 02/22/23  0458   * 114* 123*   * 125* 142*   BILIDIR  --   --  1.0*   BILITOT 3.1* 3.1* 2.4*   ALKPHOS 302* 377* 476*     No results for input(s): LIPASE, AMYLASE in the last 72 hours. Recent Labs     02/20/23  0506 02/21/23  0511 02/22/23  0458   PROTIME 20.5* 18.7* 20.3*   INR 1.76* 1.57* 1.74*     No results for input(s): PTT in the last 72 hours. No results for input(s): OCCULTBLD in the last 72 hours. Imaging Studies:                            Ultrasound 2/21/23                  FINDINGS:   LIVER: The liver demonstrates normal echogenicity without evidence of   intrahepatic biliary ductal dilatation. BILIARY SYSTEM: Gallbladder is unremarkable without evidence of   pericholecystic fluid, wall thickening or stones. Negative sonographic   Hendrix's sign. Common bile duct is within normal limits measuring 5.3 mm. KIDNEYS: The kidneys are unremarkable in appearance without evidence of   hydronephrosis. The right kidney measures 11.0 x 5.1 x 5.8 cm. The left   kidney measures 10.7 x 6.5 x 5.4 cm. PANCREAS: Visualized portions of the pancreas are unremarkable. SPLEEN: The spleen is unremarkable in appearance. Spleen is within normal   limits in size. IVC: The IVC is patent.        AORTA: Aorta is patent without aneurysm. OTHER: No evidence of ascites. There is a mild amount of layering debris within the urinary bladder. Impression   1. Unremarkable abdominal ultrasound. 2. Mild amount of nonspecific layering debris within the urinary bladder,   which could represent intraluminal hemorrhage in the correct clinical   setting. Clinical correlation is advised. Assessment/Plan:     Elevated liver enzymes -has had elevated transaminases and alk phos in the past.  Transaminases increased compared to at admission. Alk phos also increased. Bilirubin improved. US with normal appearing liver, no biliary dilation, and normal gallbladder. Given CHF with a EF of 10%, this is likely congestive hepatopathy. He is on amiodarone, however, which can cause elevated liver enzymes. Does not appear that he was taking this prior to admission though and liver enzymes were elevated at admission.   Monitor liver enzymes  hepatitis panel, CHRISTINA, f-actin, AMA      Discussed with Dr. Poonam Willis, [address] [place] [address]

## 2023-02-22 NOTE — PROGRESS NOTES
[facility]   Daily Progress Note      Admit Date:  2/19/2023    HPI:    Mr. Nancy Aguirre is a 63years old male with history of systolic heart failure, CAD status post CABG, valvular disease, tricuspid valve repair 12/10/2021, LVEF 15% at surgery time    He is admitted with bilateral leg swelling up to his knees and about 5 pounds up. He is not reliable with taking his medication and follow up visits. LVEF today is 10%. He is being diuresed with IV lasix      Subjective:  Patient is being seen for acute on chronic systolic heart failure. There were no acute overnight cardiac events. He is sitting on the side of the bed. His brother, Chau Vides is in his room. The room is very dark. He denies any increased shortness of breath, chest pain or lightheadedness. Creat 1.4-1.5-1.6 bnp [id number]    Objective:   /73   Pulse 75   Temp 97.4 °F (36.3 °C) (Oral)   Resp 16   Ht 6' 1\" (1.854 m)   Wt 188 lb 11.2 oz (85.6 kg)   SpO2 91%   BMI 24.90 kg/m²     Intake/Output Summary (Last 24 hours) at 2/22/2023 0950  Last data filed at 2/21/2023 2202  Gross per 24 hour   Intake 240 ml   Output 550 ml   Net -310 ml          Physical Exam:  General:  Awake, alert, oriented in NAD  Skin:  Warm and dry. No unusual bruising or rash  Neck:  Supple. No JVD or carotid bruit appreciated  Chest:  Normal effort.   Bilaterally decreased in bases   Cardiovascular:  RRR, S1/S2, no murmur/gallop/rub  Abdomen:  Soft, nontender, +bowel sounds  Extremities:  bilateral ankle to mid calf edema  Neurological: No focal deficits  Psychological: Normal mood and affect      Medications:    furosemide  40 mg Oral BID    midodrine  2.5 mg Oral TID WC    warfarin  10 mg Oral Daily    fluticasone  1 spray Each Nostril Daily    warfarin placeholder: dosing by pharmacy   Other RX Placeholder    enoxaparin  1 mg/kg SubCUTAneous BID    insulin glargine  15 Units SubCUTAneous Nightly    amiodarone  200 mg Oral Daily    aspirin  81 mg Oral Daily    atorvastatin  40 mg Oral Nightly    carvedilol  12.5 mg Oral BID WC    digoxin  125 mcg Oral Daily    sodium chloride flush  10 mL IntraVENous 2 times per day    insulin lispro  0-8 Units SubCUTAneous Q4H      dextrose      sodium chloride         Lab Data:  CBC:   Recent Labs     23  0506 23  0511   WBC 7.9 8.5 6.8   HGB 15.2 14.8 15.1   * 120* 108*     BMP:    Recent Labs     23  0506 23  0511 23  0458   * 139 137   K 4.1 3.5 3.5   CO2    BUN 33* 34* 32*   CREATININE 1.4* 1.5* 1.6*     INR:    Recent Labs     23  0506 23  0511 23  0458   INR 1.76* 1.57* 1.74*     BNP:    Recent Labs     23  0458   PROBNP 4,811* 3,277*         Diagnostics:    Echo 23:  (EF 20% on 21)   Summary   Overall, left ventricular systolic function is severely depressed. Ejection fraction is visually estimated to be 10%. There is severe diffuse hypokinesis. Cannot determine diastology due to   mitral valve replacement. 29 mm Medtronic mechanical valve. Max P mmHg. Mean P mmHg. 28 mm Phisio TV annuloplasty. Max P mmHg. Mild to moderate tricuspid   regurgitation. RVSP = 37 mmHG. The right ventricle is normal in size with reduced function. Biatrial enlargement. Assessment:    1. Diabetic ketoacidosis  2. Acute on chronic systolic heart failure    3. Non compliance with medications  4. Status post CABG (not on statin) and mechanical MV (not compliance with coumadin)  5. Hypotension due to severe heart failure  6.   Acute on chronic kidney failure      Plan:    Continue oral lasix 40 mg twice a day   Continue lipitor and aspirin for CAD  Continue coreg 12.5 mg twice a day  No aldactone due to none compliance  Coumadin per pharmacy  Continue digoxin 125 mcg daily  CHF nurse following for diet education, fluid restriction and daily weights  Palliative care following which is approriate  Could decide to add jardiance if he agrees to take his medications and follow up  Continue midodrine 2.5 mg three times a day for better BP control    Stable from cardiology standpoint and follow up arranged  [id number] [address] for discharge    NYHA IV    Discussed with patient who is agreeable with plan of care. Thank you for allowing me to participate in the care of your patient.     Herman Golden, MICHELLE - CNS, CNS

## 2023-02-22 NOTE — CARE COORDINATION
Discharge Planning  CM attempted to get a [facility] agency to follow patient for medication management, Lovenox bridge and INR monitoring. No home care agency can accept because there is  no doctor to follow the INR, the Coumadin clinic had discharged him  because of  being noncompliant in the past and will not accept him back . Patient's PCP Dr Carl Gonzalez the Cardiologist will not follow him because have not seen the patient for over a year- due to noncompliance. The patient's RN and the  attending BRIANNE- Camila Baires  were  updated of the situation .

## 2023-02-22 NOTE — CARE COORDINATION
Discharge Planning  CM called [place] [phone] agency spoke to [facility] intake about a [facility] referral for skilled Nursing - Lovenox Bridge and INR monitoring. She stated that they already have the referral , have accepted it and will start care tomorrow- 2/23/23. The patient's RN and the attending - Aarti were updated. Min Peace stated that Dr Loyd [place] talked to the Coumadin Clinic, have agreed will  be doing the dosing. Pharmacy has delivered the Lovenox to the patient's room.

## 2023-02-22 NOTE — TELEPHONE ENCOUNTER
James Najera [facility] requestng a call back on who is manging patient's warfarin. 02/22/2023 03:00 PM EST Phone (Incoming) Dylan Mandujano (Provider) [phone]   Let James Najera know patient was discharged from the clinic for non-compliance. And we are not managing.

## 2023-02-22 NOTE — PROGRESS NOTES
CLINICAL PHARMACY NOTE: MEDS TO BEDS    Total # of Prescriptions Filled: 8   The following medications were delivered to the patient:  Sertraline  Lovenox  Lancets  Meter  Strips  Glipizide  Midodrine  furosemide    Additional Documentation:  Ok to deliver per Rigoberto Henderson, pt signed

## 2023-02-22 NOTE — TELEPHONE ENCOUNTER
I spoke with Darion Buchanan and relayed message per [facility]. She verbalized understanding.

## 2023-02-22 NOTE — PROGRESS NOTES
Nona Fanning, MD Denver Six, MD Davis Perch, MD               Office: [phone]                      Fax: [phone]             [facility]                   NEPHROLOGY INPATIENT PROGRESS NOTE:     PATIENT NAME: Sandy Farmer  : 1960   MRN: [id number]       IMPRESSION:       Admitted on  2023  for:  Elevated troponin [R77.8]  NSTEMI (non-ST elevated myocardial infarction) ([facility] [I21.4]  Subtherapeutic international normalized ratio (INR) [R79.1]  Non compliance w medication regimen [Z91.14]  Diabetic ketoacidosis without coma associated with type 2 diabetes mellitus ([address] [E11.10]  Edema, unspecified type [R60.9]  Acute on chronic congestive heart failure, unspecified heart failure type ([facility] [I50.9]      Hyponatremia :   : Not acute but likely worsened underlying uncontrolled, chronic,   : Moderate range, : mildly symptomatic,   : hyper-volemic:   -Lowest sodium recently 32, in , in  was normal, this admission on presentation 127-worsening 126, now   improving,     - No Reported Severe symptoms (such as seizures, obtundation, coma, or respiratory arrest). - no need for Hypertonic saline or acute reversal     - Risk factors for hyponatremia: Heart failure, fluid overload,  + Pseudo effect with hyperglycemia, with DKA presentation,    - Patient is at   risk of developing Osmotic Demyelination Syndrome.    - Call us urgently if any/worsening neurological findings. RECOMMENDATIONS:     Work up: Likely suggestive of fluid retention, hypovolemia,  - renal function at baseline creatinine 1.1-1.2 as of 2022, this admission has been around 1.5-1.6  = Likely due to advancing CKD.   With stage IIIa    -UA results reviewed showing severe hyperglycemia, mild ketones, trace protein, hyaline cast, likely suggestive of diabetes CKD    - other lytes stable overall  - BP slightly lower but not in shock so unlikely, unlikely AI  - TSH control  -Hyperglycemia, with DKA, with lactic acidosis, elevated beta hydroxybutyrate, A1c uncontrolled at 12.0    - s/p  kidney ultrasound to establish baseline on 2-  \"     KIDNEYS: The kidneys are unremarkable in appearance without evidence of   hydronephrosis. The right kidney measures 11.0 x 5.1 x 5.8 cm. The left   kidney measures 10.7 x 6.5 x 5.4 cm. Management:  - Monitor Serum Na daily  - Goal Serum Na mid 130s, by next /24 hours     -Agree with current Lasix, with Aldactone-continue,  -Suggest to hold other RAAS blockade, SGL 2 inhibitors during active diuresis. -Follow-up with cardiology, ECHO    -Ongoing management of DKA, needs better diabetes control    -Midodrine added for slight hypotension, needing diuresis    - avoid Metformin d/to higher Cr       - Minimize use any hypotonic/isotonic fluids such as D5W, NS, LR with other medications/drips    - Concentrate continuous drip fluids as much as possible,   - Avoid IVF , unless needed for resuscitation/hypovolemia w/ hypotension+tachycardia,      - minimize SSRI, NSAIDs use    - Strict I/O with daily weights. --- Call us urgently if any/worsening neurological findings. Discharge plans from renal standpoint-, improving hrs whenever Na ~130s, stable w/o neurological s/s   +   : f/u w/ me at [address] in 1 mon  after d/c.  (I will arrange.)    Discussed with patient, his sister, primary team, nurse, too          Other major problems: Management per primary and other consulting teams.   //         Hospital Problems             Last Modified POA    * (Principal) Acute on chronic systolic heart failure ([address] 2/21/2023 Yes    NSTEMI (non-ST elevated myocardial infarction) ([address] 2/20/2023 Yes    Hyponatremia 2/20/2023 Yes    Acute kidney injury superimposed on chronic kidney disease ([address] 2/21/2023 Yes    Elevated lactic acid level 2/20/2023 Yes    Transaminitis 2/20/2023 Yes    Diabetic ketoacidosis without coma associated with type 2 diabetes mellitus ([facility] 2/20/2023 Yes    Elevated troponin 2/20/2023 Yes    Non-compliance 2/21/2023 Yes    Subtherapeutic international normalized ratio (INR) 2/20/2023 Yes    Hypotension 2/20/2023 Yes    S/P MVR (mitral valve repair) 2/21/2023 Yes    Coronary artery disease involving native heart without angina pectoris 2/20/2023 Yes    Acute on chronic congestive heart failure ([facility] 2/20/2023 Yes    Ischemic cardiomyopathy 2/20/2023 Yes    Mixed hyperlipidemia 2/20/2023 Yes    S/P CABG (coronary artery bypass graft) 2/21/2023 Yes    H/O mitral valve replacement with mechanical valve 2/20/2023 Yes   : other supportive care :   - Check daily renal function panel with electrolytes-phosphorus  - Strict monitoring of I/Os, daily weight  - non-Renal feeds/diet  - Current medications reviewed. Please refer to the orders. High Complexity. Multiple complex problems. Discussed with patient and treatment team-    Time spent > 30 (~35) minutes. Thank you for allowing me to participate in this patient's care. Please do not hesitate to contact me anytime. We will follow along with you. Dianna Orr MD,  [facility] [address] [place]: [phone] or [address]  Fax: [phone]        =======================================================================================   =======================================================================================    Subjective / interval history:   Patient was seen comfortably sitting up in the bed,   Reported no active complaints,   Renal function stable  NA better . Past medical, Surgical, Social, Family medical history reviewed by me. MEDICATIONS: reviewed by me. Medications Prior to Admission:  No current facility-administered medications on file prior to encounter.      Current Outpatient Medications on File Prior to Encounter   Medication Sig Dispense Refill    digoxin (LANOXIN) 125 MCG tablet Take 1 tablet by mouth daily 30 tablet 3    amiodarone (CORDARONE) 200 MG tablet Take 1 tablet by mouth daily 30 tablet 3    carvedilol (COREG) 12.5 MG tablet Take 1 tablet by mouth 2 times daily (with meals) 60 tablet 3    furosemide (LASIX) 40 MG tablet Take 0.5 tablets by mouth daily 60 tablet 3    traZODone (DESYREL) 50 MG tablet Take 1 tablet by mouth nightly (Patient not taking: No sig reported) 30 tablet 0    acetaminophen (TYLENOL) 500 MG tablet Take 2 tablets by mouth every 6 hours (Patient not taking: No sig reported) 120 tablet 3    aspirin 81 MG chewable tablet Take 1 tablet by mouth daily 30 tablet 3    atorvastatin (LIPITOR) 40 MG tablet Take 1 tablet by mouth nightly 30 tablet 3    spironolactone (ALDACTONE) 25 MG tablet Take 0.5 tablets by mouth daily 30 tablet 3    warfarin (COUMADIN) 6 MG tablet Take by mouth daily at [facility] as directed (start with 1 tablet) 30 tablet 3         Current Facility-Administered Medications:     furosemide (LASIX) tablet 40 mg, 40 mg, Oral, BID, Magi Zaragoza, APRN - CNS, 40 mg at 02/22/23 0848    midodrine (PROAMATINE) tablet 2.5 mg, 2.5 mg, Oral, TID WC, Magi Zaragoza, APRN - CNS, 2.5 mg at 02/22/23 5960    warfarin (COUMADIN) tablet 10 mg, 10 mg, Oral, Daily, John Coley APRN - CNP, 10 mg at 02/21/23 1859    fluticasone (FLONASE) 50 MCG/ACT nasal spray 1 spray, 1 spray, Each Nostril, Daily, Jaspal Blandon PA-C, 1 spray at 02/22/23 8698    warfarin placeholder: dosing by pharmacy, , Other, RX Placeholder, Marci Cristobal DO    enoxaparin (LOVENOX) injection 80 mg, 1 mg/kg, SubCUTAneous, BID, Marci Cristobal DO, 80 mg at 02/22/23 0826    zolpidem (AMBIEN) tablet 10 mg, 10 mg, Oral, Nightly PRN, Tino Pena MD, 10 mg at 02/20/23 2016    LORazepam (ATIVAN) tablet 1 mg, 1 mg, Oral, BID PRN, Tino Pena MD, 1 mg at 02/21/23 2101    perflutren lipid microspheres (DEFINITY) injection 1.5 mL, 1.5 mL, IntraVENous, ONCE PRN, Jacqualine Kolby, DO    insulin glargine (LANTUS) injection vial 15 Units, 15 Units, SubCUTAneous, Nightly, Ahmad A Susu, DO, 15 Units at 02/21/23 2102    amiodarone (CORDARONE) tablet 200 mg, 200 mg, Oral, Daily, Ahmad A Susu, DO, 200 mg at 02/22/23 5131    aspirin chewable tablet 81 mg, 81 mg, Oral, Daily, Ahmad A Susu, DO, 81 mg at 02/22/23 7732    atorvastatin (LIPITOR) tablet 40 mg, 40 mg, Oral, Nightly, Ahmad A Susu, DO, 40 mg at 02/21/23 2100    carvedilol (COREG) tablet 12.5 mg, 12.5 mg, Oral, BID WC, Ahmad A Susu, DO, 12.5 mg at 02/22/23 7055    digoxin (LANOXIN) tablet 125 mcg, 125 mcg, Oral, Daily, Ahmad A Susu, DO, 125 mcg at 02/22/23 0827    dextrose bolus 10% 125 mL, 125 mL, IntraVENous, PRN **OR** dextrose bolus 10% 250 mL, 250 mL, IntraVENous, PRN, Ahmad A Susu, DO    glucagon (rDNA) injection 1 mg, 1 mg, SubCUTAneous, PRN, Ahmad A Susu, DO    dextrose 10 % infusion, , IntraVENous, Continuous PRN, Ahmad A Susu, DO    sodium chloride flush 0.9 % injection 10 mL, 10 mL, IntraVENous, 2 times per day, Ahmad A Susu, DO, 10 mL at 02/22/23 0849    sodium chloride flush 0.9 % injection 10 mL, 10 mL, IntraVENous, PRN, Ahmad A Susu, DO    0.9 % sodium chloride infusion, , IntraVENous, PRN, Ahmad A Susu, DO    potassium chloride 10 mEq/100 mL IVPB (Peripheral Line), 10 mEq, IntraVENous, PRN, Ahmad A Susu, DO    magnesium sulfate 2000 mg in 50 mL IVPB premix, 2,000 mg, IntraVENous, PRN, Ahmad A Susu, DO    promethazine (PHENERGAN) tablet 12.5 mg, 12.5 mg, Oral, Q6H PRN **OR** ondansetron (ZOFRAN) injection 4 mg, 4 mg, IntraVENous, Q6H PRN, Marci Cristobal DO    acetaminophen (TYLENOL) tablet 650 mg, 650 mg, Oral, Q6H PRN **OR** acetaminophen (TYLENOL) suppository 650 mg, 650 mg, Rectal, Q6H PRN, Marci Cristobal DO    insulin lispro (HUMALOG) injection vial 0-8 Units, 0-8 Units, SubCUTAneous, Q4H, Marci Cristboal DO, 4 Units at 02/20/23 0414        REVIEW OF SYSTEMS:  As mentioned in chief complaint and HPI , Subjective       =======================================================================================     PHYSICAL EXAM:  Recent vital signs and recent I/Os reviewed by me. Wt Readings from Last 3 Encounters:   02/22/23 188 lb 11.2 oz (85.6 kg)   02/02/22 175 lb (79.4 kg)   01/12/22 166 lb (75.3 kg)     BP Readings from Last 3 Encounters:   02/22/23 106/73   02/02/22 128/74   01/12/22 116/68     Patient Vitals for the past 24 hrs:   BP Temp Temp src Pulse Resp SpO2 Weight   02/22/23 0815 106/73 97.4 °F (36.3 °C) Oral 75 16 91 % --   02/22/23 0558 -- -- -- 79 -- -- --   02/22/23 0312 112/74 97.7 °F (36.5 °C) Oral 80 18 93 % --   02/22/23 0211 -- -- -- 81 -- -- --   02/22/23 0013 -- -- -- -- -- -- 188 lb 11.2 oz (85.6 kg)   02/21/23 2353 96/65 97.8 °F (36.6 °C) Oral 78 20 97 % --   02/21/23 2251 98/68 -- -- 92 -- -- --   02/21/23 2212 85/63 -- -- 82 -- -- --   02/21/23 2208 93/63 -- -- 80 -- -- --   02/21/23 2202 88/70 -- -- 80 -- -- --   02/21/23 2049 105/75 97.3 °F (36.3 °C) Oral 73 16 96 % --   02/21/23 1900 103/72 -- -- 99 -- -- --   02/21/23 1800 -- -- -- 78 -- -- --   02/21/23 1704 90/63 96.9 °F (36.1 °C) Axillary 79 16 96 % --   02/21/23 1558 -- -- -- 77 -- -- --   02/21/23 1448 -- -- -- 78 -- -- --   02/21/23 1312 91/71 97.4 °F (36.3 °C) Oral 80 16 97 % --   02/21/23 1110 -- -- -- 86 -- -- --         Intake/Output Summary (Last 24 hours) at 2/22/2023 1037  Last data filed at 2/21/2023 2202  Gross per 24 hour   Intake 240 ml   Output 550 ml   Net -310 ml           Physical Exam  General: Awake, Alert, obese   HENT: Atraumatic, normocephalic   Eyes: Normal conjunctiva, Non-incteral sclera. Neck: Supple, JVD not visible. CVS:  Heart sounds are normal. No loud murmur. RS: Normal respiratory effort, Breat sound: diminished at bases. Abd: Soft , bowel sounds are normal, no distension and no tenderness .    Skin: No rash , some bruises,   CNS: Awake Oriented , No focal.   Extremities/MSK: + Edema, no cyanosis.        =======================================================================================     DATA:  Diagnostic tests reviewed by me for today's visit:   (AS NEEDED FOR MY EVALUATION AND MANAGEMENT). Recent Labs     02/19/23 2020 02/20/23 0506 02/21/23  0511   WBC 7.9 8.5 6.8   HCT 47.4 45.6 46.5   * 120* 108*       Iron Saturation:  No components found for: PERCENTFE  FERRITIN:  No results found for: FERRITIN  IRON:  No results found for: IRON  TIBC:  No results found for: TIBC    Recent Labs     02/19/23 2020 02/19/23 2333 02/20/23 0506 02/21/23  0511 02/22/23  0458   * 126* 134* 139 137   K 4.9 5.1 4.1 3.5 3.5   CL 95* 92* 98* 105 105   CO2 15* 20* 19* 24 21   BUN 33* 34* 33* 34* 32*   CREATININE 1.5* 1.6* 1.4* 1.5* 1.6*       Recent Labs     02/19/23 2020 02/19/23 2333 02/20/23 0506 02/21/23  0511 02/22/23  0458   CALCIUM 10.1 10.4 9.6 9.6 9.2   MG  --   --  2.00 2.00 2.00   PHOS  --   --   --   --  3.7       No results for input(s): PH, PCO2, PO2 in the last 72 hours.     Invalid input(s): Shaaron Lanes    ABG:  No results found for: PH, PCO2, PO2, HCO3, BE, THGB, TCO2, O2SAT  VBG:    Lab Results   Component Value Date/Time    PHVEN 7.297 02/19/2023 09:33 PM    KTU7YJO 40.5 02/19/2023 09:33 PM    BEVEN -6.3 02/19/2023 09:33 PM    E2VNBFMQ 59 02/19/2023 09:33 PM       LDH:  No results found for: LDH  Uric Acid:    Lab Results   Component Value Date/Time    LABURIC 10.8 02/20/2023 05:06 AM       PT/INR:    Lab Results   Component Value Date/Time    PROTIME 20.3 02/22/2023 04:58 AM    INR 1.74 02/22/2023 04:58 AM     Warfarin PT/INR:  No components found for: PTPATWAR, PTINRWAR  PTT:    Lab Results   Component Value Date/Time    APTT 127.3 12/14/2021 02:32 AM   [APTT}  Last 3 Troponin:    Lab Results   Component Value Date/Time    TROPONINI 0.04 02/20/2023 05:06 AM    TROPONINI 0.05 02/19/2023 11:33 PM    TROPONINI 0.04 02/19/2023 08:20 PM       U/A:    Lab Results   Component Value Date/Time    COLORU Yellow 02/20/2023 01:45 AM    PROTEINU TRACE 02/20/2023 01:45 AM    PHUR 5.0 02/20/2023 01:45 AM    PHUR 5.0 02/20/2023 01:45 AM    WBCUA 1 02/20/2023 01:45 AM    RBCUA 0 02/20/2023 01:45 AM    MUCUS Present 02/20/2023 01:45 AM    BACTERIA None Seen 02/20/2023 01:45 AM    CLARITYU Clear 02/20/2023 01:45 AM    SPECGRAV 1.015 02/20/2023 01:45 AM    LEUKOCYTESUR Negative 02/20/2023 01:45 AM    UROBILINOGEN 1.0 02/20/2023 01:45 AM    BILIRUBINUR Negative 02/20/2023 01:45 AM    BLOODU Negative 02/20/2023 01:45 AM    GLUCOSEU >=1000 02/20/2023 01:45 AM     Microalbumen/Creatinine ratio:  No components found for: RUCREAT  24 Hour Urine for Protein:  No components found for: RAWUPRO, UHRS3, ADBF87PP, UTV3  24 Hour Urine for Creatinine Clearance:  No components found for: CREAT4, UHRS10, UTV10  Urine Toxicology:  No components found for: IAMMENTA, IBARBIT, IBENZO, ICOCAINE, IMARTHC, IOPIATES, IPHENCYC    HgBA1c:    Lab Results   Component Value Date/Time    LABA1C 12.0 02/20/2023 05:06 AM     RPR:  No results found for: RPR  HIV:  No results found for: HIV  CHRISTINA:  No results found for: ANATITER, CHRISTINA  RF:  No results found for: RF  DSDNA:  No components found for: DNA  AMYLASE:  No results found for: AMYLASE  LIPASE:  No results found for: LIPASE  Fibrinogen Level:  No components found for: FIB       BELOW MENTIONED RADIOLOGY STUDY RESULTS BY ME (AS NEEDED FOR MY EVALUATION AND MANAGEMENT). XR CHEST PORTABLE    Result Date: 2/19/2023  EXAMINATION: ONE XRAY VIEW OF THE CHEST 2/19/2023 8:10 pm COMPARISON: 12/18/2021 HISTORY: ORDERING SYSTEM PROVIDED HISTORY: SOB TECHNOLOGIST PROVIDED HISTORY: Reason for exam:->SOB Reason for Exam: SOB FINDINGS: The cardiomediastinal silhouette is unchanged in appearance. Status post median sternotomy and valve replacement. Cardiac silhouette enlargement again noted. There is no consolidation, pneumothorax, or evidence of edema.  Blunting of the right costophrenic angle appears unchanged. No significant residual left pleural effusion. The osseous structures are unchanged in appearance. No acute airspace disease identified.

## 2023-02-22 NOTE — PLAN OF CARE
Problem: Safety - Adult  Goal: Free from fall injury  2/22/2023 0232 by Rosalie Cruz RN  Outcome: Progressing

## 2023-02-22 NOTE — CARE COORDINATION
Discharge note:      CM/SW has been notified of discharge. Patient noted to have the following needs at discharge. CM/SW has coordinated the following services: [facility] via  [facility]  [address], [address] [facility], [address] [place] [address]  Phone: [phone]  Fax:  [phone]       Discharge Destination:  home    Transportation: Brother will transport home        Comment:   Patient being discharged home on Lovenox  & INR monitoring ,[facility]  will start care 2/23/23 and coumadin Clinic will do the dosing. All CM/SW needs met, will sign off.

## 2023-02-23 ENCOUNTER — TELEPHONE (OUTPATIENT)
Dept: PHARMACY | Age: 63
End: 2023-02-23

## 2023-02-23 ENCOUNTER — TELEPHONE (OUTPATIENT)
Dept: INTERNAL MEDICINE CLINIC | Age: 63
End: 2023-02-23

## 2023-02-23 LAB — ANTI-NUCLEAR ANTIBODY (ANA): NEGATIVE

## 2023-02-23 NOTE — PROGRESS NOTES
Pt's sister states that the pt has no medications at home and will need all medications refilled. RN made MD aware and gave the physician the pharmacy the pt normally gets his meds. MD to send meds.

## 2023-02-23 NOTE — TELEPHONE ENCOUNTER
Care Transitions Initial Follow Up Call    Outreach made within 2 business days of discharge: Yes    Patient: Kaveh Murphy Patient : 1960   MRN: [id number]  Reason for Follow Up    Attempted to reach patient for TCM call, no answer. LVM advising will call back later.    Future Appointments   Date Time Provider Department Center   3/1/2023  1:15 PM MICHELLE Gonzalez - CNS FF Cardio [facility]       Giorgi Lawson MA

## 2023-02-23 NOTE — TELEPHONE ENCOUNTER
Nikkie from [facility] is calling needings orders for start up care and INR orders----I did tell her Dr was out till Monday and that pt had not been seen since 1/5/22 and would have to make a HFU appt and be seen by her before she would sign orders---Thanks.

## 2023-02-23 NOTE — PROGRESS NOTES
Data- discharge order received, pt verbalized agreement to discharge, needs for Home Health Care with [facility] for lovenox and INR monitoring, SRI reviewed and signed by MD, to be completed by RN.    Action- AVS prepared, discharge instructions prepared and given to pt, medication information packet given r/t NEW or CHANGED prescriptions, pt verbalized understanding further self-review.   D/C instruction summary: Diet- regular, 5 carb, Activity- up as tolerated, follow up with Primary Care Physician Gigi Cristobal -[phone] appointment in 1 week, immunizations reviewed and updated, medications prescriptions to be filled at pharmacy. Contact information provided to above agencies used.    Response- Case Management/ reported faxing completed SRI and AVS to needed HHC/DME services stated above.   Pt belongings gathered, IV removed, pt dressed. Disposition is home with HHC/DME as stated above, transported with RN, taken to lobby via w/c with sister, no complications.     1. WEIGHT: Admit Weight: 170 lb 3.1 oz (77.2 kg) (02/19/23 2116)        Today  Weight: 188 lb 11.2 oz (85.6 kg) (02/22/23 0013)       2. O2 SAT.: SpO2: 95 % (02/22/23 1145)

## 2023-02-23 NOTE — PLAN OF CARE
Problem: Discharge Planning  Goal: Discharge to home or other facility with appropriate resources  2/22/2023 1919 by Sanam Rodrigues RN  Outcome: Completed  2/22/2023 1559 by Sanam Rodrigues RN  Outcome: Progressing     Problem: Safety - Adult  Goal: Free from fall injury  2/22/2023 1919 by Sanam Rodrigues RN  Outcome: Completed  2/22/2023 1559 by Sanam Rodrigues RN  Outcome: Progressing     Problem: Chronic Conditions and Co-morbidities  Goal: Patient's chronic conditions and co-morbidity symptoms are monitored and maintained or improved  2/22/2023 1919 by Sanam Rodrigues RN  Outcome: Completed  2/22/2023 1559 by Sanam Rodrigues RN  Outcome: Progressing     Problem: Pain  Goal: Verbalizes/displays adequate comfort level or baseline comfort level  2/22/2023 1919 by Sanam Rodrigues RN  Outcome: Completed  2/22/2023 1559 by Sanam Rodrigues RN  Outcome: Progressing

## 2023-02-24 ENCOUNTER — TELEPHONE (OUTPATIENT)
Dept: CARDIOLOGY CLINIC | Age: 63
End: 2023-02-24

## 2023-02-24 ENCOUNTER — TELEPHONE (OUTPATIENT)
Dept: OTHER | Age: 63
End: 2023-02-24

## 2023-02-24 LAB — F-ACTIN AB IGG: 6 UNITS (ref 0–19)

## 2023-02-24 NOTE — TELEPHONE ENCOUNTER
[address] [facility] [address] FAILURE PROGRAM  TELEPHONE ENCOUNTER FORM    Ktkeyonna Hiltonmichelle Jeffrey 1960    Attempted to call patient for HF follow-up x 3. No answer at this time.       Next MD/ Clinic appointment: 3/1/23      AVEL EDWARD RN 2/24/2023 11:35 AM

## 2023-02-24 NOTE — TELEPHONE ENCOUNTER
Home care is asking if [facility] will sign home care orders. Pt has orders for INR and asking if that can be done on Monday 2/27/23. Please call to advise.

## 2023-02-25 LAB — MITOCHONDRIAL M2 AB, IGG: 2.2 U/ML (ref 0–4)

## 2023-02-27 NOTE — TELEPHONE ENCOUNTER
Please advise will not be able to sign any home health services unless patient completes face-to-face visit and hospital discharge follow-up to certify for home health

## 2023-02-28 NOTE — TELEPHONE ENCOUNTER
Spoke with Nikkie and advised. She has also advised patient. She states that she was able to get cardiology to sign for home care orders.

## 2023-03-01 ENCOUNTER — HOSPITAL ENCOUNTER (OUTPATIENT)
Age: 63
Discharge: HOME | End: 2023-03-01
Payer: COMMERCIAL

## 2023-03-01 ENCOUNTER — OFFICE VISIT (OUTPATIENT)
Dept: CARDIOLOGY CLINIC | Age: 63
End: 2023-03-01

## 2023-03-01 VITALS
SYSTOLIC BLOOD PRESSURE: 100 MMHG | HEIGHT: 73 IN | WEIGHT: 184 LBS | OXYGEN SATURATION: 99 % | DIASTOLIC BLOOD PRESSURE: 70 MMHG | BODY MASS INDEX: 24.39 KG/M2 | HEART RATE: 90 BPM

## 2023-03-01 DIAGNOSIS — I50.22 CHRONIC SYSTOLIC HEART FAILURE (HCC): Primary | ICD-10-CM

## 2023-03-01 DIAGNOSIS — Z95.2 S/P MVR (MITRAL VALVE REPLACEMENT): ICD-10-CM

## 2023-03-01 DIAGNOSIS — I50.22 CHRONIC SYSTOLIC HEART FAILURE (HCC): ICD-10-CM

## 2023-03-01 DIAGNOSIS — I25.10 CORONARY ARTERY DISEASE INVOLVING NATIVE CORONARY ARTERY OF NATIVE HEART WITHOUT ANGINA PECTORIS: ICD-10-CM

## 2023-03-01 DIAGNOSIS — I25.701 CORONARY ARTERY DISEASE INVOLVING CORONARY BYPASS GRAFT OF NATIVE HEART WITH ANGINA PECTORIS WITH DOCUMENTED SPASM (HCC): ICD-10-CM

## 2023-03-01 DIAGNOSIS — I95.9 HYPOTENSION, UNSPECIFIED HYPOTENSION TYPE: ICD-10-CM

## 2023-03-01 DIAGNOSIS — Z91.199 NON-COMPLIANCE: ICD-10-CM

## 2023-03-01 LAB
ANION GAP SERPL CALCULATED.3IONS-SCNC: 13 MMOL/L (ref 3–16)
BUN BLDV-MCNC: 17 MG/DL (ref 7–20)
CALCIUM SERPL-MCNC: 9 MG/DL (ref 8.3–10.6)
CHLORIDE BLD-SCNC: 99 MMOL/L (ref 99–110)
CO2: 24 MMOL/L (ref 21–32)
CREAT SERPL-MCNC: 0.9 MG/DL (ref 0.8–1.3)
GFR SERPL CREATININE-BSD FRML MDRD: >60 ML/MIN/{1.73_M2}
GLUCOSE BLD-MCNC: 325 MG/DL (ref 70–99)
INR BLD: 1.97 (ref 0.87–1.14)
POTASSIUM SERPL-SCNC: 4 MMOL/L (ref 3.5–5.1)
PRO-BNP: 3049 PG/ML (ref 0–124)
PROTHROMBIN TIME: 22.4 SEC (ref 11.7–14.5)
SODIUM BLD-SCNC: 136 MMOL/L (ref 136–145)

## 2023-03-01 PROCEDURE — 36415 COLL VENOUS BLD VENIPUNCTURE: CPT

## 2023-03-01 PROCEDURE — 80048 BASIC METABOLIC PNL TOTAL CA: CPT

## 2023-03-01 PROCEDURE — 85610 PROTHROMBIN TIME: CPT

## 2023-03-01 PROCEDURE — 83880 ASSAY OF NATRIURETIC PEPTIDE: CPT

## 2023-03-01 NOTE — PATIENT INSTRUCTIONS
Check blood work today  Continue all current medications (check this list)  <64 ounces fluid a day and <2000 mg sodium a day  RTO in 3 weeks  5.    Home care with weekly blood work

## 2023-03-01 NOTE — PROGRESS NOTES
[address]  Progress Note    Primary Care Doctor:  Carolina Villasenor MD    Chief Complaint   Patient presents with    Follow-Up from Hospital    Congestive Heart Failure        History of Present Illness:  63 y.o. male with history of systolic heart failure, CAD status post CABG and MV replacement on coumadin, valvular disease, tricuspid valve repair 12/10/2021, LVEF 15% at surgery time      I had the pleasure of seeing Maggie Sears in follow up for hospitalization 2/19-22/2023 for bilateral leg swelling up to his knees and about 5 pounds up. He is not reliable with taking his medication and follow up visits. LVEF today is 10%. He is being diuresed with IV lasix. He agreed to start taking all his medications. He is ambulatory and with his younger brother, Kika Nichols and sister, Kapil Dewey. He is taking all his medications. He has [facility] home care who have not drawn any blood work. He has completed his lovenox injections. He has not seen his PCP in a long time and is having elevated BS at home in the 200s. He denies any chest pain, palpitations, lightheadedness or shortness of breath. Lower leg edema is improving. Phone call 2/24/2023    Past Medical History:   has a past medical history of Diabetes ([address] Hyperlipidemia, and Hypertension. Surgical History:   has a past surgical history that includes Coronary artery bypass graft (N/A, 12/10/2021) and Cardiac surgery. Social History:   reports that he quit smoking about 14 months ago. His smoking use included cigarettes. He has a 15.00 pack-year smoking history. He has never used smokeless tobacco. He reports current alcohol use. He reports that he does not use drugs.    Family History:   Family History   Problem Relation Age of Onset    Cancer Mother         colon cancer    Heart Disease Father     High Blood Pressure Father     Diabetes Father     High Cholesterol Father        Home Medications:  Prior to Admission medications    Medication Sig Start Date End Date Taking? Authorizing Provider   furosemide (LASIX) 40 MG tablet Take 1 tablet by mouth in the morning and 1 tablet in the evening. 2/22/23  Yes MICHELLE Harden CNP   midodrine (PROAMATINE) 2.5 MG tablet Take 1 tablet by mouth 3 times daily (with meals) 2/22/23  Yes MICHELLE Harden CNP   glipiZIDE (GLUCOTROL) 5 MG tablet Take 1 tablet by mouth daily  Patient taking differently: Take 5 mg by mouth 2 times daily (before meals) 2/22/23  Yes MICHELLE Harden CNP   aspirin 81 MG chewable tablet Take 1 tablet by mouth daily 2/22/23  Yes MICHELLE Harden CNP   amiodarone (CORDARONE) 200 MG tablet Take 1 tablet by mouth daily 2/22/23  Yes MICHELLE Harden CNP   warfarin (COUMADIN) 5 MG tablet Take by mouth daily at [facility] as directed (start with 1 tablet) 2/22/23  Yes MICHELLE Harden CNP   carvedilol (COREG) 12.5 MG tablet Take 1 tablet by mouth 2 times daily (with meals) 2/22/23  Yes MICHELLE Harden CNP   digoxin (LANOXIN) 125 MCG tablet Take 0.5 tablets by mouth daily 2/22/23  Yes MICHELLE Harden CNP   blood glucose monitor kit and supplies Dispense sufficient amount for indicated testing frequency plus additional to accommodate PRN testing needs. Dispense all needed supplies to include: monitor, strips, lancing device, lancets, control solutions, alcohol swabs. 2/21/23  Yes MICHELLE Harden CNP   Lancets MISC 1 each by Does not apply route daily 2/21/23  Yes MICHELLE Harden CNP   blood glucose monitor strips Test 3 times a day & as needed for symptoms of irregular blood glucose. Dispense sufficient amount for indicated testing frequency plus additional to accommodate PRN testing needs.  2/21/23  Yes MICHELLE Harden CNP   sertraline (ZOLOFT) 50 MG tablet Take 1 tablet by mouth daily  Patient not taking: Reported on 3/1/2023 2/23/23   MICHELLE Harden CNP        Allergies:  Patient has no known allergies.     Review of Systems:   Constitutional: there has been no unanticipated weight loss. There's been no change in energy level, sleep pattern, or activity level.     Eyes: No visual changes or diplopia. No scleral icterus.  ENT: No Headaches, hearing loss or vertigo. No mouth sores or sore throat.  Cardiovascular: Reviewed in HPI  Respiratory: No cough or wheezing, no sputum production. No hematemesis.    Gastrointestinal: No abdominal pain, appetite loss, blood in stools. No change in bowel or bladder habits.  Genitourinary: No dysuria, trouble voiding, or hematuria.  Musculoskeletal:  No gait disturbance, weakness or joint complaints.  Integumentary: No rash or pruritis.  Neurological: No headache, diplopia, change in muscle strength, numbness or tingling. No change in gait, balance, coordination, mood, affect, memory, mentation, behavior.  Psychiatric: No anxiety, no depression.  Endocrine: No malaise, fatigue or temperature intolerance. No excessive thirst, fluid intake, or urination. No tremor.  Hematologic/Lymphatic: No abnormal bruising or bleeding, blood clots or swollen lymph nodes.  Allergic/Immunologic: No nasal congestion or hives.    Physical Examination:    Vitals:    03/01/23 1346   BP: 100/70   Site: Left Upper Arm   Position: Sitting   Cuff Size: Medium Adult   Pulse: 90   SpO2: 99%   Weight: 184 lb (83.5 kg)   Height: 6' 1\" (1.854 m)        Constitutional and General Appearance: Warm and dry, no apparent distress, normal coloration  HEENT:  Normocephalic, atraumatic  Respiratory:  Normal excursion and expansion without use of accessory muscles  Resp Auscultation: Normal breath sounds without dullness  Cardiovascular:  The apical impulses not displaced  Heart tones are crisp and normal  JVP normal cm H2O  Regular rate and rhythm  Peripheral pulses are symmetrical and full  There is no clubbing, cyanosis of the extremities.  Bilateral lower ankle to mid calf edema  Pedal Pulses: 2+ and  equal   Abdomen:  No masses or tenderness  Liver/Spleen: No Abnormalities Noted  Neurological/Psychiatric:  Alert and oriented in all spheres  Moves all extremities well  Exhibits normal gait balance and coordination  No abnormalities of mood, affect, memory, mentation, or behavior are noted    Lab Data:    CBC:   Lab Results   Component Value Date/Time    WBC 6.8 02/21/2023 05:11 AM    WBC 8.5 02/20/2023 05:06 AM    WBC 7.9 02/19/2023 08:20 PM    RBC 5.01 02/21/2023 05:11 AM    RBC 4.89 02/20/2023 05:06 AM    RBC 4.99 02/19/2023 08:20 PM    HGB 15.1 02/21/2023 05:11 AM    HGB 14.8 02/20/2023 05:06 AM    HGB 15.2 02/19/2023 08:20 PM    HCT 46.5 02/21/2023 05:11 AM    HCT 45.6 02/20/2023 05:06 AM    HCT 47.4 02/19/2023 08:20 PM    MCV 92.9 02/21/2023 05:11 AM    MCV 93.3 02/20/2023 05:06 AM    MCV 95.0 02/19/2023 08:20 PM    RDW 15.8 02/21/2023 05:11 AM    RDW 15.5 02/20/2023 05:06 AM    RDW 15.7 02/19/2023 08:20 PM     02/21/2023 05:11 AM     02/20/2023 05:06 AM     02/19/2023 08:20 PM     BMP:  Lab Results   Component Value Date/Time     02/22/2023 04:58 AM     02/21/2023 05:11 AM     02/20/2023 05:06 AM    K 3.5 02/22/2023 04:58 AM    K 3.5 02/21/2023 05:11 AM    K 4.1 02/20/2023 05:06 AM    K 5.1 02/19/2023 11:33 PM    K 4.9 02/19/2023 08:20 PM    K 4.1 12/01/2021 11:15 PM     02/22/2023 04:58 AM     02/21/2023 05:11 AM    CL 98 02/20/2023 05:06 AM    CO2 21 02/22/2023 04:58 AM    CO2 24 02/21/2023 05:11 AM    CO2 19 02/20/2023 05:06 AM    PHOS 3.7 02/22/2023 04:58 AM    BUN 32 02/22/2023 04:58 AM    BUN 34 02/21/2023 05:11 AM    BUN 33 02/20/2023 05:06 AM    CREATININE 1.6 02/22/2023 04:58 AM    CREATININE 1.5 02/21/2023 05:11 AM    CREATININE 1.4 02/20/2023 05:06 AM     BNP:   Lab Results   Component Value Date/Time    PROBNP 3,277 02/22/2023 04:58 AM    PROBNP 4,811 02/19/2023 08:20 PM    PROBNP 4,485 12/14/2021 12:20 PM     Cardiac Imaging:  Echo 2/20/23: (EF 20% on 21)   Summary   Overall, left ventricular systolic function is severely depressed. Ejection fraction is visually estimated to be 10%. There is severe diffuse hypokinesis. Cannot determine diastology due to   mitral valve replacement. 29 mm Medtronic mechanical valve. Max P mmHg. Mean P mmHg. 28 mm Phisio TV annuloplasty. Max P mmHg. Mild to moderate tricuspid   regurgitation. RVSP = 37 mmHG. The right ventricle is normal in size with reduced function. Biatrial enlargement    Assessment:    1. Chronic systolic heart failure (HCC) on bb and digoxin   2. Coronary artery disease involving native coronary artery of native heart without angina pectoris    3. S/P MVR (mitral valve replacement)    4. Coronary artery disease involving coronary bypass graft of native heart with angina pectoris with documented spasm ([facility] [address]    5. Non-compliance    6. Hypotension, unspecified hypotension type midodrine       Plan:   Patient Instructions   Check blood work today  Continue all current medications (check this list)  <64 ounces fluid a day and <2000 mg sodium a day  RTO in 3 weeks  5.    Home care with weekly blood work    NYHA IV    I appreciate the opportunity of cooperating in the care of this individual.    MICHELLE Potts - CNS, CNS, 3/1/2023, 4:02 PM

## 2023-03-02 ENCOUNTER — TELEPHONE (OUTPATIENT)
Dept: CARDIOLOGY CLINIC | Age: 63
End: 2023-03-02

## 2023-03-02 NOTE — TELEPHONE ENCOUNTER
----- Message from MICHELLE Polanco - CNS sent at 3/2/2023  8:32 AM EST -----  Blood work is great except for his blood sugar  INR is good as well  Please ask him to make an appt with his PCP  Call his sister Javier Calvo  thanks

## 2023-03-03 NOTE — TELEPHONE ENCOUNTER
Tried to reach patient's sister [facility] for her about patient's lab work and to contact PCP for a appointment. Asked patient's sister to call with any questions or concerns.

## 2023-03-06 ENCOUNTER — ANTI-COAG VISIT (OUTPATIENT)
Dept: PHARMACY | Age: 63
End: 2023-03-06
Payer: COMMERCIAL

## 2023-03-06 DIAGNOSIS — Z95.2 H/O MITRAL VALVE REPLACEMENT WITH MECHANICAL VALVE: Primary | ICD-10-CM

## 2023-03-06 LAB — INTERNATIONAL NORMALIZATION RATIO, POC: 1.9

## 2023-03-06 PROCEDURE — 99213 OFFICE O/P EST LOW 20 MIN: CPT

## 2023-03-06 PROCEDURE — 85610 PROTHROMBIN TIME: CPT

## 2023-03-06 NOTE — PROGRESS NOTES
Mr. Holden Hdz is a 63 y.o. y/o male with history of Heart Valve Replacement who presents today for anticoagulation monitoring and adjustment. 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. Hx pt of CC then dcd for noncompliance. Readmitted 2/19 after quitting taking medications for several months. Patient Reported Findings:  Yes     No  [x]   []       Patient verifies current dosing regimen as listed- 5mg daily since discharge   []   [x]       S/S bleeding/bruising/swelling/SOB- denies   []   [x]       Blood in urine or stool- denies   []   [x]       Procedures scheduled in the future at this time  []   [x]       Missed Dose- denies   []   [x]       Extra Dose- denies   []   [x]       Change in medications- denies significant changes, will check med list and update us. Need to confirm using 5mg tablets of warfarin not 6mg tablets. []   [x]       Change in health/diet/appetite- doesn't want to eat vit k foods  []   [x]       Change in alcohol use- doesn't drink or smoke   []   [x]       Change in activity  []   [x]       Hospital admission- admitted 2/19-2/22 after not taking meds for several months   2/20-6mg  2/21- 10mg  2/22- 10mg  []   [x]       Emergency department visit  []   [x]       Other complaints    Clinical Outcomes:  Yes     No  []   [x]       Major bleeding event  []   [x]       Thromboembolic event    Duration of warfarin Therapy: indefinite   INR Range:  2.5-3.5    Re-educated patient of signs and symptoms of bleeding and clotting, when to seek emergent care, the need to maintain a consistent diet and notification of clinic for any new medications including over the counter medications or abnormal bleeding. Patient acknowledges working in consult agreement with pharmacist as referred by his/her physician. PT had lab drawn 3/1 and was 1.97 so no more lovenox injections were called in per cardio. Goal range is 2.5-3.5 though. Warfarin 5mg tablets, taking at night. Has [facility].      Pt hx weekly dose was: 9 mg (6 mg x 1.5) every Mon, Wed, Fri; 12 mg (6 mg x 2) all other days (75mg/week). INR is 1.9 today after taking 5mg daily since discharge. Family member (sister?) to confirm that patient is using 5mg tablets and not old 6mg tablets. States HHN is coming Wed to do lab and check INR. Will be confirming today so she will call CC if that changes. Will also look at med list and update us. Take 10mg Mon and Tues and recheck INR on Wed with HH. Encouraged to maintain a consistency of vegetables/salads. Recheck INR Wed via [facility] 3/8  Consent signed 3/6/2023. Referring Dr. Abena Gastelum need new REF signed by Kaylen Whitney?    INR (no units)   Date Value   2023 1.97 (H)   2023 1.74 (H)   2023 1.57 (H)   2023 1.76 (H)         For Pharmacy Admin Tracking Only    Intervention Detail: Adherence Monitorin and Dose Adjustment: 1, reason: Therapy Optimization  Total # of Interventions Recommended: 2  Total # of Interventions Accepted: 2  Time Spent (min): 15

## 2023-03-09 ENCOUNTER — ANTI-COAG VISIT (OUTPATIENT)
Dept: PHARMACY | Age: 63
End: 2023-03-09

## 2023-03-09 ENCOUNTER — OFFICE VISIT (OUTPATIENT)
Dept: INTERNAL MEDICINE CLINIC | Age: 63
End: 2023-03-09
Payer: COMMERCIAL

## 2023-03-09 VITALS
HEIGHT: 73 IN | BODY MASS INDEX: 24.52 KG/M2 | HEART RATE: 79 BPM | DIASTOLIC BLOOD PRESSURE: 62 MMHG | SYSTOLIC BLOOD PRESSURE: 112 MMHG | OXYGEN SATURATION: 98 % | WEIGHT: 185 LBS

## 2023-03-09 DIAGNOSIS — Z95.1 S/P CABG (CORONARY ARTERY BYPASS GRAFT): ICD-10-CM

## 2023-03-09 DIAGNOSIS — E11.65 TYPE 2 DIABETES MELLITUS WITH HYPERGLYCEMIA, WITHOUT LONG-TERM CURRENT USE OF INSULIN (HCC): Primary | ICD-10-CM

## 2023-03-09 DIAGNOSIS — N18.9 ACUTE KIDNEY INJURY SUPERIMPOSED ON CHRONIC KIDNEY DISEASE (HCC): ICD-10-CM

## 2023-03-09 DIAGNOSIS — N17.9 ACUTE KIDNEY INJURY SUPERIMPOSED ON CHRONIC KIDNEY DISEASE (HCC): ICD-10-CM

## 2023-03-09 DIAGNOSIS — Z95.2 H/O MITRAL VALVE REPLACEMENT WITH MECHANICAL VALVE: Primary | ICD-10-CM

## 2023-03-09 DIAGNOSIS — Z91.199 NON-COMPLIANCE: ICD-10-CM

## 2023-03-09 DIAGNOSIS — E11.65 TYPE 2 DIABETES MELLITUS WITH HYPERGLYCEMIA, WITHOUT LONG-TERM CURRENT USE OF INSULIN (HCC): ICD-10-CM

## 2023-03-09 DIAGNOSIS — I50.23 ACUTE ON CHRONIC SYSTOLIC CONGESTIVE HEART FAILURE (HCC): ICD-10-CM

## 2023-03-09 PROBLEM — R74.01 TRANSAMINITIS: Status: RESOLVED | Noted: 2023-02-20 | Resolved: 2023-03-09

## 2023-03-09 PROBLEM — I21.4 NSTEMI (NON-ST ELEVATED MYOCARDIAL INFARCTION) (HCC): Status: RESOLVED | Noted: 2023-02-19 | Resolved: 2023-03-09

## 2023-03-09 PROBLEM — E11.10 DIABETIC KETOACIDOSIS WITHOUT COMA ASSOCIATED WITH TYPE 2 DIABETES MELLITUS (HCC): Status: RESOLVED | Noted: 2023-02-20 | Resolved: 2023-03-09

## 2023-03-09 PROBLEM — R77.8 ELEVATED TROPONIN: Status: RESOLVED | Noted: 2023-02-20 | Resolved: 2023-03-09

## 2023-03-09 PROBLEM — R79.89 ELEVATED TROPONIN: Status: RESOLVED | Noted: 2023-02-20 | Resolved: 2023-03-09

## 2023-03-09 PROBLEM — I95.9 HYPOTENSION: Status: RESOLVED | Noted: 2023-02-20 | Resolved: 2023-03-09

## 2023-03-09 PROBLEM — Z71.85 VACCINE COUNSELING: Status: RESOLVED | Noted: 2022-01-05 | Resolved: 2023-03-09

## 2023-03-09 PROBLEM — R79.89 ELEVATED LACTIC ACID LEVEL: Status: RESOLVED | Noted: 2023-02-20 | Resolved: 2023-03-09

## 2023-03-09 LAB — INR BLD: 2.2

## 2023-03-09 PROCEDURE — 3046F HEMOGLOBIN A1C LEVEL >9.0%: CPT | Performed by: INTERNAL MEDICINE

## 2023-03-09 PROCEDURE — 99214 OFFICE O/P EST MOD 30 MIN: CPT | Performed by: INTERNAL MEDICINE

## 2023-03-09 RX ORDER — GLIPIZIDE 5 MG/1
5 TABLET ORAL
Qty: 60 TABLET | Refills: 3 | Status: SHIPPED | OUTPATIENT
Start: 2023-03-09

## 2023-03-09 RX ORDER — GLIPIZIDE 5 MG/1
TABLET ORAL
Qty: 180 TABLET | OUTPATIENT
Start: 2023-03-09

## 2023-03-09 RX ORDER — INSULIN ASPART 100 [IU]/ML
INJECTION, SOLUTION INTRAVENOUS; SUBCUTANEOUS
Qty: 5 ADJUSTABLE DOSE PRE-FILLED PEN SYRINGE | Refills: 3 | Status: SHIPPED | OUTPATIENT
Start: 2023-03-09

## 2023-03-09 SDOH — ECONOMIC STABILITY: HOUSING INSECURITY
IN THE LAST 12 MONTHS, WAS THERE A TIME WHEN YOU DID NOT HAVE A STEADY PLACE TO SLEEP OR SLEPT IN A SHELTER (INCLUDING NOW)?: NO

## 2023-03-09 SDOH — ECONOMIC STABILITY: FOOD INSECURITY: WITHIN THE PAST 12 MONTHS, YOU WORRIED THAT YOUR FOOD WOULD RUN OUT BEFORE YOU GOT MONEY TO BUY MORE.: NEVER TRUE

## 2023-03-09 SDOH — ECONOMIC STABILITY: INCOME INSECURITY: HOW HARD IS IT FOR YOU TO PAY FOR THE VERY BASICS LIKE FOOD, HOUSING, MEDICAL CARE, AND HEATING?: NOT HARD AT ALL

## 2023-03-09 SDOH — ECONOMIC STABILITY: FOOD INSECURITY: WITHIN THE PAST 12 MONTHS, THE FOOD YOU BOUGHT JUST DIDN'T LAST AND YOU DIDN'T HAVE MONEY TO GET MORE.: NEVER TRUE

## 2023-03-09 ASSESSMENT — PATIENT HEALTH QUESTIONNAIRE - PHQ9
8. MOVING OR SPEAKING SO SLOWLY THAT OTHER PEOPLE COULD HAVE NOTICED. OR THE OPPOSITE, BEING SO FIGETY OR RESTLESS THAT YOU HAVE BEEN MOVING AROUND A LOT MORE THAN USUAL: 1
SUM OF ALL RESPONSES TO PHQ9 QUESTIONS 1 & 2: 3
7. TROUBLE CONCENTRATING ON THINGS, SUCH AS READING THE NEWSPAPER OR WATCHING TELEVISION: 0
SUM OF ALL RESPONSES TO PHQ QUESTIONS 1-9: 10
10. IF YOU CHECKED OFF ANY PROBLEMS, HOW DIFFICULT HAVE THESE PROBLEMS MADE IT FOR YOU TO DO YOUR WORK, TAKE CARE OF THINGS AT HOME, OR GET ALONG WITH OTHER PEOPLE: 0
5. POOR APPETITE OR OVEREATING: 0
SUM OF ALL RESPONSES TO PHQ QUESTIONS 1-9: 10
1. LITTLE INTEREST OR PLEASURE IN DOING THINGS: 0
2. FEELING DOWN, DEPRESSED OR HOPELESS: 3
SUM OF ALL RESPONSES TO PHQ QUESTIONS 1-9: 10
SUM OF ALL RESPONSES TO PHQ QUESTIONS 1-9: 10
3. TROUBLE FALLING OR STAYING ASLEEP: 3
9. THOUGHTS THAT YOU WOULD BE BETTER OFF DEAD, OR OF HURTING YOURSELF: 0
6. FEELING BAD ABOUT YOURSELF - OR THAT YOU ARE A FAILURE OR HAVE LET YOURSELF OR YOUR FAMILY DOWN: 0
4. FEELING TIRED OR HAVING LITTLE ENERGY: 3

## 2023-03-09 ASSESSMENT — ENCOUNTER SYMPTOMS
SORE THROAT: 0
BACK PAIN: 0
NAUSEA: 0
ABDOMINAL PAIN: 0
CONSTIPATION: 0
SHORTNESS OF BREATH: 0
COUGH: 0
CHEST TIGHTNESS: 0
WHEEZING: 0
VOMITING: 0
COLOR CHANGE: 0

## 2023-03-09 NOTE — PROGRESS NOTES
ASSESSMENT/PLAN:  1. Type 2 diabetes mellitus with hyperglycemia, without long-term current use of insulin ([facility])  Assessment & Plan:   Explained to patient he needs to be started on insulin given reported home blood sugar readings, unable to start metformin given very low ejection fraction, advised he can continue the sulfonylurea therapy however will need to start sliding scale insulin to get blood sugar under control. Instructions on use discussed with patient, patient did make a note that he will probably not comply because he does not want to do any injectables. Advised to keep a log of blood glucose readings and to bring with him to the office in 4 weeks to adjust medications, encouraged patient to start insulin therapy and advised this can be temporary until his sugars are under better control. Diet recommendations reinforced  Orders:  -     insulin aspart (NOVOLOG FLEXPEN) 100 UNIT/ML injection pen; Start sliding scale insulin as follows, for -200 take 4 units, for 201-250 take 6 units, for 251-300 take 8 units, for 301-350 take 10 units, 351-400 take 12 units, more than 400 call md, Disp-5 Adjustable Dose Pre-filled Pen Syringe, R-3Normal  -     glipiZIDE (GLUCOTROL) 5 MG tablet; Take 1 tablet by mouth 2 times daily (before meals), Disp-60 tablet, R-3Normal  2. Acute kidney injury superimposed on chronic kidney disease [facility])  Assessment & Plan: This has resolved with most recent lab work reviewed showing kidney function back to normal, continue current medications, continue maintaining healthy low-salt low-fat diet and avoid NSAIDs  3. S/P CABG (coronary artery bypass graft)  Assessment & Plan:   Continue care and recommendation as per cardiology  4. Acute on chronic systolic congestive heart failure (HCC)  Assessment & Plan:   Symptoms exacerbated by recent TKA however appears to be going back to baseline, he will continue care and recommendation as per cardiology  5.  Non-compliance  Assessment & Plan:   As noted above patient reported he will not take the insulin, he mentioned \"you are trying to kill me \", his sister tried to calm him down, patient was noncompliant with his follow-up previously, advised I expect him to follow-up in 4 weeks with blood sugar log ever if he does not comply with his follow-up appointment then he will be dismissed from the practice      Return in about 4 weeks (around 4/6/2023). SUBJECTIVE  HPI:   Patient only seen once by me and has not been seen in over a year since his only visit here. He is accompanied today by his sister who reports he was recently hospitalized with DKA. He was given sulfonylurea to help with his diabetes management but his blood sugars continue to be in the 200s and 300s, they doubled the dose and he ran out, he was seen by cardiology recently but was told needs to follow-up with PCP for diabetes management. Patient reports he was diagnosed with diabetes in 2011 however he has been diet management only      Review of Systems   Constitutional:  Negative for activity change, appetite change and fatigue. HENT:  Negative for congestion, hearing loss, mouth sores and sore throat. Respiratory:  Negative for cough, chest tightness, shortness of breath and wheezing. Cardiovascular:  Negative for chest pain, palpitations and leg swelling. Gastrointestinal:  Negative for abdominal pain, constipation, nausea and vomiting. Genitourinary:  Negative for difficulty urinating, dysuria, frequency, hematuria and urgency. Musculoskeletal:  Negative for arthralgias, back pain, gait problem and joint swelling. Skin:  Negative for color change. Allergic/Immunologic: Negative for environmental allergies and immunocompromised state. Neurological:  Negative for dizziness, light-headedness and headaches. Psychiatric/Behavioral:  Positive for dysphoric mood and sleep disturbance. Negative for behavioral problems.       OBJECTIVE:    /62   Pulse 79   Ht 6' 1\" (1.854 m)   Wt 185 lb (83.9 kg)   SpO2 98%   BMI 24.41 kg/m²    Physical Exam  Constitutional:       General: He is not in acute distress. Appearance: Normal appearance. He is not toxic-appearing. Cardiovascular:      Rate and Rhythm: Normal rate. Heart sounds: Murmur heard. Pulmonary:      Effort: Pulmonary effort is normal. No respiratory distress. Breath sounds: No wheezing. Abdominal:      Palpations: Abdomen is soft. Musculoskeletal:         General: Normal range of motion. Cervical back: Neck supple. No tenderness. Neurological:      General: No focal deficit present. Mental Status: He is alert. Electronically signed by Oswaldo Whitfield MD on 3/9/2023 at 4:36 PM.    This dictation was generated by voice recognition computer software. Although all attempts are made to edit the dictation for accuracy, there may be errors in the transcription that are not intended.

## 2023-03-09 NOTE — PROGRESS NOTES
Rosa Bowser called w/ pts INR results of 2.2, pt takes 5 mgs of warfarin daily bid. [phone]      Returned call to Rosa Bowser and M x2 asking for clarification regarding warfarin dosing. Sourav Oconnor returned call to clinic. She states that she spoke with pt sister who fills mediset who verified taking 10 mg daily for past 3 days. Advised for patient to take 10mg on Sat and 7.5 mg on Thurs, Fri and Sun.  Recheck INR on Mon 3/13        For Pharmacy Admin Tracking Only    Intervention Detail: Dose Adjustment: 1, reason: Therapy Optimization  Total # of Interventions Recommended: 1  Total # of Interventions Accepted: 1  Time Spent (min): 15

## 2023-03-13 ENCOUNTER — ANTI-COAG VISIT (OUTPATIENT)
Dept: PHARMACY | Age: 63
End: 2023-03-13

## 2023-03-13 DIAGNOSIS — Z95.2 H/O MITRAL VALVE REPLACEMENT WITH MECHANICAL VALVE: Primary | ICD-10-CM

## 2023-03-13 LAB — INR BLD: 3.8

## 2023-03-13 NOTE — PROGRESS NOTES
INR 3.8 Patient took 7.5mg on Thu/Fri/Sun and 10mg on Sat. Please call [address] [place] [address] [phone] with warfarin dosing and order for next INR check. Returned call to [facility]. Instructed for patient to hold dose tonight then take 7.5 mg daily. Recheck INR in 1 week, 3/20. Any questions or concerns return call to clinic.            For Pharmacy Admin Tracking Only    Intervention Detail: Dose Adjustment: 1, reason: Therapy Optimization  Total # of Interventions Recommended: 1  Total # of Interventions Accepted: 1  Time Spent (min): 15

## 2023-03-16 ENCOUNTER — TELEPHONE (OUTPATIENT)
Dept: CARDIOLOGY CLINIC | Age: 63
End: 2023-03-16

## 2023-03-16 NOTE — TELEPHONE ENCOUNTER
Sister asking if pt is to be taking Atorvastatin. States it was listed on some hospital info. Please call to advise.

## 2023-03-17 NOTE — TELEPHONE ENCOUNTER
On patient's discharge summary it stated to stop atorvastatin. Should patient restart atorvastatin?   Please advise

## 2023-03-17 NOTE — TELEPHONE ENCOUNTER
Tried to reach patient's sister LUIS as to why the atorvastatin was stopped. Asked her to call the office if she had any questions or concerns.

## 2023-03-20 ENCOUNTER — ANTI-COAG VISIT (OUTPATIENT)
Dept: PHARMACY | Age: 63
End: 2023-03-20

## 2023-03-20 DIAGNOSIS — Z95.2 H/O MITRAL VALVE REPLACEMENT WITH MECHANICAL VALVE: Primary | ICD-10-CM

## 2023-03-20 LAB — INR BLD: 3.3

## 2023-03-20 NOTE — PROGRESS NOTES
INR 3.3 Patient skipped his warfarin dose on Monday, took 7.5 Tue-Sat and forgot his 1/2 pill on . Patient took only 5mg on . Please call [address] [place] [address] [phone] with warfarin dosing and order for next INR check. Called Marcy back, LVMx2. Instructed to have patient take 7.5mg Mon, Wed and Fri and 5mg all other days since INR was 3.3 which is within goal range of 2.5-3.5. Will check INR again in 1 week, 3/27.     For Pharmacy Admin Tracking Only    Intervention Detail: Adherence Monitorin and Dose Adjustment: 1, reason: Therapy Optimization  Total # of Interventions Recommended: 2  Total # of Interventions Accepted: 2  Time Spent (min): 15

## 2023-03-27 ENCOUNTER — ANTI-COAG VISIT (OUTPATIENT)
Dept: PHARMACY | Age: 63
End: 2023-03-27

## 2023-03-27 ENCOUNTER — OFFICE VISIT (OUTPATIENT)
Dept: CARDIOLOGY CLINIC | Age: 63
End: 2023-03-27
Payer: COMMERCIAL

## 2023-03-27 VITALS
HEIGHT: 73 IN | SYSTOLIC BLOOD PRESSURE: 100 MMHG | DIASTOLIC BLOOD PRESSURE: 60 MMHG | HEART RATE: 68 BPM | OXYGEN SATURATION: 97 % | BODY MASS INDEX: 25.45 KG/M2 | WEIGHT: 192 LBS

## 2023-03-27 DIAGNOSIS — I95.9 HYPOTENSION, UNSPECIFIED HYPOTENSION TYPE: ICD-10-CM

## 2023-03-27 DIAGNOSIS — Z91.199 NON-COMPLIANCE: ICD-10-CM

## 2023-03-27 DIAGNOSIS — I50.22 CHRONIC SYSTOLIC HEART FAILURE (HCC): Primary | ICD-10-CM

## 2023-03-27 DIAGNOSIS — Z95.2 H/O MITRAL VALVE REPLACEMENT WITH MECHANICAL VALVE: Primary | ICD-10-CM

## 2023-03-27 DIAGNOSIS — Z95.2 S/P MVR (MITRAL VALVE REPLACEMENT): ICD-10-CM

## 2023-03-27 DIAGNOSIS — I25.10 CORONARY ARTERY DISEASE INVOLVING NATIVE CORONARY ARTERY OF NATIVE HEART WITHOUT ANGINA PECTORIS: ICD-10-CM

## 2023-03-27 LAB — INR BLD: 2.1

## 2023-03-27 PROCEDURE — 99214 OFFICE O/P EST MOD 30 MIN: CPT | Performed by: CLINICAL NURSE SPECIALIST

## 2023-03-27 RX ORDER — MIDODRINE HYDROCHLORIDE 5 MG/1
5 TABLET ORAL
Qty: 90 TABLET | Refills: 1 | Status: SHIPPED | OUTPATIENT
Start: 2023-03-27

## 2023-03-27 NOTE — PROGRESS NOTES
Lab Results   Component Value Date/Time    PROBNP 3,049 2023 02:12 PM    PROBNP 3,277 2023 04:58 AM    PROBNP 4,811 2023 08:20 PM     Cardiac Imaging:  Echo 23:  (EF 20% on 21)   Summary   Overall, left ventricular systolic function is severely depressed. Ejection fraction is visually estimated to be 10%. There is severe diffuse hypokinesis. Cannot determine diastology due to   mitral valve replacement. 29 mm Medtronic mechanical valve. Max P mmHg. Mean P mmHg. 28 mm Phisio TV annuloplasty. Max P mmHg. Mild to moderate tricuspid   regurgitation. RVSP = 37 mmHG. The right ventricle is normal in size with reduced function. Biatrial enlargement    Assessment:    1. Chronic systolic heart failure (HCC) on bb and digoxin   2. Coronary artery disease involving native coronary artery of native heart without angina pectoris    3. S/P MVR (mitral valve replacement)    4. Non-compliance    5.  Hypotension, unspecified hypotension type midodrine       Plan:   Patient Instructions   Think about cardiac rehab  Increase midodrine to 5 mg three times a day  Recommend sleep study  Check blood work today including LFTs or with home care   RTO in 6 weeks    Try and add a little entresto at next visit    NYHA IV    I appreciate the opportunity of cooperating in the care of this individual.    Burt Leventhal, APRN - CNS, CNS, 3/27/2023, 4:17 PM

## 2023-03-27 NOTE — PROGRESS NOTES
[address] [facility] w/ [facility] lv w/ pts INR results of 2.1, pt takes 7.5 mgs of warfarin M/W/F and 5 mgs all other days. Pt ate broccoli and cabbage over the weekend. [phone]         Returned call to [facility] and [facility]. Instructed for patient to take 5 mg on Sun and Thurs and 7.5mg all other days. Recheck INR in 1 week, 4/3.  Any questions or concerns return call to clinic           For Pharmacy Admin Tracking Only    Intervention Detail: Dose Adjustment: 1, reason: Therapy Optimization  Total # of Interventions Recommended: 1  Total # of Interventions Accepted: 1  Time Spent (min): 15

## 2023-03-27 NOTE — PATIENT INSTRUCTIONS
Think about cardiac rehab  Increase midodrine to 5 mg three times a day  Recommend sleep study  Check blood work today including LFTs or with home care   RTO in 6 weeks

## 2023-04-03 ENCOUNTER — ANTI-COAG VISIT (OUTPATIENT)
Dept: PHARMACY | Age: 63
End: 2023-04-03

## 2023-04-03 DIAGNOSIS — Z95.2 H/O MITRAL VALVE REPLACEMENT WITH MECHANICAL VALVE: Primary | ICD-10-CM

## 2023-04-03 LAB — INR BLD: 1.8

## 2023-04-03 NOTE — PROGRESS NOTES
INR 1.8 Patient taking 5mg warfarin on Thu/Sun and 7.5mg all other days. Patient reports 2 servings of greens and 2 ensures this week. Please call [address] [place] [address] [phone] with warfarin dosing and order for next INR check. Returned call to [facility]. Instructed for patient to take 10 mg tonight then 7.5mg daily.  Recheck INR in 1 week, 4/10         For Pharmacy Admin Tracking Only    Intervention Detail: Dose Adjustment: 1, reason: Therapy Optimization  Total # of Interventions Recommended: 1  Total # of Interventions Accepted: 1  Time Spent (min): 10

## 2023-04-17 ENCOUNTER — ANTI-COAG VISIT (OUTPATIENT)
Dept: PHARMACY | Age: 63
End: 2023-04-17

## 2023-04-17 DIAGNOSIS — Z95.2 H/O MITRAL VALVE REPLACEMENT WITH MECHANICAL VALVE: Primary | ICD-10-CM

## 2023-04-17 LAB — INR BLD: 3.4

## 2023-04-17 NOTE — PROGRESS NOTES
INR 3.4 Patient taking 10mg warfarin on Mondays and 7.5mg all other days. Please call [address] [phone] with warfarin dosing and order for next INR check. Called Faisal back. Instructed to have patient take 7.5mg daily since INR was 3.4 which is within goal range of 2.5-3.5. Will check INR again in 1 week, 4/24.      Samantha Gonzalez, PharmD, [facility] Tracking Only    Intervention Detail: Dose Adjustment: 1, reason: Therapy Optimization  Total # of Interventions Recommended: 1  Total # of Interventions Accepted: 1  Time Spent (min): 15

## 2023-04-20 ENCOUNTER — TELEPHONE (OUTPATIENT)
Dept: CARDIOLOGY CLINIC | Age: 63
End: 2023-04-20

## 2023-04-20 NOTE — TELEPHONE ENCOUNTER
Luz Maria Millie, sister called to inform [facility] that the Pt's left leg is very swollen. Please call to discuss what he needs to do for the swelling. Please advise.   Thank you

## 2023-04-21 RX ORDER — DIGOXIN 125 MCG
62.5 TABLET ORAL DAILY
Qty: 45 TABLET | Refills: 0 | Status: SHIPPED | OUTPATIENT
Start: 2023-04-21

## 2023-04-21 RX ORDER — FUROSEMIDE 40 MG/1
40 TABLET ORAL 2 TIMES DAILY
Qty: 180 TABLET | Refills: 0 | Status: SHIPPED | OUTPATIENT
Start: 2023-04-21

## 2023-04-21 NOTE — TELEPHONE ENCOUNTER
Tried to reach patient's sister, [facility] for her to return our call  Spoke to patient's sister she is not with him and will call the brother that is with him to answer HF questions.     Current Weight is 202 was 192, no sob unless he is walking, furosemide 40 mg bid, swelling in Lt foot and ankle, needs refill of furosemide

## 2023-04-24 ENCOUNTER — ANTI-COAG VISIT (OUTPATIENT)
Dept: PHARMACY | Age: 63
End: 2023-04-24

## 2023-04-24 ENCOUNTER — TELEPHONE (OUTPATIENT)
Dept: PHARMACY | Age: 63
End: 2023-04-24

## 2023-04-24 DIAGNOSIS — Z95.2 H/O MITRAL VALVE REPLACEMENT WITH MECHANICAL VALVE: Primary | ICD-10-CM

## 2023-04-24 LAB — INR BLD: 3

## 2023-04-24 RX ORDER — MIDODRINE HYDROCHLORIDE 2.5 MG/1
TABLET ORAL
Qty: 90 TABLET | Refills: 1 | OUTPATIENT
Start: 2023-04-24

## 2023-04-24 NOTE — PROGRESS NOTES
INR 3.0 Patient taking 7.5mg warfarin daily. Please call Tom Saleem [phone] with warfarin dosing. Please call patient to schedule next INR in clinic. [phone]   Today is his last home care appt. Returned call to [address] and lvm. Instructed for patient to continue to take 7.5 mg daily. Pt will need to RTC in 2 weeks, . Any questions or concerns return call to clinic. Called pt and LVM asking for pt to return call. Continue to take 7.5 mg daily. Scheduled pt to RTC on  since has to be in hospital for appt at 2:30pm. Pt will have clinic appt at 2 pm. If that does not work, please return call to clinic.      Called and LVM with pt sister relaying information above       For Pharmacy Admin Tracking Only    Intervention Detail: Adherence Monitorin and Dose Adjustment: 1, reason: Therapy Optimization  Total # of Interventions Recommended: 2  Total # of Interventions Accepted: 2  Time Spent (min): 15

## 2023-04-24 NOTE — TELEPHONE ENCOUNTER
Please send to [facility]    Medication Refill    Medication needing refilled:  midodrine (PROAMATINE    Dosage of the medication:  5 MG tablet    How are you taking this medication (QD, BID, TID, QID, PRN):  Take 1 tablet by mouth 3 times daily (with meals)    30 or 90 day supply called in:  90 tablet    When will you run out of your medication:  Currently Out    Which Pharmacy are we sending the medication to?:    [address], [address] [place] [address] [place]   Phone:  [phone]  Fax:  [phone] Statement Selected

## 2023-04-25 RX ORDER — MIDODRINE HYDROCHLORIDE 5 MG/1
TABLET ORAL
Qty: 270 TABLET | OUTPATIENT
Start: 2023-04-25

## 2023-04-25 RX ORDER — MIDODRINE HYDROCHLORIDE 5 MG/1
5 TABLET ORAL
Qty: 90 TABLET | Refills: 0 | Status: SHIPPED | OUTPATIENT
Start: 2023-04-25

## 2023-04-25 NOTE — TELEPHONE ENCOUNTER
Patient calling asking if midodrine can be sent to [facility]  Please advise

## 2023-05-01 RX ORDER — AMIODARONE HYDROCHLORIDE 200 MG/1
200 TABLET ORAL DAILY
Qty: 30 TABLET | Refills: 0 | OUTPATIENT
Start: 2023-05-01

## 2023-05-02 RX ORDER — AMIODARONE HYDROCHLORIDE 200 MG/1
200 TABLET ORAL DAILY
Qty: 30 TABLET | Refills: 0 | OUTPATIENT
Start: 2023-05-02

## 2023-05-03 ENCOUNTER — TELEPHONE (OUTPATIENT)
Dept: CARDIOLOGY CLINIC | Age: 63
End: 2023-05-03

## 2023-05-03 NOTE — TELEPHONE ENCOUNTER
Pt called left message [facility] voicemail   for refill     Medication Refill  Medication needing refilled:  amiodarone (CORDARONE) 200 MG tablet     Dosage of the medication:  200 mg  How are you taking this medication (QD, BID, TID, QID, PRN):  Take 1 tablet by mouth daily  30 or 90 day supply called in:  90 day  When will you run out of your medication:  Pt is out   Which Pharmacy are we sending the medication to?:  [place] [address] [phone] [place] [phone]   [address] [place] [address], [address] [place] [address] [place]   Phone:  [phone]  Fax:  [phone]

## 2023-05-03 NOTE — TELEPHONE ENCOUNTER
Chart reviewed. This was not prescribed by HF team.   Amiodarone was prescribed post -op 12/2021 per MXA. I will start him on amiodarone 200 mg daily to help suppress ventricular ectopy. Pt is not doing required labs. LFT elevated. Per ALYSA stop amiodarone and consult with EP regarding need for drug. Explained to sister that HF does not manage Amiodarone. Aries Morrissey will talk to [address] [place] [address] and she what she recommends as far as Amiodarone/EP consult goes. She understands he has a low EF. Has next appt 5/12 with GUILLE.

## 2023-05-12 ENCOUNTER — ANTI-COAG VISIT (OUTPATIENT)
Dept: PHARMACY | Age: 63
End: 2023-05-12
Payer: COMMERCIAL

## 2023-05-12 ENCOUNTER — OFFICE VISIT (OUTPATIENT)
Dept: CARDIOLOGY CLINIC | Age: 63
End: 2023-05-12
Payer: COMMERCIAL

## 2023-05-12 VITALS
BODY MASS INDEX: 26.24 KG/M2 | HEIGHT: 73 IN | WEIGHT: 198 LBS | SYSTOLIC BLOOD PRESSURE: 120 MMHG | HEART RATE: 78 BPM | OXYGEN SATURATION: 97 % | DIASTOLIC BLOOD PRESSURE: 70 MMHG

## 2023-05-12 DIAGNOSIS — I95.9 HYPOTENSION, UNSPECIFIED HYPOTENSION TYPE: ICD-10-CM

## 2023-05-12 DIAGNOSIS — Z95.2 H/O MITRAL VALVE REPLACEMENT WITH MECHANICAL VALVE: Primary | ICD-10-CM

## 2023-05-12 DIAGNOSIS — I50.22 CHRONIC SYSTOLIC HEART FAILURE (HCC): Primary | ICD-10-CM

## 2023-05-12 DIAGNOSIS — I25.10 CORONARY ARTERY DISEASE INVOLVING NATIVE CORONARY ARTERY OF NATIVE HEART WITHOUT ANGINA PECTORIS: ICD-10-CM

## 2023-05-12 DIAGNOSIS — Z95.2 S/P MVR (MITRAL VALVE REPLACEMENT): ICD-10-CM

## 2023-05-12 DIAGNOSIS — Z91.199 NON-COMPLIANCE: ICD-10-CM

## 2023-05-12 LAB — INTERNATIONAL NORMALIZATION RATIO, POC: 1.8

## 2023-05-12 PROCEDURE — 85610 PROTHROMBIN TIME: CPT

## 2023-05-12 PROCEDURE — 99212 OFFICE O/P EST SF 10 MIN: CPT

## 2023-05-12 PROCEDURE — 99214 OFFICE O/P EST MOD 30 MIN: CPT | Performed by: CLINICAL NURSE SPECIALIST

## 2023-05-12 NOTE — PROGRESS NOTES
[address]  Progress Note    Primary Care Doctor:  Vanessa Sanchez MD    Chief Complaint   Patient presents with    Follow-up    Congestive Heart Failure    Edema          History of Present Illness:  63 y.o. male with history of systolic heart failure, CAD status post CABG and MV replacement on coumadin, valvular disease, tricuspid valve repair 12/10/2021, LVEF 15% at surgery time  2/19-22/2023 for bilateral leg swelling up to his knees and about 5 pounds up. He is not reliable with taking his medication and follow up visits. LVEF today is 10%. He is being diuresed with IV lasix. He agreed to start taking all his medications. I had the pleasure of seeing Radha Gardner in follow up for systolic heart failure. He is ambulatory and with his sister and brother. He is feeling really good, still with ankle edema L>R, no chest pain, palpitations, lightheadedness. He is out walking more. He really wants his clicking valve changed. He is taking all his medications. BP much improved. Past Medical History:   has a past medical history of Diabetes ([address] Hyperlipidemia, and Hypertension. Surgical History:   has a past surgical history that includes Coronary artery bypass graft (N/A, 12/10/2021) and Cardiac surgery. Social History:   reports that he quit smoking about 17 months ago. His smoking use included cigarettes. He has a 15.00 pack-year smoking history. He has never used smokeless tobacco. He reports current alcohol use. He reports that he does not use drugs. Family History:   Family History   Problem Relation Age of Onset    Cancer Mother         colon cancer    Heart Disease Father     High Blood Pressure Father     Diabetes Father     High Cholesterol Father        Home Medications:  Prior to Admission medications    Medication Sig Start Date End Date Taking?  Authorizing Provider   sacubitril-valsartan (ENTRESTO) 24-26 MG per tablet Take 1 tablet by mouth daily 5/12/23  Yes Sol CHAVIS

## 2023-05-12 NOTE — PROGRESS NOTES
Mr. Mine Valentin is a 63 y.o. y/o male with history of Heart Valve Replacement who presents today for anticoagulation monitoring and adjustment. 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. Hx pt of CC then dcd for noncompliance. Readmitted 2/19 after quitting taking medications for several months. Patient Reported Findings:  Yes     No  [x]   []       Patient verifies current dosing regimen as listed- 5mg daily since discharge---> confirmed dose    []   [x]       S/S bleeding/bruising/swelling/SOB- denies   []   [x]       Blood in urine or stool- denies   []   [x]       Procedures scheduled in the future at this time  []   [x]       Missed Dose- denies   []   [x]       Extra Dose- denies   []   [x]       Change in medications- denies significant changes, will check med list and update us. Need to confirm using 5mg tablets of warfarin not 6mg tablets. ---> Amiodarone was discontinued per wife 2 weeks ago   []   [x]       Change in health/diet/appetite- doesn't want to eat vit k foods---> had salads 2-3 times in past week and broccoli twice as well   []   [x]       Change in alcohol use- doesn't drink or smoke   []   [x]       Change in activity  []   [x]       Hospital admission- admitted 2/19-2/22 after not taking meds for several months   2/20-6mg  2/21- 10mg  2/22- 10mg  []   [x]       Emergency department visit  []   [x]       Other complaints    Clinical Outcomes:  Yes     No  []   [x]       Major bleeding event  []   [x]       Thromboembolic event    Duration of warfarin Therapy: indefinite   INR Range:  2.5-3.5      Warfarin 5mg tablets, taking at night. RTC from [facility]. States was taken off Amiodarone end of April. INR is 1.8 today after stopping Amiodarone and eating more greens (plans to continue). Was 3.0 on this dose 2 weeks ago in [facility]. Seeing cardio today, pt promises to call if patient resumes Amiodarone.    Increase dose to 10mg Mon and Fri and 7.5mg all other days (9.5%)  Encouraged to maintain

## 2023-05-12 NOTE — PATIENT INSTRUCTIONS
Start entresto 24-26 mg once a day  Check blood work on May 22 when you come for INR check  Continue all current medications  RTO in 4-6 weeks

## 2023-05-22 ENCOUNTER — TELEPHONE (OUTPATIENT)
Dept: PHARMACY | Age: 63
End: 2023-05-22

## 2023-05-22 ENCOUNTER — ANTI-COAG VISIT (OUTPATIENT)
Dept: PHARMACY | Age: 63
End: 2023-05-22
Payer: COMMERCIAL

## 2023-05-22 DIAGNOSIS — Z95.2 H/O MITRAL VALVE REPLACEMENT WITH MECHANICAL VALVE: Primary | ICD-10-CM

## 2023-05-22 LAB — INTERNATIONAL NORMALIZATION RATIO, POC: 2.2

## 2023-05-22 PROCEDURE — 85610 PROTHROMBIN TIME: CPT

## 2023-05-22 PROCEDURE — 99212 OFFICE O/P EST SF 10 MIN: CPT

## 2023-05-22 RX ORDER — MIDODRINE HYDROCHLORIDE 5 MG/1
TABLET ORAL
Qty: 270 TABLET | OUTPATIENT
Start: 2023-05-22

## 2023-05-22 RX ORDER — MIDODRINE HYDROCHLORIDE 5 MG/1
5 TABLET ORAL
Qty: 90 TABLET | Refills: 0 | Status: SHIPPED | OUTPATIENT
Start: 2023-05-22

## 2023-05-22 NOTE — TELEPHONE ENCOUNTER
Faxed collaborative update for completion and signature to [facility] office.

## 2023-05-22 NOTE — TELEPHONE ENCOUNTER
Medication Refill    Medication needing refilled:   midodrine    Dosage of the medication:  5mg    How are you taking this medication (QD, BID, TID, QID, PRN):  Take 1 tablet by mouth 3 times daily (with meals)    30 or 90 day supply called in:    270    When will you run out of your medication:    Which Pharmacy are we sending the medication to?:    [id number]   [facility]   Phone:   [phone]   Fax:  [phone]

## 2023-05-22 NOTE — PROGRESS NOTES
Mr. Moon Avila is a 63 y.o. y/o male with history of Heart Valve Replacement who presents today for anticoagulation monitoring and adjustment. 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. Hx pt of CC then dcd for noncompliance. Readmitted 2/19 after quitting taking medications for several months. Patient Reported Findings:  Yes     No  [x]   []       Patient verifies current dosing regimen as listed- 5mg daily since discharge---> confirmed dose    []   [x]       S/S bleeding/bruising/swelling/SOB- denies   []   [x]       Blood in urine or stool- denies   []   [x]       Procedures scheduled in the future at this time  []   [x]       Missed Dose- denies   []   [x]       Extra Dose- denies   [x]   []       Change in medications- denies significant changes, will check med list and update us. Need to confirm using 5mg tablets of warfarin not 6mg tablets. ---> Amiodarone was discontinued per wife 2 weeks ago --> started entresto   []   [x]       Change in health/diet/appetite- doesn't want to eat vit k foods---> had salads 2-3 times in past week and broccoli twice as well --> no changes   []   [x]       Change in alcohol use- doesn't drink or smoke   []   [x]       Change in activity  []   [x]       Hospital admission- admitted 2/19-2/22 after not taking meds for several months   2/20-6mg  2/21- 10mg  2/22- 10mg  []   [x]       Emergency department visit  []   [x]       Other complaints    Clinical Outcomes:  Yes     No  []   [x]       Major bleeding event  []   [x]       Thromboembolic event    Duration of warfarin Therapy: indefinite   INR Range:  2.5-3.5    Warfarin 5mg tablets, taking at night. States was taken off Amiodarone end of April. Cardiology did not resume     INR is 2.2 today    Increase dose to 10mg Mon Wed and Fri and 7.5mg all other days  (4%)  Encouraged to maintain a consistency of vegetables/salads. Recheck INR in 2 weeks, 6/5    Consent signed 3/6/2023.     Referring Dr. Loyda Kessler need new

## 2023-05-23 RX ORDER — MIDODRINE HYDROCHLORIDE 5 MG/1
TABLET ORAL
Qty: 270 TABLET | OUTPATIENT
Start: 2023-05-23

## 2023-05-26 RX ORDER — MIDODRINE HYDROCHLORIDE 2.5 MG/1
TABLET ORAL
Qty: 90 TABLET | Refills: 1 | Status: SHIPPED | OUTPATIENT
Start: 2023-05-26 | End: 2023-06-15 | Stop reason: ALTCHOICE

## 2023-06-05 ENCOUNTER — ANTI-COAG VISIT (OUTPATIENT)
Dept: PHARMACY | Age: 63
End: 2023-06-05
Payer: COMMERCIAL

## 2023-06-05 DIAGNOSIS — Z95.2 H/O MITRAL VALVE REPLACEMENT WITH MECHANICAL VALVE: Primary | ICD-10-CM

## 2023-06-05 LAB — INTERNATIONAL NORMALIZATION RATIO, POC: 1.7

## 2023-06-05 PROCEDURE — 85610 PROTHROMBIN TIME: CPT

## 2023-06-05 PROCEDURE — 99212 OFFICE O/P EST SF 10 MIN: CPT

## 2023-06-05 NOTE — PROGRESS NOTES
Mr. Tona Berg is a 63 y.o. y/o male with history of Heart Valve Replacement who presents today for anticoagulation monitoring and adjustment. 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. Hx pt of CC then dcd for noncompliance. Readmitted 2/19 after quitting taking medications for several months. Patient Reported Findings:  Yes     No  [x]   []       Patient verifies current dosing regimen as listed- 5mg daily since discharge---> confirmed dose    []   [x]       S/S bleeding/bruising/swelling/SOB- denies   []   [x]       Blood in urine or stool- denies   []   [x]       Procedures scheduled in the future at this time  []   [x]       Missed Dose- denies ---> possibly   []   [x]       Extra Dose- denies   [x]   []       Change in medications- denies significant changes, will check med list and update us. Need to confirm using 5mg tablets of warfarin not 6mg tablets. ---> Amiodarone was discontinued per wife 2 weeks ago --> started entresto   []   [x]       Change in health/diet/appetite- doesn't want to eat vit k foods---> had salads 2-3 times in past week and broccoli twice as well --> extra greens    []   [x]       Change in alcohol use- doesn't drink or smoke   []   [x]       Change in activity  []   [x]       Hospital admission- admitted 2/19-2/22 after not taking meds for several months   2/20-6mg  2/21- 10mg  2/22- 10mg  []   [x]       Emergency department visit  []   [x]       Other complaints    Clinical Outcomes:  Yes     No  []   [x]       Major bleeding event  []   [x]       Thromboembolic event    Duration of warfarin Therapy: indefinite   INR Range:  2.5-3.5    Warfarin 5mg tablets, taking at night. States was taken off Amiodarone end of April. Cardiology did not resume     INR is 1.7 today after dose increase but then increased greens and possible missed dose. Will drop back to 3 times/week and keep a journal. Will give large boost today then continue but if low again will need to increase dose.

## 2023-06-07 ENCOUNTER — TELEPHONE (OUTPATIENT)
Dept: PHARMACY | Age: 63
End: 2023-06-07

## 2023-06-07 NOTE — TELEPHONE ENCOUNTER
Ref was faxed to [facility] to update. Received referral today w/ pts PCP Dr Manoj Zaman signature on it.  Re-faxed to PCP asking to verify he completed and signed referral.

## 2023-06-20 ENCOUNTER — ANTI-COAG VISIT (OUTPATIENT)
Dept: PHARMACY | Age: 63
End: 2023-06-20
Payer: COMMERCIAL

## 2023-06-20 DIAGNOSIS — Z95.2 H/O MITRAL VALVE REPLACEMENT WITH MECHANICAL VALVE: Primary | ICD-10-CM

## 2023-06-20 LAB — INTERNATIONAL NORMALIZATION RATIO, POC: 2.3

## 2023-06-20 PROCEDURE — 99212 OFFICE O/P EST SF 10 MIN: CPT

## 2023-06-20 PROCEDURE — 85610 PROTHROMBIN TIME: CPT

## 2023-06-20 NOTE — PROGRESS NOTES
inc)  Encouraged to maintain a consistency of vegetables/salads. Recheck INR in 3 weeks,  d/t holiday. Pt is out of town week of holiday     Consent signed 3/6/2023.     Referring Dr. Todd Jama need new REF signed by Shukri Ayala  INR (no units)   Date Value   2023 3.00   2023 3.40   04/10/2023 2.70   2023 1.80     INR,(POC) (no units)   Date Value   2023 1.7   2023 2.2   2023 1.8     For Pharmacy Admin Tracking Only    Intervention Detail: Adherence Monitorin and Dose Adjustment: 1, reason: Therapy Optimization  Total # of Interventions Recommended: 2  Total # of Interventions Accepted: 2  Time Spent (min): 15

## 2023-07-11 ENCOUNTER — ANTI-COAG VISIT (OUTPATIENT)
Dept: PHARMACY | Age: 63
End: 2023-07-11
Payer: COMMERCIAL

## 2023-07-11 DIAGNOSIS — Z95.2 H/O MITRAL VALVE REPLACEMENT WITH MECHANICAL VALVE: Primary | ICD-10-CM

## 2023-07-11 LAB — INTERNATIONAL NORMALIZATION RATIO, POC: 1.9

## 2023-07-11 PROCEDURE — 99212 OFFICE O/P EST SF 10 MIN: CPT

## 2023-07-11 PROCEDURE — 85610 PROTHROMBIN TIME: CPT

## 2023-07-11 NOTE — PROGRESS NOTES
Mr. Rosa Guillermo is a 63 y.o. y/o male with history of Heart Valve Replacement who presents today for anticoagulation monitoring and adjustment. 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. Hx pt of CC then dcd for noncompliance. Readmitted 2/19 after quitting taking medications for several months. Patient Reported Findings:  Yes     No  [x]   []       Patient verifies current dosing regimen as listed- 5mg daily since discharge---> confirmed dose    []   [x]       S/S bleeding/bruising/swelling/SOB- denies   []   [x]       Blood in urine or stool- denies   []   [x]       Procedures scheduled in the future at this time  []   [x]       Missed Dose- 2 days over holiday weekend   []   [x]       Extra Dose- denies   []   [x]       Change in medications- denies significant changes, will check med list and update us. Need to confirm using 5mg tablets of warfarin not 6mg tablets. ---> Amiodarone was discontinued per wife 2 weeks ago --> started entresto -->stopped midodrine. --> no changes   []   [x]       Change in health/diet/appetite- doesn't want to eat vit k foods---> had salads 2-3 times in past week and broccoli twice as well --> extra greens --> eating vit k a few times a week, maybe. Pt unclear --> no changes    []   [x]       Change in alcohol use- doesn't drink or smoke   []   [x]       Change in activity  []   [x]       Hospital admission- admitted 2/19-2/22 after not taking meds for several months   2/20-6mg  2/21- 10mg  2/22- 10mg  []   [x]       Emergency department visit  []   [x]       Other complaints    Clinical Outcomes:  Yes     No  []   [x]       Major bleeding event  []   [x]       Thromboembolic event    Duration of warfarin Therapy: indefinite   INR Range:  2.5-3.5    Warfarin 5mg tablets, taking at night. States was taken off Amiodarone end of April.  Cardiology did not resume     INR is 1.9 today after missed doses last week     Take 12.5 mg tonight then continue weekly dose of 10mg Mon Wed

## 2023-07-26 ENCOUNTER — ANTI-COAG VISIT (OUTPATIENT)
Dept: PHARMACY | Age: 63
End: 2023-07-26
Payer: COMMERCIAL

## 2023-07-26 DIAGNOSIS — Z95.2 H/O MITRAL VALVE REPLACEMENT WITH MECHANICAL VALVE: Primary | ICD-10-CM

## 2023-07-26 LAB — INTERNATIONAL NORMALIZATION RATIO, POC: 1.6

## 2023-07-26 PROCEDURE — 85610 PROTHROMBIN TIME: CPT

## 2023-07-26 PROCEDURE — 99212 OFFICE O/P EST SF 10 MIN: CPT

## 2023-08-04 ENCOUNTER — TELEPHONE (OUTPATIENT)
Dept: PHARMACY | Age: 63
End: 2023-08-04

## 2023-08-08 ENCOUNTER — ANTI-COAG VISIT (OUTPATIENT)
Dept: PHARMACY | Age: 63
End: 2023-08-08
Payer: COMMERCIAL

## 2023-08-08 DIAGNOSIS — Z95.2 H/O MITRAL VALVE REPLACEMENT WITH MECHANICAL VALVE: Primary | ICD-10-CM

## 2023-08-08 LAB — INTERNATIONAL NORMALIZATION RATIO, POC: 2.4

## 2023-08-08 PROCEDURE — 85610 PROTHROMBIN TIME: CPT

## 2023-08-08 PROCEDURE — 99211 OFF/OP EST MAY X REQ PHY/QHP: CPT

## 2023-08-08 NOTE — PROGRESS NOTES
Mr. Stella Parker is a 63 y.o. y/o male with history of Heart Valve Replacement who presents today for anticoagulation monitoring and adjustment. 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. Hx pt of CC then dcd for noncompliance. Readmitted 2/19 after quitting taking medications for several months. Patient Reported Findings:  Yes     No  [x]   []       Patient verifies current dosing regimen as listed- 5mg daily since discharge---> confirmed dose    []   [x]       S/S bleeding/bruising/swelling/SOB- denies   []   [x]       Blood in urine or stool- denies   []   [x]       Procedures scheduled in the future at this time denies   []   [x]       Missed Dose- denies   []   [x]       Extra Dose- denies   []   [x]       Change in medications-  Amiodarone was discontinued per wife 2 weeks ago --> started entresto -->stopped midodrine. --> no changes   []   [x]       Change in health/diet/appetite- doesn't want to eat vit k foods---> had salads 2-3 times in past week and broccoli twice as well --> extra greens --> eating vit k a few times a week, maybe. Pt unclear --> no changes --> pt believes probably had salads recently and spinach but unsure. No V/D --> no changes    []   [x]       Change in alcohol use- doesn't drink or smoke   []   [x]       Change in activity  []   [x]       Hospital admission- admitted 2/19-2/22 after not taking meds for several months   2/20-6mg  2/21- 10mg  2/22- 10mg  []   [x]       Emergency department visit  []   [x]       Other complaints    Clinical Outcomes:  Yes     No  []   [x]       Major bleeding event  []   [x]       Thromboembolic event    Duration of warfarin Therapy: indefinite   INR Range:  2.5-3.5    Warfarin 5mg tablets, taking at night. States was taken off Amiodarone end of April. Cardiology did not resume     INR is 2.4 today   Increase weekly dose to 12.5 mg on Tues and 10 mg all other days of the week (3% inc)  Encouraged to maintain a consistency of vegetables/salads.

## 2023-08-28 ENCOUNTER — TELEPHONE (OUTPATIENT)
Dept: PHARMACY | Age: 63
End: 2023-08-28

## 2023-08-28 NOTE — TELEPHONE ENCOUNTER
Patient's father has been hospitalized in [place] and he and his sister are leaving tomorrow. Sister will call back once they return to [place] to r/s.

## 2023-09-14 ENCOUNTER — HOSPITAL ENCOUNTER (OUTPATIENT)
Age: 63
Discharge: HOME | End: 2023-09-14
Payer: COMMERCIAL

## 2023-09-14 ENCOUNTER — OFFICE VISIT (OUTPATIENT)
Dept: CARDIOLOGY CLINIC | Age: 63
End: 2023-09-14
Payer: COMMERCIAL

## 2023-09-14 ENCOUNTER — ANTI-COAG VISIT (OUTPATIENT)
Dept: PHARMACY | Age: 63
End: 2023-09-14
Payer: COMMERCIAL

## 2023-09-14 VITALS
SYSTOLIC BLOOD PRESSURE: 120 MMHG | HEIGHT: 73 IN | BODY MASS INDEX: 25.84 KG/M2 | WEIGHT: 195 LBS | HEART RATE: 80 BPM | OXYGEN SATURATION: 97 % | DIASTOLIC BLOOD PRESSURE: 80 MMHG

## 2023-09-14 DIAGNOSIS — Z91.199 NON-COMPLIANCE: ICD-10-CM

## 2023-09-14 DIAGNOSIS — Z95.2 H/O MITRAL VALVE REPLACEMENT WITH MECHANICAL VALVE: Primary | ICD-10-CM

## 2023-09-14 DIAGNOSIS — I50.22 CHRONIC SYSTOLIC HEART FAILURE (HCC): Primary | ICD-10-CM

## 2023-09-14 DIAGNOSIS — I50.22 CHRONIC SYSTOLIC HEART FAILURE (HCC): ICD-10-CM

## 2023-09-14 DIAGNOSIS — Z95.2 S/P MVR (MITRAL VALVE REPLACEMENT): ICD-10-CM

## 2023-09-14 DIAGNOSIS — I25.10 CORONARY ARTERY DISEASE INVOLVING NATIVE CORONARY ARTERY OF NATIVE HEART WITHOUT ANGINA PECTORIS: ICD-10-CM

## 2023-09-14 LAB
ANION GAP SERPL CALCULATED.3IONS-SCNC: 11 MMOL/L (ref 3–16)
BUN SERPL-MCNC: 11 MG/DL (ref 7–20)
CALCIUM SERPL-MCNC: 9.6 MG/DL (ref 8.3–10.6)
CHLORIDE SERPL-SCNC: 105 MMOL/L (ref 99–110)
CO2 SERPL-SCNC: 22 MMOL/L (ref 21–32)
CREAT SERPL-MCNC: 0.9 MG/DL (ref 0.8–1.3)
GFR SERPLBLD CREATININE-BSD FMLA CKD-EPI: >60 ML/MIN/{1.73_M2}
GLUCOSE SERPL-MCNC: 156 MG/DL (ref 70–99)
INTERNATIONAL NORMALIZATION RATIO, POC: 3.3
NT-PROBNP SERPL-MCNC: 832 PG/ML (ref 0–124)
POTASSIUM SERPL-SCNC: 4.5 MMOL/L (ref 3.5–5.1)
SODIUM SERPL-SCNC: 138 MMOL/L (ref 136–145)

## 2023-09-14 PROCEDURE — 83880 ASSAY OF NATRIURETIC PEPTIDE: CPT

## 2023-09-14 PROCEDURE — 80048 BASIC METABOLIC PNL TOTAL CA: CPT

## 2023-09-14 PROCEDURE — 85610 PROTHROMBIN TIME: CPT

## 2023-09-14 PROCEDURE — 36415 COLL VENOUS BLD VENIPUNCTURE: CPT

## 2023-09-14 PROCEDURE — 99214 OFFICE O/P EST MOD 30 MIN: CPT | Performed by: CLINICAL NURSE SPECIALIST

## 2023-09-14 PROCEDURE — 99211 OFF/OP EST MAY X REQ PHY/QHP: CPT

## 2023-09-14 RX ORDER — DIGOXIN 125 MCG
62.5 TABLET ORAL DAILY
Qty: 45 TABLET | Refills: 0 | Status: SHIPPED | OUTPATIENT
Start: 2023-09-14

## 2023-09-14 NOTE — PROGRESS NOTES
[facility]  Progress Note    Primary Care Doctor:  Travis London MD    Chief Complaint   Patient presents with    Congestive Heart Failure    Coronary Artery Disease    Hyperlipidemia          History of Present Illness:  63 y.o. male with history of systolic heart failure, CAD status post CABG and MV replacement on coumadin, valvular disease, tricuspid valve repair 12/10/2021, LVEF 15% at surgery time  2/19-22/2023 for bilateral leg swelling up to his knees and about 5 pounds up. He is not reliable with taking his medication and follow up visits. LVEF today is 10%. He is being diuresed with IV lasix. I had the pleasure of seeing Wing Munroe in follow up for systolic heart failure. He is ambulatory and with his sister. He is only taking his entresto once a day and states he is compliant with all his other medications. He has been told every visit to get blood work done and still has not done this. Explained to him he has to get labs done today. He smells of smoke (yellow fingers) and finally admits to using marijuana. He denies any chest pain, palpitations, lightheadedness, edema or shortness of breath. He does not want any more medications. Past Medical History:   has a past medical history of Diabetes ([address]), Hyperlipidemia, and Hypertension. Surgical History:   has a past surgical history that includes Coronary artery bypass graft (N/A, 12/10/2021) and Cardiac surgery. Social History:   reports that he quit smoking about 21 months ago. His smoking use included cigarettes. He has a 15.00 pack-year smoking history. He has never used smokeless tobacco. He reports current alcohol use. He reports that he does not use drugs.    Family History:   Family History   Problem Relation Age of Onset    Cancer Mother         colon cancer    Heart Disease Father     High Blood Pressure Father     Diabetes Father     High Cholesterol Father        Home Medications:  Prior to Admission

## 2023-09-14 NOTE — PROGRESS NOTES
Mr. Georgette Sharif is a 63 y.o. y/o male with history of Heart Valve Replacement who presents today for anticoagulation monitoring and adjustment. 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. Hx pt of CC then dcd for noncompliance. Readmitted 2/19 after quitting taking medications for several months. Patient Reported Findings:  Yes     No  [x]   []       Patient verifies current dosing regimen as listed- 5mg daily since discharge---> confirmed dose    []   [x]       S/S bleeding/bruising/swelling/SOB- denies   []   [x]       Blood in urine or stool- denies   []   [x]       Procedures scheduled in the future at this time denies   []   [x]       Missed Dose- denies   []   [x]       Extra Dose- denies   []   [x]       Change in medications-  Amiodarone was discontinued per wife 2 weeks ago --> started entresto -->stopped midodrine. --> no changes   []   [x]       Change in health/diet/appetite- doesn't want to eat vit k foods---> had salads 2-3 times in past week and broccoli twice as well --> extra greens --> eating vit k a few times a week, maybe. Pt unclear --> pt believes probably had salads recently and spinach but unsure. No V/D --> no changes    []   [x]       Change in alcohol use- doesn't drink or smoke   []   [x]       Change in activity  []   [x]       Hospital admission- admitted 2/19-2/22 after not taking meds for several months   2/20-6mg  2/21- 10mg  2/22- 10mg  []   [x]       Emergency department visit  []   [x]       Other complaints    Clinical Outcomes:  Yes     No  []   [x]       Major bleeding event  []   [x]       Thromboembolic event    Duration of warfarin Therapy: indefinite   INR Range:  2.5-3.5    Warfarin 5mg tablets, taking at night. States was taken off Amiodarone end of April. Cardiology did not resume     INR is 3.3 today   Continue weekly dose of 12.5 mg on Tues and 10 mg all other days of the week   Encouraged to maintain a consistency of vegetables/salads.    Refilled warfarin at

## 2023-09-15 ENCOUNTER — TELEPHONE (OUTPATIENT)
Dept: CARDIOLOGY CLINIC | Age: 63
End: 2023-09-15

## 2023-09-15 NOTE — TELEPHONE ENCOUNTER
----- Message from MICHELLE Cevallos - CNS sent at 9/15/2023  8:29 AM EDT -----  Thank him for getting this done  His kidney function has improved greatly 1.6 down to 0.9 and fluid level went from 3049 down to 832 all wonderful  Continue all current medications  Thanks    Tried to reach patient [facility] about normal lab results asked patient to call with any questions or concerns.

## 2023-10-05 ENCOUNTER — ANTI-COAG VISIT (OUTPATIENT)
Dept: PHARMACY | Age: 63
End: 2023-10-05
Payer: COMMERCIAL

## 2023-10-05 DIAGNOSIS — Z95.2 H/O MITRAL VALVE REPLACEMENT WITH MECHANICAL VALVE: Primary | ICD-10-CM

## 2023-10-05 LAB — INTERNATIONAL NORMALIZATION RATIO, POC: 3.6

## 2023-10-05 PROCEDURE — 99211 OFF/OP EST MAY X REQ PHY/QHP: CPT

## 2023-10-05 PROCEDURE — 85610 PROTHROMBIN TIME: CPT

## 2023-10-05 NOTE — PROGRESS NOTES
Mr. Vimal Luo is a 63 y.o. y/o male with history of Heart Valve Replacement who presents today for anticoagulation monitoring and adjustment. 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. Hx pt of CC then dcd for noncompliance. Readmitted 2/19 after quitting taking medications for several months. Patient Reported Findings:  Yes     No  [x]   []       Patient verifies current dosing regimen as listed- 5mg daily since discharge---> confirmed dose    []   [x]       S/S bleeding/bruising/swelling/SOB- denies   []   [x]       Blood in urine or stool- denies   []   [x]       Procedures scheduled in the future at this time denies   []   [x]       Missed Dose- denies   []   [x]       Extra Dose- denies   []   [x]       Change in medications-  Amiodarone was discontinued per wife 2 weeks ago --> started entresto -->stopped midodrine. --> inc entresto    []   [x]       Change in health/diet/appetite- doesn't want to eat vit k foods---> had salads 2-3 times in past week and broccoli twice as well --> extra greens --> eating vit k a few times a week, maybe. Pt unclear --> pt believes probably had salads recently and spinach but unsure. No V/D --> no changes---> no greens     []   [x]       Change in alcohol use- doesn't drink or smoke   []   [x]       Change in activity  []   [x]       Hospital admission- admitted 2/19-2/22 after not taking meds for several months   2/20-6mg  2/21- 10mg  2/22- 10mg  []   [x]       Emergency department visit  []   [x]       Other complaints    Clinical Outcomes:  Yes     No  []   [x]       Major bleeding event  []   [x]       Thromboembolic event    Duration of warfarin Therapy: indefinite   INR Range:  2.5-3.5    Warfarin 5mg tablets, taking at night. States was taken off Amiodarone end of April. Cardiology did not resume     INR is 3.6 today dt no greens, will add back in. Continue weekly dose of 12.5 mg on Tues and 10 mg all other days of the week.    Encouraged to maintain a

## 2023-10-26 ENCOUNTER — ANTI-COAG VISIT (OUTPATIENT)
Dept: PHARMACY | Age: 63
End: 2023-10-26
Payer: COMMERCIAL

## 2023-10-26 DIAGNOSIS — Z95.2 H/O MITRAL VALVE REPLACEMENT WITH MECHANICAL VALVE: Primary | ICD-10-CM

## 2023-10-26 LAB — INTERNATIONAL NORMALIZATION RATIO, POC: 4.7

## 2023-10-26 PROCEDURE — 99212 OFFICE O/P EST SF 10 MIN: CPT

## 2023-10-26 PROCEDURE — 85610 PROTHROMBIN TIME: CPT

## 2023-10-26 NOTE — PROGRESS NOTES
vegetables/salads. Recheck INR in 2 weeks,   Consent signed 3/6/2023. Refused AVS/apt card.      Referring Dr. David Del Real   INR (no units)   Date Value   2023 3.00   2023 3.40   04/10/2023 2.70   2023 1.80     INR,(POC) (no units)   Date Value   10/26/2023 4.7   10/05/2023 3.6   2023 3.3   2023 2.4     For Pharmacy Admin Tracking Only    Intervention Detail: Adherence Monitorin and Dose Adjustment: 1, reason: Therapy Optimization  Total # of Interventions Recommended: 2  Total # of Interventions Accepted: 2  Time Spent (min): 15

## 2023-11-09 ENCOUNTER — TELEPHONE (OUTPATIENT)
Dept: PHARMACY | Age: 63
End: 2023-11-09

## 2023-11-09 ENCOUNTER — APPOINTMENT (OUTPATIENT)
Dept: PHARMACY | Age: 63
End: 2023-11-09
Payer: COMMERCIAL

## 2023-11-21 ENCOUNTER — ANTI-COAG VISIT (OUTPATIENT)
Dept: PHARMACY | Age: 63
End: 2023-11-21
Payer: COMMERCIAL

## 2023-11-21 DIAGNOSIS — Z95.2 H/O MITRAL VALVE REPLACEMENT WITH MECHANICAL VALVE: Primary | ICD-10-CM

## 2023-11-21 LAB — INTERNATIONAL NORMALIZATION RATIO, POC: 3.3

## 2023-11-21 PROCEDURE — 99211 OFF/OP EST MAY X REQ PHY/QHP: CPT

## 2023-11-21 PROCEDURE — 85610 PROTHROMBIN TIME: CPT

## 2023-11-21 NOTE — PROGRESS NOTES
in 3 weeks, 12/12    Consent signed 3/6/2023. Refused AVS/apt card.      Referring Dr. Carl Lennox   INR (no units)   Date Value   04/24/2023 3.00   04/17/2023 3.40   04/10/2023 2.70   04/03/2023 1.80     INR,(POC) (no units)   Date Value   10/26/2023 4.7   10/05/2023 3.6   09/14/2023 3.3   08/08/2023 2.4     For Pharmacy Admin Tracking Only    Total # of Interventions Recommended: 0  Total # of Interventions Accepted: 0  Time Spent (min): 15

## 2023-12-12 ENCOUNTER — ANTI-COAG VISIT (OUTPATIENT)
Dept: PHARMACY | Age: 63
End: 2023-12-12
Payer: COMMERCIAL

## 2023-12-12 DIAGNOSIS — Z95.2 H/O MITRAL VALVE REPLACEMENT WITH MECHANICAL VALVE: Primary | ICD-10-CM

## 2023-12-12 LAB — INTERNATIONAL NORMALIZATION RATIO, POC: 3.1

## 2023-12-12 PROCEDURE — 85610 PROTHROMBIN TIME: CPT

## 2023-12-12 PROCEDURE — 99211 OFF/OP EST MAY X REQ PHY/QHP: CPT

## 2023-12-12 RX ORDER — WARFARIN SODIUM 5 MG/1
10 TABLET ORAL DAILY
Qty: 180 TABLET | Refills: 3 | Status: SHIPPED | OUTPATIENT
Start: 2023-12-12

## 2023-12-12 NOTE — TELEPHONE ENCOUNTER
Warfarin prescription phoned into [address] to [address] [facility] under Dr. Maureen iLra   Warfarin 5 mg tabs  Take 10 mg daily  90 days   2 refills

## 2023-12-12 NOTE — PROGRESS NOTES
Mr. Kelly Moyer is a 63 y.o. y/o male with history of Heart Valve Replacement who presents today for anticoagulation monitoring and adjustment. 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. Hx pt of CC then dcd for noncompliance. Readmitted 2/19 after quitting taking medications for several months. Patient Reported Findings:  Yes     No  [x]   []       Patient verifies current dosing regimen as listed- 5mg daily since discharge---> confirmed dose    []   [x]       S/S bleeding/bruising/swelling/SOB- denies   []   [x]       Blood in urine or stool- denies   []   [x]       Procedures scheduled in the future at this time denies   []   [x]       Missed Dose- denies   []   [x]       Extra Dose- denies   []   [x]       Change in medications-  Amiodarone was discontinued per wife 2 weeks ago --> started entresto --> stopped midodrine. --> inc entresto ---> denies    []   [x]       Change in health/diet/appetite- doesn't want to eat vit k foods---> had salads 2-3 times in past week and broccoli twice as well --> extra greens --> eating vit k a few times a week, maybe. Pt unclear --> pt believes probably had salads recently and spinach but unsure. No V/D ---> no greens  ---> not many greens --> no changes   []   [x]       Change in alcohol use- doesn't drink or smoke   []   [x]       Change in activity  []   [x]       Hospital admission-   []   [x]       Emergency department visit  []   [x]       Other complaints    Clinical Outcomes:  Yes     No  []   [x]       Major bleeding event  []   [x]       Thromboembolic event  Refills warfarin at [facility]   Duration of warfarin Therapy: indefinite   INR Range:  2.5-3.5    Warfarin 5mg tablets, taking at night. States was taken off Amiodarone end of April. Cardiology did not resume     INR is 3.3 today    Continue weekly dose to 10mg daily  Encouraged to maintain a consistency of vegetables/salads.    E-scribed warfarin to [facility] pharmacy    Recheck INR in 4 weeks,

## 2023-12-20 RX ORDER — CARVEDILOL 12.5 MG/1
12.5 TABLET ORAL 2 TIMES DAILY WITH MEALS
Qty: 180 TABLET | Refills: 1 | Status: SHIPPED | OUTPATIENT
Start: 2023-12-20

## 2023-12-20 RX ORDER — SACUBITRIL AND VALSARTAN 24; 26 MG/1; MG/1
1 TABLET, FILM COATED ORAL DAILY
Qty: 180 TABLET | Refills: 0 | Status: SHIPPED | OUTPATIENT
Start: 2023-12-20

## 2023-12-26 ENCOUNTER — TELEPHONE (OUTPATIENT)
Dept: CARDIOLOGY CLINIC | Age: 63
End: 2023-12-26

## 2023-12-26 RX ORDER — DIGOXIN 125 MCG
62.5 TABLET ORAL DAILY
Qty: 45 TABLET | Refills: 0 | Status: SHIPPED | OUTPATIENT
Start: 2023-12-26

## 2023-12-26 NOTE — TELEPHONE ENCOUNTER
Submitted PA for ENTRESTO  [facility]  Key: [id number] STATUS: APPROVED 12/26/23-12/25/24    Follow up done daily; if no decision with in three days we will refax. If another three days goes by with no decision will call the insurance for status.

## 2024-01-01 NOTE — ASSESSMENT & PLAN NOTE
Continue care and recommendation as per cardiology  
As noted above patient reported he will not take the insulin, he mentioned \"you are trying to kill me \", his sister tried to calm him down, patient was noncompliant with his follow-up previously, advised I expect him to follow-up in 4 weeks with blood sugar log ever if he does not comply with his follow-up appointment then he will be dismissed from the practice
Explained to patient he needs to be started on insulin given reported home blood sugar readings, unable to start metformin given very low ejection fraction, advised he can continue the sulfonylurea therapy however will need to start sliding scale insulin to get blood sugar under control. Instructions on use discussed with patient, patient did make a note that he will probably not comply because he does not want to do any injectables. Advised to keep a log of blood glucose readings and to bring with him to the office in 4 weeks to adjust medications, encouraged patient to start insulin therapy and advised this can be temporary until his sugars are under better control.   Diet recommendations reinforced
Symptoms exacerbated by recent TKA however appears to be going back to baseline, he will continue care and recommendation as per cardiology
This has resolved with most recent lab work reviewed showing kidney function back to normal, continue current medications, continue maintaining healthy low-salt low-fat diet and avoid NSAIDs
Fariha Retana

## 2024-01-10 ENCOUNTER — ANTI-COAG VISIT (OUTPATIENT)
Dept: PHARMACY | Age: 64
End: 2024-01-10
Payer: COMMERCIAL

## 2024-01-10 ENCOUNTER — OFFICE VISIT (OUTPATIENT)
Dept: CARDIOLOGY CLINIC | Age: 64
End: 2024-01-10
Payer: COMMERCIAL

## 2024-01-10 VITALS
SYSTOLIC BLOOD PRESSURE: 136 MMHG | WEIGHT: 213 LBS | DIASTOLIC BLOOD PRESSURE: 90 MMHG | BODY MASS INDEX: 28.23 KG/M2 | HEIGHT: 73 IN | OXYGEN SATURATION: 98 %

## 2024-01-10 DIAGNOSIS — I50.22 CHRONIC SYSTOLIC HEART FAILURE (HCC): Primary | ICD-10-CM

## 2024-01-10 DIAGNOSIS — Z95.2 H/O MITRAL VALVE REPLACEMENT WITH MECHANICAL VALVE: Primary | ICD-10-CM

## 2024-01-10 DIAGNOSIS — Z95.2 S/P MVR (MITRAL VALVE REPLACEMENT): ICD-10-CM

## 2024-01-10 DIAGNOSIS — I25.10 CORONARY ARTERY DISEASE INVOLVING NATIVE CORONARY ARTERY OF NATIVE HEART WITHOUT ANGINA PECTORIS: ICD-10-CM

## 2024-01-10 DIAGNOSIS — Z91.199 NON-COMPLIANCE: ICD-10-CM

## 2024-01-10 LAB — INTERNATIONAL NORMALIZATION RATIO, POC: 4.3

## 2024-01-10 PROCEDURE — 99214 OFFICE O/P EST MOD 30 MIN: CPT | Performed by: CLINICAL NURSE SPECIALIST

## 2024-01-10 PROCEDURE — 85610 PROTHROMBIN TIME: CPT

## 2024-01-10 PROCEDURE — 99211 OFF/OP EST MAY X REQ PHY/QHP: CPT

## 2024-01-10 NOTE — PROGRESS NOTES
[facility]  Progress Note    Primary Care Doctor:  Gigi Cristobal MD    Chief Complaint   Patient presents with    3 Month Follow-Up    Congestive Heart Failure    Fatigue          History of Present Illness:  63 y.o. male with history of systolic heart failure, CAD status post CABG and MV replacement on coumadin, valvular disease, tricuspid valve repair 12/10/2021, LVEF 15% at surgery time  2/19-22/2023 for bilateral leg swelling up to his knees and about 5 pounds up.  He is not reliable with taking his medication and follow up visits.  LVEF today is 10%.  He is being diuresed with IV lasix.      I had the pleasure of seeing Kaveh Murphy in follow up for systolic heart failure.  He is ambulatory and with his sister.  His weight has increased from 195->213 and admits to eating ham, chips and drinking more over the holidays.  He has bilateral ankle edema.  He is not interested in doing an echo yet as he has \"a lot going on with family.\"  He is taking all his medications and goes to the coumadin clinic.  No chest pain, palpitations, lightheadedness or shortness of breath.  Biggest complaint is fatigue.      Past Medical History:   has a past medical history of Diabetes (HCC), Hyperlipidemia, and Hypertension.  Surgical History:   has a past surgical history that includes Coronary artery bypass graft (N/A, 12/10/2021) and Cardiac surgery.   Social History:   reports that he quit smoking about 2 years ago. His smoking use included cigarettes. He started smoking about 17 years ago. He has a 15.0 pack-year smoking history. He has never used smokeless tobacco. He reports current alcohol use. He reports that he does not use drugs.   Family History:   Family History   Problem Relation Age of Onset    Cancer Mother         colon cancer    Heart Disease Father     High Blood Pressure Father     Diabetes Father     High Cholesterol Father        Home Medications:  Prior to Admission medications    Medication Sig

## 2024-01-10 NOTE — PROGRESS NOTES
Mr. Kaveh Murphy is a 63 y.o. y/o male with history of Heart Valve Replacement who presents today for anticoagulation monitoring and adjustment.    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. Hx pt of CC then dcd for noncompliance. Readmitted 2/19 after quitting taking medications for several months.     Patient Reported Findings:  Yes     No  [x]   []       Patient verifies current dosing regimen as listed- 5mg daily since discharge---> confirmed dose    []   [x]       S/S bleeding/bruising/swelling/SOB- denies   []   [x]       Blood in urine or stool- denies   []   [x]       Procedures scheduled in the future at this time denies   []   [x]       Missed Dose- denies   []   [x]       Extra Dose- denies   []   [x]       Change in medications-  Amiodarone was discontinued per wife 2 weeks ago --> started entresto --> stopped midodrine. --> inc entresto ---> denies--> extra tylenol recently   []   [x]       Change in health/diet/appetite- doesn't want to eat vit k foods---> had salads 2-3 times in past week and broccoli twice as well --> extra greens --> eating vit k a few times a week, maybe. Pt unclear --> pt believes probably had salads recently and spinach but unsure. No V/D ---> no greens  ---> not many greens --> no changes   []   [x]       Change in alcohol use- doesn't drink or smoke   []   [x]       Change in activity  []   [x]       Hospital admission-   []   [x]       Emergency department visit  []   [x]       Other complaints    Clinical Outcomes:  Yes     No  []   [x]       Major bleeding event  []   [x]       Thromboembolic event  Refills warfarin at [facility]   Duration of warfarin Therapy: indefinite   INR Range:  2.5-3.5    Warfarin 5mg tablets, taking at night.      States was taken off Amiodarone end of April. Cardiology did not resume     INR is 4.3 today, dt extra tylenol   Patients expresses they do not plan on taking any additional tylenol   Hold warfarin tonight and continue to take 10mg

## 2024-01-10 NOTE — PATIENT INSTRUCTIONS
Start jardiance 10 mg daily  Blood work jan 24 th  Continue all other medications  Cut back on salt and fluids  Think about the echo scheduling  RTO

## 2024-01-24 ENCOUNTER — ANTI-COAG VISIT (OUTPATIENT)
Dept: PHARMACY | Age: 64
End: 2024-01-24
Payer: COMMERCIAL

## 2024-01-24 DIAGNOSIS — Z95.2 H/O MITRAL VALVE REPLACEMENT WITH MECHANICAL VALVE: Primary | ICD-10-CM

## 2024-01-24 LAB — INTERNATIONAL NORMALIZATION RATIO, POC: 2.7

## 2024-01-24 PROCEDURE — 99211 OFF/OP EST MAY X REQ PHY/QHP: CPT

## 2024-01-24 PROCEDURE — 85610 PROTHROMBIN TIME: CPT

## 2024-01-24 NOTE — PROGRESS NOTES
Mr. Kaveh Murphy is a 63 y.o. y/o male with history of Heart Valve Replacement who presents today for anticoagulation monitoring and adjustment.    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. Hx pt of CC then dcd for noncompliance. Readmitted 2/19 after quitting taking medications for several months.     Patient Reported Findings:  Yes     No  [x]   []       Patient verifies current dosing regimen as listed- 5mg daily since discharge---> confirmed dose    []   [x]       S/S bleeding/bruising/swelling/SOB- denies   []   [x]       Blood in urine or stool- denies   []   [x]       Procedures scheduled in the future at this time denies   []   [x]       Missed Dose- denies   []   [x]       Extra Dose- denies   [x]   []       Change in medications-  Amiodarone was discontinued per wife 2 weeks ago --> started entresto --> stopped midodrine. --> inc entresto ---> denies--> extra tylenol recently --> started jardiance   []   [x]       Change in health/diet/appetite- doesn't want to eat vit k foods---> had salads 2-3 times in past week and broccoli twice as well --> extra greens --> eating vit k a few times a week, maybe. Pt unclear --> pt believes probably had salads recently and spinach but unsure. No V/D ---> no greens  ---> not many greens --> no changes   []   [x]       Change in alcohol use- doesn't drink or smoke   []   [x]       Change in activity  []   [x]       Hospital admission-   []   [x]       Emergency department visit  []   [x]       Other complaints    Clinical Outcomes:  Yes     No  []   [x]       Major bleeding event  []   [x]       Thromboembolic event  Refills warfarin at [facility]   Duration of warfarin Therapy: indefinite   INR Range:  2.5-3.5    Warfarin 5mg tablets, taking at night.      States was taken off Amiodarone end of April. Cardiology did not resume     INR is 2.7 today  Continue to take 10mg daily  Encouraged to maintain a consistency of vegetables/salads.   Recheck INR in 3 weeks,

## 2024-02-15 ENCOUNTER — ANTI-COAG VISIT (OUTPATIENT)
Dept: PHARMACY | Age: 64
End: 2024-02-15
Payer: COMMERCIAL

## 2024-02-15 DIAGNOSIS — Z95.2 H/O MITRAL VALVE REPLACEMENT WITH MECHANICAL VALVE: Primary | ICD-10-CM

## 2024-02-15 LAB — INTERNATIONAL NORMALIZATION RATIO, POC: 2.9

## 2024-02-15 PROCEDURE — 99211 OFF/OP EST MAY X REQ PHY/QHP: CPT

## 2024-02-15 PROCEDURE — 85610 PROTHROMBIN TIME: CPT

## 2024-02-15 NOTE — PROGRESS NOTES
Mr. Kaveh Murphy is a 63 y.o. y/o male with history of Heart Valve Replacement who presents today for anticoagulation monitoring and adjustment.    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. Hx pt of CC then dcd for noncompliance. Readmitted 2/19 after quitting taking medications for several months.     Patient Reported Findings:  Yes     No  [x]   []       Patient verifies current dosing regimen as listed- 5mg daily since discharge---> confirmed dose    []   [x]       S/S bleeding/bruising/swelling/SOB- denies   []   [x]       Blood in urine or stool- denies   []   [x]       Procedures scheduled in the future at this time denies   []   [x]       Missed Dose- denies   []   [x]       Extra Dose- denies   []   [x]       Change in medications-  Amiodarone was discontinued per wife 2 weeks ago --> started entresto --> stopped midodrine. --> inc entresto ---> denies--> extra tylenol recently --> started jardiance --> no changes  []   [x]       Change in health/diet/appetite- doesn't want to eat vit k foods---> had salads 2-3 times in past week and broccoli twice as well --> extra greens --> eating vit k a few times a week, maybe. Pt unclear --> pt believes probably had salads recently and spinach but unsure. No V/D ---> no greens  ---> not many greens --> no changes   []   [x]       Change in alcohol use- doesn't drink or smoke   []   [x]       Change in activity  []   [x]       Hospital admission-   []   [x]       Emergency department visit  []   [x]       Other complaints    Clinical Outcomes:  Yes     No  []   [x]       Major bleeding event  []   [x]       Thromboembolic event  Refills warfarin at [facility]   Duration of warfarin Therapy: indefinite   INR Range:  2.5-3.5    Warfarin 5mg tablets, taking at night.     States was taken off Amiodarone end of April. Cardiology did not resume     INR is 2.9 today  Continue to take 10mg daily  Encouraged to maintain a consistency of vegetables/salads.   Recheck INR in 4 weeks,

## 2024-03-14 ENCOUNTER — ANTI-COAG VISIT (OUTPATIENT)
Dept: PHARMACY | Age: 64
End: 2024-03-14
Payer: COMMERCIAL

## 2024-03-14 DIAGNOSIS — Z95.2 H/O MITRAL VALVE REPLACEMENT WITH MECHANICAL VALVE: Primary | ICD-10-CM

## 2024-03-14 LAB — INTERNATIONAL NORMALIZATION RATIO, POC: 2.4

## 2024-03-14 PROCEDURE — 99211 OFF/OP EST MAY X REQ PHY/QHP: CPT

## 2024-03-14 PROCEDURE — 85610 PROTHROMBIN TIME: CPT

## 2024-03-14 NOTE — PROGRESS NOTES
apt that day, then wants 6 wks  Consent signed 2024.  Refused AVS/apt card.     Referring Dr. Gigi Cristobal   INR (no units)   Date Value   2023 3.00   2023 3.40   04/10/2023 2.70   2023 1.80     INR,(POC) (no units)   Date Value   02/15/2024 2.9   2024 2.7   01/10/2024 4.3   2023 3.1     For Pharmacy Admin Tracking Only    Intervention Detail: Adherence Monitorin and Dose Adjustment: 1, reason: Therapy Optimization  Total # of Interventions Recommended: 2  Total # of Interventions Accepted: 2  Time Spent (min): 15

## 2024-03-26 ENCOUNTER — TELEPHONE (OUTPATIENT)
Dept: CARDIOLOGY CLINIC | Age: 64
End: 2024-03-26

## 2024-03-26 NOTE — TELEPHONE ENCOUNTER
The patient's sister Sol phoned asking if they could have samples of   Sample requested:     ENTRESTO 24-26 MG per tablet [[id number]]   Strength:     Dosage:    TAKE 1 TABLET BY MOUTH DAILY.   Patient's call back number:    [phone]  Please call Sol with questions.  Patient is waiting on 30 day supply from   [id number] [address] - P [phone] - F [phone]     The patient is getting 30 day supply due to cost.

## 2024-03-26 NOTE — TELEPHONE ENCOUNTER
Ok for samples 1 bottle per RGNP.  Sample of Entresto 24-26mg daily given to sister.     Pt is depressed.  Sister doesn't know how long he has been without Entresto.

## 2024-03-26 NOTE — TELEPHONE ENCOUNTER
Left message for the patient asking him if he is taking the Jardiance and also telling him he needs blood work per RGNP.      Asked the sister if he is taking it and she did not know.  She states that he read that it could cause his penis to peel so she doesn't think that he is taking it.     Do you want her to have a bottle of Entresto?

## 2024-04-08 ENCOUNTER — TELEPHONE (OUTPATIENT)
Dept: PHARMACY | Age: 64
End: 2024-04-08

## 2024-04-08 NOTE — TELEPHONE ENCOUNTER
Wife called to [employer] cifuentes from  to , out of town.    Shelli Sharp, PharmD, [facility]    For Pharmacy Admin Tracking Only    Intervention Detail: Adherence Monitorin  Total # of Interventions Recommended: 1  Total # of Interventions Accepted: 1  Time Spent (min): 5

## 2024-04-10 NOTE — PROGRESS NOTES
Mr. Juan Cuellar is a 63 y.o. y/o male with history of Heart Valve Replacement who presents today for anticoagulation monitoring and adjustment. 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. Hx pt of CC then dcd for noncompliance. Readmitted 2/19 after quitting taking medications for several months. Patient Reported Findings:  Yes     No  [x]   []       Patient verifies current dosing regimen as listed- 5mg daily since discharge---> confirmed dose    []   [x]       S/S bleeding/bruising/swelling/SOB- denies   []   [x]       Blood in urine or stool- denies   []   [x]       Procedures scheduled in the future at this time  []   [x]       Missed Dose- denies   []   [x]       Extra Dose- denies   []   [x]       Change in medications- denies significant changes, will check med list and update us. Need to confirm using 5mg tablets of warfarin not 6mg tablets. ---> Amiodarone was discontinued per wife 2 weeks ago --> started entresto -->stopped midodrine. --> no changes   [x]   []       Change in health/diet/appetite- doesn't want to eat vit k foods---> had salads 2-3 times in past week and broccoli twice as well --> extra greens --> eating vit k a few times a week, maybe. Pt unclear --> no changes --> pt believes probably had salads recently and spinach but unsure. No V/D    []   [x]       Change in alcohol use- doesn't drink or smoke   []   [x]       Change in activity  []   [x]       Hospital admission- admitted 2/19-2/22 after not taking meds for several months   2/20-6mg  2/21- 10mg  2/22- 10mg  []   [x]       Emergency department visit  []   [x]       Other complaints    Clinical Outcomes:  Yes     No  []   [x]       Major bleeding event  []   [x]       Thromboembolic event    Duration of warfarin Therapy: indefinite   INR Range:  2.5-3.5    Warfarin 5mg tablets, taking at night. States was taken off Amiodarone end of April.  Cardiology did not resume     INR is 1.6 today despite pt stating no changes   Increase No skilled OT needs

## 2024-05-09 ENCOUNTER — ANTI-COAG VISIT (OUTPATIENT)
Dept: PHARMACY | Age: 64
End: 2024-05-09
Payer: COMMERCIAL

## 2024-05-09 DIAGNOSIS — Z95.2 H/O MITRAL VALVE REPLACEMENT WITH MECHANICAL VALVE: Primary | ICD-10-CM

## 2024-05-09 LAB — INTERNATIONAL NORMALIZATION RATIO, POC: 2.3

## 2024-05-09 PROCEDURE — 99211 OFF/OP EST MAY X REQ PHY/QHP: CPT

## 2024-05-09 PROCEDURE — 85610 PROTHROMBIN TIME: CPT

## 2024-05-09 NOTE — PROGRESS NOTES
Mr. Kaveh Murphy is a 63 y.o. y/o male with history of Heart Valve Replacement who presents today for anticoagulation monitoring and adjustment.    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. Hx pt of CC then dcd for noncompliance. Readmitted 2/19 after quitting taking medications for several months.     Patient Reported Findings:  Yes     No  [x]   []       Patient verifies current dosing regimen as listed- 5mg daily since discharge---> confirmed dose    []   [x]       S/S bleeding/bruising/swelling/SOB- denies   []   [x]       Blood in urine or stool- denies   []   [x]       Procedures scheduled in the future at this time denies   []   [x]       Missed Dose- denies   []   [x]       Extra Dose- denies   []   [x]       Change in medications-  Amiodarone was discontinued per wife 2 weeks ago --> started entresto --> stopped midodrine. --> inc entresto ---> denies--> extra tylenol recently --> started jardiance --> no changes  []   [x]       Change in health/diet/appetite- doesn't want to eat vit k foods---> had salads 2-3 times in past week and broccoli twice as well --> extra greens --> eating vit k a few times a week, maybe. Pt unclear --> pt believes probably had salads recently and spinach but unsure. No V/D ---> no greens  ---> not many greens --> no changes   []   [x]       Change in alcohol use- doesn't drink or smoke   []   [x]       Change in activity  []   [x]       Hospital admission-   []   [x]       Emergency department visit  []   [x]       Other complaints    Clinical Outcomes:  Yes     No  []   [x]       Major bleeding event  []   [x]       Thromboembolic event  Refills warfarin at [facility]   Duration of warfarin Therapy: indefinite   INR Range:  2.5-3.5    Warfarin 5mg tablets, taking at night.     INR is 2.3 today after maintaining dose at last visit.  Will increase dose slightly.  Increase dose to 12.5mg on Thurs and 10mg all other days (3.6%).  Encouraged to maintain a consistency of

## 2024-05-21 RX ORDER — FUROSEMIDE 40 MG/1
40 TABLET ORAL DAILY
Qty: 30 TABLET | Refills: 0 | Status: SHIPPED | OUTPATIENT
Start: 2024-05-21

## 2024-06-06 ENCOUNTER — ANTI-COAG VISIT (OUTPATIENT)
Dept: PHARMACY | Age: 64
End: 2024-06-06
Payer: COMMERCIAL

## 2024-06-06 DIAGNOSIS — Z95.2 H/O MITRAL VALVE REPLACEMENT WITH MECHANICAL VALVE: Primary | ICD-10-CM

## 2024-06-06 LAB — INTERNATIONAL NORMALIZATION RATIO, POC: 2.7

## 2024-06-06 PROCEDURE — 99211 OFF/OP EST MAY X REQ PHY/QHP: CPT

## 2024-06-06 PROCEDURE — 85610 PROTHROMBIN TIME: CPT

## 2024-06-06 NOTE — PROGRESS NOTES
Recheck INR in 5 weeks, 7/11    Referring Dr. Gigi Cristobal   INR (no units)   Date Value   04/24/2023 3.00   04/17/2023 3.40   04/10/2023 2.70   04/03/2023 1.80     INR,(POC) (no units)   Date Value   05/09/2024 2.3   03/14/2024 2.4   02/15/2024 2.9   01/24/2024 2.7     For Pharmacy Admin Tracking Only    Total # of Interventions Recommended: 0  Total # of Interventions Accepted: 0  Time Spent (min): 15

## 2024-07-11 ENCOUNTER — ANTI-COAG VISIT (OUTPATIENT)
Dept: PHARMACY | Age: 64
End: 2024-07-11
Payer: COMMERCIAL

## 2024-07-11 DIAGNOSIS — Z95.2 H/O MITRAL VALVE REPLACEMENT WITH MECHANICAL VALVE: Primary | ICD-10-CM

## 2024-07-11 LAB
INTERNATIONAL NORMALIZATION RATIO, POC: 2.5
PROTHROMBIN TIME, POC: NORMAL

## 2024-07-11 PROCEDURE — 99211 OFF/OP EST MAY X REQ PHY/QHP: CPT

## 2024-07-11 PROCEDURE — 85610 PROTHROMBIN TIME: CPT

## 2024-07-11 NOTE — PROGRESS NOTES
Mr. Kaveh Murphy is a 63 y.o. y/o male with history of Heart Valve Replacement who presents today for anticoagulation monitoring and adjustment.    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. Hx pt of CC then dcd for noncompliance. Readmitted 2/19 after quitting taking medications for several months.     Patient Reported Findings:  Yes     No  [x]   []       Patient verifies current dosing regimen as listed- 5mg daily since discharge---> confirmed dose --> did not increase dose as instructed, only one day --> confirmed dose    []   [x]       S/S bleeding/bruising/swelling/SOB- denies   []   [x]       Blood in urine or stool- denies   []   [x]       Procedures scheduled in the future at this time denies   []   [x]       Missed Dose- denies   []   [x]       Extra Dose- denies   []   [x]       Change in medications-  Amiodarone was discontinued per wife 2 weeks ago --> started entresto --> stopped midodrine. --> inc entresto ---> extra tylenol recently --> started jardiance --> no changes  [x]   []       Change in health/diet/appetite- doesn't want to eat vit k foods---> had salads 2-3 times in past week and broccoli twice as well --> extra greens --> eating vit k a few times a week, maybe. Pt unclear --> pt believes probably had salads recently and spinach but unsure. No V/D ---> no greens  ---> not many greens --> had more spinach and broccoli --> has had some vit k, does not state how much   []   [x]       Change in alcohol use- doesn't drink or smoke   []   [x]       Change in activity  []   [x]       Hospital admission-   []   [x]       Emergency department visit  []   [x]       Other complaints    Clinical Outcomes:  Yes     No  []   [x]       Major bleeding event  []   [x]       Thromboembolic event  Refills warfarin at [facility]   Duration of warfarin Therapy: indefinite   INR Range:  2.5-3.5    Warfarin 5mg tablets, taking at night.     INR is 2.5 today  Continue to take 10mg daily   Encouraged to maintain a

## 2024-08-15 ENCOUNTER — TELEPHONE (OUTPATIENT)
Dept: PHARMACY | Age: 64
End: 2024-08-15

## 2024-10-10 ENCOUNTER — TELEPHONE (OUTPATIENT)
Dept: INTERNAL MEDICINE CLINIC | Age: 64
End: 2024-10-10

## 2024-10-10 NOTE — TELEPHONE ENCOUNTER
This writer contacted pt - call went straight to . Detailed message left stating need for him to continue to get his INRs checked and to please call the anticoagulation clinic to make an appt before he is discharged.     Pt is also quite overdue for follow up with  - last seen 3/9/23.

## 2024-10-10 NOTE — TELEPHONE ENCOUNTER
Nancy from [facility] calling in. Pt last seen on 7/11 for INR and he was due back 8/15 and they have not been able to reach pt. They were able to speak to his sister and she said he was out of town. Nancy is just wanting  to be aware of the non-compliance and they will be discharging pt on 10/22 and  will be sent a copy of that discharge letter once sent. She is asking if staff can reach out to pt as well to encourage him to make an appt at the clinic before he is discharged.     Nancy is aware that  is out of the office until next Tuesday.    Nancy may be reached at [phone].

## 2024-10-11 ENCOUNTER — APPOINTMENT (OUTPATIENT)
Dept: PHARMACY | Age: 64
End: 2024-10-11
Payer: COMMERCIAL

## 2024-10-14 ENCOUNTER — ANTI-COAG VISIT (OUTPATIENT)
Dept: PHARMACY | Age: 64
End: 2024-10-14
Payer: COMMERCIAL

## 2024-10-14 DIAGNOSIS — Z95.2 H/O MITRAL VALVE REPLACEMENT WITH MECHANICAL VALVE: Primary | ICD-10-CM

## 2024-10-14 LAB
INTERNATIONAL NORMALIZATION RATIO, POC: 1.7
PROTHROMBIN TIME, POC: 0

## 2024-10-14 PROCEDURE — 99212 OFFICE O/P EST SF 10 MIN: CPT | Performed by: PHYSICIAN ASSISTANT

## 2024-10-14 PROCEDURE — 85610 PROTHROMBIN TIME: CPT | Performed by: PHYSICIAN ASSISTANT

## 2024-10-14 NOTE — PROGRESS NOTES
6 weeks     INR is 1.7 today after missed dose last week   Take 15 mg tonight then continue to take 10mg daily   Encouraged to maintain a consistency of vegetables/salads.   Recheck INR in 3 weeks, 11/4    Referring Dr. Gigi Cristobal   INR (no units)   Date Value   04/24/2023 3.00   04/17/2023 3.40   04/10/2023 2.70   04/03/2023 1.80     INR,(POC) (no units)   Date Value   07/11/2024 2.5   06/06/2024 2.7   05/09/2024 2.3   03/14/2024 2.4     For Pharmacy Admin Tracking Only    Intervention Detail: Dose Adjustment: 1, reason: Therapy Optimization  Total # of Interventions Recommended: 1  Total # of Interventions Accepted: 1  Time Spent (min): 15

## 2024-11-04 ENCOUNTER — ANTI-COAG VISIT (OUTPATIENT)
Dept: PHARMACY | Age: 64
End: 2024-11-04
Payer: COMMERCIAL

## 2024-11-04 DIAGNOSIS — Z95.2 H/O MITRAL VALVE REPLACEMENT WITH MECHANICAL VALVE: Primary | ICD-10-CM

## 2024-11-04 LAB
INTERNATIONAL NORMALIZATION RATIO, POC: 2.3
PROTHROMBIN TIME, POC: 0

## 2024-11-04 PROCEDURE — 99212 OFFICE O/P EST SF 10 MIN: CPT | Performed by: PHYSICIAN ASSISTANT

## 2024-11-04 PROCEDURE — 85610 PROTHROMBIN TIME: CPT | Performed by: PHYSICIAN ASSISTANT

## 2024-11-04 RX ORDER — WARFARIN SODIUM 5 MG/1
TABLET ORAL
Qty: 187 TABLET | Refills: 3 | Status: SHIPPED | OUTPATIENT
Start: 2024-11-04

## 2024-11-04 NOTE — PROGRESS NOTES
Increase weekly dose to 12.5 mg on Mon and 10 mg all other days of the week (3% inc)  Encouraged to maintain a consistency of vegetables/salads.   Recheck INR in 4 weeks, 12/2 as pt unavailable week of thanksgiving     Referring Dr. Gigi Cristobal   INR (no units)   Date Value   04/24/2023 3.00   04/17/2023 3.40   04/10/2023 2.70   04/03/2023 1.80     INR,(POC) (no units)   Date Value   10/14/2024 1.7   07/11/2024 2.5   06/06/2024 2.7   05/09/2024 2.3     For Pharmacy Admin Tracking Only    Intervention Detail: Dose Adjustment: 1, reason: Therapy Optimization  Total # of Interventions Recommended: 1  Total # of Interventions Accepted: 1  Time Spent (min): 15

## 2024-12-02 ENCOUNTER — TELEPHONE (OUTPATIENT)
Dept: PHARMACY | Age: 64
End: 2024-12-02

## 2024-12-02 NOTE — TELEPHONE ENCOUNTER
Sister called to cancel patient's appt for today.  States she is still OOT , due to the holiday.  Sister will call back to r/s, once she knows when he is coming back.

## 2025-01-05 ENCOUNTER — HOSPITAL ENCOUNTER (INPATIENT)
Age: 65
LOS: 8 days | Discharge: HOME | DRG: 291 | End: 2025-01-13
Attending: EMERGENCY MEDICINE | Admitting: INTERNAL MEDICINE
Payer: COMMERCIAL

## 2025-01-05 ENCOUNTER — APPOINTMENT (OUTPATIENT)
Dept: GENERAL RADIOLOGY | Age: 65
DRG: 291 | End: 2025-01-05
Payer: COMMERCIAL

## 2025-01-05 DIAGNOSIS — I50.23 ACUTE ON CHRONIC SYSTOLIC (CONGESTIVE) HEART FAILURE (HCC): Primary | ICD-10-CM

## 2025-01-05 DIAGNOSIS — R79.89 ELEVATED TROPONIN: ICD-10-CM

## 2025-01-05 DIAGNOSIS — R06.02 SHORTNESS OF BREATH: ICD-10-CM

## 2025-01-05 DIAGNOSIS — N17.9 ACUTE KIDNEY INJURY (HCC): ICD-10-CM

## 2025-01-05 DIAGNOSIS — R60.0 PEDAL EDEMA: ICD-10-CM

## 2025-01-05 DIAGNOSIS — T68.XXXA HYPOTHERMIA, INITIAL ENCOUNTER: ICD-10-CM

## 2025-01-05 DIAGNOSIS — E11.65 TYPE 2 DIABETES MELLITUS WITH HYPERGLYCEMIA, WITHOUT LONG-TERM CURRENT USE OF INSULIN (HCC): ICD-10-CM

## 2025-01-05 PROBLEM — Z72.0 TOBACCO ABUSE DISORDER: Status: ACTIVE | Noted: 2025-01-05

## 2025-01-05 LAB
ALBUMIN SERPL-MCNC: 3.4 G/DL (ref 3.4–5)
ALP SERPL-CCNC: 409 U/L (ref 40–129)
ALT SERPL-CCNC: 50 U/L (ref 10–40)
ANION GAP SERPL CALCULATED.3IONS-SCNC: 11 MMOL/L (ref 3–16)
APTT BLD: 43 SEC (ref 22.1–36.4)
AST SERPL-CCNC: 39 U/L (ref 15–37)
BASOPHILS # BLD: 0 K/UL (ref 0–0.2)
BASOPHILS NFR BLD: 0.5 %
BILIRUB DIRECT SERPL-MCNC: 1.3 MG/DL (ref 0–0.3)
BILIRUB INDIRECT SERPL-MCNC: 0.9 MG/DL (ref 0–1)
BILIRUB SERPL-MCNC: 2.2 MG/DL (ref 0–1)
BUN SERPL-MCNC: 33 MG/DL (ref 7–20)
CALCIUM SERPL-MCNC: 9.5 MG/DL (ref 8.3–10.6)
CHLORIDE SERPL-SCNC: 95 MMOL/L (ref 99–110)
CO2 SERPL-SCNC: 23 MMOL/L (ref 21–32)
CREAT SERPL-MCNC: 1.6 MG/DL (ref 0.8–1.3)
DEPRECATED RDW RBC AUTO: 16 % (ref 12.4–15.4)
EOSINOPHIL # BLD: 0.1 K/UL (ref 0–0.6)
EOSINOPHIL NFR BLD: 1.2 %
GFR SERPLBLD CREATININE-BSD FMLA CKD-EPI: 48 ML/MIN/{1.73_M2}
GLUCOSE BLD-MCNC: 421 MG/DL (ref 70–99)
GLUCOSE SERPL-MCNC: 431 MG/DL (ref 70–99)
HCT VFR BLD AUTO: 42.2 % (ref 40.5–52.5)
HGB BLD-MCNC: 14 G/DL (ref 13.5–17.5)
INR PPP: 5.83 (ref 0.85–1.15)
LACTATE BLDV-SCNC: 2.5 MMOL/L (ref 0.4–1.9)
LACTATE BLDV-SCNC: 2.6 MMOL/L (ref 0.4–1.9)
LACTATE BLDV-SCNC: 2.6 MMOL/L (ref 0.4–2)
LYMPHOCYTES # BLD: 1.5 K/UL (ref 1–5.1)
LYMPHOCYTES NFR BLD: 26 %
MCH RBC QN AUTO: 29.8 PG (ref 26–34)
MCHC RBC AUTO-ENTMCNC: 33.1 G/DL (ref 31–36)
MCV RBC AUTO: 89.9 FL (ref 80–100)
MONOCYTES # BLD: 0.5 K/UL (ref 0–1.3)
MONOCYTES NFR BLD: 9.2 %
NEUTROPHILS # BLD: 3.7 K/UL (ref 1.7–7.7)
NEUTROPHILS NFR BLD: 63.1 %
NT-PROBNP SERPL-MCNC: 922 PG/ML (ref 0–124)
PERFORMED ON: ABNORMAL
PLATELET # BLD AUTO: 131 K/UL (ref 135–450)
PMV BLD AUTO: 9.4 FL (ref 5–10.5)
POTASSIUM SERPL-SCNC: 4.9 MMOL/L (ref 3.5–5.1)
PROT SERPL-MCNC: 6.5 G/DL (ref 6.4–8.2)
PROTHROMBIN TIME: 51.5 SEC (ref 11.9–14.9)
RBC # BLD AUTO: 4.69 M/UL (ref 4.2–5.9)
SODIUM SERPL-SCNC: 129 MMOL/L (ref 136–145)
TROPONIN, HIGH SENSITIVITY: 65 NG/L (ref 0–22)
TROPONIN, HIGH SENSITIVITY: 70 NG/L (ref 0–22)
TROPONIN, HIGH SENSITIVITY: 73 NG/L (ref 0–22)
TROPONIN, HIGH SENSITIVITY: 75 NG/L (ref 0–22)
WBC # BLD AUTO: 5.8 K/UL (ref 4–11)

## 2025-01-05 PROCEDURE — 6360000002 HC RX W HCPCS: Performed by: INTERNAL MEDICINE

## 2025-01-05 PROCEDURE — 80048 BASIC METABOLIC PNL TOTAL CA: CPT

## 2025-01-05 PROCEDURE — 83880 ASSAY OF NATRIURETIC PEPTIDE: CPT

## 2025-01-05 PROCEDURE — 6370000000 HC RX 637 (ALT 250 FOR IP): Performed by: EMERGENCY MEDICINE

## 2025-01-05 PROCEDURE — 36415 COLL VENOUS BLD VENIPUNCTURE: CPT

## 2025-01-05 PROCEDURE — 85610 PROTHROMBIN TIME: CPT

## 2025-01-05 PROCEDURE — 6370000000 HC RX 637 (ALT 250 FOR IP): Performed by: INTERNAL MEDICINE

## 2025-01-05 PROCEDURE — 71045 X-RAY EXAM CHEST 1 VIEW: CPT

## 2025-01-05 PROCEDURE — 82728 ASSAY OF FERRITIN: CPT

## 2025-01-05 PROCEDURE — 2060000000 HC ICU INTERMEDIATE R&B

## 2025-01-05 PROCEDURE — 6370000000 HC RX 637 (ALT 250 FOR IP): Performed by: NURSE PRACTITIONER

## 2025-01-05 PROCEDURE — 85730 THROMBOPLASTIN TIME PARTIAL: CPT

## 2025-01-05 PROCEDURE — 85025 COMPLETE CBC W/AUTO DIFF WBC: CPT

## 2025-01-05 PROCEDURE — 83605 ASSAY OF LACTIC ACID: CPT

## 2025-01-05 PROCEDURE — 96374 THER/PROPH/DIAG INJ IV PUSH: CPT

## 2025-01-05 PROCEDURE — 99285 EMERGENCY DEPT VISIT HI MDM: CPT

## 2025-01-05 PROCEDURE — 6360000002 HC RX W HCPCS: Performed by: EMERGENCY MEDICINE

## 2025-01-05 PROCEDURE — 83540 ASSAY OF IRON: CPT

## 2025-01-05 PROCEDURE — 83550 IRON BINDING TEST: CPT

## 2025-01-05 PROCEDURE — 80076 HEPATIC FUNCTION PANEL: CPT

## 2025-01-05 PROCEDURE — 93005 ELECTROCARDIOGRAM TRACING: CPT | Performed by: EMERGENCY MEDICINE

## 2025-01-05 PROCEDURE — 2500000003 HC RX 250 WO HCPCS: Performed by: INTERNAL MEDICINE

## 2025-01-05 PROCEDURE — 84484 ASSAY OF TROPONIN QUANT: CPT

## 2025-01-05 RX ORDER — SODIUM CHLORIDE 9 MG/ML
INJECTION, SOLUTION INTRAVENOUS PRN
Status: DISCONTINUED | OUTPATIENT
Start: 2025-01-05 | End: 2025-01-13 | Stop reason: HOSPADM

## 2025-01-05 RX ORDER — INSULIN LISPRO 100 [IU]/ML
0-8 INJECTION, SOLUTION INTRAVENOUS; SUBCUTANEOUS
Status: DISCONTINUED | OUTPATIENT
Start: 2025-01-05 | End: 2025-01-13 | Stop reason: HOSPADM

## 2025-01-05 RX ORDER — FUROSEMIDE 10 MG/ML
80 INJECTION INTRAMUSCULAR; INTRAVENOUS ONCE
Status: COMPLETED | OUTPATIENT
Start: 2025-01-05 | End: 2025-01-05

## 2025-01-05 RX ORDER — ONDANSETRON 4 MG/1
4 TABLET, ORALLY DISINTEGRATING ORAL EVERY 8 HOURS PRN
Status: DISCONTINUED | OUTPATIENT
Start: 2025-01-05 | End: 2025-01-13 | Stop reason: HOSPADM

## 2025-01-05 RX ORDER — NICOTINE 21 MG/24HR
1 PATCH, TRANSDERMAL 24 HOURS TRANSDERMAL DAILY
Status: DISCONTINUED | OUTPATIENT
Start: 2025-01-05 | End: 2025-01-06

## 2025-01-05 RX ORDER — ACETAMINOPHEN 325 MG/1
650 TABLET ORAL EVERY 6 HOURS PRN
Status: DISCONTINUED | OUTPATIENT
Start: 2025-01-05 | End: 2025-01-13 | Stop reason: HOSPADM

## 2025-01-05 RX ORDER — DEXTROSE MONOHYDRATE 100 MG/ML
INJECTION, SOLUTION INTRAVENOUS CONTINUOUS PRN
Status: DISCONTINUED | OUTPATIENT
Start: 2025-01-05 | End: 2025-01-13 | Stop reason: HOSPADM

## 2025-01-05 RX ORDER — POLYETHYLENE GLYCOL 3350 17 G/17G
17 POWDER, FOR SOLUTION ORAL DAILY PRN
Status: DISCONTINUED | OUTPATIENT
Start: 2025-01-05 | End: 2025-01-13 | Stop reason: HOSPADM

## 2025-01-05 RX ORDER — INSULIN LISPRO 100 [IU]/ML
3 INJECTION, SOLUTION INTRAVENOUS; SUBCUTANEOUS ONCE
Status: COMPLETED | OUTPATIENT
Start: 2025-01-05 | End: 2025-01-05

## 2025-01-05 RX ORDER — MIDODRINE HYDROCHLORIDE 5 MG/1
5 TABLET ORAL ONCE
Status: COMPLETED | OUTPATIENT
Start: 2025-01-05 | End: 2025-01-05

## 2025-01-05 RX ORDER — POTASSIUM CHLORIDE 7.45 MG/ML
10 INJECTION INTRAVENOUS PRN
Status: DISCONTINUED | OUTPATIENT
Start: 2025-01-05 | End: 2025-01-13 | Stop reason: HOSPADM

## 2025-01-05 RX ORDER — ACETAMINOPHEN 650 MG/1
650 SUPPOSITORY RECTAL EVERY 6 HOURS PRN
Status: DISCONTINUED | OUTPATIENT
Start: 2025-01-05 | End: 2025-01-13 | Stop reason: HOSPADM

## 2025-01-05 RX ORDER — POTASSIUM CHLORIDE 1500 MG/1
40 TABLET, EXTENDED RELEASE ORAL PRN
Status: DISCONTINUED | OUTPATIENT
Start: 2025-01-05 | End: 2025-01-13 | Stop reason: HOSPADM

## 2025-01-05 RX ORDER — FUROSEMIDE 10 MG/ML
40 INJECTION INTRAMUSCULAR; INTRAVENOUS 2 TIMES DAILY
Status: DISCONTINUED | OUTPATIENT
Start: 2025-01-05 | End: 2025-01-09

## 2025-01-05 RX ORDER — GLUCAGON 1 MG/ML
1 KIT INJECTION PRN
Status: DISCONTINUED | OUTPATIENT
Start: 2025-01-05 | End: 2025-01-05 | Stop reason: RX

## 2025-01-05 RX ORDER — HEPARIN SODIUM 5000 [USP'U]/ML
5000 INJECTION, SOLUTION INTRAVENOUS; SUBCUTANEOUS EVERY 8 HOURS SCHEDULED
Status: DISCONTINUED | OUTPATIENT
Start: 2025-01-05 | End: 2025-01-05 | Stop reason: ALTCHOICE

## 2025-01-05 RX ORDER — ONDANSETRON 2 MG/ML
4 INJECTION INTRAMUSCULAR; INTRAVENOUS EVERY 6 HOURS PRN
Status: DISCONTINUED | OUTPATIENT
Start: 2025-01-05 | End: 2025-01-13 | Stop reason: HOSPADM

## 2025-01-05 RX ORDER — SODIUM CHLORIDE 0.9 % (FLUSH) 0.9 %
5-40 SYRINGE (ML) INJECTION PRN
Status: DISCONTINUED | OUTPATIENT
Start: 2025-01-05 | End: 2025-01-13 | Stop reason: HOSPADM

## 2025-01-05 RX ORDER — ASPIRIN 81 MG/1
81 TABLET, CHEWABLE ORAL DAILY
Status: DISCONTINUED | OUTPATIENT
Start: 2025-01-05 | End: 2025-01-13 | Stop reason: HOSPADM

## 2025-01-05 RX ORDER — MAGNESIUM SULFATE IN WATER 40 MG/ML
2000 INJECTION, SOLUTION INTRAVENOUS PRN
Status: DISCONTINUED | OUTPATIENT
Start: 2025-01-05 | End: 2025-01-13 | Stop reason: HOSPADM

## 2025-01-05 RX ORDER — SODIUM CHLORIDE 0.9 % (FLUSH) 0.9 %
5-40 SYRINGE (ML) INJECTION EVERY 12 HOURS SCHEDULED
Status: DISCONTINUED | OUTPATIENT
Start: 2025-01-05 | End: 2025-01-13 | Stop reason: HOSPADM

## 2025-01-05 RX ADMIN — FUROSEMIDE 40 MG: 10 INJECTION, SOLUTION INTRAMUSCULAR; INTRAVENOUS at 20:57

## 2025-01-05 RX ADMIN — INSULIN LISPRO 8 UNITS: 100 INJECTION, SOLUTION INTRAVENOUS; SUBCUTANEOUS at 20:56

## 2025-01-05 RX ADMIN — MIDODRINE HYDROCHLORIDE 5 MG: 5 TABLET ORAL at 15:52

## 2025-01-05 RX ADMIN — FUROSEMIDE 80 MG: 10 INJECTION, SOLUTION INTRAMUSCULAR; INTRAVENOUS at 17:01

## 2025-01-05 RX ADMIN — Medication 10 ML: at 20:57

## 2025-01-05 RX ADMIN — INSULIN LISPRO 3 UNITS: 100 INJECTION, SOLUTION INTRAVENOUS; SUBCUTANEOUS at 20:56

## 2025-01-05 RX ADMIN — ASPIRIN 81 MG 81 MG: 81 TABLET ORAL at 20:58

## 2025-01-05 ASSESSMENT — PAIN SCALES - GENERAL: PAINLEVEL_OUTOF10: 0

## 2025-01-05 NOTE — ACP (ADVANCE CARE PLANNING)
Advanced Care Planning Note.    Purpose of Encounter: Advanced care planning in light of acute on chronic systolic CHF  Parties In Attendance: Patient, sister, brothers  Decisional Capacity: Yes  Subjective: Patient with LE edema  Objective: Cr 1.6  Goals of Care Determination: Patient wants full support (CPR, vent, surgery, HD, trach, PEG)  Plan:  IV Lasix, Midodrine, Echo, PT/OT, Renal and Cardio consults  Code Status: Full code   Time spent on Advanced care Plannin minutes  Advanced Care Planning Documents: Completed advanced directives on chart, sister is the POA.    Mathew Sarabia MD  2025 5:04 PM

## 2025-01-05 NOTE — ED PROVIDER NOTES
[facility] EMERGENCY DEPARTMENT  EMERGENCY DEPARTMENT ENCOUNTER        Pt Name: Kaveh Murphy  MRN: [id number]  Birthdate 1960  Date of evaluation: 1/5/2025  Provider: Rosa Abarca PA-C  PCP: No primary care provider on file.  Note Started: 2:07 PM EST 1/5/25       I have seen and evaluated this patient with my supervising physician Nicol Trejo,*.      CHIEF COMPLAINT       Chief Complaint   Patient presents with    Leg Swelling     Reports swelling to lower extremities worsening since the end of November. Reports his cardiologists stopped his rx of Entresto and Lasix bc he failed to take a test. States medications were stopped a couple of months ago. Pt reports fatigue when up moving around, denies any CP.        HISTORY OF PRESENT ILLNESS: 1 or more Elements     History From: Patient    Kaveh Murphy is a 64 y.o. male with a past medical history of CHF, CABG, hypertension, hyperlipidemia, diabetes who presents with bilateral leg swelling for 2-3 weeks.  He states he does not feel very short of breath or at least any more so than normal, but does have difficulty with washing the dishes or walking around his house.  He states he is not able to sleep lying down, but this is not abnormal for him.  He used to be on Entresto and Jardiance, he states that he did not want to complete a test so his doctor would no longer prescribe it for him.   He denies any other symptoms, denies abdominal swelling.    Nursing Notes were all reviewed and agreed with or any disagreements were addressed in the HPI.    REVIEW OF SYSTEMS :      Review of Systems   Constitutional:  Negative for appetite change, chills and fever.   HENT:  Negative for congestion and rhinorrhea.    Respiratory:  Positive for shortness of breath. Negative for wheezing.    Cardiovascular:  Positive for leg swelling. Negative for chest pain.   Gastrointestinal:  Negative for abdominal pain, nausea and vomiting. 
Notable for the following components:    Protime 51.5 (*)     INR 5.83 (*)     All other components within normal limits    Narrative:     CALL  De Jesus  SFER[facility] tel. [phone],                  Coag results called to and read back by KAREEN Song, 01/05/2025 15:15, by                  KARO   TROPONIN   LACTATE, SEPSIS     RADIOLOGY:   Plain x-rays were viewed by me:   XR CHEST PORTABLE   Final Result   Trace right pleural effusion with bibasilar scarring versus atelectasis.            EKG:  Read by me in the absence of a cardiologist shows: Sinus rhythm, first-degree AV block, QRS duration normal, axis 56, low voltages, poor R wave progression, J-point elevation in anterior waves with Q waves, lower limb lead voltages when compared to prior EKG from February of last year    Medications administered (list can include non ED meds ordered by other providers for patients that are admitted):  Medications   aspirin chewable tablet 81 mg (81 mg Oral Given 1/5/25 2058)   sodium chloride flush 0.9 % injection 5-40 mL (10 mLs IntraVENous Given 1/5/25 2057)   sodium chloride flush 0.9 % injection 5-40 mL (has no administration in time range)   0.9 % sodium chloride infusion (has no administration in time range)   potassium chloride (KLOR-CON M) extended release tablet 40 mEq (has no administration in time range)     Or   potassium bicarb-citric acid (EFFER-K) effervescent tablet 40 mEq (has no administration in time range)     Or   potassium chloride 10 mEq/100 mL IVPB (Peripheral Line) (has no administration in time range)   magnesium sulfate 2000 mg in 50 mL IVPB premix (has no administration in time range)   ondansetron (ZOFRAN-ODT) disintegrating tablet 4 mg (has no administration in time range)     Or   ondansetron (ZOFRAN) injection 4 mg (has no administration in time range)   polyethylene glycol (GLYCOLAX) packet 17 g (has no administration in time range)   acetaminophen (TYLENOL) tablet 650 mg (has no administration in

## 2025-01-05 NOTE — ED NOTES
Patient Name: Kaveh Murphy  : 1960 64 y.o.  MRN: [id number]  ED Room #: ED-0003/03     Chief complaint:   Chief Complaint   Patient presents with    Leg Swelling     Reports swelling to lower extremities worsening since the end of November. Reports his cardiologists stopped his rx of Entresto and Lasix bc he failed to take a test. States medications were stopped a couple of months ago. Pt reports fatigue when up moving around, denies any CP.      Hospital Problem/Diagnosis:   Hospital Problems             Last Modified POA    S/P MVR (mitral valve repair) 2025 Yes    Type 2 diabetes mellitus with hyperglycemia, without long-term current use of insulin (HCC) 2025 Yes    Ischemic cardiomyopathy 2025 Yes    S/P CABG (coronary artery bypass graft) 2025 Yes    H/O mitral valve replacement with mechanical valve 2025 Yes         O2 Flow Rate:O2 Device: None (Room air)   (if applicable)  Cardiac Rhythm:   (if applicable)  Active LDA's:   Peripheral IV 25 Right Antecubital (Active)   Site Assessment Clean, dry & intact 25 1344   Line Status Blood return noted 25 1344   Phlebitis Assessment No symptoms 25 1344   Infiltration Assessment 0 25 1344            How does patient ambulate? Low Fall Risk (Ambulates by themselves without support    2. How does patient take pills? Whole with Water    3. Is patient alert? Alert    4. Is patient oriented? To Person, To Place, To Time, and To Situation    5.   Patient arrived from:  home  Facility Name: ___________________________________________    6. If patient is disoriented or from a Skill Nursing Facility has family been notified of admission?  NA    7. Patient belongings? Belongings: Clothing    Disposition of belongings? Kept with Patient     8. Any specific patient or family belongings/needs/dynamics?   a. na    9. Miscellaneous comments/pending orders?  a. na      If there are any additional questions please reach out to

## 2025-01-05 NOTE — H&P
HOSPITALISTS HISTORY AND PHYSICAL    1/5/2025 5:04 PM    Patient Information:  KAVEH MURPHY is a 64 y.o. male [id number]  PCP:  No primary care provider on file. (Tel: None )    Chief complaint:    Chief Complaint   Patient presents with    Leg Swelling     Reports swelling to lower extremities worsening since the end of November. Reports his cardiologists stopped his rx of Entresto and Lasix bc he failed to take a test. States medications were stopped a couple of months ago. Pt reports fatigue when up moving around, denies any CP.         History of Present Illness:  Kaveh Murphy is a 64 y.o. male with history of CAD s/p CABG and mechanical MV replacement on Coumadin, h/o TV repair, DM 2, tobacco abuse, noncompliance came to ER with complaints of LE edema.  He stopped going to clinic follow ups and stopped taking Entresto and diuretics in Spring 2024.  He claims LE became swollen in past 2 weeks.  Has WEST and orthopnea.  No CP, HA, dizziness, LH or syncope.  Otherwise complete ROS is negative unless listed above.      REVIEW OF SYSTEMS:   Pertinent positives as noted in HPI.  All other systems were reviewed and are negative.      Past Medical History:   has a past medical history of Diabetes (HCC), Hyperlipidemia, and Hypertension.     Past Surgical History:   has a past surgical history that includes Coronary artery bypass graft (N/A, 12/10/2021) and Cardiac surgery.     Medications:  No current facility-administered medications on file prior to encounter.     Current Outpatient Medications on File Prior to Encounter   Medication Sig Dispense Refill    warfarin (COUMADIN) 5 MG tablet Take 2.5 tablets by mouth every 7 days AND 2 tablets Six times weekly. 187 tablet 3    furosemide (LASIX) 40 MG tablet TAKE 1 TABLET BY MOUTH DAILY (Patient not taking: Reported on 1/5/2025) 30 tablet 0    sacubitril-valsartan

## 2025-01-06 LAB
ALBUMIN SERPL-MCNC: 3.1 G/DL (ref 3.4–5)
ALP SERPL-CCNC: 361 U/L (ref 40–129)
ALT SERPL-CCNC: 45 U/L (ref 10–40)
ANION GAP SERPL CALCULATED.3IONS-SCNC: 11 MMOL/L (ref 3–16)
AST SERPL-CCNC: 34 U/L (ref 15–37)
BASOPHILS # BLD: 0 K/UL (ref 0–0.2)
BASOPHILS NFR BLD: 0.8 %
BILIRUB DIRECT SERPL-MCNC: 1.1 MG/DL (ref 0–0.3)
BILIRUB INDIRECT SERPL-MCNC: 0.7 MG/DL (ref 0–1)
BILIRUB SERPL-MCNC: 1.8 MG/DL (ref 0–1)
BUN SERPL-MCNC: 34 MG/DL (ref 7–20)
CALCIUM SERPL-MCNC: 9.4 MG/DL (ref 8.3–10.6)
CHLORIDE SERPL-SCNC: 97 MMOL/L (ref 99–110)
CHOLEST SERPL-MCNC: 135 MG/DL (ref 0–199)
CO2 SERPL-SCNC: 23 MMOL/L (ref 21–32)
CREAT SERPL-MCNC: 1.6 MG/DL (ref 0.8–1.3)
DEPRECATED RDW RBC AUTO: 15.9 % (ref 12.4–15.4)
DIGOXIN SERPL-MCNC: <0.3 NG/ML (ref 0.8–2)
EKG ATRIAL RATE: 92 BPM
EKG DIAGNOSIS: NORMAL
EKG P AXIS: 16 DEGREES
EKG P-R INTERVAL: 210 MS
EKG Q-T INTERVAL: 376 MS
EKG QRS DURATION: 116 MS
EKG QTC CALCULATION (BAZETT): 464 MS
EKG R AXIS: 56 DEGREES
EKG T AXIS: 58 DEGREES
EKG VENTRICULAR RATE: 92 BPM
EOSINOPHIL # BLD: 0.1 K/UL (ref 0–0.6)
EOSINOPHIL NFR BLD: 1.4 %
EST. AVERAGE GLUCOSE BLD GHB EST-MCNC: 349.4 MG/DL
FERRITIN SERPL IA-MCNC: 96.8 NG/ML (ref 30–400)
GFR SERPLBLD CREATININE-BSD FMLA CKD-EPI: 48 ML/MIN/{1.73_M2}
GLUCOSE BLD-MCNC: 192 MG/DL (ref 70–99)
GLUCOSE BLD-MCNC: 199 MG/DL (ref 70–99)
GLUCOSE BLD-MCNC: 231 MG/DL (ref 70–99)
GLUCOSE BLD-MCNC: 272 MG/DL (ref 70–99)
GLUCOSE BLD-MCNC: 321 MG/DL (ref 70–99)
GLUCOSE SERPL-MCNC: 128 MG/DL (ref 70–99)
HBA1C MFR BLD: 13.8 %
HCT VFR BLD AUTO: 41.8 % (ref 40.5–52.5)
HDLC SERPL-MCNC: 38 MG/DL (ref 40–60)
HGB BLD-MCNC: 14 G/DL (ref 13.5–17.5)
INR PPP: 6.1 (ref 0.85–1.15)
IRON SATN MFR SERPL: 17 % (ref 20–50)
IRON SERPL-MCNC: 53 UG/DL (ref 59–158)
LACTATE BLDV-SCNC: 2.1 MMOL/L (ref 0.4–2)
LDLC SERPL CALC-MCNC: 67 MG/DL
LYMPHOCYTES # BLD: 1.9 K/UL (ref 1–5.1)
LYMPHOCYTES NFR BLD: 34.8 %
MAGNESIUM SERPL-MCNC: 2 MG/DL (ref 1.8–2.4)
MCH RBC QN AUTO: 29.8 PG (ref 26–34)
MCHC RBC AUTO-ENTMCNC: 33.5 G/DL (ref 31–36)
MCV RBC AUTO: 89.1 FL (ref 80–100)
MONOCYTES # BLD: 0.6 K/UL (ref 0–1.3)
MONOCYTES NFR BLD: 10.4 %
NEUTROPHILS # BLD: 2.9 K/UL (ref 1.7–7.7)
NEUTROPHILS NFR BLD: 52.6 %
PERFORMED ON: ABNORMAL
PLATELET # BLD AUTO: 136 K/UL (ref 135–450)
PMV BLD AUTO: 9 FL (ref 5–10.5)
POTASSIUM SERPL-SCNC: 4.3 MMOL/L (ref 3.5–5.1)
PROT SERPL-MCNC: 6.1 G/DL (ref 6.4–8.2)
PROTHROMBIN TIME: 53.3 SEC (ref 11.9–14.9)
RBC # BLD AUTO: 4.7 M/UL (ref 4.2–5.9)
SODIUM SERPL-SCNC: 131 MMOL/L (ref 136–145)
TIBC SERPL-MCNC: 313 UG/DL (ref 260–445)
TRIGL SERPL-MCNC: 152 MG/DL (ref 0–150)
TROPONIN, HIGH SENSITIVITY: 89 NG/L (ref 0–22)
TSH SERPL DL<=0.005 MIU/L-ACNC: 2.04 UIU/ML (ref 0.27–4.2)
VLDLC SERPL CALC-MCNC: 30 MG/DL
WBC # BLD AUTO: 5.4 K/UL (ref 4–11)

## 2025-01-06 PROCEDURE — 2060000000 HC ICU INTERMEDIATE R&B

## 2025-01-06 PROCEDURE — 36415 COLL VENOUS BLD VENIPUNCTURE: CPT

## 2025-01-06 PROCEDURE — 2500000003 HC RX 250 WO HCPCS: Performed by: INTERNAL MEDICINE

## 2025-01-06 PROCEDURE — 6370000000 HC RX 637 (ALT 250 FOR IP): Performed by: INTERNAL MEDICINE

## 2025-01-06 PROCEDURE — 84443 ASSAY THYROID STIM HORMONE: CPT

## 2025-01-06 PROCEDURE — 6360000002 HC RX W HCPCS: Performed by: INTERNAL MEDICINE

## 2025-01-06 PROCEDURE — 80076 HEPATIC FUNCTION PANEL: CPT

## 2025-01-06 PROCEDURE — 83735 ASSAY OF MAGNESIUM: CPT

## 2025-01-06 PROCEDURE — 84484 ASSAY OF TROPONIN QUANT: CPT

## 2025-01-06 PROCEDURE — 83036 HEMOGLOBIN GLYCOSYLATED A1C: CPT

## 2025-01-06 PROCEDURE — 99223 1ST HOSP IP/OBS HIGH 75: CPT | Performed by: INTERNAL MEDICINE

## 2025-01-06 PROCEDURE — 6370000000 HC RX 637 (ALT 250 FOR IP): Performed by: NURSE PRACTITIONER

## 2025-01-06 PROCEDURE — 80048 BASIC METABOLIC PNL TOTAL CA: CPT

## 2025-01-06 PROCEDURE — 80162 ASSAY OF DIGOXIN TOTAL: CPT

## 2025-01-06 PROCEDURE — 83605 ASSAY OF LACTIC ACID: CPT

## 2025-01-06 PROCEDURE — 2580000003 HC RX 258: Performed by: INTERNAL MEDICINE

## 2025-01-06 PROCEDURE — 93010 ELECTROCARDIOGRAM REPORT: CPT | Performed by: INTERNAL MEDICINE

## 2025-01-06 PROCEDURE — 85025 COMPLETE CBC W/AUTO DIFF WBC: CPT

## 2025-01-06 PROCEDURE — 94760 N-INVAS EAR/PLS OXIMETRY 1: CPT

## 2025-01-06 PROCEDURE — 80061 LIPID PANEL: CPT

## 2025-01-06 PROCEDURE — 85610 PROTHROMBIN TIME: CPT

## 2025-01-06 RX ORDER — MIDODRINE HYDROCHLORIDE 5 MG/1
5 TABLET ORAL
Status: DISCONTINUED | OUTPATIENT
Start: 2025-01-06 | End: 2025-01-06

## 2025-01-06 RX ORDER — TRAZODONE HYDROCHLORIDE 50 MG/1
50 TABLET, FILM COATED ORAL NIGHTLY PRN
Status: DISCONTINUED | OUTPATIENT
Start: 2025-01-06 | End: 2025-01-13 | Stop reason: HOSPADM

## 2025-01-06 RX ORDER — MIDODRINE HYDROCHLORIDE 5 MG/1
5 TABLET ORAL 3 TIMES DAILY PRN
Status: DISCONTINUED | OUTPATIENT
Start: 2025-01-06 | End: 2025-01-06

## 2025-01-06 RX ORDER — NICOTINE 21 MG/24HR
1 PATCH, TRANSDERMAL 24 HOURS TRANSDERMAL DAILY
Status: DISCONTINUED | OUTPATIENT
Start: 2025-01-07 | End: 2025-01-13 | Stop reason: HOSPADM

## 2025-01-06 RX ORDER — MIDODRINE HYDROCHLORIDE 5 MG/1
5 TABLET ORAL 3 TIMES DAILY PRN
Status: DISCONTINUED | OUTPATIENT
Start: 2025-01-06 | End: 2025-01-13 | Stop reason: HOSPADM

## 2025-01-06 RX ADMIN — ASPIRIN 81 MG 81 MG: 81 TABLET ORAL at 08:47

## 2025-01-06 RX ADMIN — SODIUM CHLORIDE 200 MG: 9 INJECTION, SOLUTION INTRAVENOUS at 11:38

## 2025-01-06 RX ADMIN — MIDODRINE HYDROCHLORIDE 5 MG: 5 TABLET ORAL at 18:22

## 2025-01-06 RX ADMIN — INSULIN LISPRO 6 UNITS: 100 INJECTION, SOLUTION INTRAVENOUS; SUBCUTANEOUS at 12:40

## 2025-01-06 RX ADMIN — MIDODRINE HYDROCHLORIDE 5 MG: 5 TABLET ORAL at 08:59

## 2025-01-06 RX ADMIN — MELATONIN TAB 3 MG 6 MG: 3 TAB at 23:28

## 2025-01-06 RX ADMIN — INSULIN LISPRO 4 UNITS: 100 INJECTION, SOLUTION INTRAVENOUS; SUBCUTANEOUS at 18:26

## 2025-01-06 RX ADMIN — Medication 10 ML: at 08:47

## 2025-01-06 RX ADMIN — TRAZODONE HYDROCHLORIDE 50 MG: 50 TABLET ORAL at 20:09

## 2025-01-06 RX ADMIN — ONDANSETRON 4 MG: 4 TABLET, ORALLY DISINTEGRATING ORAL at 20:09

## 2025-01-06 ASSESSMENT — PAIN SCALES - GENERAL: PAINLEVEL_OUTOF10: 0

## 2025-01-07 ENCOUNTER — APPOINTMENT (OUTPATIENT)
Age: 65
DRG: 291 | End: 2025-01-07
Attending: INTERNAL MEDICINE
Payer: COMMERCIAL

## 2025-01-07 LAB
ANION GAP SERPL CALCULATED.3IONS-SCNC: 11 MMOL/L (ref 3–16)
BACTERIA URNS QL MICRO: ABNORMAL /HPF
BASOPHILS # BLD: 0 K/UL (ref 0–0.2)
BASOPHILS NFR BLD: 0.8 %
BILIRUB UR QL STRIP.AUTO: ABNORMAL
BUN SERPL-MCNC: 40 MG/DL (ref 7–20)
CALCIUM SERPL-MCNC: 9 MG/DL (ref 8.3–10.6)
CHLORIDE SERPL-SCNC: 98 MMOL/L (ref 99–110)
CLARITY UR: CLEAR
CO2 SERPL-SCNC: 24 MMOL/L (ref 21–32)
COLOR UR: ABNORMAL
CREAT SERPL-MCNC: 1.8 MG/DL (ref 0.8–1.3)
CREAT UR-MCNC: 317 MG/DL (ref 39–259)
DEPRECATED RDW RBC AUTO: 16 % (ref 12.4–15.4)
ECHO AO ROOT DIAM: 2.8 CM
ECHO AO ROOT INDEX: 1.28 CM/M2
ECHO AV AREA PEAK VELOCITY: 1 CM2
ECHO AV AREA VTI: 1.2 CM2
ECHO AV AREA/BSA PEAK VELOCITY: 0.5 CM2/M2
ECHO AV AREA/BSA VTI: 0.5 CM2/M2
ECHO AV MEAN GRADIENT: 1 MMHG
ECHO AV MEAN VELOCITY: 0.5 M/S
ECHO AV PEAK GRADIENT: 3 MMHG
ECHO AV PEAK VELOCITY: 0.8 M/S
ECHO AV VELOCITY RATIO: 0.38
ECHO AV VTI: 12.7 CM
ECHO BSA: 2.2 M2
ECHO EST RA PRESSURE: 15 MMHG
ECHO IVC PROX: 2.6 CM
ECHO LA AREA 2C: 25.4 CM2
ECHO LA AREA 4C: 28 CM2
ECHO LA DIAMETER INDEX: 2.19 CM/M2
ECHO LA DIAMETER: 4.8 CM
ECHO LA MAJOR AXIS: 6.5 CM
ECHO LA MINOR AXIS: 6.1 CM
ECHO LA TO AORTIC ROOT RATIO: 1.71
ECHO LA VOL BP: 96 ML (ref 18–58)
ECHO LA VOL MOD A2C: 87 ML (ref 18–58)
ECHO LA VOL MOD A4C: 102 ML (ref 18–58)
ECHO LA VOL/BSA BIPLANE: 44 ML/M2 (ref 16–34)
ECHO LA VOLUME INDEX MOD A2C: 40 ML/M2 (ref 16–34)
ECHO LA VOLUME INDEX MOD A4C: 47 ML/M2 (ref 16–34)
ECHO LV E' LATERAL VELOCITY: 9.14 CM/S
ECHO LV E' SEPTAL VELOCITY: 4.13 CM/S
ECHO LV EDV A2C: 156 ML
ECHO LV EDV A4C: 205 ML
ECHO LV EDV INDEX A4C: 94 ML/M2
ECHO LV EDV NDEX A2C: 71 ML/M2
ECHO LV EF PHYSICIAN: 20 %
ECHO LV EJECTION FRACTION A2C: 6 %
ECHO LV EJECTION FRACTION A4C: 12 %
ECHO LV ESV A2C: 146 ML
ECHO LV ESV A4C: 181 ML
ECHO LV ESV INDEX A2C: 67 ML/M2
ECHO LV ESV INDEX A4C: 83 ML/M2
ECHO LV FRACTIONAL SHORTENING: 8 % (ref 28–44)
ECHO LV INTERNAL DIMENSION DIASTOLE INDEX: 3.01 CM/M2
ECHO LV INTERNAL DIMENSION DIASTOLIC: 6.6 CM (ref 4.2–5.9)
ECHO LV INTERNAL DIMENSION SYSTOLIC INDEX: 2.79 CM/M2
ECHO LV INTERNAL DIMENSION SYSTOLIC: 6.1 CM
ECHO LV IVSD: 0.9 CM (ref 0.6–1)
ECHO LV MASS 2D: 237.1 G (ref 88–224)
ECHO LV MASS INDEX 2D: 108.3 G/M2 (ref 49–115)
ECHO LV POSTERIOR WALL DIASTOLIC: 0.8 CM (ref 0.6–1)
ECHO LV RELATIVE WALL THICKNESS RATIO: 0.24
ECHO LVOT AREA: 3.1 CM2
ECHO LVOT AV VTI INDEX: 0.38
ECHO LVOT DIAM: 2 CM
ECHO LVOT MEAN GRADIENT: 0 MMHG
ECHO LVOT PEAK GRADIENT: 0 MMHG
ECHO LVOT PEAK VELOCITY: 0.3 M/S
ECHO LVOT STROKE VOLUME INDEX: 6.9 ML/M2
ECHO LVOT SV: 15.1 ML
ECHO LVOT VTI: 4.8 CM
ECHO MV A VELOCITY: 0.28 M/S
ECHO MV AREA PHT: 2.6 CM2
ECHO MV AREA VTI: 1.1 CM2
ECHO MV E DECELERATION TIME (DT): 120 MS
ECHO MV E VELOCITY: 1.22 M/S
ECHO MV E/A RATIO: 4.36
ECHO MV E/E' LATERAL: 13.35
ECHO MV E/E' RATIO (AVERAGED): 21.44
ECHO MV E/E' SEPTAL: 29.54
ECHO MV LVOT VTI INDEX: 2.98
ECHO MV MAX VELOCITY: 1 M/S
ECHO MV MEAN GRADIENT: 1 MMHG
ECHO MV MEAN VELOCITY: 0.5 M/S
ECHO MV PEAK GRADIENT: 4 MMHG
ECHO MV PRESSURE HALF TIME (PHT): 85 MS
ECHO MV VTI: 14.3 CM
ECHO PULMONARY ARTERY END DIASTOLIC PRESSURE: 6 MMHG
ECHO PV MAX VELOCITY: 0.8 M/S
ECHO PV PEAK GRADIENT: 3 MMHG
ECHO PV REGURGITANT MAX VELOCITY: 1.3 M/S
ECHO RA AREA 4C: 21.6 CM2
ECHO RA END SYSTOLIC VOLUME APICAL 4 CHAMBER INDEX BSA: 32 ML/M2
ECHO RA VOLUME: 69 ML
ECHO RIGHT VENTRICULAR SYSTOLIC PRESSURE (RVSP): 35 MMHG
ECHO RV BASAL DIMENSION: 3.3 CM
ECHO RV FREE WALL PEAK S': 5.7 CM/S
ECHO RV LONGITUDINAL DIMENSION: 8.3 CM
ECHO RV MID DIMENSION: 2.9 CM
ECHO RV TAPSE: 1 CM (ref 1.7–?)
ECHO TV MEAN GRADIENT: 3 MMHG
ECHO TV MEAN VELOCITY: 0.8 M/S
ECHO TV PEAK GRADIENT: 8 MMHG
ECHO TV REGURGITANT MAX VELOCITY: 2.21 M/S
ECHO TV REGURGITANT PEAK GRADIENT: 20 MMHG
ECHO TV VALVE AREA VTI: 0.6 CM2
ECHO TV VTI: 25.2 CM
EOSINOPHIL # BLD: 0.1 K/UL (ref 0–0.6)
EOSINOPHIL NFR BLD: 1.1 %
EPI CELLS #/AREA URNS AUTO: 2 /HPF (ref 0–5)
EST. AVERAGE GLUCOSE BLD GHB EST-MCNC: 337.9 MG/DL
GFR SERPLBLD CREATININE-BSD FMLA CKD-EPI: 42 ML/MIN/{1.73_M2}
GLUCOSE BLD-MCNC: 160 MG/DL (ref 70–99)
GLUCOSE BLD-MCNC: 190 MG/DL (ref 70–99)
GLUCOSE BLD-MCNC: 236 MG/DL (ref 70–99)
GLUCOSE BLD-MCNC: 301 MG/DL (ref 70–99)
GLUCOSE SERPL-MCNC: 161 MG/DL (ref 70–99)
GLUCOSE UR STRIP.AUTO-MCNC: NEGATIVE MG/DL
HBA1C MFR BLD: 13.4 %
HCT VFR BLD AUTO: 42.1 % (ref 40.5–52.5)
HGB BLD-MCNC: 14 G/DL (ref 13.5–17.5)
HGB UR QL STRIP.AUTO: NEGATIVE
HYALINE CASTS #/AREA URNS AUTO: 21 /LPF (ref 0–8)
HYALINE CASTS: PRESENT
INR PPP: 5.11 (ref 0.85–1.15)
KETONES UR STRIP.AUTO-MCNC: ABNORMAL MG/DL
LEFT VENTRICULAR EJECTION FRACTION HIGH VALUE: 20 %
LEFT VENTRICULAR EJECTION FRACTION MODE: NORMAL
LEUKOCYTE ESTERASE UR QL STRIP.AUTO: ABNORMAL
LYMPHOCYTES # BLD: 1.5 K/UL (ref 1–5.1)
LYMPHOCYTES NFR BLD: 31.6 %
MAGNESIUM SERPL-MCNC: 2.19 MG/DL (ref 1.8–2.4)
MCH RBC QN AUTO: 29.9 PG (ref 26–34)
MCHC RBC AUTO-ENTMCNC: 33.3 G/DL (ref 31–36)
MCV RBC AUTO: 89.9 FL (ref 80–100)
MONOCYTES # BLD: 0.5 K/UL (ref 0–1.3)
MONOCYTES NFR BLD: 10.5 %
NEUTROPHILS # BLD: 2.7 K/UL (ref 1.7–7.7)
NEUTROPHILS NFR BLD: 56 %
NITRITE UR QL STRIP.AUTO: NEGATIVE
NT-PROBNP SERPL-MCNC: 656 PG/ML (ref 0–124)
PERFORMED ON: ABNORMAL
PH UR STRIP.AUTO: 5 [PH] (ref 5–8)
PLATELET # BLD AUTO: 134 K/UL (ref 135–450)
PMV BLD AUTO: 8.9 FL (ref 5–10.5)
POTASSIUM SERPL-SCNC: 4.2 MMOL/L (ref 3.5–5.1)
PROT UR STRIP.AUTO-MCNC: 30 MG/DL
PROTHROMBIN TIME: 46.5 SEC (ref 11.9–14.9)
RBC # BLD AUTO: 4.69 M/UL (ref 4.2–5.9)
RBC CLUMPS #/AREA URNS AUTO: 1 /HPF (ref 0–4)
SODIUM SERPL-SCNC: 133 MMOL/L (ref 136–145)
SODIUM UR-SCNC: <20 MMOL/L
SP GR UR STRIP.AUTO: 1.02 (ref 1–1.03)
UA DIPSTICK W REFLEX MICRO PNL UR: YES
URN SPEC COLLECT METH UR: ABNORMAL
UROBILINOGEN UR STRIP-ACNC: 1 E.U./DL
UUN UR-MCNC: 1188 MG/DL (ref 800–1666)
WBC # BLD AUTO: 4.8 K/UL (ref 4–11)
WBC #/AREA URNS AUTO: 1 /HPF (ref 0–5)

## 2025-01-07 PROCEDURE — 6360000002 HC RX W HCPCS: Performed by: INTERNAL MEDICINE

## 2025-01-07 PROCEDURE — 2500000003 HC RX 250 WO HCPCS: Performed by: INTERNAL MEDICINE

## 2025-01-07 PROCEDURE — 85610 PROTHROMBIN TIME: CPT

## 2025-01-07 PROCEDURE — 80048 BASIC METABOLIC PNL TOTAL CA: CPT

## 2025-01-07 PROCEDURE — 6370000000 HC RX 637 (ALT 250 FOR IP): Performed by: STUDENT IN AN ORGANIZED HEALTH CARE EDUCATION/TRAINING PROGRAM

## 2025-01-07 PROCEDURE — 2580000003 HC RX 258: Performed by: INTERNAL MEDICINE

## 2025-01-07 PROCEDURE — 2060000000 HC ICU INTERMEDIATE R&B

## 2025-01-07 PROCEDURE — 99233 SBSQ HOSP IP/OBS HIGH 50: CPT | Performed by: INTERNAL MEDICINE

## 2025-01-07 PROCEDURE — 6360000004 HC RX CONTRAST MEDICATION: Performed by: INTERNAL MEDICINE

## 2025-01-07 PROCEDURE — 93306 TTE W/DOPPLER COMPLETE: CPT | Performed by: INTERNAL MEDICINE

## 2025-01-07 PROCEDURE — 82570 ASSAY OF URINE CREATININE: CPT

## 2025-01-07 PROCEDURE — 84540 ASSAY OF URINE/UREA-N: CPT

## 2025-01-07 PROCEDURE — 85025 COMPLETE CBC W/AUTO DIFF WBC: CPT

## 2025-01-07 PROCEDURE — 6370000000 HC RX 637 (ALT 250 FOR IP): Performed by: INTERNAL MEDICINE

## 2025-01-07 PROCEDURE — 84300 ASSAY OF URINE SODIUM: CPT

## 2025-01-07 PROCEDURE — 36415 COLL VENOUS BLD VENIPUNCTURE: CPT

## 2025-01-07 PROCEDURE — 83735 ASSAY OF MAGNESIUM: CPT

## 2025-01-07 PROCEDURE — C8929 TTE W OR WO FOL WCON,DOPPLER: HCPCS

## 2025-01-07 PROCEDURE — 83880 ASSAY OF NATRIURETIC PEPTIDE: CPT

## 2025-01-07 PROCEDURE — 81001 URINALYSIS AUTO W/SCOPE: CPT

## 2025-01-07 RX ORDER — DOBUTAMINE HYDROCHLORIDE 200 MG/100ML
5 INJECTION INTRAVENOUS CONTINUOUS
Status: DISCONTINUED | OUTPATIENT
Start: 2025-01-07 | End: 2025-01-07

## 2025-01-07 RX ORDER — MIDODRINE HYDROCHLORIDE 5 MG/1
5 TABLET ORAL 3 TIMES DAILY
Status: DISCONTINUED | OUTPATIENT
Start: 2025-01-07 | End: 2025-01-12

## 2025-01-07 RX ORDER — DOBUTAMINE HYDROCHLORIDE 200 MG/100ML
5 INJECTION INTRAVENOUS CONTINUOUS
Status: DISCONTINUED | OUTPATIENT
Start: 2025-01-07 | End: 2025-01-09

## 2025-01-07 RX ADMIN — Medication 10 ML: at 09:12

## 2025-01-07 RX ADMIN — FUROSEMIDE 40 MG: 10 INJECTION, SOLUTION INTRAMUSCULAR; INTRAVENOUS at 09:17

## 2025-01-07 RX ADMIN — SULFUR HEXAFLUORIDE 2 ML: 60.7; .19; .19 INJECTION, POWDER, LYOPHILIZED, FOR SUSPENSION INTRAVENOUS; INTRAVESICAL at 08:49

## 2025-01-07 RX ADMIN — INSULIN LISPRO 2 UNITS: 100 INJECTION, SOLUTION INTRAVENOUS; SUBCUTANEOUS at 18:13

## 2025-01-07 RX ADMIN — FUROSEMIDE 40 MG: 10 INJECTION, SOLUTION INTRAMUSCULAR; INTRAVENOUS at 18:14

## 2025-01-07 RX ADMIN — MIDODRINE HYDROCHLORIDE 5 MG: 5 TABLET ORAL at 14:08

## 2025-01-07 RX ADMIN — INSULIN LISPRO 2 UNITS: 100 INJECTION, SOLUTION INTRAVENOUS; SUBCUTANEOUS at 12:31

## 2025-01-07 RX ADMIN — INSULIN LISPRO 6 UNITS: 100 INJECTION, SOLUTION INTRAVENOUS; SUBCUTANEOUS at 20:36

## 2025-01-07 RX ADMIN — SODIUM CHLORIDE 200 MG: 9 INJECTION, SOLUTION INTRAVENOUS at 12:48

## 2025-01-07 RX ADMIN — Medication 10 ML: at 20:37

## 2025-01-07 RX ADMIN — MIDODRINE HYDROCHLORIDE 5 MG: 5 TABLET ORAL at 18:13

## 2025-01-07 RX ADMIN — ASPIRIN 81 MG 81 MG: 81 TABLET ORAL at 09:12

## 2025-01-07 RX ADMIN — DOBUTAMINE HYDROCHLORIDE 5 MCG/KG/MIN: 200 INJECTION INTRAVENOUS at 12:27

## 2025-01-07 RX ADMIN — MIDODRINE HYDROCHLORIDE 5 MG: 5 TABLET ORAL at 11:01

## 2025-01-08 LAB
ANION GAP SERPL CALCULATED.3IONS-SCNC: 12 MMOL/L (ref 3–16)
ANTI-XA UNFRAC HEPARIN: 0.21 IU/ML (ref 0.3–0.7)
ANTI-XA UNFRAC HEPARIN: 0.96 IU/ML (ref 0.3–0.7)
ANTI-XA UNFRAC HEPARIN: <0.1 IU/ML (ref 0.3–0.7)
APTT BLD: 44.2 SEC (ref 22.1–36.4)
BASOPHILS # BLD: 0 K/UL (ref 0–0.2)
BASOPHILS NFR BLD: 0.7 %
BUN SERPL-MCNC: 34 MG/DL (ref 7–20)
CALCIUM SERPL-MCNC: 9.2 MG/DL (ref 8.3–10.6)
CHLORIDE SERPL-SCNC: 99 MMOL/L (ref 99–110)
CO2 SERPL-SCNC: 25 MMOL/L (ref 21–32)
CREAT SERPL-MCNC: 1.4 MG/DL (ref 0.8–1.3)
DEPRECATED RDW RBC AUTO: 16.1 % (ref 12.4–15.4)
DEPRECATED RDW RBC AUTO: 16.3 % (ref 12.4–15.4)
EOSINOPHIL # BLD: 0 K/UL (ref 0–0.6)
EOSINOPHIL NFR BLD: 0.9 %
GFR SERPLBLD CREATININE-BSD FMLA CKD-EPI: 57 ML/MIN/{1.73_M2}
GLUCOSE BLD-MCNC: 221 MG/DL (ref 70–99)
GLUCOSE BLD-MCNC: 248 MG/DL (ref 70–99)
GLUCOSE BLD-MCNC: 327 MG/DL (ref 70–99)
GLUCOSE BLD-MCNC: 343 MG/DL (ref 70–99)
GLUCOSE SERPL-MCNC: 190 MG/DL (ref 70–99)
HCT VFR BLD AUTO: 38.7 % (ref 40.5–52.5)
HCT VFR BLD AUTO: 39.8 % (ref 40.5–52.5)
HGB BLD-MCNC: 13 G/DL (ref 13.5–17.5)
HGB BLD-MCNC: 13.4 G/DL (ref 13.5–17.5)
INR PPP: 2.81 (ref 0.85–1.15)
INR PPP: 3.15 (ref 0.85–1.15)
LYMPHOCYTES # BLD: 0.9 K/UL (ref 1–5.1)
LYMPHOCYTES NFR BLD: 19 %
MAGNESIUM SERPL-MCNC: 1.95 MG/DL (ref 1.8–2.4)
MCH RBC QN AUTO: 29.8 PG (ref 26–34)
MCH RBC QN AUTO: 30 PG (ref 26–34)
MCHC RBC AUTO-ENTMCNC: 33.5 G/DL (ref 31–36)
MCHC RBC AUTO-ENTMCNC: 33.6 G/DL (ref 31–36)
MCV RBC AUTO: 89.1 FL (ref 80–100)
MCV RBC AUTO: 89.3 FL (ref 80–100)
MONOCYTES # BLD: 0.5 K/UL (ref 0–1.3)
MONOCYTES NFR BLD: 10.2 %
NEUTROPHILS # BLD: 3.3 K/UL (ref 1.7–7.7)
NEUTROPHILS NFR BLD: 69.2 %
PERFORMED ON: ABNORMAL
PLATELET # BLD AUTO: 120 K/UL (ref 135–450)
PLATELET # BLD AUTO: 123 K/UL (ref 135–450)
PMV BLD AUTO: 9 FL (ref 5–10.5)
PMV BLD AUTO: 9.2 FL (ref 5–10.5)
POTASSIUM SERPL-SCNC: 3.7 MMOL/L (ref 3.5–5.1)
PROTHROMBIN TIME: 29.5 SEC (ref 11.9–14.9)
PROTHROMBIN TIME: 32.2 SEC (ref 11.9–14.9)
RBC # BLD AUTO: 4.35 M/UL (ref 4.2–5.9)
RBC # BLD AUTO: 4.46 M/UL (ref 4.2–5.9)
SODIUM SERPL-SCNC: 136 MMOL/L (ref 136–145)
WBC # BLD AUTO: 4.8 K/UL (ref 4–11)
WBC # BLD AUTO: 5.2 K/UL (ref 4–11)

## 2025-01-08 PROCEDURE — 6360000002 HC RX W HCPCS: Performed by: INTERNAL MEDICINE

## 2025-01-08 PROCEDURE — 36415 COLL VENOUS BLD VENIPUNCTURE: CPT

## 2025-01-08 PROCEDURE — 6370000000 HC RX 637 (ALT 250 FOR IP): Performed by: STUDENT IN AN ORGANIZED HEALTH CARE EDUCATION/TRAINING PROGRAM

## 2025-01-08 PROCEDURE — 6370000000 HC RX 637 (ALT 250 FOR IP): Performed by: INTERNAL MEDICINE

## 2025-01-08 PROCEDURE — 97161 PT EVAL LOW COMPLEX 20 MIN: CPT

## 2025-01-08 PROCEDURE — 99233 SBSQ HOSP IP/OBS HIGH 50: CPT | Performed by: INTERNAL MEDICINE

## 2025-01-08 PROCEDURE — 85025 COMPLETE CBC W/AUTO DIFF WBC: CPT

## 2025-01-08 PROCEDURE — 83735 ASSAY OF MAGNESIUM: CPT

## 2025-01-08 PROCEDURE — 85520 HEPARIN ASSAY: CPT

## 2025-01-08 PROCEDURE — 80048 BASIC METABOLIC PNL TOTAL CA: CPT

## 2025-01-08 PROCEDURE — 2500000003 HC RX 250 WO HCPCS: Performed by: INTERNAL MEDICINE

## 2025-01-08 PROCEDURE — 6370000000 HC RX 637 (ALT 250 FOR IP): Performed by: NURSE PRACTITIONER

## 2025-01-08 PROCEDURE — 85610 PROTHROMBIN TIME: CPT

## 2025-01-08 PROCEDURE — 2060000000 HC ICU INTERMEDIATE R&B

## 2025-01-08 PROCEDURE — 85027 COMPLETE CBC AUTOMATED: CPT

## 2025-01-08 PROCEDURE — 85730 THROMBOPLASTIN TIME PARTIAL: CPT

## 2025-01-08 PROCEDURE — 2580000003 HC RX 258: Performed by: INTERNAL MEDICINE

## 2025-01-08 PROCEDURE — 97165 OT EVAL LOW COMPLEX 30 MIN: CPT

## 2025-01-08 PROCEDURE — 97116 GAIT TRAINING THERAPY: CPT

## 2025-01-08 PROCEDURE — 97535 SELF CARE MNGMENT TRAINING: CPT

## 2025-01-08 RX ORDER — VALSARTAN 40 MG/1
40 TABLET ORAL DAILY
Status: DISCONTINUED | OUTPATIENT
Start: 2025-01-08 | End: 2025-01-13

## 2025-01-08 RX ORDER — HEPARIN SODIUM 10000 [USP'U]/100ML
5-30 INJECTION, SOLUTION INTRAVENOUS CONTINUOUS
Status: DISCONTINUED | OUTPATIENT
Start: 2025-01-08 | End: 2025-01-09

## 2025-01-08 RX ORDER — HEPARIN SODIUM 1000 [USP'U]/ML
4000 INJECTION, SOLUTION INTRAVENOUS; SUBCUTANEOUS PRN
Status: DISCONTINUED | OUTPATIENT
Start: 2025-01-08 | End: 2025-01-09

## 2025-01-08 RX ORDER — WARFARIN SODIUM 5 MG/1
10 TABLET ORAL
Status: COMPLETED | OUTPATIENT
Start: 2025-01-08 | End: 2025-01-08

## 2025-01-08 RX ORDER — HEPARIN SODIUM 1000 [USP'U]/ML
2000 INJECTION, SOLUTION INTRAVENOUS; SUBCUTANEOUS PRN
Status: DISCONTINUED | OUTPATIENT
Start: 2025-01-08 | End: 2025-01-09

## 2025-01-08 RX ADMIN — VALSARTAN 40 MG: 40 TABLET, FILM COATED ORAL at 13:00

## 2025-01-08 RX ADMIN — INSULIN LISPRO 6 UNITS: 100 INJECTION, SOLUTION INTRAVENOUS; SUBCUTANEOUS at 12:45

## 2025-01-08 RX ADMIN — FUROSEMIDE 40 MG: 10 INJECTION, SOLUTION INTRAMUSCULAR; INTRAVENOUS at 08:35

## 2025-01-08 RX ADMIN — MIDODRINE HYDROCHLORIDE 5 MG: 5 TABLET ORAL at 06:21

## 2025-01-08 RX ADMIN — MELATONIN TAB 3 MG 6 MG: 3 TAB at 21:22

## 2025-01-08 RX ADMIN — MIDODRINE HYDROCHLORIDE 5 MG: 5 TABLET ORAL at 17:00

## 2025-01-08 RX ADMIN — SODIUM CHLORIDE, PRESERVATIVE FREE 10 ML: 5 INJECTION INTRAVENOUS at 13:00

## 2025-01-08 RX ADMIN — TRAZODONE HYDROCHLORIDE 50 MG: 50 TABLET ORAL at 21:22

## 2025-01-08 RX ADMIN — INSULIN LISPRO 6 UNITS: 100 INJECTION, SOLUTION INTRAVENOUS; SUBCUTANEOUS at 21:09

## 2025-01-08 RX ADMIN — Medication 10 ML: at 08:35

## 2025-01-08 RX ADMIN — INSULIN LISPRO 2 UNITS: 100 INJECTION, SOLUTION INTRAVENOUS; SUBCUTANEOUS at 17:00

## 2025-01-08 RX ADMIN — SODIUM CHLORIDE 200 MG: 9 INJECTION, SOLUTION INTRAVENOUS at 13:05

## 2025-01-08 RX ADMIN — INSULIN LISPRO 2 UNITS: 100 INJECTION, SOLUTION INTRAVENOUS; SUBCUTANEOUS at 08:35

## 2025-01-08 RX ADMIN — FUROSEMIDE 40 MG: 10 INJECTION, SOLUTION INTRAMUSCULAR; INTRAVENOUS at 17:00

## 2025-01-08 RX ADMIN — WARFARIN SODIUM 10 MG: 5 TABLET ORAL at 17:00

## 2025-01-08 RX ADMIN — SODIUM CHLORIDE, PRESERVATIVE FREE 10 ML: 5 INJECTION INTRAVENOUS at 13:20

## 2025-01-08 RX ADMIN — DOBUTAMINE HYDROCHLORIDE 5 MCG/KG/MIN: 200 INJECTION INTRAVENOUS at 06:20

## 2025-01-08 RX ADMIN — HEPARIN SODIUM 10 UNITS/KG/HR: 10000 INJECTION, SOLUTION INTRAVENOUS at 10:50

## 2025-01-08 RX ADMIN — Medication 10 ML: at 21:10

## 2025-01-08 RX ADMIN — SODIUM CHLORIDE, PRESERVATIVE FREE 10 ML: 5 INJECTION INTRAVENOUS at 17:00

## 2025-01-08 RX ADMIN — MIDODRINE HYDROCHLORIDE 5 MG: 5 TABLET ORAL at 12:45

## 2025-01-08 RX ADMIN — ASPIRIN 81 MG 81 MG: 81 TABLET ORAL at 08:35

## 2025-01-08 NOTE — CONSULTS
PALLIATIVE MEDICINE CONSULTATION     Patient name:Kaveh Murphy   MRN:[id number]    :1960  Room/Bed:[facility]-3368/3368-01   LOS: 2 days         Date of consult:2025    Inpatient consult to Palliative Care  Consult performed by: Ai Ho APRN - CNP  Consult ordered by: Jackson Navarro MD  Reason for consult: GOC and code status              ASSESSMENT/RECOMMENDATIONS     64 y.o. male with Dyspnea and edema with CHF       Symptom Management:  CHF- pt here with edema has been off medication for several months he was not wanting to have ECHO or additional testing   Dyspnea- improved with diuresis    Goals of Care- talked to pt and two of his siblings at bedside today regarding GOC. Pt is \"sick of tests, hospitals and medicine\" but he now realizes that his medications were helping with swelling and his comfort. We talked about the nature of CHF and that diuresis is treating the symptom of heart failure but doing nothing to fix the heart itself. The patient has refused additional aggressive cardiac procedures or surgeries. We discussed [facility] advanced cardiac program for aggressive fluid management meeting pts goal of not returning to the hospital. Referral to [facility] to discuss program more in depth. Pt states that this is the third time someone has talked to him about Hospice that maybe its time to go that route. Gave brochure to pts sister.     Patient/Family Goals of Care :    talked to pt and two of his siblings at bedside today regarding GOC. Pt is \"sick of tests, hospitals and medicine\" but he now realizes that his medications were helping with swelling and his comfort. We talked about the nature of CHF and that diuresis is treating the symptom of heart failure but doing nothing to fix the heart itself. The patient has refused additional aggressive cardiac procedures or surgeries. We discussed [facility] advanced cardiac program for aggressive fluid management meeting pts goal of not 
    Clinical Pharmacy Note: Pharmacy to Dose Warfarin    Pharmacy consulted by Dr. Sarabia to dose warfarin.    Kaveh Murphy is a 64 y.o. male  is receiving warfarin for indication: mechanical heart valve replacement.    INR Goal Range: 2.5-3.5  Prior to admission warfarin dosing regimen: 12.5 mg on Mon and 10 mg all other days of the week.  INR today:   Lab Results   Component Value Date/Time    INR 5.83 01/05/2025 01:58 PM       Assessment/Plan:  INR is supratherapeutic on prior to admission dosing regimen.   Possible concomitant drug-drug interactions include: n/a   Based on today's assessment, hold tonight's dose.  Daily INR is ordered. Pharmacy will continue to monitor and make adjustments to regimen as necessary.     Thank you for the consult,     Leroy May, PharmD      
   MD Contreras Bear MD  Beny Veliz DO                 Office: [phone]                      Fax: [phone]             [employer]                   NEPHROLOGY INITIAL CONSULT NOTE      IMPRESSION/RECOMMENDATIONS:      #BENOIT  ?CKD  -suspect 2/2 CRS, cannot rule out obstructive, ATIN less likely  -Cr 0.9 (9/2023) no recent Cr. May have had some CKD progression if no physician follow-up since 2/2023. A1c then was 12% (2/2023).  -had some UOP to lasix overnight  -check ua, ur na/cr. Monitor I/o's, daily weights.  -continue lasix 40 IV BID. 1st does held this am due to hypotension. Follow-up cardiology recs, pending TTE results.    #hyponatremia  -likely 2/2 hypervolemia  -improving with diuretics    #hypoalbuminemia  -likely in setting of hypervolemia vs acute illness.    #DM  -uncontrolled  -hyperglycemic in the 400s on admit.    #HFrEF  TTE < 10% (2/2023)  -follow-up TTE      Thank you for the consult.  We will follow this patient along the hospitalization.  Beny Veliz DO     High complexity, at risk of impending organ failure needing  higher level of care/monitoring.   Time spent 32 minutes that included face-to-face meeting/discussion with patient, and treatment team (including primary/referring team and other consultants; included coordination of care with the treatment team; and review of patient's electronic medical records and ordering appropriates tests.             Reason for Consult:  BENOIT  Requesting Physician:  Mathew Sarabia MD     CHIEF COMPLAINT:  leg swelling    History obtained from records and patient.    HISTORY OF PRESENT ILLNESS:                Kaveh Murphy  is 64 y.o. y.o. male with significant past medical history of CABG, CHF, DM2 who presented with worsening leg edema.  Patient noncompliant with his entresto and diuretics with subsequent worsening swelling. In ED, with elevated Cr. Nephrology consulted for BENOIT.    INAD this am. Feeling better. Denies dysuria, 
[facility]  HEART FAILURE PROGRAM      Kaveh Murphy 1960    History:  Past Medical History:   Diagnosis Date    Diabetes (HCC)     Hyperlipidemia     Hypertension        ECHO:  1/7/25  Echo (TTE) complete with contrast        Left Ventricle: Severely reduced left ventricular systolic function with a visually estimated EF of 15 - 20%. Left ventricle is severely dilated. Normal wall thickness. Severe global hypokinesis present. Akinesis of the apex. Indeterminate diastolic function due to mitral valve surgery.    Right Ventricle: Reduced systolic function. TAPSE is 1.0 cm. RV Peak S' is 5.7 cm/s.    Aortic Valve: Trileaflet valve.    Mitral Valve: Medtronic mechanical valve with a size of 29 mm.  Gradient in expected range.  No paravalvular leak.    Tricuspid Valve: Repaired by 28 mm Phisio annular ring. Mild regurgitation. No stenosis noted. TV mean gradient is 3 mmHg. The estimated RVSP is 35 mmHg.    Left Atrium: Left atrium is moderately dilated.    Right Atrium: Right atrium is mildly dilated.    Image quality is adequate. Contrast used: Lumason.    ACE/ARB/ARNi: valsartan 40 mg daily-- home med is Entresto 24-26 mg bid-- hypotensive ARB started today  BB: home med is coreg 12.5 mg bid- not on while on inotrope and diuresis- hypotensive  Aldosterone Antagonist: not started on as outpatient d/t noncompliance  SGLT2: home med is jardiance - not currently on    History of sleep apnea: No   [place] Screen ordered: Yes    DM History: Yes  Consult for DM educator: Yes      Last Hospital Admission: 2/19/23 with CHF  Code Status: full   Discharge plans: from home    Family Present: no    Kaveh Murphy was admitted to the hospital with increased shortness of breath and edema. Patient has been seen in the past for education. He follows with HF NP but had not been in to clinic since last January. He states he ran out of medications since he did not go to follow ups. He states he has anxiety and it was 
EXPAREL, LEFT INTERNAL MAMMARY LYMPH NODE BIOPSIES X4     PT: 253 XCT: 217     CARDIAC RHYTHM ASSESSMENT:  Holter/Event monitor  No results found for this or any previous visit.    Old notes reviewed  Telemetry reviewd  Ekg personally reviewed  Chest xray personally reviewed  Echo, stress, cath, and/or other cardiac testing reviewed in detail   Medications and labs reviewed    MEDICAL DECISION-MAKING COMPLEXITY    [x] High (any 2)    A. Problems (any 1)  [x] Acute/Chronic Illness/injury posing threat to life or bodily function:  acute CHF, elevated troponin  [] Severe exacerbation of chronic illness:    ---------------------------------------------------------------------  B. Risk of Treatment (any 1)   [x] Drugs/treatments that require intensive monitoring for toxicity include:  IV lasix  [] Change in code status:    [] Decision to escalate care:    [] Major surgery/procedure with associated risk factors:    ----------------------------------------------------------------------  C. Data (any 2)  [x] Discussed management of the case with:   hospitalist  [x] Imaging personally reviewed and interpreted, includes:  CXR, EKG, telemetry  [x] Data Review (any 3)  [] Collateral history obtained from:    [x] All available Consultant notes from yesterday/today were reviewed  [x] All current labs were reviewed and interpreted for clinical significance   [x] Appropriate follow-up labs were ordered    Tobacco use was discussed with the patient and educated on the negative effects. I have asked the patient to not utilize these agents.    Thank you for allowing to us to participate in the care or Kaveh Murphy. Further evaluation will be based upon the patient's clinical course and testing results.    All questions and concerns were addressed to the patient/family. Alternatives to my treatment were discussed.

## 2025-01-09 LAB
ALBUMIN SERPL-MCNC: 3.2 G/DL (ref 3.4–5)
ALP SERPL-CCNC: 310 U/L (ref 40–129)
ALT SERPL-CCNC: 37 U/L (ref 10–40)
ANION GAP SERPL CALCULATED.3IONS-SCNC: 10 MMOL/L (ref 3–16)
ANTI-XA UNFRAC HEPARIN: 0.17 IU/ML (ref 0.3–0.7)
ANTI-XA UNFRAC HEPARIN: 0.34 IU/ML (ref 0.3–0.7)
AST SERPL-CCNC: 34 U/L (ref 15–37)
BILIRUB DIRECT SERPL-MCNC: 0.9 MG/DL (ref 0–0.3)
BILIRUB INDIRECT SERPL-MCNC: 0.7 MG/DL (ref 0–1)
BILIRUB SERPL-MCNC: 1.6 MG/DL (ref 0–1)
BUN SERPL-MCNC: 20 MG/DL (ref 7–20)
CALCIUM SERPL-MCNC: 8.5 MG/DL (ref 8.3–10.6)
CHLORIDE SERPL-SCNC: 100 MMOL/L (ref 99–110)
CO2 SERPL-SCNC: 24 MMOL/L (ref 21–32)
CREAT SERPL-MCNC: 1.1 MG/DL (ref 0.8–1.3)
GFR SERPLBLD CREATININE-BSD FMLA CKD-EPI: 76 ML/MIN/{1.73_M2}
GLUCOSE BLD-MCNC: 156 MG/DL (ref 70–99)
GLUCOSE BLD-MCNC: 201 MG/DL (ref 70–99)
GLUCOSE BLD-MCNC: 225 MG/DL (ref 70–99)
GLUCOSE BLD-MCNC: 266 MG/DL (ref 70–99)
GLUCOSE SERPL-MCNC: 200 MG/DL (ref 70–99)
INR PPP: 3.11 (ref 0.85–1.15)
MAGNESIUM SERPL-MCNC: 1.94 MG/DL (ref 1.8–2.4)
NT-PROBNP SERPL-MCNC: 516 PG/ML (ref 0–124)
PERFORMED ON: ABNORMAL
POTASSIUM SERPL-SCNC: 3.7 MMOL/L (ref 3.5–5.1)
PROT SERPL-MCNC: 5.6 G/DL (ref 6.4–8.2)
PROTHROMBIN TIME: 31.9 SEC (ref 11.9–14.9)
SODIUM SERPL-SCNC: 134 MMOL/L (ref 136–145)

## 2025-01-09 PROCEDURE — 80048 BASIC METABOLIC PNL TOTAL CA: CPT

## 2025-01-09 PROCEDURE — 2500000003 HC RX 250 WO HCPCS: Performed by: INTERNAL MEDICINE

## 2025-01-09 PROCEDURE — 83735 ASSAY OF MAGNESIUM: CPT

## 2025-01-09 PROCEDURE — 6370000000 HC RX 637 (ALT 250 FOR IP): Performed by: INTERNAL MEDICINE

## 2025-01-09 PROCEDURE — 2580000003 HC RX 258: Performed by: INTERNAL MEDICINE

## 2025-01-09 PROCEDURE — 36415 COLL VENOUS BLD VENIPUNCTURE: CPT

## 2025-01-09 PROCEDURE — 83880 ASSAY OF NATRIURETIC PEPTIDE: CPT

## 2025-01-09 PROCEDURE — 80076 HEPATIC FUNCTION PANEL: CPT

## 2025-01-09 PROCEDURE — 6360000002 HC RX W HCPCS: Performed by: INTERNAL MEDICINE

## 2025-01-09 PROCEDURE — 85610 PROTHROMBIN TIME: CPT

## 2025-01-09 PROCEDURE — 6370000000 HC RX 637 (ALT 250 FOR IP): Performed by: STUDENT IN AN ORGANIZED HEALTH CARE EDUCATION/TRAINING PROGRAM

## 2025-01-09 PROCEDURE — 99233 SBSQ HOSP IP/OBS HIGH 50: CPT | Performed by: INTERNAL MEDICINE

## 2025-01-09 PROCEDURE — 2060000000 HC ICU INTERMEDIATE R&B

## 2025-01-09 PROCEDURE — 85520 HEPARIN ASSAY: CPT

## 2025-01-09 PROCEDURE — 6370000000 HC RX 637 (ALT 250 FOR IP): Performed by: NURSE PRACTITIONER

## 2025-01-09 RX ORDER — WARFARIN SODIUM 7.5 MG/1
7.5 TABLET ORAL
Status: COMPLETED | OUTPATIENT
Start: 2025-01-09 | End: 2025-01-09

## 2025-01-09 RX ORDER — WARFARIN SODIUM 5 MG/1
10 TABLET ORAL
Status: DISCONTINUED | OUTPATIENT
Start: 2025-01-09 | End: 2025-01-09

## 2025-01-09 RX ORDER — FUROSEMIDE 10 MG/ML
40 INJECTION INTRAMUSCULAR; INTRAVENOUS DAILY
Status: DISCONTINUED | OUTPATIENT
Start: 2025-01-09 | End: 2025-01-10

## 2025-01-09 RX ORDER — ENOXAPARIN SODIUM 100 MG/ML
40 INJECTION SUBCUTANEOUS DAILY
Status: DISCONTINUED | OUTPATIENT
Start: 2025-01-09 | End: 2025-01-10

## 2025-01-09 RX ORDER — METOPROLOL SUCCINATE 25 MG/1
12.5 TABLET, EXTENDED RELEASE ORAL DAILY
Status: DISCONTINUED | OUTPATIENT
Start: 2025-01-09 | End: 2025-01-13 | Stop reason: HOSPADM

## 2025-01-09 RX ORDER — DOBUTAMINE HYDROCHLORIDE 200 MG/100ML
2.5 INJECTION INTRAVENOUS CONTINUOUS
Status: DISCONTINUED | OUTPATIENT
Start: 2025-01-09 | End: 2025-01-09

## 2025-01-09 RX ORDER — WARFARIN SODIUM 5 MG/1
5 TABLET ORAL DAILY
Status: DISCONTINUED | OUTPATIENT
Start: 2025-01-09 | End: 2025-01-09

## 2025-01-09 RX ORDER — DOBUTAMINE HYDROCHLORIDE 200 MG/100ML
2.5 INJECTION INTRAVENOUS CONTINUOUS
Status: DISCONTINUED | OUTPATIENT
Start: 2025-01-09 | End: 2025-01-13

## 2025-01-09 RX ADMIN — MIDODRINE HYDROCHLORIDE 5 MG: 5 TABLET ORAL at 05:35

## 2025-01-09 RX ADMIN — MIDODRINE HYDROCHLORIDE 5 MG: 5 TABLET ORAL at 21:55

## 2025-01-09 RX ADMIN — WARFARIN SODIUM 7.5 MG: 7.5 TABLET ORAL at 17:26

## 2025-01-09 RX ADMIN — FUROSEMIDE 40 MG: 10 INJECTION, SOLUTION INTRAMUSCULAR; INTRAVENOUS at 09:16

## 2025-01-09 RX ADMIN — INSULIN LISPRO 2 UNITS: 100 INJECTION, SOLUTION INTRAVENOUS; SUBCUTANEOUS at 09:17

## 2025-01-09 RX ADMIN — VALSARTAN 40 MG: 40 TABLET, FILM COATED ORAL at 09:17

## 2025-01-09 RX ADMIN — MIDODRINE HYDROCHLORIDE 5 MG: 5 TABLET ORAL at 17:26

## 2025-01-09 RX ADMIN — INSULIN LISPRO 4 UNITS: 100 INJECTION, SOLUTION INTRAVENOUS; SUBCUTANEOUS at 20:49

## 2025-01-09 RX ADMIN — HEPARIN SODIUM 2000 UNITS: 1000 INJECTION INTRAVENOUS; SUBCUTANEOUS at 00:44

## 2025-01-09 RX ADMIN — METOPROLOL SUCCINATE 12.5 MG: 25 TABLET, EXTENDED RELEASE ORAL at 09:17

## 2025-01-09 RX ADMIN — SODIUM CHLORIDE 200 MG: 9 INJECTION, SOLUTION INTRAVENOUS at 14:40

## 2025-01-09 RX ADMIN — MIDODRINE HYDROCHLORIDE 5 MG: 5 TABLET ORAL at 12:37

## 2025-01-09 RX ADMIN — DOBUTAMINE HYDROCHLORIDE 5 MCG/KG/MIN: 200 INJECTION INTRAVENOUS at 00:58

## 2025-01-09 RX ADMIN — ASPIRIN 81 MG 81 MG: 81 TABLET ORAL at 09:17

## 2025-01-09 RX ADMIN — TRAZODONE HYDROCHLORIDE 50 MG: 50 TABLET ORAL at 20:49

## 2025-01-09 RX ADMIN — INSULIN LISPRO 2 UNITS: 100 INJECTION, SOLUTION INTRAVENOUS; SUBCUTANEOUS at 12:37

## 2025-01-09 RX ADMIN — Medication 10 ML: at 20:50

## 2025-01-09 RX ADMIN — DOBUTAMINE HYDROCHLORIDE 2.5 MCG/KG/MIN: 200 INJECTION INTRAVENOUS at 16:02

## 2025-01-09 RX ADMIN — ENOXAPARIN SODIUM 40 MG: 100 INJECTION SUBCUTANEOUS at 17:25

## 2025-01-09 RX ADMIN — MELATONIN TAB 3 MG 6 MG: 3 TAB at 20:49

## 2025-01-09 NOTE — PALLIATIVE CARE
Pt wants palliative and not Hospice at NH. Per families choice referral submitted to [facility] Palliative care Julio Yung fax# [phone], phone # [phone]. Referral also communicated to Hospitalist and . Brochure left at bedside for information. They will follow up with pt at NH

## 2025-01-09 NOTE — DISCHARGE INSTRUCTIONS
Follow up with your PCP within 3-4 days of discharge.  Have PCP check INR and adjust Coumadin dose if needed  Follow up with cardiology as instructed   Follow up with nephrology as instructed   Make appointment with endocrinology  Take all your medications as prescribed.          Guidelines for Heart Failure home management:    1. Continue to monitor weight first thing each morning. You should weigh yourself after using the bathroom and before you eat breakfast.    2. Report to your doctor any significant weight change. Remember that weight change of 2-3 lbs. in 1 day or 5 lbs in a week is \"significant\" and likely represents changes in \"fluid\" status.  If you are experiencing any swelling in your feet, ankles or abdomen, or shortness of breath, call your doctor.     3. You should restrict all sodium intake to 3000 milligrams (3 grams) a day. Depending on your status, you may also be asked to restrict fluid intake to no more that 64 oz/2 Liters a day. If uncertain, ask the nurse or physician.     4. Regular aerobic exercise is encouraged 30 minutes a day (walking, bike, swimming, etc.). For specific exercise recommendations, ask your physician.     5. Report to your doctor any change in symptoms (chest pain, worsening shortness of breath, increased dizziness or passing out, increased palpitations or ICD shock, trouble catching breath while lying down, increased edema or abdominal bloating). Remember that even \"minor\" changes in symptoms may be important. Also report any changes in medications including \"over the counter\" medications.     6. DO NOT take NSAID's for pain (i.e, Advil, Aleve, Motrin, ibuprofen, and many more) since these may cause serious problems in those with a history of CHF. If uncertain about the medication, call your doctor.    7. If you have new significant or ongoing diarrhea or vomiting, please call your doctor for further instructions. Taking a diuretic (water pill) with these symptoms can

## 2025-01-10 LAB
ALBUMIN SERPL-MCNC: 3.1 G/DL (ref 3.4–5)
ALP SERPL-CCNC: 284 U/L (ref 40–129)
ALT SERPL-CCNC: 32 U/L (ref 10–40)
ANION GAP SERPL CALCULATED.3IONS-SCNC: 7 MMOL/L (ref 3–16)
ANTI-XA UNFRAC HEPARIN: 0.12 IU/ML (ref 0.3–0.7)
ANTI-XA UNFRAC HEPARIN: <0.1 IU/ML (ref 0.3–0.7)
AST SERPL-CCNC: 29 U/L (ref 15–37)
BILIRUB DIRECT SERPL-MCNC: 0.9 MG/DL (ref 0–0.3)
BILIRUB INDIRECT SERPL-MCNC: 0.7 MG/DL (ref 0–1)
BILIRUB SERPL-MCNC: 1.6 MG/DL (ref 0–1)
BUN SERPL-MCNC: 17 MG/DL (ref 7–20)
CALCIUM SERPL-MCNC: 8.8 MG/DL (ref 8.3–10.6)
CHLORIDE SERPL-SCNC: 99 MMOL/L (ref 99–110)
CO2 SERPL-SCNC: 28 MMOL/L (ref 21–32)
CREAT SERPL-MCNC: 1.1 MG/DL (ref 0.8–1.3)
DEPRECATED RDW RBC AUTO: 16.2 % (ref 12.4–15.4)
GFR SERPLBLD CREATININE-BSD FMLA CKD-EPI: 76 ML/MIN/{1.73_M2}
GLUCOSE BLD-MCNC: 179 MG/DL (ref 70–99)
GLUCOSE BLD-MCNC: 213 MG/DL (ref 70–99)
GLUCOSE BLD-MCNC: 225 MG/DL (ref 70–99)
GLUCOSE BLD-MCNC: 243 MG/DL (ref 70–99)
GLUCOSE BLD-MCNC: 300 MG/DL (ref 70–99)
GLUCOSE SERPL-MCNC: 236 MG/DL (ref 70–99)
HCT VFR BLD AUTO: 36.7 % (ref 40.5–52.5)
HGB BLD-MCNC: 12.2 G/DL (ref 13.5–17.5)
INR PPP: 3.19 (ref 0.85–1.15)
MAGNESIUM SERPL-MCNC: 1.78 MG/DL (ref 1.8–2.4)
MCH RBC QN AUTO: 29.8 PG (ref 26–34)
MCHC RBC AUTO-ENTMCNC: 33.2 G/DL (ref 31–36)
MCV RBC AUTO: 89.8 FL (ref 80–100)
PERFORMED ON: ABNORMAL
PLATELET # BLD AUTO: 103 K/UL (ref 135–450)
PMV BLD AUTO: 8.7 FL (ref 5–10.5)
POTASSIUM SERPL-SCNC: 3.8 MMOL/L (ref 3.5–5.1)
PROT SERPL-MCNC: 5.9 G/DL (ref 6.4–8.2)
PROTHROMBIN TIME: 32.5 SEC (ref 11.9–14.9)
RBC # BLD AUTO: 4.09 M/UL (ref 4.2–5.9)
SODIUM SERPL-SCNC: 134 MMOL/L (ref 136–145)
WBC # BLD AUTO: 6.8 K/UL (ref 4–11)

## 2025-01-10 PROCEDURE — 85520 HEPARIN ASSAY: CPT

## 2025-01-10 PROCEDURE — 6370000000 HC RX 637 (ALT 250 FOR IP): Performed by: INTERNAL MEDICINE

## 2025-01-10 PROCEDURE — 2060000000 HC ICU INTERMEDIATE R&B

## 2025-01-10 PROCEDURE — 85610 PROTHROMBIN TIME: CPT

## 2025-01-10 PROCEDURE — 6370000000 HC RX 637 (ALT 250 FOR IP): Performed by: NURSE PRACTITIONER

## 2025-01-10 PROCEDURE — 2580000003 HC RX 258: Performed by: INTERNAL MEDICINE

## 2025-01-10 PROCEDURE — 80048 BASIC METABOLIC PNL TOTAL CA: CPT

## 2025-01-10 PROCEDURE — 2500000003 HC RX 250 WO HCPCS: Performed by: INTERNAL MEDICINE

## 2025-01-10 PROCEDURE — 99233 SBSQ HOSP IP/OBS HIGH 50: CPT | Performed by: INTERNAL MEDICINE

## 2025-01-10 PROCEDURE — 85027 COMPLETE CBC AUTOMATED: CPT

## 2025-01-10 PROCEDURE — 6370000000 HC RX 637 (ALT 250 FOR IP): Performed by: STUDENT IN AN ORGANIZED HEALTH CARE EDUCATION/TRAINING PROGRAM

## 2025-01-10 PROCEDURE — 6360000002 HC RX W HCPCS: Performed by: INTERNAL MEDICINE

## 2025-01-10 PROCEDURE — 36415 COLL VENOUS BLD VENIPUNCTURE: CPT

## 2025-01-10 PROCEDURE — 83735 ASSAY OF MAGNESIUM: CPT

## 2025-01-10 PROCEDURE — 80076 HEPATIC FUNCTION PANEL: CPT

## 2025-01-10 RX ORDER — DIGOXIN 125 MCG
125 TABLET ORAL DAILY
Qty: 45 TABLET | Refills: 3 | Status: SHIPPED | OUTPATIENT
Start: 2025-01-10 | End: 2025-01-13

## 2025-01-10 RX ORDER — NICOTINE 21 MG/24HR
1 PATCH, TRANSDERMAL 24 HOURS TRANSDERMAL DAILY
Qty: 10 PATCH | Refills: 0 | Status: SHIPPED | OUTPATIENT
Start: 2025-01-11 | End: 2025-01-21

## 2025-01-10 RX ORDER — MAGNESIUM SULFATE 1 G/100ML
1000 INJECTION INTRAVENOUS ONCE
Status: COMPLETED | OUTPATIENT
Start: 2025-01-10 | End: 2025-01-10

## 2025-01-10 RX ORDER — WARFARIN SODIUM 7.5 MG/1
7.5 TABLET ORAL
Status: COMPLETED | OUTPATIENT
Start: 2025-01-10 | End: 2025-01-10

## 2025-01-10 RX ORDER — WARFARIN SODIUM 5 MG/1
10 TABLET ORAL
Status: DISCONTINUED | OUTPATIENT
Start: 2025-01-11 | End: 2025-01-11

## 2025-01-10 RX ORDER — VALSARTAN 40 MG/1
40 TABLET ORAL DAILY
Qty: 30 TABLET | Refills: 3 | Status: SHIPPED | OUTPATIENT
Start: 2025-01-11 | End: 2025-01-13 | Stop reason: HOSPADM

## 2025-01-10 RX ORDER — MIDODRINE HYDROCHLORIDE 5 MG/1
5 TABLET ORAL 3 TIMES DAILY
Qty: 90 TABLET | Refills: 0 | Status: SHIPPED | OUTPATIENT
Start: 2025-01-10 | End: 2025-01-13 | Stop reason: HOSPADM

## 2025-01-10 RX ORDER — INSULIN GLARGINE 100 [IU]/ML
10 INJECTION, SOLUTION SUBCUTANEOUS NIGHTLY
Status: DISCONTINUED | OUTPATIENT
Start: 2025-01-10 | End: 2025-01-13 | Stop reason: HOSPADM

## 2025-01-10 RX ORDER — WARFARIN SODIUM 5 MG/1
10 TABLET ORAL
Status: DISCONTINUED | OUTPATIENT
Start: 2025-01-10 | End: 2025-01-10

## 2025-01-10 RX ORDER — TORSEMIDE 20 MG/1
20 TABLET ORAL DAILY
Qty: 30 TABLET | Refills: 3 | Status: SHIPPED | OUTPATIENT
Start: 2025-01-11 | End: 2025-01-19

## 2025-01-10 RX ORDER — METOPROLOL SUCCINATE 25 MG/1
12.5 TABLET, EXTENDED RELEASE ORAL DAILY
Qty: 30 TABLET | Refills: 3 | Status: SHIPPED | OUTPATIENT
Start: 2025-01-11

## 2025-01-10 RX ORDER — INSULIN GLARGINE 100 [IU]/ML
10 INJECTION, SOLUTION SUBCUTANEOUS NIGHTLY
Qty: 10 ML | Refills: 3 | Status: SHIPPED | OUTPATIENT
Start: 2025-01-10

## 2025-01-10 RX ORDER — TORSEMIDE 20 MG/1
20 TABLET ORAL DAILY
Status: DISCONTINUED | OUTPATIENT
Start: 2025-01-11 | End: 2025-01-13 | Stop reason: HOSPADM

## 2025-01-10 RX ADMIN — ENOXAPARIN SODIUM 40 MG: 100 INJECTION SUBCUTANEOUS at 09:11

## 2025-01-10 RX ADMIN — WARFARIN SODIUM 7.5 MG: 7.5 TABLET ORAL at 17:07

## 2025-01-10 RX ADMIN — SODIUM CHLORIDE 200 MG: 9 INJECTION, SOLUTION INTRAVENOUS at 16:39

## 2025-01-10 RX ADMIN — MIDODRINE HYDROCHLORIDE 5 MG: 5 TABLET ORAL at 17:07

## 2025-01-10 RX ADMIN — MAGNESIUM SULFATE HEPTAHYDRATE 1000 MG: 1 INJECTION, SOLUTION INTRAVENOUS at 12:28

## 2025-01-10 RX ADMIN — INSULIN GLARGINE 10 UNITS: 100 INJECTION, SOLUTION SUBCUTANEOUS at 21:32

## 2025-01-10 RX ADMIN — METOPROLOL SUCCINATE 12.5 MG: 25 TABLET, EXTENDED RELEASE ORAL at 09:11

## 2025-01-10 RX ADMIN — Medication 10 ML: at 21:33

## 2025-01-10 RX ADMIN — Medication 10 ML: at 09:12

## 2025-01-10 RX ADMIN — ASPIRIN 81 MG 81 MG: 81 TABLET ORAL at 09:11

## 2025-01-10 RX ADMIN — MIDODRINE HYDROCHLORIDE 5 MG: 5 TABLET ORAL at 05:16

## 2025-01-10 RX ADMIN — INSULIN LISPRO 6 UNITS: 100 INJECTION, SOLUTION INTRAVENOUS; SUBCUTANEOUS at 21:32

## 2025-01-10 RX ADMIN — MIDODRINE HYDROCHLORIDE 5 MG: 5 TABLET ORAL at 12:13

## 2025-01-10 RX ADMIN — FUROSEMIDE 40 MG: 10 INJECTION, SOLUTION INTRAMUSCULAR; INTRAVENOUS at 09:10

## 2025-01-10 RX ADMIN — INSULIN LISPRO 2 UNITS: 100 INJECTION, SOLUTION INTRAVENOUS; SUBCUTANEOUS at 12:23

## 2025-01-10 RX ADMIN — MELATONIN TAB 3 MG 6 MG: 3 TAB at 21:33

## 2025-01-10 RX ADMIN — TRAZODONE HYDROCHLORIDE 50 MG: 50 TABLET ORAL at 21:33

## 2025-01-10 RX ADMIN — INSULIN LISPRO 2 UNITS: 100 INJECTION, SOLUTION INTRAVENOUS; SUBCUTANEOUS at 09:11

## 2025-01-10 RX ADMIN — VALSARTAN 40 MG: 40 TABLET, FILM COATED ORAL at 09:11

## 2025-01-11 PROBLEM — R06.02 SHORTNESS OF BREATH: Status: ACTIVE | Noted: 2025-01-11

## 2025-01-11 LAB
ALBUMIN SERPL-MCNC: 2.9 G/DL (ref 3.4–5)
ALP SERPL-CCNC: 286 U/L (ref 40–129)
ALT SERPL-CCNC: 29 U/L (ref 10–40)
ANION GAP SERPL CALCULATED.3IONS-SCNC: 9 MMOL/L (ref 3–16)
ANTI-XA UNFRAC HEPARIN: <0.1 IU/ML (ref 0.3–0.7)
AST SERPL-CCNC: 30 U/L (ref 15–37)
BILIRUB DIRECT SERPL-MCNC: 0.7 MG/DL (ref 0–0.3)
BILIRUB INDIRECT SERPL-MCNC: 0.4 MG/DL (ref 0–1)
BILIRUB SERPL-MCNC: 1.1 MG/DL (ref 0–1)
BUN SERPL-MCNC: 20 MG/DL (ref 7–20)
CALCIUM SERPL-MCNC: 8.3 MG/DL (ref 8.3–10.6)
CHLORIDE SERPL-SCNC: 99 MMOL/L (ref 99–110)
CO2 SERPL-SCNC: 25 MMOL/L (ref 21–32)
CREAT SERPL-MCNC: 1.1 MG/DL (ref 0.8–1.3)
GFR SERPLBLD CREATININE-BSD FMLA CKD-EPI: 76 ML/MIN/{1.73_M2}
GLUCOSE BLD-MCNC: 146 MG/DL (ref 70–99)
GLUCOSE BLD-MCNC: 225 MG/DL (ref 70–99)
GLUCOSE BLD-MCNC: 279 MG/DL (ref 70–99)
GLUCOSE BLD-MCNC: 292 MG/DL (ref 70–99)
GLUCOSE SERPL-MCNC: 219 MG/DL (ref 70–99)
INR PPP: 4.59 (ref 0.85–1.15)
NT-PROBNP SERPL-MCNC: 834 PG/ML (ref 0–124)
PERFORMED ON: ABNORMAL
POTASSIUM SERPL-SCNC: 4.1 MMOL/L (ref 3.5–5.1)
PROT SERPL-MCNC: 5.7 G/DL (ref 6.4–8.2)
PROTHROMBIN TIME: 42.9 SEC (ref 11.9–14.9)
SODIUM SERPL-SCNC: 133 MMOL/L (ref 136–145)

## 2025-01-11 PROCEDURE — 6370000000 HC RX 637 (ALT 250 FOR IP): Performed by: INTERNAL MEDICINE

## 2025-01-11 PROCEDURE — 2500000003 HC RX 250 WO HCPCS: Performed by: INTERNAL MEDICINE

## 2025-01-11 PROCEDURE — 6370000000 HC RX 637 (ALT 250 FOR IP): Performed by: STUDENT IN AN ORGANIZED HEALTH CARE EDUCATION/TRAINING PROGRAM

## 2025-01-11 PROCEDURE — 36415 COLL VENOUS BLD VENIPUNCTURE: CPT

## 2025-01-11 PROCEDURE — 6370000000 HC RX 637 (ALT 250 FOR IP): Performed by: NURSE PRACTITIONER

## 2025-01-11 PROCEDURE — 6360000002 HC RX W HCPCS: Performed by: INTERNAL MEDICINE

## 2025-01-11 PROCEDURE — 80076 HEPATIC FUNCTION PANEL: CPT

## 2025-01-11 PROCEDURE — 83880 ASSAY OF NATRIURETIC PEPTIDE: CPT

## 2025-01-11 PROCEDURE — 2060000000 HC ICU INTERMEDIATE R&B

## 2025-01-11 PROCEDURE — 85610 PROTHROMBIN TIME: CPT

## 2025-01-11 PROCEDURE — 99232 SBSQ HOSP IP/OBS MODERATE 35: CPT | Performed by: NURSE PRACTITIONER

## 2025-01-11 PROCEDURE — 80048 BASIC METABOLIC PNL TOTAL CA: CPT

## 2025-01-11 PROCEDURE — 85520 HEPARIN ASSAY: CPT

## 2025-01-11 RX ORDER — DIGOXIN 125 MCG
125 TABLET ORAL DAILY
Status: DISCONTINUED | OUTPATIENT
Start: 2025-01-11 | End: 2025-01-13 | Stop reason: HOSPADM

## 2025-01-11 RX ADMIN — DIGOXIN 125 MCG: 125 TABLET ORAL at 08:51

## 2025-01-11 RX ADMIN — MELATONIN TAB 3 MG 6 MG: 3 TAB at 20:21

## 2025-01-11 RX ADMIN — Medication 10 ML: at 08:07

## 2025-01-11 RX ADMIN — INSULIN LISPRO 2 UNITS: 100 INJECTION, SOLUTION INTRAVENOUS; SUBCUTANEOUS at 08:52

## 2025-01-11 RX ADMIN — MIDODRINE HYDROCHLORIDE 5 MG: 5 TABLET ORAL at 05:15

## 2025-01-11 RX ADMIN — MIDODRINE HYDROCHLORIDE 5 MG: 5 TABLET ORAL at 00:22

## 2025-01-11 RX ADMIN — MIDODRINE HYDROCHLORIDE 5 MG: 5 TABLET ORAL at 08:07

## 2025-01-11 RX ADMIN — Medication 10 ML: at 20:21

## 2025-01-11 RX ADMIN — MIDODRINE HYDROCHLORIDE 5 MG: 5 TABLET ORAL at 11:37

## 2025-01-11 RX ADMIN — INSULIN GLARGINE 10 UNITS: 100 INJECTION, SOLUTION SUBCUTANEOUS at 20:20

## 2025-01-11 RX ADMIN — DOBUTAMINE HYDROCHLORIDE 2.5 MCG/KG/MIN: 200 INJECTION INTRAVENOUS at 00:24

## 2025-01-11 RX ADMIN — INSULIN LISPRO 4 UNITS: 100 INJECTION, SOLUTION INTRAVENOUS; SUBCUTANEOUS at 11:37

## 2025-01-11 RX ADMIN — ASPIRIN 81 MG 81 MG: 81 TABLET ORAL at 08:07

## 2025-01-11 RX ADMIN — MIDODRINE HYDROCHLORIDE 5 MG: 5 TABLET ORAL at 17:47

## 2025-01-11 RX ADMIN — INSULIN LISPRO 4 UNITS: 100 INJECTION, SOLUTION INTRAVENOUS; SUBCUTANEOUS at 20:20

## 2025-01-11 RX ADMIN — TRAZODONE HYDROCHLORIDE 50 MG: 50 TABLET ORAL at 20:21

## 2025-01-11 ASSESSMENT — PAIN SCALES - GENERAL: PAINLEVEL_OUTOF10: 0

## 2025-01-12 LAB
ANION GAP SERPL CALCULATED.3IONS-SCNC: 7 MMOL/L (ref 3–16)
BUN SERPL-MCNC: 21 MG/DL (ref 7–20)
CALCIUM SERPL-MCNC: 8.6 MG/DL (ref 8.3–10.6)
CHLORIDE SERPL-SCNC: 100 MMOL/L (ref 99–110)
CO2 SERPL-SCNC: 26 MMOL/L (ref 21–32)
CREAT SERPL-MCNC: 1.1 MG/DL (ref 0.8–1.3)
DEPRECATED RDW RBC AUTO: 16.6 % (ref 12.4–15.4)
GFR SERPLBLD CREATININE-BSD FMLA CKD-EPI: 76 ML/MIN/{1.73_M2}
GLUCOSE BLD-MCNC: 179 MG/DL (ref 70–99)
GLUCOSE BLD-MCNC: 189 MG/DL (ref 70–99)
GLUCOSE BLD-MCNC: 270 MG/DL (ref 70–99)
GLUCOSE BLD-MCNC: 284 MG/DL (ref 70–99)
GLUCOSE SERPL-MCNC: 188 MG/DL (ref 70–99)
HCT VFR BLD AUTO: 34.2 % (ref 40.5–52.5)
HGB BLD-MCNC: 11.6 G/DL (ref 13.5–17.5)
INR PPP: 2.82 (ref 0.85–1.15)
MCH RBC QN AUTO: 30.3 PG (ref 26–34)
MCHC RBC AUTO-ENTMCNC: 33.8 G/DL (ref 31–36)
MCV RBC AUTO: 89.6 FL (ref 80–100)
PERFORMED ON: ABNORMAL
PLATELET # BLD AUTO: 104 K/UL (ref 135–450)
PMV BLD AUTO: 8.6 FL (ref 5–10.5)
POTASSIUM SERPL-SCNC: 4.1 MMOL/L (ref 3.5–5.1)
PROTHROMBIN TIME: 29.6 SEC (ref 11.9–14.9)
RBC # BLD AUTO: 3.82 M/UL (ref 4.2–5.9)
SODIUM SERPL-SCNC: 133 MMOL/L (ref 136–145)
WBC # BLD AUTO: 4.5 K/UL (ref 4–11)

## 2025-01-12 PROCEDURE — 85610 PROTHROMBIN TIME: CPT

## 2025-01-12 PROCEDURE — 99232 SBSQ HOSP IP/OBS MODERATE 35: CPT | Performed by: NURSE PRACTITIONER

## 2025-01-12 PROCEDURE — 6370000000 HC RX 637 (ALT 250 FOR IP): Performed by: INTERNAL MEDICINE

## 2025-01-12 PROCEDURE — 36415 COLL VENOUS BLD VENIPUNCTURE: CPT

## 2025-01-12 PROCEDURE — 80048 BASIC METABOLIC PNL TOTAL CA: CPT

## 2025-01-12 PROCEDURE — 6370000000 HC RX 637 (ALT 250 FOR IP): Performed by: NURSE PRACTITIONER

## 2025-01-12 PROCEDURE — 2500000003 HC RX 250 WO HCPCS: Performed by: INTERNAL MEDICINE

## 2025-01-12 PROCEDURE — 6360000002 HC RX W HCPCS: Performed by: INTERNAL MEDICINE

## 2025-01-12 PROCEDURE — 6370000000 HC RX 637 (ALT 250 FOR IP): Performed by: STUDENT IN AN ORGANIZED HEALTH CARE EDUCATION/TRAINING PROGRAM

## 2025-01-12 PROCEDURE — 2060000000 HC ICU INTERMEDIATE R&B

## 2025-01-12 PROCEDURE — 85027 COMPLETE CBC AUTOMATED: CPT

## 2025-01-12 RX ORDER — MIDODRINE HYDROCHLORIDE 5 MG/1
10 TABLET ORAL 3 TIMES DAILY
Status: DISCONTINUED | OUTPATIENT
Start: 2025-01-12 | End: 2025-01-13 | Stop reason: HOSPADM

## 2025-01-12 RX ORDER — WARFARIN SODIUM 7.5 MG/1
7.5 TABLET ORAL
Status: COMPLETED | OUTPATIENT
Start: 2025-01-12 | End: 2025-01-12

## 2025-01-12 RX ADMIN — MIDODRINE HYDROCHLORIDE 5 MG: 5 TABLET ORAL at 05:39

## 2025-01-12 RX ADMIN — Medication 10 ML: at 09:52

## 2025-01-12 RX ADMIN — INSULIN GLARGINE 10 UNITS: 100 INJECTION, SOLUTION SUBCUTANEOUS at 20:38

## 2025-01-12 RX ADMIN — INSULIN LISPRO 4 UNITS: 100 INJECTION, SOLUTION INTRAVENOUS; SUBCUTANEOUS at 13:13

## 2025-01-12 RX ADMIN — METOPROLOL SUCCINATE 12.5 MG: 25 TABLET, EXTENDED RELEASE ORAL at 11:27

## 2025-01-12 RX ADMIN — DOBUTAMINE HYDROCHLORIDE 2.5 MCG/KG/MIN: 200 INJECTION INTRAVENOUS at 16:27

## 2025-01-12 RX ADMIN — INSULIN LISPRO 4 UNITS: 100 INJECTION, SOLUTION INTRAVENOUS; SUBCUTANEOUS at 20:38

## 2025-01-12 RX ADMIN — ASPIRIN 81 MG 81 MG: 81 TABLET ORAL at 09:53

## 2025-01-12 RX ADMIN — DIGOXIN 125 MCG: 125 TABLET ORAL at 09:52

## 2025-01-12 RX ADMIN — MELATONIN TAB 3 MG 6 MG: 3 TAB at 20:39

## 2025-01-12 RX ADMIN — MIDODRINE HYDROCHLORIDE 10 MG: 5 TABLET ORAL at 13:13

## 2025-01-12 RX ADMIN — TRAZODONE HYDROCHLORIDE 50 MG: 50 TABLET ORAL at 20:39

## 2025-01-12 RX ADMIN — Medication 10 ML: at 20:39

## 2025-01-12 RX ADMIN — TORSEMIDE 20 MG: 20 TABLET ORAL at 09:52

## 2025-01-12 RX ADMIN — WARFARIN SODIUM 7.5 MG: 7.5 TABLET ORAL at 17:58

## 2025-01-12 RX ADMIN — MIDODRINE HYDROCHLORIDE 10 MG: 5 TABLET ORAL at 16:31

## 2025-01-12 ASSESSMENT — PAIN SCALES - GENERAL: PAINLEVEL_OUTOF10: 0

## 2025-01-13 VITALS
RESPIRATION RATE: 18 BRPM | SYSTOLIC BLOOD PRESSURE: 95 MMHG | HEART RATE: 95 BPM | BODY MASS INDEX: 27.04 KG/M2 | HEIGHT: 73 IN | WEIGHT: 204 LBS | DIASTOLIC BLOOD PRESSURE: 65 MMHG | TEMPERATURE: 97.6 F | OXYGEN SATURATION: 97 %

## 2025-01-13 LAB
ANION GAP SERPL CALCULATED.3IONS-SCNC: 6 MMOL/L (ref 3–16)
BUN SERPL-MCNC: 22 MG/DL (ref 7–20)
CALCIUM SERPL-MCNC: 8.8 MG/DL (ref 8.3–10.6)
CHLORIDE SERPL-SCNC: 102 MMOL/L (ref 99–110)
CO2 SERPL-SCNC: 28 MMOL/L (ref 21–32)
CREAT SERPL-MCNC: 1.1 MG/DL (ref 0.8–1.3)
GFR SERPLBLD CREATININE-BSD FMLA CKD-EPI: 76 ML/MIN/{1.73_M2}
GLUCOSE BLD-MCNC: 102 MG/DL (ref 70–99)
GLUCOSE BLD-MCNC: 103 MG/DL (ref 70–99)
GLUCOSE BLD-MCNC: 263 MG/DL (ref 70–99)
GLUCOSE SERPL-MCNC: 90 MG/DL (ref 70–99)
INR PPP: 2.24 (ref 0.85–1.15)
MAGNESIUM SERPL-MCNC: 2.07 MG/DL (ref 1.8–2.4)
PERFORMED ON: ABNORMAL
POTASSIUM SERPL-SCNC: 4.4 MMOL/L (ref 3.5–5.1)
POTASSIUM SERPL-SCNC: 4.6 MMOL/L (ref 3.5–5.1)
PROTHROMBIN TIME: 24.8 SEC (ref 11.9–14.9)
SODIUM SERPL-SCNC: 136 MMOL/L (ref 136–145)

## 2025-01-13 PROCEDURE — 99232 SBSQ HOSP IP/OBS MODERATE 35: CPT | Performed by: NURSE PRACTITIONER

## 2025-01-13 PROCEDURE — 36415 COLL VENOUS BLD VENIPUNCTURE: CPT

## 2025-01-13 PROCEDURE — 80048 BASIC METABOLIC PNL TOTAL CA: CPT

## 2025-01-13 PROCEDURE — 85610 PROTHROMBIN TIME: CPT

## 2025-01-13 PROCEDURE — 2500000003 HC RX 250 WO HCPCS: Performed by: INTERNAL MEDICINE

## 2025-01-13 PROCEDURE — 84132 ASSAY OF SERUM POTASSIUM: CPT

## 2025-01-13 PROCEDURE — 6370000000 HC RX 637 (ALT 250 FOR IP): Performed by: INTERNAL MEDICINE

## 2025-01-13 PROCEDURE — 6370000000 HC RX 637 (ALT 250 FOR IP): Performed by: NURSE PRACTITIONER

## 2025-01-13 PROCEDURE — 6370000000 HC RX 637 (ALT 250 FOR IP): Performed by: STUDENT IN AN ORGANIZED HEALTH CARE EDUCATION/TRAINING PROGRAM

## 2025-01-13 PROCEDURE — 83735 ASSAY OF MAGNESIUM: CPT

## 2025-01-13 RX ORDER — DIGOXIN 125 MCG
125 TABLET ORAL DAILY
Qty: 45 TABLET | Refills: 3 | Status: SHIPPED | OUTPATIENT
Start: 2025-01-13

## 2025-01-13 RX ORDER — WARFARIN SODIUM 7.5 MG/1
7.5 TABLET ORAL
Status: DISCONTINUED | OUTPATIENT
Start: 2025-01-13 | End: 2025-01-13 | Stop reason: HOSPADM

## 2025-01-13 RX ORDER — VALSARTAN 40 MG/1
20 TABLET ORAL DAILY
Qty: 30 TABLET | Refills: 3 | Status: SHIPPED | OUTPATIENT
Start: 2025-01-14

## 2025-01-13 RX ORDER — VALSARTAN 40 MG/1
20 TABLET ORAL DAILY
Status: DISCONTINUED | OUTPATIENT
Start: 2025-01-14 | End: 2025-01-13 | Stop reason: HOSPADM

## 2025-01-13 RX ORDER — MIDODRINE HYDROCHLORIDE 10 MG/1
10 TABLET ORAL 3 TIMES DAILY
Qty: 90 TABLET | Refills: 2 | Status: SHIPPED | OUTPATIENT
Start: 2025-01-13

## 2025-01-13 RX ADMIN — MIDODRINE HYDROCHLORIDE 10 MG: 5 TABLET ORAL at 05:17

## 2025-01-13 RX ADMIN — INSULIN LISPRO 4 UNITS: 100 INJECTION, SOLUTION INTRAVENOUS; SUBCUTANEOUS at 12:17

## 2025-01-13 RX ADMIN — DIGOXIN 125 MCG: 125 TABLET ORAL at 09:25

## 2025-01-13 RX ADMIN — MIDODRINE HYDROCHLORIDE 10 MG: 5 TABLET ORAL at 12:17

## 2025-01-13 RX ADMIN — Medication 10 ML: at 09:26

## 2025-01-13 RX ADMIN — ASPIRIN 81 MG 81 MG: 81 TABLET ORAL at 09:25

## 2025-01-13 RX ADMIN — METOPROLOL SUCCINATE 12.5 MG: 25 TABLET, EXTENDED RELEASE ORAL at 09:25

## 2025-01-13 RX ADMIN — TORSEMIDE 20 MG: 20 TABLET ORAL at 09:25

## 2025-01-13 ASSESSMENT — PAIN SCALES - GENERAL: PAINLEVEL_OUTOF10: 0

## 2025-01-13 NOTE — PLAN OF CARE
Problem: Chronic Conditions and Co-morbidities  Goal: Patient's chronic conditions and co-morbidity symptoms are monitored and maintained or improved  1/12/2025 1001 by Joyce Ortiz RN  Outcome: Progressing  Flowsheets (Taken 1/12/2025 0957)  Care Plan - Patient's Chronic Conditions and Co-Morbidity Symptoms are Monitored and Maintained or Improved: Monitor and assess patient's chronic conditions and comorbid symptoms for stability, deterioration, or improvement  1/12/2025 0557 by Lisbet Rodriguez RN  Outcome: Progressing     Problem: Discharge Planning  Goal: Discharge to home or other facility with appropriate resources  1/12/2025 1001 by Joyce Ortiz RN  Outcome: Progressing  Flowsheets (Taken 1/12/2025 0957)  Discharge to home or other facility with appropriate resources: Identify barriers to discharge with patient and caregiver  1/12/2025 0557 by Lisbet Rodriguez RN  Outcome: Progressing     Problem: Safety - Adult  Goal: Free from fall injury  1/12/2025 1001 by Joyce Ortiz RN  Outcome: Progressing  1/12/2025 0557 by Lisbet Rodriguez RN  Outcome: Progressing     Problem: ABCDS Injury Assessment  Goal: Absence of physical injury  1/12/2025 1001 by Joyce Ortiz RN  Outcome: Progressing  1/12/2025 0557 by Lisbet Rodriguez RN  Outcome: Progressing     Problem: Spiritual Struggle  Goal: Verbalizes spiritual struggle  1/12/2025 1001 by Joyce Ortiz RN  Outcome: Progressing  1/12/2025 0557 by Lisbet Rodriguez RN  Outcome: Progressing  Goal: Gains new understanding/perception  1/12/2025 1001 by Joyce Ortiz RN  Outcome: Progressing  1/12/2025 0557 by Lisbet Rodriguez RN  Outcome: Progressing  Goal: Growing sense of peace/hope  1/12/2025 1001 by Joyce Ortiz RN  Outcome: Progressing  1/12/2025 0557 by Lisbet Rodriguez RN  Outcome: Progressing  Goal: Explore resources for additional follow up, post discharge  1/12/2025 1001 by Joyce Ortiz RN  Outcome: Progressing  1/12/2025 0557 by 
  Problem: Chronic Conditions and Co-morbidities  Goal: Patient's chronic conditions and co-morbidity symptoms are monitored and maintained or improved  1/13/2025 0958 by Annabel Perrin RN  Outcome: Progressing  1/13/2025 0452 by Lisbet Rodriguez RN  Outcome: Progressing     Problem: Discharge Planning  Goal: Discharge to home or other facility with appropriate resources  1/13/2025 0958 by Annabel Perrin RN  Outcome: Progressing  1/13/2025 0452 by Lisbet Rodriguez RN  Outcome: Progressing     Problem: Safety - Adult  Goal: Free from fall injury  1/13/2025 0958 by Annabel Perrin RN  Outcome: Progressing  1/13/2025 0452 by Lisbet Rodriguez RN  Outcome: Progressing     Problem: ABCDS Injury Assessment  Goal: Absence of physical injury  1/13/2025 0958 by Annabel Perrin RN  Outcome: Progressing  1/13/2025 0452 by Lisbet Rodriguez RN  Outcome: Progressing     Problem: Spiritual Struggle  Goal: Verbalizes spiritual struggle  1/13/2025 0958 by Annabel Perrin RN  Outcome: Progressing  1/13/2025 0452 by Lisbet Rodriguez RN  Outcome: Progressing  Goal: Gains new understanding/perception  1/13/2025 0958 by Annabel Perrin RN  Outcome: Progressing  1/13/2025 0452 by Lisbet Rodriguez RN  Outcome: Progressing  Goal: Growing sense of peace/hope  1/13/2025 0958 by Annabel Perrin RN  Outcome: Progressing  1/13/2025 0452 by Lisbet Rodriguez RN  Outcome: Progressing  Goal: Explore resources for additional follow up, post discharge  1/13/2025 0958 by Annabel Perrin RN  Outcome: Progressing  1/13/2025 0452 by Lisbet Rodriguez RN  Outcome: Progressing     Problem: Pain  Goal: Verbalizes/displays adequate comfort level or baseline comfort level  1/13/2025 0958 by Annabel Perrin RN  Outcome: Progressing  1/13/2025 0452 by Lisbet Rodriguez RN  Outcome: Progressing     Problem: Cardiovascular - Adult  Goal: Maintains optimal cardiac output and hemodynamic 
  Problem: Chronic Conditions and Co-morbidities  Goal: Patient's chronic conditions and co-morbidity symptoms are monitored and maintained or improved  1/9/2025 1029 by Francisco J Petersen, RN  Outcome: Progressing  Flowsheets (Taken 1/9/2025 1029)  Care Plan - Patient's Chronic Conditions and Co-Morbidity Symptoms are Monitored and Maintained or Improved: Monitor and assess patient's chronic conditions and comorbid symptoms for stability, deterioration, or improvement     Problem: Discharge Planning  Goal: Discharge to home or other facility with appropriate resources  1/9/2025 1029 by Francisco J Petersen, RN  Outcome: Progressing  Flowsheets (Taken 1/8/2025 2000 by Anayeli Dyer, RN)  Discharge to home or other facility with appropriate resources: Identify barriers to discharge with patient and caregiver     Problem: Safety - Adult  Goal: Free from fall injury  1/9/2025 1029 by Francisco J Petersen, RN  Outcome: Progressing  Flowsheets (Taken 1/9/2025 1029)  Free From Fall Injury: Instruct family/caregiver on patient safety     
  Problem: Chronic Conditions and Co-morbidities  Goal: Patient's chronic conditions and co-morbidity symptoms are monitored and maintained or improved  Outcome: Progressing     Problem: Discharge Planning  Goal: Discharge to home or other facility with appropriate resources  Outcome: Progressing     Problem: Safety - Adult  Goal: Free from fall injury  Outcome: Progressing     Problem: ABCDS Injury Assessment  Goal: Absence of physical injury  Outcome: Progressing     Problem: Spiritual Struggle  Goal: Verbalizes spiritual struggle  Outcome: Progressing  Goal: Gains new understanding/perception  Outcome: Progressing  Goal: Growing sense of peace/hope  Outcome: Progressing  Goal: Explore resources for additional follow up, post discharge  Outcome: Progressing     Problem: Pain  Goal: Verbalizes/displays adequate comfort level or baseline comfort level  Outcome: Progressing     Problem: Cardiovascular - Adult  Goal: Maintains optimal cardiac output and hemodynamic stability  Outcome: Progressing  Goal: Absence of cardiac dysrhythmias or at baseline  Outcome: Progressing     Problem: Skin/Tissue Integrity - Adult  Goal: Skin integrity remains intact  Outcome: Progressing     Problem: Musculoskeletal - Adult  Goal: Return mobility to safest level of function  Outcome: Progressing     Problem: Metabolic/Fluid and Electrolytes - Adult  Goal: Electrolytes maintained within normal limits  Outcome: Progressing  Goal: Hemodynamic stability and optimal renal function maintained  Outcome: Progressing  Goal: Glucose maintained within prescribed range  Outcome: Progressing     Problem: Hematologic - Adult  Goal: Maintains hematologic stability  Outcome: Progressing     Problem: Respiratory - Adult  Goal: Achieves optimal ventilation and oxygenation  Outcome: Progressing     Problem: Anxiety  Goal: Will report anxiety at manageable levels  Description: INTERVENTIONS:  1. Administer medication as ordered  2. Teach and rehearse 
  Problem: Chronic Conditions and Co-morbidities  Goal: Patient's chronic conditions and co-morbidity symptoms are monitored and maintained or improved  Outcome: Progressing  Flowsheets (Taken 1/7/2025 2042)  Care Plan - Patient's Chronic Conditions and Co-Morbidity Symptoms are Monitored and Maintained or Improved: Monitor and assess patient's chronic conditions and comorbid symptoms for stability, deterioration, or improvement     Problem: Discharge Planning  Goal: Discharge to home or other facility with appropriate resources  Outcome: Progressing  Flowsheets (Taken 1/7/2025 2042)  Discharge to home or other facility with appropriate resources:   Identify barriers to discharge with patient and caregiver   Arrange for needed discharge resources and transportation as appropriate   Identify discharge learning needs (meds, wound care, etc)   Refer to discharge planning if patient needs post-hospital services based on physician order or complex needs related to functional status, cognitive ability or social support system     Problem: Safety - Adult  Goal: Free from fall injury  Outcome: Progressing     Problem: ABCDS Injury Assessment  Goal: Absence of physical injury  Outcome: Progressing     Problem: Spiritual Struggle  Goal: Verbalizes spiritual struggle  Outcome: Progressing  Goal: Gains new understanding/perception  Outcome: Progressing  Goal: Growing sense of peace/hope  Outcome: Progressing  Goal: Explore resources for additional follow up, post discharge  Outcome: Progressing     Problem: Pain  Goal: Verbalizes/displays adequate comfort level or baseline comfort level  Outcome: Progressing     
Problem: Safety - Adult  Goal: Free from fall injury  1/8/2025 0845 by Coreen Finch, RN  Outcome: Progressing  Note: Patient remains absent from falls at this time.  Remains alert and oriented, in bed with call light and belongings in reach.  Non-slip footwear on and 2/4 siderails raised.  Bed remains in lowest/locked position at all times with alarm activated.  Fall precautions in place.  Patient encouraged to use call light to request assistance, v/u.  Will continue to monitor.     Problem: Pain  Goal: Verbalizes/displays adequate comfort level or baseline comfort level  1/8/2025 0845 by Coreen Finch, RN  Outcome: Progressing  Note: Patient denies any pain at this time.  Will continue to monitor.     
Progressing     Problem: Cardiovascular - Adult  Goal: Maintains optimal cardiac output and hemodynamic stability  1/11/2025 0801 by Joyce Ortiz RN  Outcome: Progressing  1/11/2025 0422 by Lisebt Rodriguez RN  Outcome: Progressing  Goal: Absence of cardiac dysrhythmias or at baseline  1/11/2025 0801 by Joyce Ortiz RN  Outcome: Progressing  1/11/2025 0422 by Lisbet Rodriguez RN  Outcome: Progressing     Problem: Skin/Tissue Integrity - Adult  Goal: Skin integrity remains intact  1/11/2025 0801 by Joyce Ortiz RN  Outcome: Progressing  1/11/2025 0422 by Lisbet Rodriguez RN  Outcome: Progressing     Problem: Musculoskeletal - Adult  Goal: Return mobility to safest level of function  1/11/2025 0801 by Joyce Ortiz RN  Outcome: Progressing  1/11/2025 0422 by Lisbet Rodriguez RN  Outcome: Progressing     Problem: Metabolic/Fluid and Electrolytes - Adult  Goal: Electrolytes maintained within normal limits  1/11/2025 0801 by Joyce Ortiz RN  Outcome: Progressing  1/11/2025 0422 by Lisbet Rodriguez RN  Outcome: Progressing  Goal: Hemodynamic stability and optimal renal function maintained  1/11/2025 0801 by Joyce Ortiz RN  Outcome: Progressing  1/11/2025 0422 by Lisbet Rodriguez RN  Outcome: Progressing  Goal: Glucose maintained within prescribed range  1/11/2025 0801 by Joyce Ortiz RN  Outcome: Progressing  1/11/2025 0422 by Lisbet Rodriguez RN  Outcome: Progressing     Problem: Hematologic - Adult  Goal: Maintains hematologic stability  1/11/2025 0801 by Joyce Ortiz RN  Outcome: Progressing  1/11/2025 0422 by Lisbet Rodriguez RN  Outcome: Progressing     Problem: Respiratory - Adult  Goal: Achieves optimal ventilation and oxygenation  1/11/2025 0801 by Joyce Ortiz RN  Outcome: Progressing  1/11/2025 0422 by Lisbet Rodriguez RN  Outcome: Progressing     Problem: Anxiety  Goal: Will report anxiety at manageable levels  Description: INTERVENTIONS:  1. Administer medication as 
concerns and demonstrate effective coping strategies  Description: INTERVENTIONS:  1. Assist patient/family to identify coping skills, available support systems and cultural and spiritual values  2. Provide emotional support, including active listening and acknowledgement of concerns of patient and caregivers  3. Reduce environmental stimuli, as able  4. Instruct patient/family in relaxation techniques, as appropriate  5. Assess for spiritual pain/suffering and initiate Spiritual Care, Psychosocial Clinical Specialist consults as needed  Outcome: Progressing  Flowsheets (Taken 1/8/2025 2000)  Patient/family able to verbalize anxieties, fears, and concerns, and demonstrate effective coping: Assist patient/family to identify coping skills, available support systems and cultural and spiritual values     
0958 by Annabel Perrin, RN  Outcome: Progressing  1/13/2025 0452 by Lisbet Rodriguez, RN  Outcome: Progressing

## 2025-01-13 NOTE — DISCHARGE SUMMARY
cooperative.  Cardiovascular: Regular rhythm, normal S1, S2. No murmur.   Respiratory: Clear to auscultation bilaterally, no wheeze or crackles.   GI: Abdomen soft, no tenderness, not distended, normal bowel sounds  Musculoskeletal:  No cyanosis in digits.  BLE edema improved  Neurology: CN 2-12 grossly intact. No speech or motor deficits      Consults:     IP CONSULT TO HOSPITALIST  IP CONSULT TO HEART FAILURE NURSE/COORDINATOR  IP CONSULT TO CARDIOLOGY  IP CONSULT TO NEPHROLOGY  IP CONSULT TO PHARMACY  IP CONSULT TO SPIRITUAL SERVICES  IP CONSULT TO PALLIATIVE CARE  IP CONSULT TO DIABETES EDUCATOR  IP CONSULT TO DIABETES EDUCATOR  IP CONSULT TO FINANCIAL COUNSELOR  IP CONSULT TO SPIRITUAL SERVICES    Disposition: Home    Condition at Discharge: Stable     Discharge Instructions/Follow-up:   Follow up with your PCP within 3-4 days of discharge.  Have PCP check INR and adjust Coumadin dose if needed  Follow up with cardiology as instructed   Follow up with nephrology as instructed   Make appointment with endocrinology  Take all your medications as prescribed.      Discharge Medications:     Current Discharge Medication List             Details   midodrine (PROAMATINE) 10 MG tablet Take 1 tablet by mouth in the morning, at noon, and at bedtime  Qty: 90 tablet, Refills: 2      valsartan (DIOVAN) 40 MG tablet Take 0.5 tablets by mouth daily  Qty: 30 tablet, Refills: 3      metoprolol succinate (TOPROL XL) 25 MG extended release tablet Take 0.5 tablets by mouth daily  Qty: 30 tablet, Refills: 3      torsemide (DEMADEX) 20 MG tablet Take 1 tablet by mouth daily  Qty: 30 tablet, Refills: 3      nicotine (NICODERM CQ) 14 MG/24HR Place 1 patch onto the skin daily for 10 days  Qty: 10 patch, Refills: 0      insulin glargine (LANTUS) 100 UNIT/ML injection vial Inject 10 Units into the skin nightly  Qty: 10 mL, Refills: 3                Details   digoxin (LANOXIN) 125 MCG tablet Take 1 tablet by mouth daily  Qty: 45 tablet,

## 2025-01-13 NOTE — PROGRESS NOTES
HOSPITALISTS PROGRESS NOTE    1/11/2025 9:08 AM        Name: Kaveh Murphy .              Admitted: 1/5/2025  Primary Care Provider: No primary care provider on file. (Tel: None)      Brief Course: This 64-year-old female with PMHx of CAD; s/p CABG and mechanical MVR; Coumadin, Hx of DVT.,  Diabetes mellitus, tobacco abuse and noncompliance who presented with BLE edema.          Interval history:   Pt seen and examined today.  Overnight events noted, interval ancillary notes and labs reviewed.   BP soft this morning; midodrine given.  Jarrett on repeat imaging drip  Patient walking in the room; denies any dizziness, chest pain, palpitations or chest pain.  Repeatedly asks the same questions that he does not understand why his heart is still bad; blames the physician/NP who took him off the heart medication for years.  Wants to be back on Entresto; explained to the patient that he cannot be back on Entresto due to his low BP        Assessment & Plan:     Acute on chronic combined CHF  Last echo showed EF <20% in 2023. Echo on admission showed EF of 20%  Cardiology on board; status post IV diuresis.  Switch to p.o. torsemide  Remains on dobutamine drip for BP support.  Started on digoxin.  Continue beta-blocker and valsartan. Per cardiology, patient will need ICD given persistently low EF.   Maintain fluid and salt restriction.  Strict I&O's.  Daily weights    Elevated troponin; remained flat.  Likely 2/2 demand ischemia.  Monitor on telemetry    Hx of mechanical MVR; restarted back on Coumadin.;bridging with levonox per cardiology recs  Monitor for signs of overt bleeding while on heparin drip    BENOIT; likely cardiorenal.  Improved  Creatinine 1.6 on admission(baseline  0.9-1.2) creatinine trended down to 1.1  Nephrology on board; continue Lasix.  Avoid nephrotoxin.  Monitor renal function closely    Hyponatremia; likely 2/2 hypervolemia.  Continue IV 
    Pharmacy to Dose Warfarin    Pharmacy consulted to dose warfarin for MVR.    INR Goal: 2.5-3.5    INR today: 2.24    Assessment/Plan:  - INR is subtherapeutic  - 7.5mg today  - If INR remains subtherapeutic. Consider LMWH bridge   - Possible concomitant drug-drug interactions include: ASA     Pharmacy will continue to follow.    Dashawn Garcia [facility]  [id number]      
    Pharmacy to Dose Warfarin    Pharmacy consulted to dose warfarin for MVR.    INR Goal: 2.5-3.5    INR today: 2.82    Home regimen: 12.5 mg Mon and 10 mg all other days     Assessment/Plan:  - INR therapeutic but experienced a significant drop (~1.8)  - Given held dose last night, INR may continue to drop a bit more before stabilizing  - Will resume at a lower dose of 7.5 mg tonight  - Possible concomitant drug-drug interactions include: ASA     Pharmacy will continue to follow.    Gilberto Fu, PharmD  PGY-1 Pharmacy Resident   [facility]  [id number]    
    Pharmacy to Dose Warfarin    Pharmacy consulted to dose warfarin for MVR.    INR Goal: 2.5-3.5    INR today: 3.11    Assessment/Plan:  - INR is therapeutic . Increase from 2.81 yesterday  - 7.5 mg today . With hx of supratherapeutic levels will be cautious with dosing and avoid home dose regimen of 10 mg on Thursdays  - Stop heparin gtt bridge      Pharmacy will continue to follow.    Dashawn Garcia [facility]  [id number]      
    Pharmacy to Dose Warfarin    Pharmacy consulted to dose warfarin for MVR.    INR Goal: 2.5-3.5    INR today: 3.15    Assessment/Plan:  - INR is therapeutic after being held for 3 days  - Heparin gtt started after large INR decrease from yesterday  - Resume home dose regimen 10 mg daily.   - Possible concomitant drug-drug interactions include: Heparin     Pharmacy will continue to follow.    Dashawn Garcia [facility]  [id number]      
    Pharmacy to Dose Warfarin    Pharmacy consulted to dose warfarin for MVR.    INR Goal: 2.5-3.5    INR today: 3.19    Assessment/Plan:  - INR is therapeutic  - 7.5mg today  - Possible concomitant drug-drug interactions include: ASA     Pharmacy will continue to follow.    Dashawn Garcia [facility]  [id number]      
    Pharmacy to Dose Warfarin    Pharmacy consulted to dose warfarin for MVR.    INR Goal: 2.5-3.5    INR today: 4.59    Home regimen: 12.5 mg Mon and 10 mg all other days     Assessment/Plan:  - INR is supratherapeutic due to unknown etiology   - Will hold tonight's dose and monitor  - Possible concomitant drug-drug interactions include: ASA     Pharmacy will continue to follow.    Augie AgD  PGY-1 Pharmacy Resident   [facility]  [id number]  
    Pharmacy to Dose Warfarin    Pharmacy consulted to dose warfarin for MVR.    INR Goal: 2.5-3.5    INR today: 6.1    Assessment/Plan:  - hold warfarin today  - Possible concomitant drug-drug interactions include: aspirin     Pharmacy will continue to follow.    Maggi Lam, PharmD, [facility]  [id number]  1/6/2025 12:42 PM         
   MD Contreras Bear MD  Beny Veliz                  Office: [phone]                      Fax: [phone]             [employer]                     NEPHROLOGY CONSULT PROGRESS NOTE    Subjective / interval history / medical decision making.  -leg edema improving he says. No n/v/d.    IMPRESSION/RECOMMENDATIONS:      #BENOIT  ?CKD  -suspect 2/2 CRS, cannot rule out obstructive, ATIN less likely  -Cr 0.9 (9/2023) no recent Cr. May have had some CKD progression if no physician follow-up since 2/2023. A1c then was 12% (2/2023).  -TTE showing EF 15-20%.   -on butamine gtt per Cardiology  -Cr closer to baseline  -Valsartan and metoprolol started by cards.   -Cr stable, okay to transition to PO torsemide 20 qd.    #hyponatremia  -likely 2/2 hypervolemia  -improving with diuretics    #hypoalbuminemia  -likely in setting of hypervolemia vs acute illness.    #DM  -uncontrolled  -hyperglycemic in the 400s on admit. Improving.    #HFrEF  TTE < 10% (2/2023)  -follow-up cards recs      Thank you for the consult.  We will follow this patient along the hospitalization.  Beny Veliz DO     High complexity, at risk of impending organ failure needing  higher level of care/monitoring.   Time spent 32 minutes that included face-to-face meeting/discussion with patient, and treatment team (including primary/referring team and other consultants; included coordination of care with the treatment team; and review of patient's electronic medical records and ordering appropriates tests.             Reason for Consult:  BENOIT  Requesting Physician:  Mathew Sarabia MD     CHIEF COMPLAINT:  leg swelling    History obtained from records and patient.    HISTORY OF PRESENT ILLNESS:                Kaveh Murphy  is 64 y.o. y.o. male with significant past medical history of CABG, CHF, DM2 who presented with worsening leg edema.  Patient noncompliant with his entresto and diuretics with subsequent worsening swelling. In ED, 
   MD Contreras Bear MD  Beny Veliz DO                 Office: [phone]                      Fax: [phone]             [employer]                     NEPHROLOGY CONSULT PROGRESS NOTE    Subjective / interval history / medical decision making.  -still making urine, same as yesterday. Reports several UMV. Legs swollen but improving    IMPRESSION/RECOMMENDATIONS:      #BENOIT  #CKD2  -suspect 2/2 CRS, cannot rule out obstructive, ATIN less likely  -bcr likely 1.1. May have had some CKD progression if no physician follow-up since 2/2023. A1c then was 12% (2/2023).  -TTE showing EF 15-20%.   -on butamine gtt per Cardiology  -Valsartan and metoprolol started by cards. Held valsartan d/t hypotension.   -has poor EF and off dobutamine, tolerating - appreciate cardiology recs.  -Cr stable, on torsemide 20 PO daily. Continue diuretic. Soft bps, stable otherwise from renal.    #hyponatremia  -likely 2/2 hypervolemia  -improving with diuretics    #hypoalbuminemia  -likely in setting of hypervolemia vs acute illness.    #DM  -uncontrolled  -hyperglycemic in the 400s on admit. Improving.    #HFrEF  TTE < 10% (2/2023)  -follow-up cards recs      Thank you for the consult.  We will follow this patient along the hospitalization.  Beny Veliz DO     High complexity, at risk of impending organ failure needing  higher level of care/monitoring.   Time spent 31 minutes that included face-to-face meeting/discussion with patient, and treatment team (including primary/referring team and other consultants; included coordination of care with the treatment team; and review of patient's electronic medical records and ordering appropriates tests.             Reason for Consult:  BENOIT  Requesting Physician:  Mathew Sarabia MD     CHIEF COMPLAINT:  leg swelling    History obtained from records and patient.    HISTORY OF PRESENT ILLNESS:                Kaveh Murphy  is 64 y.o. y.o. male with significant past medical 
   MD Contreras Bear MD  Beny Veliz DO                 Office: [phone]                      Fax: [phone]             [facility]                     NEPHROLOGY CONSULT PROGRESS NOTE    Subjective / interval history / medical decision making.  -leg edema improving he says. No n/v/d.    IMPRESSION/RECOMMENDATIONS:      #BENOIT  ?CKD  -suspect 2/2 CRS, cannot rule out obstructive, ATIN less likely  -Cr 0.9 (9/2023) no recent Cr. May have had some CKD progression if no physician follow-up since 2/2023. A1c then was 12% (2/2023).  -lasix 40 IV bid held yesterday due to hypotension, poor UOP. TTE showing EF 15-20%.   -started yesterday on butamine gtt per Cardiology  -Cr closer to baseline, good uop to diuretics with cover of dobutamine gtt AND midodrine 5 tid.   -Valsartan and metoprolol started by cards. Monitor response to antihypertensives.IV lasix 40 qd then switch to torsemide 40 if responds well.    #hyponatremia  -likely 2/2 hypervolemia  -improving with diuretics    #hypoalbuminemia  -likely in setting of hypervolemia vs acute illness.    #DM  -uncontrolled  -hyperglycemic in the 400s on admit. Improving.    #HFrEF  TTE < 10% (2/2023)  -follow-up cards recs      Thank you for the consult.  We will follow this patient along the hospitalization.  Beny Veliz DO     High complexity, at risk of impending organ failure needing  higher level of care/monitoring.   Time spent 32 minutes that included face-to-face meeting/discussion with patient, and treatment team (including primary/referring team and other consultants; included coordination of care with the treatment team; and review of patient's electronic medical records and ordering appropriates tests.             Reason for Consult:  BENOIT  Requesting Physician:  Mathew Sarabia MD     CHIEF COMPLAINT:  leg swelling    History obtained from records and patient.    HISTORY OF PRESENT ILLNESS:                Kaveh Murphy  is 64 y.o. y.o. 
   MD Contreras Bear MD Aldmichael SerranoKeren, DO                 Office: [phone]                      Fax: [phone]             [facility]                     NEPHROLOGY CONSULT PROGRESS NOTE    Subjective / interval history / medical decision making.  -still making urine, same as yesterday. Reports several UMV. Legs swollen but improving    IMPRESSION/RECOMMENDATIONS:      #BENOIT  ?CKD  -suspect 2/2 CRS, cannot rule out obstructive, ATIN less likely  -Cr 0.9 (9/2023) no recent Cr. May have had some CKD progression if no physician follow-up since 2/2023. A1c then was 12% (2/2023).  -TTE showing EF 15-20%.   -on butamine gtt per Cardiology  -Cr closer to baseline  -Valsartan and metoprolol started by cards.   -Cr stable, on torsemide 20 PO daily. Continue diuretic. Soft bps.    #hyponatremia  -likely 2/2 hypervolemia  -improving with diuretics    #hypoalbuminemia  -likely in setting of hypervolemia vs acute illness.    #DM  -uncontrolled  -hyperglycemic in the 400s on admit. Improving.    #HFrEF  TTE < 10% (2/2023)  -follow-up cards recs      Thank you for the consult.  We will follow this patient along the hospitalization.  Beny Veliz DO     High complexity, at risk of impending organ failure needing  higher level of care/monitoring.   Time spent 32 minutes that included face-to-face meeting/discussion with patient, and treatment team (including primary/referring team and other consultants; included coordination of care with the treatment team; and review of patient's electronic medical records and ordering appropriates tests.             Reason for Consult:  BENOIT  Requesting Physician:  Mathew Sarabia MD     CHIEF COMPLAINT:  leg swelling    History obtained from records and patient.    HISTORY OF PRESENT ILLNESS:                Kaveh Murphy  is 64 y.o. y.o. male with significant past medical history of CABG, CHF, DM2 who presented with worsening leg edema.  Patient noncompliant with his 
  [facility]     Daily Progress Note      Admit Date:  1/5/2025    ASSESSMENT AND PLAN:  Acute on chronic severe combined systolic/diastolic heart failure                Last LVEF on file < 20% (2023)                Presenting with predominantly right-sided failure                Overall NYHA II                Very functional at baseline despite low LVEF  Hypotension  Elevated troponin, likely demand ischemia  Lactic acidosis  Acute kidney injury - improving with inotrope and diuresis  CAD in native artery  s/p mechanical mitral valve replacement  s/p tricuspid valve repair  Poorly controlled type 2 DM  Iron deficiency without anemia (likely due to poor intestinal absorption from severe CHF)  On chronic anticoagulation (for mechanical MVR goal INR 2.5-3.5)  Supratherapeutic INR  Elevated liver transaminases  Hypervolemic hyponatremia     Plan  Now that INR in therapeutic range for MVR will start heparin drip (without bolus) and resume coumadin (pharmacy to dose)  Continue dobutamine @ 5 mcg/kg/min to increase cardiac output and improve renal perfusion  Patient needs aggressive IV diuresis - lasix @ 40 BID  Net negative 4.5 L since admission  Still making good urine, creatinine improving (likely had significant renal venous congestion)  GDMT   Patient has more BP to work with today  Will start low-dose valsartan 40 mg daily  Strict I/O, daily weights  Low sodium diet, limit to 2000 mg daily.  CHF education  Goal net negative 1-2 liters per day  Low threshold to convert to lasix drip  Closely monitor renal function, 'lytes, telemetry while aggressively IV diuresing.  Replace electrolytes as needed.  Maintain K+ close to 4.0 and Mg++ close to 2.0  Continue IV iron for 5-day course given very low iron indices   Ischemic workup - no significant change in echo, low clinical suspicion of new CAD  I discussed with patient that we could consider a cath to evaluate patency of his CABG grafts given severity of 
  [facility] - Inpatient Rehabilitation Department   Phone: [phone]    Occupational Therapy    [x] Initial Evaluation            [] Daily Treatment Note         [x] Discharge Summary      Patient: Kaveh Murphy   : 1960   MRN: [id number]   Date of Service:  2025    Admitting Diagnosis:  Acute on chronic systolic CHF (congestive heart failure) (HCC)  Current Admission Summary: Kaveh Murphy is a 64 y.o. male with history of CAD s/p CABG and mechanical MV replacement on Coumadin, h/o TV repair, DM 2, tobacco abuse, noncompliance came to ER with complaints of LE edema.  He stopped going to clinic follow ups and stopped taking Entresto and diuretics in Spring 2024.  He claims LE became swollen in past 2 weeks.  Has WEST and orthopnea.  No CP, HA, dizziness, LH or syncope.  Otherwise complete ROS is negative unless listed above.   Past Medical History:  has a past medical history of Diabetes (HCC), Hyperlipidemia, and Hypertension.  Past Surgical History:  has a past surgical history that includes Coronary artery bypass graft (N/A, 12/10/2021) and Cardiac surgery.    Discharge Recommendations: Kaveh Murphy scored a 24/24 on the AM-[facility] ADL Inpatient form.  At this time, no further OT is recommended upon discharge due to patient at independent level.  Recommend patient returns to prior setting with prior services.      DME Required For Discharge: No DME required    Precautions/Restrictions: high fall risk  Positional Restrictions:no positional restrictions    Pre-Admission Information   Lives With: alone                    Type of Home: condo  Home Layout: one level  Home Access:  8 step to enter with handrail.  Handrails are located on both side.  Bathroom Layout: walker accessible, tub/shower unit  Bathroom Equipment: grab bars in shower, grab bars around toilet  Toilet Height: standard height  Home Equipment:  None  Transfer Assistance: Independent without use of device  Ambulation 
  [facility] - Inpatient Rehabilitation Department   Phone: [phone]    Physical Therapy    [x] Initial Evaluation            [] Daily Treatment Note         [x] Discharge Summary      Patient: Kaveh Murphy   : 1960   MRN: [id number]   Date of Service:  2025  Admitting Diagnosis: Acute on chronic systolic CHF (congestive heart failure) (HCC)  Current Admission Summary: Kaveh Murphy is a 64 y.o. male with history of CAD s/p CABG and mechanical MV replacement on Coumadin, h/o TV repair, DM 2, tobacco abuse, noncompliance came to ER with complaints of LE edema.  He stopped going to clinic follow ups and stopped taking Entresto and diuretics in Spring 2024.  He claims LE became swollen in past 2 weeks.  Has WEST and orthopnea.  No CP, HA, dizziness, LH or syncope.  Otherwise complete ROS is negative unless listed above   Past Medical History:  has a past medical history of Diabetes (HCC), Hyperlipidemia, and Hypertension.  Past Surgical History:  has a past surgical history that includes Coronary artery bypass graft (N/A, 12/10/2021) and Cardiac surgery.  Discharge Recommendations: Kaveh Murphy scored a 23/24 on the AM-PAC short mobility form.  At this time, no further PT is recommended upon discharge due to being at his functional mobility baseline.  Recommend patient returns to prior setting with prior services.     DME Required For Discharge: no DME required at discharge  Precautions/Restrictions: high fall risk  Weight Bearing Restrictions: no restrictions  [] Right Upper Extremity  [] Left Upper Extremity [] Right Lower Extremity  [] Left Lower Extremity     Required Braces/Orthotics: no braces required   [] Right  [] Left  Positional Restrictions:no positional restrictions    Pre-Admission Information  Lives With: alone                    Type of Home: condo  Home Layout: one level  Home Access:  8 step to enter with handrail.  Handrails are located on both side.  Bathroom Layout: 
  [facility]     Daily Progress Note      Admit Date:  1/5/2025    ASSESSMENT AND PLAN:  Acute on chronic severe combined systolic/diastolic heart failure                Last LVEF on file < 20% (2023)                Presenting with predominantly right-sided failure                Overall NYHA II                Very functional at baseline despite low LVEF   Very well-diuresed, now euvolemic  Hypotension, improving, able to tolerate low-dose valsartan  Elevated troponin, likely demand ischemia  Lactic acidosis  Acute kidney injury - now resolved with inotrope and diuresis  CAD in native artery  s/p mechanical mitral valve replacement  s/p tricuspid valve repair  Poorly controlled type 2 DM  Iron deficiency without anemia (likely due to poor intestinal absorption from severe CHF)  On chronic anticoagulation (for mechanical MVR goal INR 2.5-3.5)  Supratherapeutic INR  Elevated liver transaminases, likely hepatic venous congestion from CHF (now resolved)  Hypervolemic hyponatremia (resolving with CHF treatment)     Patient is now euvolemic and with normal renal function.    Plan  Start coumadin, continue heparin drip.  Plan to bridge with lovenox if able to discharge home today.  Target INR 2.5 - 3.5  Decrease dobutamine to 2.5 mcg/kg/min now.  If patient tolerates this will d/c dobuatmine completely in about 3 hours.  Reduce diuretic to daily (40 mg IV).  Continue to monitor response.  Will need a daily oral diuretic at discharge.  Net negative 5.6  L since admission  Still making good urine, creatinine improving (likely had significant renal venous congestion)  GDMT   Patient has more BP to work with today  Continue low-dose valsartan 40 mg daily  Add metoprolol XL 12.5 mg daily  If BP stable will change valsatan to low-dose Entresto soon  Strict I/O, daily weights  Low sodium diet, limit to 2000 mg daily.  CHF education  Continue IV iron for 5-day course given very low iron indices  Monitor telemetry - 
  Physician Progress Note      PATIENT:               JOHNNIE LITTLEJOHN  [facility] #:                  [id number]  :                       1960  ADMIT DATE:       2025 1:10 PM  DISCH DATE:  RESPONDING  PROVIDER #:        Beny Veliz DO          QUERY TEXT:    Patient admitted with Acute on chronic heart failure, noted to have CKD in   Nephrology note. If possible, please document in progress notes and discharge   summary if you are evaluating and/or treating any of the following:    The medical record reflects the following:  Risk Factors: BENOIT, HFrEF    Clinical Indicators: GFR 48>42>57>76 Crea 1.8,  Per nephrology consult note, 25,  \"#BENOIT ?CKD  -suspect 2/2 CRS, cannot rule out obstructive, ATIN less likely  -Cr 0.9 (2023) no recent Cr.\"    Treatment: lasix, UA, UR na/cr, I&O's, DW, Nephrology consult, dobutamine gtt  Options provided:  -- CKD Stage 2 GFR 60-90  -- CKD Stage 3a GFR 45-59  -- CKD Stage 3b GFR 30-44  -- Other - I will add my own diagnosis  -- Disagree - Not applicable / Not valid  -- Disagree - Clinically unable to determine / Unknown  -- Refer to Clinical Documentation Reviewer    PROVIDER RESPONSE TEXT:    This patient has CKD Stage 2.    Query created by: Amy Guerrero on 2025 1:28 PM      Electronically signed by:  Beny Veliz DO 2025 10:10 PM          
..Shift assessment completed. VSS Waiting to give medications, b/p 90/56 pulse 95, secure message sent to Dr Cia to see if she wants b/p to be given. Patient is awake, alert and oriented. Respirations are easy and unlabored. Patient does not appear to be in distress, resting comfortably at this time. Call light within reach.   Ayala responded to given Midodrine dose, given. Will recheck b/p in 30 mins  Repeat b/p 84/59 pulse 97, digoxin ordered and given. Secure message sent to Ayala to notify b/p    
.Shift assessment completed. Routine vitals stable. Scheduled medications given. Patient is awake, alert and oriented. Respirations are easy and unlabored. Patient does not appear to be in distress, resting comfortably at this time. Call light within reach.  Patient has been walking around the unit, tolerating well.   
175ml urine output at 0550 otherwise no output since assuming care of pt at 2130. Post void bladder scan showed 64mL urine. Overnight hospitalist notified.   
APRN notified of current BP, improved from prior when decision to hold lasix was made. APRN maintained decision to hold lasix tonight and requested for nursing to notify provider if any changes in respiratory status overnight.   
CLINICAL PHARMACY NOTE: MEDS TO BEDS    Total # of Prescriptions Filled: 8   The following medications were delivered to the patient:  Lantus solostar  Nicotine 14mg patches  Digoxin 125mcg  Pen needles  Metoprolol succinate ER 25mg  Midodrine 10mg  Torsemide 20mg  Valsartan 40mg    Additional Documentation:     KAREEN Jin approved medication delivery    Medications were delivered to patient's room and patient signed for    Cori Rocha CPhT   
Called patient's son Winston Murphy and spoke via telephone to setup a meeting time that worked for him. He advised that he was in [place] and would have to move his schedule around but would get back to me later in the day.     at 1515 received call from patient's sister Sol who reported that palliative care nurse was in the room when I was talking to patient's son via telephone. She was seeking more clarification from pallative care and asked that they speak to her again. Reached out to Ai to notify of request to speak to her again. Returned call to Sol to let her know request was sent and offered to setup meeting with family. Sol reports that patient was upset about the meeting that he just had and that they have our brochure and if they want to speak to us they will reach out. Declined an in person meeting for tomorrow.     Elizabeth Hernandez RN  Hospital Liaison   [phone]     
[facility]  Diabetes Education   Progress Note       NAME:  Kaveh Murphy  MEDICAL RECORD NUMBER:  [id number]  AGE: 64 y.o.   GENDER: male  : 1960  TODAY'S DATE:  2025    Subjective   Reason for Diabetes Education Evaluation and Assessment: DM    Visit Type: evaluation      Kaveh Murphy is a 64 y.o. male referred by:  [] Physician  [x] Nursing  [] Chart Review   [] Other:     Visited pt for DM education. Pt on the phone with a friend during visit, states his friend is a doctor offering his support. Pt states he has metformin, insulin pens, pen needles, and testing supplies at home. Denies needs/questions/concerns. States he knows what he needs to do to manage his diabetes.    PAST MEDICAL HISTORY        Diagnosis Date    Diabetes (HCC)     Hyperlipidemia     Hypertension        PAST SURGICAL HISTORY    Past Surgical History:   Procedure Laterality Date    CARDIAC SURGERY      CORONARY ARTERY BYPASS GRAFT N/A 12/10/2021    CABG X 4 , CORONARY ARTERY BYPASS GRAFT STRONG TO LAD, VEIN GRAFT TO PDA, SVG SEQUENCED TO DIAG AND OM, LIGATION KATHARINE  ATRICLIP 45MM, EVH LEFT SAPHENOUS VEIN, ANTERIOR AND POSTERIOR CHORDAE SPARING MITRAL VALVE REPLACEMENT MEDTRONIC 29MM mechanical valve, TRICUSPID VALVE REPAIR with 28mm Physio tricuspid annuloplasty ring , TOTAL CPB, VERO, Doppler flow verification of bypass grafts, 5 LEVEL BILATERAL        FAMILY HISTORY    Family History   Problem Relation Age of Onset    Cancer Mother         colon cancer    Heart Disease Father     High Blood Pressure Father     Diabetes Father     High Cholesterol Father        SOCIAL HISTORY    Social History     Tobacco Use    Smoking status: Former     Current packs/day: 0.00     Average packs/day: 1 pack/day for 15.0 years (15.0 ttl pk-yrs)     Types: Cigarettes     Start date: 12/10/2006     Quit date: 12/10/2021     Years since quitting: 3.0    Smokeless tobacco: Never   Vaping Use    Vaping status: Never Used   Substance 
Data- discharge order received, pt verbalized agreement to discharge, disposition to previous residence, no needs for HHC/DME.     Action- discharge instructions prepared and given to patient, pt verbalized understanding. Medication information packet given r/t NEW and/or CHANGED prescriptions emphasizing name/purpose/side effects, pt verbalized understanding. Discharge instruction summary: Diet- regular, carb control, Activity- as tolerated, Primary Care Physician as follows: No primary care provider on file. None f/u appointment to be scheduled by patient, immunizations reviewed and up to date, prescription medications filled and delivered to patient. Inpatient surgical procedure precautions reviewed. CHF Education reviewed. Pt/ Family has had a total of 60 minutes CHF education this admission encounter.     1. WEIGHT: Admit Weight - Scale: 96.6 kg (213 lb) (01/05/25 1326)        Today  Weight - Scale: 92.5 kg (204 lb) (01/11/25 0810)       2. O2 SAT.: SpO2: 97 % (01/13/25 1615)    Response- Pt belongings gathered, IV removed. Disposition is home (no HHC/DME needs), transported with belongings, taken to lobby via w/c w/ belongings and family member, no complications.    
Lasix and midodrine given together per dr due to blood pressure being soft.  
Nephrology consult received - full consult note to follow.   [facility]. Dr. Trejo   Trial of midodrine then attempt diuresis kevin.   Thank you for allowing us to participate in this patient’s care. Please do not hesitate to contact, if any questions or concerns. We will follow along with you.     Contreras Rahman MD  [facility]   [phone] or [address] [facility]. 
Nutrition Education    Provided heart failure diet education.  Education included low sodium diet guidelines (3-4 gm Na+/day) and fluid restriction (64 oz/day).  Reviewed foods to choose and foods to avoid, along with label reading and ways to add flavor to food.  Pt does not currently follow a low sodium diet at home, but tries not to add salt to food.  Pt states understanding of education.  Time spent with patient: 5 minutes.       Educated on CHF with [facility] written info per pt request.  Learners: Patient and Family  Readiness: Eager  Method: Explanation and Handout  Response: Verbalizes Understanding  Contact name and number provided.    Albania Watson RD, LD  Contact Number: [phone]    
Occupational Therapy/Physical Therapy  Kaveh MARINO Jeffrey  1960     1010: OT/PT orders received and appreciated. Pt currently refusing therapy evaluation as he is requesting to sleep.     1123: Attempted PT/OT evaluation. Patient continues to request to sleep. States that he does not feel like he can get up at this time. Will follow up when patient is agreeable to therapy evaluations.     Jes Francois, PT, DPT [id number]  Shital Nails, M/OT, OTR/L- [id number]        
[facility]  HEART FAILURE  Progress Note      Admit Date 1/5/2025     Reason for Consult:      Reason for Consultation/Chief Complaint: leg swelling    HPI:    Kaveh Murphy is a 64 y.o. male with PMH CAD, CABG, MVR, TV repair, KATHARINE ligation, ICM, HFrEF, DM admitted with AHF. He had been diuresed with IV lasix, on dobutamine and has been hypotensive.       Subjective:  Patient is being seen for CHF. No overnight events  Today Mr. Murphy feel about the same and he denies chest pain, shortness of breath, palpitations, or dizziness      Baseline Weight: 200-205   Wt Readings from Last 3 Encounters:   01/11/25 92.5 kg (204 lb)   01/10/24 96.6 kg (213 lb)   09/14/23 88.5 kg (195 lb)         Cardiac Testing:   ECHO 1/7/25:     Left Ventricle: Severely reduced left ventricular systolic function with a visually estimated EF of 15 - 20%. Left ventricle is severely dilated. Normal wall thickness. Severe global hypokinesis present. Akinesis of the apex. Indeterminate diastolic function due to mitral valve surgery.    Right Ventricle: Reduced systolic function. TAPSE is 1.0 cm. RV Peak S' is 5.7 cm/s.    Aortic Valve: Trileaflet valve.    Mitral Valve: Medtronic mechanical valve with a size of 29 mm.  Gradient in expected range.  No paravalvular leak.    Tricuspid Valve: Repaired by 28 mm Phisio annular ring. Mild regurgitation. No stenosis noted. TV mean gradient is 3 mmHg. The estimated RVSP is 35 mmHg.    Left Atrium: Left atrium is moderately dilated.    Right Atrium: Right atrium is mildly dilated.    Image quality is adequate. Contrast used: Lumason.     Device: none, per pt pref     NYHA Class IV    Objective:   BP 95/65   Pulse 94   Temp 98 °F (36.7 °C) (Oral)   Resp 16   Ht 1.854 m (6' 1\")   Wt 92.5 kg (204 lb)   SpO2 98%   BMI 26.91 kg/m²     Intake/Output Summary (Last 24 hours) at 1/13/2025 0912  Last data filed at 1/13/2025 0416  Gross per 24 hour   Intake 240 ml   Output 2100 ml   Net -1860 ml    
IV diuresing and monitor sodium level closely    Hx of CAD; continue aspirin and statins    S/p tricuspid valve repair    Lactic acidosis; lactic acid 2.6 on admission; down to 2.1  Patient afebrile with normal WBCs.  Infectious workup negative so far    Transaminitis; chronic; likely 2/2 congestive hepatopathy; LFTs improving.  Monitor LFTs closely    Hx of diabetes mellitus; HgbA1c 13.4 on admission; continue Lantus and SSI  Continue SSI and monitor BG closely    Hx of smoking abuse.  Smoking cessation counseling provided and nicotine patch ordered.       DVT PPX: Coumadin; bridging with Lovenox  Code:Full Code    Disposition: Once acute medical issues have resolved    Current Medications  digoxin (LANOXIN) tablet 125 mcg, Daily  insulin glargine (LANTUS) injection vial 10 Units, Nightly  torsemide (DEMADEX) tablet 20 mg, Daily  metoprolol succinate (TOPROL XL) extended release tablet 12.5 mg, Daily  DOBUTamine (DOBUTREX) 500 mg in dextrose 5 % 250 mL infusion, Continuous  [Held by provider] valsartan (DIOVAN) tablet 40 mg, Daily  midodrine (PROAMATINE) tablet 5 mg, TID  midodrine (PROAMATINE) tablet 5 mg, TID PRN  nicotine (NICODERM CQ) 14 MG/24HR 1 patch, Daily  sodium chloride (OCEAN, BABY AYR) 0.65 % nasal spray 2 spray, PRN  traZODone (DESYREL) tablet 50 mg, Nightly PRN  aspirin chewable tablet 81 mg, Daily  sodium chloride flush 0.9 % injection 5-40 mL, 2 times per day  sodium chloride flush 0.9 % injection 5-40 mL, PRN  0.9 % sodium chloride infusion, PRN  potassium chloride (KLOR-CON M) extended release tablet 40 mEq, PRN   Or  potassium bicarb-citric acid (EFFER-K) effervescent tablet 40 mEq, PRN   Or  potassium chloride 10 mEq/100 mL IVPB (Peripheral Line), PRN  magnesium sulfate 2000 mg in 50 mL IVPB premix, PRN  ondansetron (ZOFRAN-ODT) disintegrating tablet 4 mg, Q8H PRN   Or  ondansetron (ZOFRAN) injection 4 mg, Q6H PRN  polyethylene glycol (GLYCOLAX) packet 17 g, Daily PRN  acetaminophen (TYLENOL) 
lactic acid 2.6 on admission; down to 2.1  Patient afebrile with normal WBCs.  Infectious workup negative so far    Transaminitis; chronic; likely 2/2 congestive hepatopathy; LFTs improving.  Monitor LFTs closely    Hx of diabetes mellitus; HgbA1c 12.0 on 2/20/2023.  Repeat A1c ordered.   Continue SSI and monitor BG closely    Hx of smoking abuse.  Smoking cessation counseling provided and nicotine patch ordered.     DVT PPX: Coumadin  Code:Full Code    Disposition: Once acute medical issues have resolved    Current Medications  aspirin chewable tablet 81 mg, Daily  sodium chloride flush 0.9 % injection 5-40 mL, 2 times per day  sodium chloride flush 0.9 % injection 5-40 mL, PRN  0.9 % sodium chloride infusion, PRN  potassium chloride (KLOR-CON M) extended release tablet 40 mEq, PRN   Or  potassium bicarb-citric acid (EFFER-K) effervescent tablet 40 mEq, PRN   Or  potassium chloride 10 mEq/100 mL IVPB (Peripheral Line), PRN  magnesium sulfate 2000 mg in 50 mL IVPB premix, PRN  ondansetron (ZOFRAN-ODT) disintegrating tablet 4 mg, Q8H PRN   Or  ondansetron (ZOFRAN) injection 4 mg, Q6H PRN  polyethylene glycol (GLYCOLAX) packet 17 g, Daily PRN  acetaminophen (TYLENOL) tablet 650 mg, Q6H PRN   Or  acetaminophen (TYLENOL) suppository 650 mg, Q6H PRN  dextrose bolus 10% 125 mL, PRN   Or  dextrose bolus 10% 250 mL, PRN  dextrose 10 % infusion, Continuous PRN  sulfur hexafluoride microspheres (LUMASON) 60.7-25 MG injection 2 mL, ONCE PRN  furosemide (LASIX) injection 40 mg, BID  insulin lispro (HUMALOG,ADMELOG) injection vial 0-8 Units, 4x Daily AC & HS  nicotine (NICODERM CQ) 14 MG/24HR 1 patch, Daily  warfarin placeholder: dosing by pharmacy, RX Placeholder  melatonin tablet 6 mg, Nightly PRN        Objective:  BP (!) 76/56   Pulse 91   Temp 97.3 °F (36.3 °C) (Oral)   Resp 18   Ht 1.854 m (6' 1\")   Wt 97.4 kg (214 lb 11.7 oz)   SpO2 100%   BMI 28.33 kg/m²     Intake/Output Summary (Last 24 hours) at 1/6/2025 
of NSVT are expected in LVEF this low (patient has refused ICD)     He continues to state he doesn't want a transplant or VAD. He is thinking about possibly getting an ICD but hasn't decided.  Given his prior refusal for ICD will continue to treat him medically.  If he brings up wanting an ICD, we can get an EP consult, as he clearly meets criteria for primary prevention ICD (except for, of course, not wanting the device).     I discussed case with Dr. Bowers - after discussion will plan on keeping him on dobutamine and stopping it tomorrow am to determine if he is ready for discharge tomorrow.    Subjective:  Mr. Murphy feels well.  No dyspnea or chest pain.  He did well with decrease of dobutamine from 5 to 2.5; however he didn't tolerate going totally off dobutamine - SBP in the 70s.  He denies any dizziness when this occurred.  Dobutamine restarted at 2.5 mcg/kg/min.  Bps remain in the high 80s-low 90s throughout today, with one reading in the high 70s.    Objective:   BP (!) 94/53   Pulse 94   Temp 98.1 °F (36.7 °C) (Oral)   Resp 16   Ht 1.854 m (6' 1\")   Wt 91.6 kg (202 lb)   SpO2 95%   BMI 26.65 kg/m²     Intake/Output Summary (Last 24 hours) at 1/10/2025 1043  Last data filed at 1/10/2025 0919  Gross per 24 hour   Intake 360 ml   Output 300 ml   Net 60 ml       TELEMETRY: Sinus, PVCS, NSVT    Physical Exam:  General:  Awake, alert, oriented x 3, NAD  Skin:  Warm and dry  Neck:  JVD none  Chest:  normal air entry  Cardiovascular:  RRR S1S2, no S3, no murmur  Abdomen:  Soft, ND, NT, No HSM  Extremities:  No edema    Medications:    insulin glargine  10 Units SubCUTAneous Nightly    magnesium sulfate  1,000 mg IntraVENous Once    warfarin  7.5 mg Oral Once    [START ON 1/13/2025] warfarin  12.5 mg Oral Every Monday    warfarin  10 mg Oral Once per day on Sunday Tuesday Wednesday Thursday Friday Saturday    metoprolol succinate  12.5 mg Oral Daily    valsartan  40 mg Oral Daily    midodrine  5 mg Oral TID 
IntraVENous, Continuous PRN  furosemide (LASIX) injection 40 mg, 40 mg, IntraVENous, BID  insulin lispro (HUMALOG,ADMELOG) injection vial 0-8 Units, 0-8 Units, SubCUTAneous, 4x Daily AC & HS  warfarin placeholder: dosing by pharmacy, , Oral, RX Placeholder  melatonin tablet 6 mg, 6 mg, Oral, Nightly PRN  Allergies:    Patient has no known allergies.   Social History:     reports that he quit smoking about 3 years ago. His smoking use included cigarettes. He started smoking about 18 years ago. He has a 15 pack-year smoking history. He has never used smokeless tobacco. He reports current alcohol use. He reports that he does not use drugs.   Family History:   family history includes Cancer in his mother; Diabetes in his father; Heart Disease in his father; High Blood Pressure in his father; High Cholesterol in his father.     REVIEW OF SYSTEMS:    System review was done , pertinent positives are mentioned in the HPI    Negative fatigue  No chest pain nor palpitations  No SOB, coughing nor hemoptysis  No abdominal pain, nausea, vomiting nor diarrhea  No fever/chills  No leg swelling  No change in urine output nor gross hematuria    PHYSICAL EXAM:    Vitals:  /67   Pulse 96   Temp 97.3 °F (36.3 °C) (Oral)   Resp 18   Ht 1.854 m (6' 1\")   Wt 94.3 kg (208 lb)   SpO2 99%   BMI 27.44 kg/m²   WEIGHT: well tolerated  I/O's since admission    CONSTITUTIONAL:  awake, alert, cooperative, no apparent distress, and appears stated age  EYES:  Lids and lashes normal, pupils equal, round   NECK:  Supple, symmetrical, trachea midline  HEMATOLOGIC/LYMPHATICS:  no pallor, no cervical LAD  LUNGS:  No increased work of breathing, diminished bibasilar breath sounds  CARDIOVASCULAR:  Normal apical impulse, regular rate and rhythm, normal S1 and S2  ABDOMEN:  No scars, normal bowel sounds, soft, non-distended, non-tender  EXTREMITIES:  Warm, dry, edema 2  NEUROLOGIC:  Awake, alert, oriented to name, place and time.      DATA:  
Q6H PRN  dextrose bolus 10% 125 mL, PRN   Or  dextrose bolus 10% 250 mL, PRN  dextrose 10 % infusion, Continuous PRN  furosemide (LASIX) injection 40 mg, BID  insulin lispro (HUMALOG,ADMELOG) injection vial 0-8 Units, 4x Daily AC & HS  warfarin placeholder: dosing by pharmacy, RX Placeholder  melatonin tablet 6 mg, Nightly PRN        Objective:  BP (!) 94/57   Pulse 98   Temp 97.6 °F (36.4 °C) (Oral)   Resp 18   Ht 1.854 m (6' 1\")   Wt 96.9 kg (213 lb 10 oz)   SpO2 98%   BMI 28.18 kg/m²     Intake/Output Summary (Last 24 hours) at 1/8/2025 0826  Last data filed at 1/8/2025 0815  Gross per 24 hour   Intake --   Output 3125 ml   Net -3125 ml      Wt Readings from Last 3 Encounters:   01/08/25 96.9 kg (213 lb 10 oz)   01/10/24 96.6 kg (213 lb)   09/14/23 88.5 kg (195 lb)       Physical Examination:   General appearance:  No apparent distress, appears stated age and cooperative.  HEENT: Normocephalic, sclera clear., PERRLA.  Trachea midline, no adenopathy.  Cardiovascular: Regular rate and rhythm, normal S1, S2.    Respiratory:Clear to auscultation bilaterally, no wheeze or crackles.   GI: Abdomen soft, no tenderness, not distended, normal bowel sounds  Musculoskeletal: No cyanosis in digits.  4+ BLE edema present  Neurology: CN 2-12 grossly intact. No speech or motor deficits  Psych: Not agitated, appropriate affect  Skin: Warm, dry, normal turgor    Labs and Tests:  CBC:   Recent Labs     01/06/25  0421 01/07/25  0450 01/08/25  0437   WBC 5.4 4.8 4.8   HGB 14.0 14.0 13.4*    134* 123*     BMP:    Recent Labs     01/06/25  0421 01/07/25  0450 01/08/25  0437   * 133* 136   K 4.3 4.2 3.7   CL 97* 98* 99   CO2 23 24 25   BUN 34* 40* 34*   CREATININE 1.6* 1.8* 1.4*   GLUCOSE 128* 161* 190*     Hepatic:   Recent Labs     01/05/25  1358 01/06/25  0421   AST 39* 34   ALT 50* 45*   BILITOT 2.2* 1.8*   ALKPHOS 409* 361*     XR CHEST PORTABLE   Final Result   Trace right pleural effusion with bibasilar 
acetaminophen, dextrose bolus **OR** dextrose bolus, dextrose, sulfur hexafluoride microspheres, melatonin    Lab Data:  CBC:   Recent Labs     01/05/25  1358 01/06/25  0421 01/07/25  0450   WBC 5.8 5.4 4.8   HGB 14.0 14.0 14.0   HCT 42.2 41.8 42.1   MCV 89.9 89.1 89.9   * 136 134*     BMP:   Recent Labs     01/05/25  1358 01/06/25  0421 01/07/25  0450   * 131* 133*   K 4.9 4.3 4.2   CL 95* 97* 98*   CO2 23 23 24   BUN 33* 34* 40*   CREATININE 1.6* 1.6* 1.8*     LIVER PROFILE:   Recent Labs     01/05/25 1358 01/06/25  0421   AST 39* 34   ALT 50* 45*   BILIDIR 1.3* 1.1*   BILITOT 2.2* 1.8*   ALKPHOS 409* 361*     PT/INR:   Recent Labs     01/05/25  1358 01/06/25  0421 01/07/25  0450   PROTIME 51.5* 53.3* 46.5*   INR 5.83* 6.10* 5.11*     APTT:   Recent Labs     01/05/25 1358   APTT 43.0*     BNP:    Recent Labs     01/05/25  1358 01/07/25  0450   PROBNP 922* 656*     TROPONIN:   Lab Results   Component Value Date    TROPHS 89 (H) 01/06/2025    TROPHS 70 (H) 01/05/2025    TROPHS 73 (H) 01/05/2025     LIPIDS:   Lab Results   Component Value Date    CHOL 135 01/06/2025    TRIG 152 (H) 01/06/2025    HDL 38 (L) 01/06/2025     Lab Results   Component Value Date    LDL 67 01/06/2025       Imaging  01/05/25    ECHO (TTE) COMPLETE (PRN CONTRAST/BUBBLE/STRAIN/3D) 01/07/2025 10:13 AM (Final)    Interpretation Summary    Left Ventricle: Severely reduced left ventricular systolic function with a visually estimated EF of 15 - 20%. Left ventricle is severely dilated. Normal wall thickness. Severe global hypokinesis present. Akinesis of the apex. Indeterminate diastolic function due to mitral valve surgery.    Right Ventricle: Reduced systolic function. TAPSE is 1.0 cm. RV Peak S' is 5.7 cm/s.    Aortic Valve: Trileaflet valve.    Mitral Valve: Medtronic mechanical valve with a size of 29 mm.  Gradient in expected range.  No paravalvular leak.    Tricuspid Valve: Repaired by 28 mm Phisio annular ring. Mild 
disintegrating tablet 4 mg, 4 mg, Oral, Q8H PRN **OR** ondansetron (ZOFRAN) injection 4 mg, 4 mg, IntraVENous, Q6H PRN  polyethylene glycol (GLYCOLAX) packet 17 g, 17 g, Oral, Daily PRN  acetaminophen (TYLENOL) tablet 650 mg, 650 mg, Oral, Q6H PRN **OR** acetaminophen (TYLENOL) suppository 650 mg, 650 mg, Rectal, Q6H PRN  dextrose bolus 10% 125 mL, 125 mL, IntraVENous, PRN **OR** dextrose bolus 10% 250 mL, 250 mL, IntraVENous, PRN  dextrose 10 % infusion, , IntraVENous, Continuous PRN  insulin lispro (HUMALOG,ADMELOG) injection vial 0-8 Units, 0-8 Units, SubCUTAneous, 4x Daily AC & HS  warfarin placeholder: dosing by pharmacy, , Oral, RX Placeholder  melatonin tablet 6 mg, 6 mg, Oral, Nightly PRN  Allergies:    Patient has no known allergies.   Social History:     reports that he quit smoking about 3 years ago. His smoking use included cigarettes. He started smoking about 18 years ago. He has a 15 pack-year smoking history. He has never used smokeless tobacco. He reports current alcohol use. He reports that he does not use drugs.   Family History:   family history includes Cancer in his mother; Diabetes in his father; Heart Disease in his father; High Blood Pressure in his father; High Cholesterol in his father.     REVIEW OF SYSTEMS:    System review was done , pertinent positives are mentioned in the HPI    Negative fatigue  No chest pain nor palpitations  No SOB, coughing nor hemoptysis  No abdominal pain, nausea, vomiting nor diarrhea  No fever/chills  No leg swelling  No change in urine output nor gross hematuria    PHYSICAL EXAM:    Vitals:  BP 93/65   Pulse (!) 104   Temp 98.3 °F (36.8 °C) (Oral)   Resp 18   Ht 1.854 m (6' 1\")   Wt 92.5 kg (204 lb)   SpO2 97%   BMI 26.91 kg/m²   WEIGHT: well tolerated  I/O's since admission    CONSTITUTIONAL:  awake, alert, cooperative, no apparent distress, and appears stated age  EYES:  Lids and lashes normal, pupils equal, round   NECK:  Supple, symmetrical, trachea 
tablet 650 mg, Q6H PRN   Or  acetaminophen (TYLENOL) suppository 650 mg, Q6H PRN  dextrose bolus 10% 125 mL, PRN   Or  dextrose bolus 10% 250 mL, PRN  dextrose 10 % infusion, Continuous PRN  insulin lispro (HUMALOG,ADMELOG) injection vial 0-8 Units, 4x Daily AC & HS  warfarin placeholder: dosing by pharmacy, RX Placeholder  melatonin tablet 6 mg, Nightly PRN        Objective:  BP (!) 94/53   Pulse 94   Temp 98.1 °F (36.7 °C) (Oral)   Resp 16   Ht 1.854 m (6' 1\")   Wt 91.6 kg (202 lb)   SpO2 95%   BMI 26.65 kg/m²     Intake/Output Summary (Last 24 hours) at 1/10/2025 0938  Last data filed at 1/10/2025 0919  Gross per 24 hour   Intake 480 ml   Output 300 ml   Net 180 ml      Wt Readings from Last 3 Encounters:   01/10/25 91.6 kg (202 lb)   01/10/24 96.6 kg (213 lb)   09/14/23 88.5 kg (195 lb)       Physical Examination:   General appearance:  No apparent distress, appears stated age and cooperative.  HEENT: Normocephalic, sclera clear., PERRLA.  Trachea midline, no adenopathy.  Cardiovascular: Regular rate and rhythm, normal S1, S2.    Respiratory:Clear to auscultation bilaterally, no wheeze or crackles.   GI: Abdomen soft, no tenderness, not distended, normal bowel sounds  Musculoskeletal: No cyanosis in digits.  4+ BLE edema present  Neurology: CN 2-12 grossly intact. No speech or motor deficits  Psych: Not agitated, appropriate affect  Skin: Warm, dry, normal turgor    Labs and Tests:  CBC:   Recent Labs     01/08/25  0437 01/08/25  1000 01/10/25  0523   WBC 4.8 5.2 6.8   HGB 13.4* 13.0* 12.2*   * 120* 103*     BMP:    Recent Labs     01/08/25  0437 01/09/25  0524 01/10/25  0523    134* 134*   K 3.7 3.7 3.8   CL 99 100 99   CO2 25 24 28   BUN 34* 20 17   CREATININE 1.4* 1.1 1.1   GLUCOSE 190* 200* 236*     Hepatic:   Recent Labs     01/09/25  0524 01/10/25  0523   AST 34 29   ALT 37 32   BILITOT 1.6* 1.6*   ALKPHOS 310* 284*     XR CHEST PORTABLE   Final Result   Trace right pleural effusion 
    Acute Heart Failure:  ~compensated   ~ >656>516>834, 6L out with diuresis  ~Plan: continue po torsemide, Monitor I&O's, Daily weights, Pt education  Cardiomyopathy: ESHF  ~Echo:  EF: 15-20%   Core measures for HF:  Pt is on BB: Metoprolol succinate  2. Pt is not on entresto 2/2 hypotension, on low dose ARB  3. Pt is not on Beny:  none for hypotension and noncompliance with f/u  4. Pt is not on SGLT2i:  was on jardiance at one time, stopped by pt     ~Device: none per pt pref  ~Plan: start dig to wean dobutamine, ok to d/c when BP stable  Hypotension:   ~some improvement this am  ~Plan: continue midodrine, wean dobut  CAD:   ~Status: no CP, stable  ~Meds:   Statin none  BB: Metoprolol succinate  ASA  S/p MVR   ~Plan:continue AC       Multiple medical conditions with risk of decompensation.      All pertinent information and plan of care discussed with the cardiologist     All questions and concerns were addressed to the patient. Alternatives to my treatment were discussed. I have discussed the above stated plan with patient and the nurse. The patient verbalized understanding and agreed with the plan.    I appreciate the opportunity of cooperating in the care of this individual.    MICHELLE PATTON - CNP, ACNP, AGPCNP 1/11/2025, 8:07 AM  Heart Failure  [place] [address]  Ph: [phone]                          
1409  Gross per 24 hour   Intake 240 ml   Output --   Net 240 ml         Assessment/Plan:     Acute Heart Failure:  ~compensated   ~ >656>516>834, 6L out with diuresis  ~Plan: po torsemide,  Monitor I&O's, Daily weights, Pt education  Cardiomyopathy: ESHF  ~Echo:  EF: 15-20%   Core measures for HF:  Pt is on BB: Metoprolol succinate- hold parameters in place  2. Pt is not on entresto 2/2 hypotension, on low dose ARB  3. Pt is not on Beny:  none for hypotension and noncompliance with f/u  4. Pt is not on SGLT2i:  was on jardiance at one time, stopped by pt     ~Device: none per pt pref  ~Plan: wean dobutamine, ok to d/c when BP stable  Hypotension:   ~continued  ~Plan:increase midodrine  CAD:   ~Status: no CP, stable  ~Meds:   Statin none  BB: Metoprolol succinate  ASA  S/p MVR   ~Plan:continue AC   6. VT   ~ short run today, give BB      Multiple medical conditions with risk of decompensation.      All pertinent information and plan of care discussed with the cardiologist     All questions and concerns were addressed to the patient. Alternatives to my treatment were discussed. I have discussed the above stated plan with patient and the nurse. The patient verbalized understanding and agreed with the plan.    I appreciate the opportunity of cooperating in the care of this individual.    SANNA ALMEIDA, MICHELLE - CNP, ACNP, AGPCNP 1/12/2025, 8:43 AM  Heart Failure  [facility] [place] [address]  Ph: [phone]                          
190* 200*     Hepatic:   Recent Labs     01/09/25  0524   AST 34   ALT 37   BILITOT 1.6*   ALKPHOS 310*     XR CHEST PORTABLE   Final Result   Trace right pleural effusion with bibasilar scarring versus atelectasis.             Problem List  Principal Problem:    Acute on chronic systolic CHF (congestive heart failure) ([facility])  Active Problems:    Non-compliance    Hypotension    S/P MVR (mitral valve repair)    Type 2 diabetes mellitus with hyperglycemia, without long-term current use of insulin ([facility])    Ischemic cardiomyopathy    S/P CABG (coronary artery bypass graft)    H/O mitral valve replacement with mechanical valve    Tobacco abuse disorder    BENOIT (acute kidney injury) ([facility])  Resolved Problems:    * No resolved hospital problems. *       GYPSY LUNA MD   1/9/2025 9:05 AM      Please note that some part of this chart was generated using Dragon dictation software. Although every effort was made to ensure the accuracy of this automated transcription, some errors in transcription may have occurred inadvertently. If you may need any clarification, please do not hesitate to contact me through EPIC.        
admission    CONSTITUTIONAL:  awake, alert, cooperative, no apparent distress, and appears stated age  EYES:  Lids and lashes normal, pupils equal, round   NECK:  Supple, symmetrical, trachea midline  HEMATOLOGIC/LYMPHATICS:  no pallor, no cervical LAD  LUNGS:  No increased work of breathing,CTAB  CARDIOVASCULAR:  Normal apical impulse, regular rate and rhythm, normal S1 and S2  ABDOMEN:  No scars, normal bowel sounds, soft, non-distended, non-tender  EXTREMITIES:  Warm, dry, edema 2+  NEUROLOGIC:  Awake, alert, oriented to name, place and time.      DATA:    CBC:   Lab Results   Component Value Date/Time    WBC 5.2 01/08/2025 10:00 AM    RBC 4.35 01/08/2025 10:00 AM    HGB 13.0 01/08/2025 10:00 AM    HCT 38.7 01/08/2025 10:00 AM    MCV 89.1 01/08/2025 10:00 AM    MCH 29.8 01/08/2025 10:00 AM    MCHC 33.5 01/08/2025 10:00 AM    RDW 16.3 01/08/2025 10:00 AM     01/08/2025 10:00 AM    MPV 9.0 01/08/2025 10:00 AM     BMP:   Lab Results   Component Value Date/Time     01/08/2025 04:37 AM    K 3.7 01/08/2025 04:37 AM    K 4.9 01/05/2025 01:58 PM    CL 99 01/08/2025 04:37 AM    CO2 25 01/08/2025 04:37 AM    BUN 34 01/08/2025 04:37 AM    CREATININE 1.4 01/08/2025 04:37 AM    CALCIUM 9.2 01/08/2025 04:37 AM    GFRAA >60 01/05/2022 10:46 AM    LABGLOM 57 01/08/2025 04:37 AM    LABGLOM >60 09/14/2023 03:57 PM    GLUCOSE 190 01/08/2025 04:37 AM   Magnesium:    Lab Results   Component Value Date/Time    MG 1.95 01/08/2025 04:37 AM   Phosphorus:    Lab Results   Component Value Date/Time    PHOS 3.7 02/22/2023 04:58 AM           
heart failure) ([facility])  Active Problems:    Non-compliance    Hypotension    S/P MVR (mitral valve repair)    Type 2 diabetes mellitus with hyperglycemia, without long-term current use of insulin ([facility])    Ischemic cardiomyopathy    S/P CABG (coronary artery bypass graft)    H/O mitral valve replacement with mechanical valve    Tobacco abuse disorder    BENOIT (acute kidney injury) ([facility])  Resolved Problems:    * No resolved hospital problems. *       GYPSY LUNA MD   1/7/2025 9:30 AM      Please note that some part of this chart was generated using Dragon dictation software. Although every effort was made to ensure the accuracy of this automated transcription, some errors in transcription may have occurred inadvertently. If you may need any clarification, please do not hesitate to contact me through EPIC.

## 2025-01-14 ENCOUNTER — TELEPHONE (OUTPATIENT)
Dept: CARDIOLOGY CLINIC | Age: 65
End: 2025-01-14

## 2025-01-14 ENCOUNTER — TELEPHONE (OUTPATIENT)
Dept: OTHER | Age: 65
End: 2025-01-14

## 2025-01-14 NOTE — TELEPHONE ENCOUNTER
[facility]  HEART FAILURE PROGRAM  TELEPHONE ENCOUNTER FORM    Kaveh Murphy 1960    Attempted to call patient for HF follow-up. No answer at this time.      Next MD/ Clinic appointment: 1/16 with Magi EDWARD RN 1/14/2025 12:01 PM

## 2025-01-14 NOTE — TELEPHONE ENCOUNTER
Dr. Cash called back wanting to pass along to  stating pt was admitted to [facility] on 1/5 and discharged on 1/13. Pt's phone # [phone]  Dr. Cash spoke with  earlier and did not relay info at time

## 2025-01-16 ENCOUNTER — TELEPHONE (OUTPATIENT)
Dept: OTHER | Age: 65
End: 2025-01-16

## 2025-01-16 ENCOUNTER — APPOINTMENT (OUTPATIENT)
Dept: PHARMACY | Age: 65
End: 2025-01-16
Payer: COMMERCIAL

## 2025-01-17 ENCOUNTER — HOSPITAL ENCOUNTER (OUTPATIENT)
Age: 65
Discharge: HOME | End: 2025-01-17
Payer: COMMERCIAL

## 2025-01-17 ENCOUNTER — OFFICE VISIT (OUTPATIENT)
Dept: CARDIOLOGY CLINIC | Age: 65
End: 2025-01-17

## 2025-01-17 ENCOUNTER — ANTI-COAG VISIT (OUTPATIENT)
Dept: PHARMACY | Age: 65
End: 2025-01-17
Payer: COMMERCIAL

## 2025-01-17 VITALS
WEIGHT: 199 LBS | DIASTOLIC BLOOD PRESSURE: 74 MMHG | BODY MASS INDEX: 26.37 KG/M2 | SYSTOLIC BLOOD PRESSURE: 100 MMHG | HEART RATE: 100 BPM | HEIGHT: 73 IN | OXYGEN SATURATION: 98 %

## 2025-01-17 DIAGNOSIS — I50.22 CHRONIC SYSTOLIC HEART FAILURE (HCC): ICD-10-CM

## 2025-01-17 DIAGNOSIS — I25.10 CORONARY ARTERY DISEASE INVOLVING NATIVE CORONARY ARTERY OF NATIVE HEART WITHOUT ANGINA PECTORIS: ICD-10-CM

## 2025-01-17 DIAGNOSIS — I50.22 CHRONIC SYSTOLIC HEART FAILURE (HCC): Primary | ICD-10-CM

## 2025-01-17 DIAGNOSIS — Z95.2 H/O MITRAL VALVE REPLACEMENT WITH MECHANICAL VALVE: Primary | ICD-10-CM

## 2025-01-17 DIAGNOSIS — Z91.199 NON-COMPLIANCE: ICD-10-CM

## 2025-01-17 DIAGNOSIS — Z95.2 S/P MVR (MITRAL VALVE REPLACEMENT): ICD-10-CM

## 2025-01-17 LAB
ANION GAP SERPL CALCULATED.3IONS-SCNC: 9 MMOL/L (ref 3–16)
BUN SERPL-MCNC: 11 MG/DL (ref 7–20)
CALCIUM SERPL-MCNC: 9.1 MG/DL (ref 8.3–10.6)
CHLORIDE SERPL-SCNC: 103 MMOL/L (ref 99–110)
CO2 SERPL-SCNC: 28 MMOL/L (ref 21–32)
CREAT SERPL-MCNC: 1.1 MG/DL (ref 0.8–1.3)
GFR SERPLBLD CREATININE-BSD FMLA CKD-EPI: 76 ML/MIN/{1.73_M2}
GLUCOSE SERPL-MCNC: 209 MG/DL (ref 70–99)
INTERNATIONAL NORMALIZATION RATIO, POC: 1.5
NT-PROBNP SERPL-MCNC: 2907 PG/ML (ref 0–124)
POTASSIUM SERPL-SCNC: 3.9 MMOL/L (ref 3.5–5.1)
PROTHROMBIN TIME, POC: 0
SODIUM SERPL-SCNC: 140 MMOL/L (ref 136–145)

## 2025-01-17 PROCEDURE — 36415 COLL VENOUS BLD VENIPUNCTURE: CPT

## 2025-01-17 PROCEDURE — 83880 ASSAY OF NATRIURETIC PEPTIDE: CPT

## 2025-01-17 PROCEDURE — 80048 BASIC METABOLIC PNL TOTAL CA: CPT

## 2025-01-17 PROCEDURE — 99211 OFF/OP EST MAY X REQ PHY/QHP: CPT | Performed by: INTERNAL MEDICINE

## 2025-01-17 PROCEDURE — 85610 PROTHROMBIN TIME: CPT | Performed by: INTERNAL MEDICINE

## 2025-01-17 NOTE — PROGRESS NOTES
Other complaints    Clinical Outcomes:  Yes     No  []   [x]       Major bleeding event  []   [x]       Thromboembolic event  Refills warfarin at [facility]   Duration of warfarin Therapy: indefinite   INR Range:  2.5-3.5    Warfarin 5mg tablets, taking at night.     INR is 1.5 today, has not had warfarin in 3 days due to confusion leaving the hospital, he was not told to resume his warfarin.    Take 15mg tonight only then resume previous weekly dose of 12.5 mg on Mon and 10 mg all other days of the week  Encouraged to maintain a consistency of vegetables/salads.   Recheck INR in 1 week, 1/24    Referring Dr. Gigi Cristobal   INR (no units)   Date Value   01/13/2025 2.24 (H)   01/12/2025 2.82 (H)   01/11/2025 4.59 (HH)   01/10/2025 3.19 (H)     For Pharmacy Admin Tracking Only    Intervention Detail: Dose Adjustment: 1, reason: Therapy Optimization  Total # of Interventions Recommended: 1  Total # of Interventions Accepted: 1  Time Spent (min): 15

## 2025-01-17 NOTE — PROGRESS NOTES
[facility]  Progress Note    Primary Care Doctor:  Gigi Cristobal MD    Chief Complaint   Patient presents with    Follow-Up from Hospital    Congestive Heart Failure          History of Present Illness:  64 y.o. male with history of systolic heart failure, CAD status post CABG and MV replacement on coumadin, valvular disease, tricuspid valve repair 12/10/2021, LVEF 15% at surgery time       I had the pleasure of seeing Kaveh Murphy in follow up for systolic heart failure and hospitalization 1/5-13/2025 for acute on chronic combined s/diastolic heart failure, treated with dobutamine infusion, IV lasix, hyponatremia (133) due to hypervolemia and A1c was 13.4 and smoking abuse.  He is ambulatory and with his sister.  His weight has gone from 213->199.  He continues with edema from ankles up to lower thighs.  He states he is taking all his medications.  He denies any increased shortness of breath, chest pain or palpitations.  His biggest complaint is not being able to sleep.  He does take a nap occasionally.  He is not happy that he can not get anything for sleep.  He is not always checking his blood sugars.  He is not interested in palliative/hospice at this time    Phone call 1/16/2025    Past Medical History:   has a past medical history of Diabetes (HCC), Hyperlipidemia, and Hypertension.  Surgical History:   has a past surgical history that includes Coronary artery bypass graft (N/A, 12/10/2021) and Cardiac surgery.   Social History:   reports that he quit smoking about 3 years ago. His smoking use included cigarettes. He started smoking about 18 years ago. He has a 15 pack-year smoking history. He has never used smokeless tobacco. He reports current alcohol use. He reports that he does not use drugs.   Family History:   Family History   Problem Relation Age of Onset    Cancer Mother         colon cancer    Heart Disease Father     High Blood Pressure Father     Diabetes Father     High Cholesterol

## 2025-01-17 NOTE — PATIENT INSTRUCTIONS
Wrap lower legs during the day  Try melatonin 5 mg for sleep   You really need to improve the diabetes management (recommend endocrinologist)  Blood work today  Continue all current medications  List of PCP given  RTO in 3 weeks

## 2025-01-19 RX ORDER — TORSEMIDE 20 MG/1
20 TABLET ORAL 2 TIMES DAILY
Qty: 60 TABLET | Refills: 0 | Status: SHIPPED | OUTPATIENT
Start: 2025-01-19

## 2025-01-20 ENCOUNTER — TELEPHONE (OUTPATIENT)
Dept: CARDIOLOGY CLINIC | Age: 65
End: 2025-01-20

## 2025-01-20 RX ORDER — WARFARIN SODIUM 5 MG/1
TABLET ORAL
Qty: 186 TABLET | Refills: 3 | Status: SHIPPED | OUTPATIENT
Start: 2025-01-20

## 2025-01-20 NOTE — TELEPHONE ENCOUNTER
----- Message from MICHELLE Hassan - CNS sent at 1/19/2025 11:49 AM EST -----  His fluid level is elevated and I want him to increase the torsemide to 20 mg twice a day   I sent the script to his pharmacy  Thanks    Tried to reach patient and his sister Sol SMITH to return our call for the mediation change.    Spoke to patient's sister Sol she verbalized understanding.

## 2025-01-24 ENCOUNTER — ANTI-COAG VISIT (OUTPATIENT)
Dept: PHARMACY | Age: 65
End: 2025-01-24
Payer: COMMERCIAL

## 2025-01-24 DIAGNOSIS — Z95.2 H/O MITRAL VALVE REPLACEMENT WITH MECHANICAL VALVE: Primary | ICD-10-CM

## 2025-01-24 LAB
INTERNATIONAL NORMALIZATION RATIO, POC: 1.6
PROTHROMBIN TIME, POC: 0

## 2025-01-24 PROCEDURE — 99211 OFF/OP EST MAY X REQ PHY/QHP: CPT

## 2025-01-24 PROCEDURE — 85610 PROTHROMBIN TIME: CPT

## 2025-01-24 NOTE — PROGRESS NOTES
Mr. Kaveh Murphy is a 64 y.o. y/o male with history of Heart Valve Replacement who presents today for anticoagulation monitoring and adjustment.    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. Hx pt of CC then dcd for noncompliance. Readmitted 2/19 after quitting taking medications for several months.     Patient Reported Findings:  Yes     No  [x]   []       Patient verifies current dosing regimen as listed- 5mg daily since discharge---> confirmed dose --> did not increase dose as instructed, only one day --> confirmed dose    []   [x]       S/S bleeding/bruising/swelling/SOB- denies   []   [x]       Blood in urine or stool- denies   []   [x]       Procedures scheduled in the future at this time denies   []   [x]       Missed Dose- denies --> did not resume after d/c from hospital, missed at least the last 3 days ---> none  []   [x]       Extra Dose- denies   []   [x]       Change in medications-  Amiodarone was discontinued per wife 2 weeks ago --> started entresto --> stopped midodrine. --> inc entresto ---> extra tylenol recently --> started jardiance --> no changes --> on midodrine and torsemide  []   [x]       Change in health/diet/appetite- doesn't want to eat vit k foods---> had salads 2-3 times in past week and broccoli twice as well --> extra greens --> eating vit k a few times a week, maybe. Pt unclear --> pt believes probably had salads recently and spinach but unsure. No V/D ---> no greens  ---> not many greens --> had more spinach and broccoli --> has had some vit k, does not state how much --> has had some vit k recently --> denies changes   []   [x]       Change in alcohol use- doesn't drink or smoke   []   [x]       Change in activity  []   [x]       Hospital admission- 1/5-1/13/25 for CHF, INR was over 6 while inpatient, so his warfarin was held and therapeutic on d/c, but patient was not told to resume warfarin at d/c so he has not had warfarin in 3 days---> no   []   [x]       Emergency department

## 2025-01-31 ENCOUNTER — ANTI-COAG VISIT (OUTPATIENT)
Dept: PHARMACY | Age: 65
End: 2025-01-31
Payer: COMMERCIAL

## 2025-01-31 DIAGNOSIS — Z95.2 H/O MITRAL VALVE REPLACEMENT WITH MECHANICAL VALVE: Primary | ICD-10-CM

## 2025-01-31 LAB
INTERNATIONAL NORMALIZATION RATIO, POC: 1.9
PROTHROMBIN TIME, POC: 0

## 2025-01-31 PROCEDURE — 99212 OFFICE O/P EST SF 10 MIN: CPT | Performed by: PHYSICIAN ASSISTANT

## 2025-01-31 PROCEDURE — 85610 PROTHROMBIN TIME: CPT | Performed by: PHYSICIAN ASSISTANT

## 2025-01-31 NOTE — PROGRESS NOTES
Mr. Kaveh Murphy is a 64 y.o. y/o male with history of Heart Valve Replacement who presents today for anticoagulation monitoring and adjustment.    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. Hx pt of CC then dcd for noncompliance. Readmitted 2/19 after quitting taking medications for several months.     Patient Reported Findings:  Yes     No  [x]   []       Patient verifies current dosing regimen as listed- 5mg daily since discharge---> confirmed dose --> did not increase dose as instructed, only one day --> confirmed dose    []   [x]       S/S bleeding/bruising/swelling/SOB- denies   []   [x]       Blood in urine or stool- denies   []   [x]       Procedures scheduled in the future at this time denies   []   [x]       Missed Dose- denies --> did not resume after d/c from hospital, missed at least the last 3 days ---> denies   []   [x]       Extra Dose- denies   []   [x]       Change in medications-  Amiodarone was discontinued per wife 2 weeks ago --> started entresto --> stopped midodrine. --> inc entresto ---> extra tylenol recently --> started jardiance --> no changes --> on midodrine and torsemide --> no changes   []   [x]       Change in health/diet/appetite- doesn't want to eat vit k foods---> had salads 2-3 times in past week and broccoli twice as well --> extra greens --> eating vit k a few times a week, maybe. Pt unclear --> pt believes probably had salads recently and spinach but unsure. No V/D ---> no greens  ---> not many greens --> had more spinach and broccoli --> has had some vit k, does not state how much --> has had some vit k recently --> denies changes   []   [x]       Change in alcohol use- doesn't drink or smoke   []   [x]       Change in activity  [x]   []       Hospital admission- 1/5-1/13/25 for CHF, INR was over 6 while inpatient, so his warfarin was held and therapeutic on d/c, but patient was not told to resume warfarin at d/c so he has not had warfarin in 3 days---> In hospital 1/5-1/13 for

## 2025-02-07 ENCOUNTER — OFFICE VISIT (OUTPATIENT)
Dept: CARDIOLOGY CLINIC | Age: 65
End: 2025-02-07

## 2025-02-07 ENCOUNTER — ANTI-COAG VISIT (OUTPATIENT)
Dept: PHARMACY | Age: 65
End: 2025-02-07
Payer: COMMERCIAL

## 2025-02-07 VITALS
DIASTOLIC BLOOD PRESSURE: 56 MMHG | HEIGHT: 73 IN | BODY MASS INDEX: 23.86 KG/M2 | WEIGHT: 180 LBS | HEART RATE: 85 BPM | SYSTOLIC BLOOD PRESSURE: 102 MMHG | OXYGEN SATURATION: 97 %

## 2025-02-07 DIAGNOSIS — I50.22 CHRONIC SYSTOLIC HEART FAILURE (HCC): Primary | ICD-10-CM

## 2025-02-07 DIAGNOSIS — Z95.2 S/P MVR (MITRAL VALVE REPLACEMENT): ICD-10-CM

## 2025-02-07 DIAGNOSIS — Z95.2 H/O MITRAL VALVE REPLACEMENT WITH MECHANICAL VALVE: Primary | ICD-10-CM

## 2025-02-07 DIAGNOSIS — I25.10 CORONARY ARTERY DISEASE INVOLVING NATIVE CORONARY ARTERY OF NATIVE HEART WITHOUT ANGINA PECTORIS: ICD-10-CM

## 2025-02-07 DIAGNOSIS — F17.200 SMOKER: ICD-10-CM

## 2025-02-07 DIAGNOSIS — Z91.199 NON-COMPLIANCE: ICD-10-CM

## 2025-02-07 LAB
INTERNATIONAL NORMALIZATION RATIO, POC: 2.3
PROTHROMBIN TIME, POC: 0

## 2025-02-07 PROCEDURE — 85610 PROTHROMBIN TIME: CPT | Performed by: PHYSICIAN ASSISTANT

## 2025-02-07 PROCEDURE — 99212 OFFICE O/P EST SF 10 MIN: CPT | Performed by: PHYSICIAN ASSISTANT

## 2025-02-07 RX ORDER — TORSEMIDE 20 MG/1
20 TABLET ORAL DAILY
Qty: 60 TABLET | Refills: 0
Start: 2025-02-07

## 2025-02-07 NOTE — PROGRESS NOTES
[facility]  Progress Note    Primary Care Doctor:  Gigi Cristobal MD    Chief Complaint   Patient presents with    Congestive Heart Failure          History of Present Illness:  64 y.o. male with history of systolic heart failure, CAD status post CABG and MV replacement on coumadin, valvular disease, tricuspid valve repair 12/10/2021, LVEF 15% at surgery time  1/5-13/2025 for acute on chronic combined s/diastolic heart failure, treated with dobutamine infusion, IV lasix, hyponatremia (133) due to hypervolemia and A1c was 13.4 and smoking abuse    I had the pleasure of seeing Kaveh Murphy in follow up for systolic heart failure with LVEF of 15-20%.  He is ambulatory and with his sister, Sol.  He is taking all his medications.  His weight has gone from 213-199-180.  He denies any chest pain, palpitations, lightheadedness or increased shortness of breath.  His lower leg edema is much improved.  He continues to wish he never had th MV replacement done.  He is smoking    Past Medical History:   has a past medical history of Diabetes (HCC), Hyperlipidemia, and Hypertension.  Surgical History:   has a past surgical history that includes Coronary artery bypass graft (N/A, 12/10/2021) and Cardiac surgery.   Social History:   reports that he quit smoking about 3 years ago. His smoking use included cigarettes. He started smoking about 18 years ago. He has a 15 pack-year smoking history. He has never used smokeless tobacco. He reports current alcohol use. He reports that he does not use drugs.   Family History:   Family History   Problem Relation Age of Onset    Cancer Mother         colon cancer    Heart Disease Father     High Blood Pressure Father     Diabetes Father     High Cholesterol Father        Home Medications:  Prior to Admission medications    Medication Sig Start Date End Date Taking? Authorizing Provider   torsemide (DEMADEX) 20 MG tablet Take 1 tablet by mouth daily 2/7/25  Yes Magi Zaragoza

## 2025-02-07 NOTE — PROGRESS NOTES
Mr. Kaveh Murphy is a 64 y.o. y/o male with history of Heart Valve Replacement who presents today for anticoagulation monitoring and adjustment.    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. Hx pt of CC then dcd for noncompliance. Readmitted 2/19 after quitting taking medications for several months.     Patient Reported Findings:  Yes     No  [x]   []       Patient verifies current dosing regimen as listed- 5mg daily since discharge---> confirmed dose --> did not increase dose as instructed, only one day --> confirmed dose    []   [x]       S/S bleeding/bruising/swelling/SOB- denies   []   [x]       Blood in urine or stool- denies   []   [x]       Procedures scheduled in the future at this time denies   []   [x]       Missed Dose- denies  []   [x]       Extra Dose- denies   []   [x]       Change in medications-  Amiodarone was discontinued per wife 2 weeks ago --> started entresto --> stopped midodrine. --> inc entresto ---> extra tylenol recently --> started jardiance --> no changes --> on midodrine and torsemide --> no changes   []   [x]       Change in health/diet/appetite- doesn't want to eat vit k foods---> had salads 2-3 times in past week and broccoli twice as well --> extra greens --> eating vit k a few times a week, maybe. Pt unclear --> pt believes probably had salads recently and spinach but unsure. No V/D ---> no greens  ---> not many greens --> had more spinach and broccoli --> has had some vit k, does not state how much --> has had some vit k recently --> denies changes   []   [x]       Change in alcohol use- doesn't drink or smoke   []   [x]       Change in activity  [x]   []       Hospital admission- 1/5-1/13/25 for CHF, INR was over 6 while inpatient, so his warfarin was held and therapeutic on d/c, but patient was not told to resume warfarin at d/c so he has not had warfarin in 3 days---> In hospital 1/5-1/13 for CHF. Was diuresed with lasix. Coreg switched to metoprolol, furosemide to torsemide,

## 2025-02-07 NOTE — PATIENT INSTRUCTIONS
Stop smoking  Blood work when you get INR checked  If weight >3 pounds in a day or 5 in a week call me  Continue all other medications  RTO April

## 2025-02-17 RX ORDER — TORSEMIDE 20 MG/1
20 TABLET ORAL 2 TIMES DAILY
Qty: 60 TABLET | Refills: 0 | Status: SHIPPED | OUTPATIENT
Start: 2025-02-17

## 2025-02-20 ENCOUNTER — TELEPHONE (OUTPATIENT)
Dept: PHARMACY | Age: 65
End: 2025-02-20

## 2025-02-20 NOTE — TELEPHONE ENCOUNTER
Sol contacted clinic stating that patient is unable to make it to appt tomorrow as he is in [place] stuck in snow. R/s appt for 2/28

## 2025-02-21 ENCOUNTER — APPOINTMENT (OUTPATIENT)
Dept: PHARMACY | Age: 65
End: 2025-02-21
Payer: COMMERCIAL

## 2025-02-28 ENCOUNTER — ANTI-COAG VISIT (OUTPATIENT)
Dept: PHARMACY | Age: 65
End: 2025-02-28
Payer: COMMERCIAL

## 2025-02-28 ENCOUNTER — HOSPITAL ENCOUNTER (OUTPATIENT)
Age: 65
Discharge: HOME | End: 2025-02-28
Payer: COMMERCIAL

## 2025-02-28 DIAGNOSIS — Z95.2 H/O MITRAL VALVE REPLACEMENT WITH MECHANICAL VALVE: Primary | ICD-10-CM

## 2025-02-28 DIAGNOSIS — I50.22 CHRONIC SYSTOLIC HEART FAILURE (HCC): ICD-10-CM

## 2025-02-28 LAB
ALBUMIN SERPL-MCNC: 3.8 G/DL (ref 3.4–5)
ALBUMIN/GLOB SERPL: 1.2 {RATIO} (ref 1.1–2.2)
ALP SERPL-CCNC: 390 U/L (ref 40–129)
ALT SERPL-CCNC: 22 U/L (ref 10–40)
ANION GAP SERPL CALCULATED.3IONS-SCNC: 10 MMOL/L (ref 3–16)
AST SERPL-CCNC: 28 U/L (ref 15–37)
BILIRUB SERPL-MCNC: 0.9 MG/DL (ref 0–1)
BUN SERPL-MCNC: 15 MG/DL (ref 7–20)
CALCIUM SERPL-MCNC: 9.5 MG/DL (ref 8.3–10.6)
CHLORIDE SERPL-SCNC: 106 MMOL/L (ref 99–110)
CO2 SERPL-SCNC: 23 MMOL/L (ref 21–32)
CREAT SERPL-MCNC: 1 MG/DL (ref 0.8–1.3)
GFR SERPLBLD CREATININE-BSD FMLA CKD-EPI: 85 ML/MIN/{1.73_M2}
GLUCOSE SERPL-MCNC: 122 MG/DL (ref 70–99)
INTERNATIONAL NORMALIZATION RATIO, POC: 3.7
NT-PROBNP SERPL-MCNC: 1182 PG/ML (ref 0–124)
POTASSIUM SERPL-SCNC: 4.3 MMOL/L (ref 3.5–5.1)
PROT SERPL-MCNC: 6.9 G/DL (ref 6.4–8.2)
PROTHROMBIN TIME, POC: 0
SODIUM SERPL-SCNC: 139 MMOL/L (ref 136–145)

## 2025-02-28 PROCEDURE — 36415 COLL VENOUS BLD VENIPUNCTURE: CPT

## 2025-02-28 PROCEDURE — 85610 PROTHROMBIN TIME: CPT

## 2025-02-28 PROCEDURE — 80053 COMPREHEN METABOLIC PANEL: CPT

## 2025-02-28 PROCEDURE — 99211 OFF/OP EST MAY X REQ PHY/QHP: CPT

## 2025-02-28 PROCEDURE — 83880 ASSAY OF NATRIURETIC PEPTIDE: CPT

## 2025-03-04 ENCOUNTER — TELEPHONE (OUTPATIENT)
Dept: CARDIOLOGY CLINIC | Age: 65
End: 2025-03-04

## 2025-03-04 NOTE — TELEPHONE ENCOUNTER
----- Message from MICHELLE Hassan - CNS sent at 3/3/2025  9:02 AM EST -----  Blood work is improving  Continue all current medications  Make sure he has all of them  thanks

## 2025-03-14 ENCOUNTER — ANTI-COAG VISIT (OUTPATIENT)
Dept: PHARMACY | Age: 65
End: 2025-03-14
Payer: COMMERCIAL

## 2025-03-14 DIAGNOSIS — Z95.2 H/O MITRAL VALVE REPLACEMENT WITH MECHANICAL VALVE: Primary | ICD-10-CM

## 2025-03-14 LAB
INTERNATIONAL NORMALIZATION RATIO, POC: 1.6
PROTHROMBIN TIME, POC: 0

## 2025-03-14 PROCEDURE — 99211 OFF/OP EST MAY X REQ PHY/QHP: CPT

## 2025-03-14 PROCEDURE — 85610 PROTHROMBIN TIME: CPT

## 2025-03-14 NOTE — PROGRESS NOTES
Mr. Kaveh Murphy is a 64 y.o. y/o male with history of Heart Valve Replacement who presents today for anticoagulation monitoring and adjustment.    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. Hx pt of CC then dcd for noncompliance. Readmitted 2/19 after quitting taking medications for several months.     Patient Reported Findings:  Yes     No  [x]   []       Patient verifies current dosing regimen as listed- 5mg daily since discharge---> confirmed dose --> did not increase dose as instructed, only one day --> confirmed dose    []   [x]       S/S bleeding/bruising/swelling/SOB- denies   []   [x]       Blood in urine or stool- denies   []   [x]       Procedures scheduled in the future at this time denies   [x]   []       Missed Dose- Sat   []   [x]       Extra Dose- denies   []   [x]       Change in medications-  Amiodarone was discontinued per wife 2 weeks ago --> started entresto --> stopped midodrine. --> inc entresto ---> extra tylenol recently --> started jardiance --> no changes --> on midodrine and torsemide --> no changes   []   [x]       Change in health/diet/appetite- doesn't want to eat vit k foods---> had salads 2-3 times in past week and broccoli twice as well --> extra greens --> eating vit k a few times a week, maybe. Pt unclear --> pt believes probably had salads recently and spinach but unsure. No V/D ---> no greens  ---> not many greens --> had more spinach and broccoli --> has had some vit k, does not state how much --> has had some vit k recently --> denies changes   []   [x]       Change in alcohol use- doesn't drink or smoke   []   [x]       Change in activity  []   [x]       Hospital admission- 1/5-1/13/25 for CHF, INR was over 6 while inpatient, so his warfarin was held and therapeutic on d/c, but patient was not told to resume warfarin at d/c so he has not had warfarin in 3 days---> In hospital 1/5-1/13 for CHF. Was diuresed with lasix. Coreg switched to metoprolol, furosemide to torsemide,

## 2025-03-28 ENCOUNTER — ANTI-COAG VISIT (OUTPATIENT)
Dept: PHARMACY | Age: 65
End: 2025-03-28
Payer: COMMERCIAL

## 2025-03-28 DIAGNOSIS — Z95.2 H/O MITRAL VALVE REPLACEMENT WITH MECHANICAL VALVE: Primary | ICD-10-CM

## 2025-03-28 LAB
INTERNATIONAL NORMALIZATION RATIO, POC: 2.7
PROTHROMBIN TIME, POC: 0

## 2025-03-28 PROCEDURE — 99211 OFF/OP EST MAY X REQ PHY/QHP: CPT | Performed by: PHYSICIAN ASSISTANT

## 2025-03-28 PROCEDURE — 85610 PROTHROMBIN TIME: CPT | Performed by: PHYSICIAN ASSISTANT

## 2025-03-28 NOTE — PROGRESS NOTES
Mr. Kaveh Murphy is a 64 y.o. y/o male with history of Heart Valve Replacement who presents today for anticoagulation monitoring and adjustment.    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. Hx pt of CC then dcd for noncompliance. Readmitted 2/19 after quitting taking medications for several months.     Patient Reported Findings:  Yes     No  [x]   []       Patient verifies current dosing regimen as listed- 5mg daily since discharge---> confirmed dose --> did not increase dose as instructed, only one day --> confirmed dose    []   [x]       S/S bleeding/bruising/swelling/SOB- denies   []   [x]       Blood in urine or stool- denies   []   [x]       Procedures scheduled in the future at this time denies   []   [x]       Missed Dose- denies   []   [x]       Extra Dose- denies   []   [x]       Change in medications-  Amiodarone was discontinued per wife 2 weeks ago --> started entresto --> stopped midodrine. --> inc entresto ---> extra tylenol recently --> started jardiance --> on midodrine and torsemide --> no changes   [x]   []       Change in health/diet/appetite- doesn't want to eat vit k foods---> had salads 2-3 times in past week and broccoli twice as well --> extra greens --> eating vit k a few times a week, maybe. Pt unclear --> pt believes probably had salads recently and spinach but unsure. No V/D ---> no greens  ---> not many greens --> had more spinach and broccoli --> has had some vit k, does not state how much --> has had some vit k recently --> had more vit k than normal    []   [x]       Change in alcohol use- doesn't drink or smoke   []   [x]       Change in activity  []   [x]       Hospital admission- 1/5-1/13/25 for CHF, INR was over 6 while inpatient, so his warfarin was held and therapeutic on d/c, but patient was not told to resume warfarin at d/c so he has not had warfarin in 3 days---> In hospital 1/5-1/13 for CHF. Was diuresed with lasix. Coreg switched to metoprolol, furosemide to torsemide,

## 2025-03-28 NOTE — TELEPHONE ENCOUNTER
Medication Refill    Medication needing refilled:    torsemide (DEMADEX) 20 MG     metoprolol succinate (TOPROL XL) 25 MG     valsartan (DIOVAN) 40 MG     midodrine (PROAMATINE) 10 MG      digoxin (LANOXIN) 125 MCG     Dosage of the medication:    How are you taking this medication (QD, BID, TID, QID, PRN):    30 or 90 day supply called in: 90 day supply    When will you run out of your medication:    Which Pharmacy are we sending the medication to?: [id number] - [address] - P [phone] - F [phone]

## 2025-03-30 RX ORDER — VALSARTAN 40 MG/1
20 TABLET ORAL DAILY
Qty: 15 TABLET | Refills: 0 | Status: SHIPPED | OUTPATIENT
Start: 2025-03-30

## 2025-03-30 RX ORDER — MIDODRINE HYDROCHLORIDE 10 MG/1
10 TABLET ORAL 3 TIMES DAILY
Qty: 60 TABLET | Refills: 0 | Status: SHIPPED | OUTPATIENT
Start: 2025-03-30

## 2025-03-30 RX ORDER — METOPROLOL SUCCINATE 25 MG/1
12.5 TABLET, EXTENDED RELEASE ORAL DAILY
Qty: 30 TABLET | Refills: 0 | Status: SHIPPED | OUTPATIENT
Start: 2025-03-30

## 2025-03-30 RX ORDER — TORSEMIDE 20 MG/1
20 TABLET ORAL 2 TIMES DAILY
Qty: 60 TABLET | Refills: 0 | Status: SHIPPED | OUTPATIENT
Start: 2025-03-30

## 2025-03-30 RX ORDER — DIGOXIN 125 MCG
125 TABLET ORAL DAILY
Qty: 30 TABLET | Refills: 0 | Status: SHIPPED | OUTPATIENT
Start: 2025-03-30

## 2025-04-25 ENCOUNTER — OFFICE VISIT (OUTPATIENT)
Dept: CARDIOLOGY CLINIC | Age: 65
End: 2025-04-25
Payer: COMMERCIAL

## 2025-04-25 ENCOUNTER — ANTI-COAG VISIT (OUTPATIENT)
Dept: PHARMACY | Age: 65
End: 2025-04-25
Payer: COMMERCIAL

## 2025-04-25 VITALS
HEIGHT: 73 IN | SYSTOLIC BLOOD PRESSURE: 134 MMHG | OXYGEN SATURATION: 97 % | HEART RATE: 83 BPM | DIASTOLIC BLOOD PRESSURE: 70 MMHG | BODY MASS INDEX: 24.12 KG/M2 | WEIGHT: 182 LBS

## 2025-04-25 DIAGNOSIS — I50.22 CHRONIC SYSTOLIC HEART FAILURE (HCC): Primary | ICD-10-CM

## 2025-04-25 DIAGNOSIS — Z95.2 H/O MITRAL VALVE REPLACEMENT WITH MECHANICAL VALVE: Primary | ICD-10-CM

## 2025-04-25 DIAGNOSIS — I25.10 CORONARY ARTERY DISEASE INVOLVING NATIVE CORONARY ARTERY OF NATIVE HEART WITHOUT ANGINA PECTORIS: ICD-10-CM

## 2025-04-25 DIAGNOSIS — F17.200 SMOKER: ICD-10-CM

## 2025-04-25 DIAGNOSIS — Z91.199 NON-COMPLIANCE: ICD-10-CM

## 2025-04-25 DIAGNOSIS — Z95.2 S/P MVR (MITRAL VALVE REPLACEMENT): ICD-10-CM

## 2025-04-25 LAB
INTERNATIONAL NORMALIZATION RATIO, POC: 2.3
PROTHROMBIN TIME, POC: 0

## 2025-04-25 PROCEDURE — 99211 OFF/OP EST MAY X REQ PHY/QHP: CPT | Performed by: PHARMACIST

## 2025-04-25 PROCEDURE — 85610 PROTHROMBIN TIME: CPT | Performed by: PHARMACIST

## 2025-04-25 PROCEDURE — 99214 OFFICE O/P EST MOD 30 MIN: CPT | Performed by: CLINICAL NURSE SPECIALIST

## 2025-04-25 RX ORDER — TORSEMIDE 20 MG/1
20 TABLET ORAL DAILY
Qty: 30 TABLET | Refills: 0
Start: 2025-04-25

## 2025-04-25 RX ORDER — MIDODRINE HYDROCHLORIDE 5 MG/1
5 TABLET ORAL 2 TIMES DAILY
Qty: 60 TABLET | Refills: 0
Start: 2025-04-25

## 2025-04-25 NOTE — PROGRESS NOTES
Mr. Kaveh Murphy is a 64 y.o. y/o male with history of Heart Valve Replacement who presents today for anticoagulation monitoring and adjustment.    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. Hx pt of [facility] then dcd for noncompliance. Readmitted 2/19 after quitting taking medications for several months.     Patient Reported Findings:  Yes     No  [x]   []       Patient verifies current dosing regimen as listed- 5mg daily since discharge---> confirmed dose --> did not increase dose as instructed, only one day --> confirmed dose    []   [x]       S/S bleeding/bruising/swelling/SOB- denies   []   [x]       Blood in urine or stool- denies   []   [x]       Procedures scheduled in the future at this time denies   [x]   []       Missed Dose- denies --> thinks he missed one dose this week  []   [x]       Extra Dose- denies   []   [x]       Change in medications-  Amiodarone was discontinued per wife 2 weeks ago --> started entresto --> stopped midodrine. --> inc entresto ---> extra tylenol recently --> started jardiance --> on midodrine and torsemide --> no changes   []   [x]       Change in health/diet/appetite- doesn't want to eat vit k foods---> had salads 2-3 times in past week and broccoli twice as well --> extra greens --> eating vit k a few times a week, maybe. Pt unclear --> pt believes probably had salads recently and spinach but unsure. No V/D ---> no greens  ---> not many greens --> had more spinach and broccoli --> has had some vit k, does not state how much --> has had some vit k recently --> had more vit k than normal    []   [x]       Change in alcohol use- doesn't drink or smoke   []   [x]       Change in activity  []   [x]       Hospital admission- 1/5-1/13/25 for CHF, INR was over 6 while inpatient, so his warfarin was held and therapeutic on d/c, but patient was not told to resume warfarin at d/c so he has not had warfarin in 3 days---> In hospital 1/5-1/13 for CHF. Was diuresed with lasix. [facility]g switched to

## 2025-04-25 NOTE — PROGRESS NOTES
amount for indicated testing frequency plus additional to accommodate PRN testing needs. 2/21/23  Yes Lenora Simpson, APRN - CNP   insulin glargine (LANTUS) 100 UNIT/ML injection vial Inject 10 Units into the skin nightly  Patient not taking: Reported on 4/25/2025 1/10/25   Dodie Cai MD        Allergies:  Patient has no known allergies.     Review of Systems:   Constitutional: there has been no unanticipated weight loss. There's been no change in energy level, sleep pattern, or activity level.     Eyes: No visual changes or diplopia. No scleral icterus.  ENT: No Headaches, hearing loss or vertigo. No mouth sores or sore throat.  Cardiovascular: Reviewed in HPI  Respiratory: No cough or wheezing, no sputum production. No hematemesis.    Gastrointestinal: No abdominal pain, appetite loss, blood in stools. No change in bowel or bladder habits.  Genitourinary: No dysuria, trouble voiding, or hematuria.  Musculoskeletal:  No gait disturbance, weakness or joint complaints.  Integumentary: No rash or pruritis.  Neurological: No headache, diplopia, change in muscle strength, numbness or tingling. No change in gait, balance, coordination, mood, affect, memory, mentation, behavior.  Psychiatric: No anxiety, no depression.  Endocrine: No malaise, fatigue or temperature intolerance. No excessive thirst, fluid intake, or urination. No tremor.  Hematologic/Lymphatic: No abnormal bruising or bleeding, blood clots or swollen lymph nodes.  Allergic/Immunologic: No nasal congestion or hives.    Physical Examination:    Vitals:    04/25/25 1400   BP: 134/70   BP Site: Right Upper Arm   Patient Position: Sitting   BP Cuff Size: Medium Adult   Pulse: 83   SpO2: 97%   Weight: 82.6 kg (182 lb)   Height: 1.854 m (6' 1\")              Constitutional and General Appearance: Warm and dry, no apparent distress, normal coloration  HEENT:  Normocephalic, atraumatic  Respiratory:  Normal excursion and expansion without use of accessory

## 2025-05-23 ENCOUNTER — ANTI-COAG VISIT (OUTPATIENT)
Dept: PHARMACY | Age: 65
End: 2025-05-23
Payer: COMMERCIAL

## 2025-05-23 DIAGNOSIS — Z95.2 H/O MITRAL VALVE REPLACEMENT WITH MECHANICAL VALVE: Primary | ICD-10-CM

## 2025-05-23 LAB
INTERNATIONAL NORMALIZATION RATIO, POC: 3.3
PROTHROMBIN TIME, POC: 0

## 2025-05-23 PROCEDURE — 85610 PROTHROMBIN TIME: CPT | Performed by: PHYSICIAN ASSISTANT

## 2025-05-23 PROCEDURE — 99211 OFF/OP EST MAY X REQ PHY/QHP: CPT | Performed by: PHYSICIAN ASSISTANT

## 2025-05-23 NOTE — PROGRESS NOTES
Mr. Kaveh Murphy is a 64 y.o. y/o male with history of Heart Valve Replacement who presents today for anticoagulation monitoring and adjustment.    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. Hx pt of CC then dcd for noncompliance. Readmitted 2/19 after quitting taking medications for several months.     Patient Reported Findings:  Yes     No  [x]   []       Patient verifies current dosing regimen as listed- 5mg daily since discharge---> confirmed dose --> did not increase dose as instructed, only one day --> confirmed dose    []   [x]       S/S bleeding/bruising/swelling/SOB- denies   []   [x]       Blood in urine or stool- denies   []   [x]       Procedures scheduled in the future at this time denies   [x]   []       Missed Dose- denies --> thinks he missed one dose this week --> might have missed a dose last week   []   [x]       Extra Dose- denies   []   [x]       Change in medications-  Amiodarone was discontinued per wife 2 weeks ago --> started entresto --> stopped midodrine. --> inc entresto ---> extra tylenol recently --> started jardiance --> on midodrine and torsemide --> no changes   [x]   []       Change in health/diet/appetite- doesn't want to eat vit k foods---> had salads 2-3 times in past week and broccoli twice as well --> extra greens --> eating vit k a few times a week, maybe. Pt unclear --> pt believes probably had salads recently and spinach but unsure. No V/D ---> had more spinach and broccoli --> has had some vit k, does not state how much --> has had some vit k recently --> had more vit k than normal --> might have had more vit k recently    []   [x]       Change in alcohol use- doesn't drink or smoke   []   [x]       Change in activity  []   [x]       Hospital admission- 1/5-1/13/25 for CHF, INR was over 6 while inpatient, so his warfarin was held and therapeutic on d/c, but patient was not told to resume warfarin at d/c so he has not had warfarin in 3 days---> In hospital 1/5-1/13 for CHF.

## 2025-06-10 DIAGNOSIS — Z95.2 H/O MITRAL VALVE REPLACEMENT WITH MECHANICAL VALVE: Primary | ICD-10-CM

## 2025-06-10 PROBLEM — R06.02 SHORTNESS OF BREATH: Status: RESOLVED | Noted: 2025-01-11 | Resolved: 2025-06-10

## 2025-06-10 PROBLEM — R79.1 SUBTHERAPEUTIC INTERNATIONAL NORMALIZED RATIO (INR): Status: RESOLVED | Noted: 2023-02-20 | Resolved: 2025-06-10

## 2025-06-10 PROBLEM — N17.9 AKI (ACUTE KIDNEY INJURY): Status: RESOLVED | Noted: 2025-01-05 | Resolved: 2025-06-10

## 2025-06-13 ENCOUNTER — ANTI-COAG VISIT (OUTPATIENT)
Dept: PHARMACY | Age: 65
End: 2025-06-13
Payer: COMMERCIAL

## 2025-06-13 DIAGNOSIS — Z95.2 H/O MITRAL VALVE REPLACEMENT WITH MECHANICAL VALVE: Primary | ICD-10-CM

## 2025-06-13 LAB
INTERNATIONAL NORMALIZATION RATIO, POC: 3.1
PROTHROMBIN TIME, POC: 0

## 2025-06-13 PROCEDURE — 99211 OFF/OP EST MAY X REQ PHY/QHP: CPT

## 2025-06-13 PROCEDURE — 85610 PROTHROMBIN TIME: CPT

## 2025-06-13 NOTE — PROGRESS NOTES
hospital - for CHF. Was diuresed with lasix. [facility]g switched to metoprolol, furosemide to torsemide, entresto to valsartan, started midodrine and lantus   -- held  - 10 mg  -1/10- 7.5 mg  - held  - 7.5 mg  INR 2.2    []   [x]       Emergency department visit  []   [x]       Other complaints    Clinical Outcomes:  Yes     No  []   [x]       Major bleeding event  []   [x]       Thromboembolic event  Refills warfarin at [facility]   Duration of warfarin Therapy: indefinite   INR Range:  2.5-3.5    Warfarin 5mg tablets, taking at night.     INR is 3.1 today despite reporting that he missed his dose Monday, would prefer to check INR in 2 weeks to assess without missed doses, pt refused, states will be out of town and can't come back for a month   Continue to take 10 mg Sun,  and 12.5 mg all other days of the week   Encouraged to maintain a consistency of vegetables/salads.   Recheck INR in 4 weeks, - refused sooner, refused AVS   Patient consent signed 2025    Referring Dr. Gigi Cristobal   INR (no units)   Date Value   2025 2.24 (H)   2025 2.82 (H)   2025 4.59 (HH)   01/10/2025 3.19 (H)     INR,(POC) (no units)   Date Value   2025 3.3   2025 2.3   2025 2.7   2025 1.6     For Pharmacy Admin Tracking Only    Intervention Detail: Adherence Monitorin  Total # of Interventions Recommended: 1  Total # of Interventions Accepted: 1  Time Spent (min): 15

## 2025-07-11 ENCOUNTER — ANTI-COAG VISIT (OUTPATIENT)
Dept: PHARMACY | Age: 65
End: 2025-07-11
Payer: COMMERCIAL

## 2025-07-11 DIAGNOSIS — Z95.2 H/O MITRAL VALVE REPLACEMENT WITH MECHANICAL VALVE: Primary | ICD-10-CM

## 2025-07-11 LAB
INTERNATIONAL NORMALIZATION RATIO, POC: 4.2
PROTHROMBIN TIME, POC: 0

## 2025-07-11 PROCEDURE — 99211 OFF/OP EST MAY X REQ PHY/QHP: CPT

## 2025-07-11 PROCEDURE — 85610 PROTHROMBIN TIME: CPT

## 2025-07-11 NOTE — PROGRESS NOTES
Mr. Kaveh Murphy is a 64 y.o. y/o male with history of Heart Valve Replacement who presents today for anticoagulation monitoring and adjustment.    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. Hx pt of CC then dcd for noncompliance. Readmitted 2/19 after quitting taking medications for several months.     Patient Reported Findings:  Yes     No  [x]   []       Patient verifies current dosing regimen as listed- 5mg daily since discharge---> confirmed dose --> did not increase dose as instructed, only one day --> confirmed dose, uses AVS---> uses AVS, cannot state dose    []   [x]       S/S bleeding/bruising/swelling/SOB- denies   []   [x]       Blood in urine or stool- denies   []   [x]       Procedures scheduled in the future at this time denies   [x]   []       Missed Dose- denies --> thinks he missed one dose this week --> might have missed a dose last week ---> missed Monday ---> thought he missed one so took it and thinks doubled   [x]   []       Extra Dose- denies ---> thought he missed one so took it and thinks doubled   []   [x]       Change in medications-  Amiodarone was discontinued per wife 2 weeks ago --> started entresto --> stopped midodrine. --> inc entresto ---> extra tylenol recently --> started jardiance --> on midodrine and torsemide --> no changes   [x]   []       Change in health/diet/appetite- doesn't want to eat vit k foods---> had salads 2-3 times in past week and broccoli twice as well --> extra greens --> eating vit k a few times a week, maybe. Pt unclear --> pt believes probably had salads recently and spinach but unsure. No V/D ---> had more spinach and broccoli --> has had some vit k, does not state how much --> has had some vit k recently --> had more vit k than normal --> might have had more vit k recently ---> denies    []   [x]       Change in alcohol use- doesn't drink or smoke   []   [x]       Change in activity  []   [x]       Hospital admission- 1/5-1/13/25 for CHF, INR was over 6

## 2025-07-21 ENCOUNTER — TELEPHONE (OUTPATIENT)
Dept: CARDIOLOGY CLINIC | Age: 65
End: 2025-07-21

## 2025-07-21 RX ORDER — VALSARTAN 40 MG/1
20 TABLET ORAL DAILY
Qty: 45 TABLET | OUTPATIENT
Start: 2025-07-21

## 2025-07-21 RX ORDER — VALSARTAN 40 MG/1
20 TABLET ORAL DAILY
Qty: 15 TABLET | Refills: 0 | Status: SHIPPED | OUTPATIENT
Start: 2025-07-21 | End: 2025-07-25 | Stop reason: SDUPTHER

## 2025-07-21 RX ORDER — DIGOXIN 125 MCG
125 TABLET ORAL DAILY
Qty: 90 TABLET | OUTPATIENT
Start: 2025-07-21

## 2025-07-21 RX ORDER — DIGOXIN 125 MCG
125 TABLET ORAL DAILY
Qty: 30 TABLET | Refills: 0 | Status: SHIPPED | OUTPATIENT
Start: 2025-07-21 | End: 2025-07-25 | Stop reason: SDUPTHER

## 2025-07-21 NOTE — TELEPHONE ENCOUNTER
Pt called left message for refills for the following medications:    digoxin (LANOXIN) 125 MCG tablet     valsartan (DIOVAN) 40 MG tablet       [id number] - [place], [place] - [address] - P [phone] - F [phone]  [address] [address]  Phone: [phone]  Fax: [phone]

## 2025-07-25 ENCOUNTER — OFFICE VISIT (OUTPATIENT)
Dept: CARDIOLOGY CLINIC | Age: 65
End: 2025-07-25

## 2025-07-25 VITALS
DIASTOLIC BLOOD PRESSURE: 78 MMHG | BODY MASS INDEX: 25.31 KG/M2 | SYSTOLIC BLOOD PRESSURE: 120 MMHG | HEART RATE: 73 BPM | OXYGEN SATURATION: 98 % | HEIGHT: 73 IN | WEIGHT: 191 LBS

## 2025-07-25 DIAGNOSIS — I25.10 CORONARY ARTERY DISEASE INVOLVING NATIVE CORONARY ARTERY OF NATIVE HEART WITHOUT ANGINA PECTORIS: ICD-10-CM

## 2025-07-25 DIAGNOSIS — Z91.199 NON-COMPLIANCE: ICD-10-CM

## 2025-07-25 DIAGNOSIS — I50.22 CHRONIC SYSTOLIC HEART FAILURE (HCC): Primary | ICD-10-CM

## 2025-07-25 DIAGNOSIS — F17.200 SMOKER: ICD-10-CM

## 2025-07-25 DIAGNOSIS — Z95.2 S/P MVR (MITRAL VALVE REPLACEMENT): ICD-10-CM

## 2025-07-25 RX ORDER — VALSARTAN 40 MG/1
20 TABLET ORAL DAILY
Qty: 45 TABLET | Refills: 0 | Status: SHIPPED | OUTPATIENT
Start: 2025-07-25

## 2025-07-25 RX ORDER — METOPROLOL SUCCINATE 25 MG/1
12.5 TABLET, EXTENDED RELEASE ORAL DAILY
Qty: 90 TABLET | Refills: 0 | Status: SHIPPED | OUTPATIENT
Start: 2025-07-25

## 2025-07-25 RX ORDER — TORSEMIDE 20 MG/1
20 TABLET ORAL DAILY
Qty: 90 TABLET | Refills: 0 | Status: SHIPPED | OUTPATIENT
Start: 2025-07-25

## 2025-07-25 RX ORDER — DIGOXIN 125 MCG
125 TABLET ORAL DAILY
Qty: 90 TABLET | Refills: 0 | Status: SHIPPED | OUTPATIENT
Start: 2025-07-25

## 2025-07-25 NOTE — PATIENT INSTRUCTIONS
Refills done  Blood work on August 5 th with coumadin test  Continue all current medications  Stop smokint  May consider increasing valsartan after I see blood work  RTO in 4 months

## 2025-07-25 NOTE — PROGRESS NOTES
[facility]  Progress Note    Primary Care Doctor:  Gigi Cristobal MD    Chief Complaint   Patient presents with    Follow-up    Congestive Heart Failure          History of Present Illness:  64 y.o. male with history of systolic heart failure, CAD status post CABG and MV replacement on coumadin, valvular disease, tricuspid valve repair 12/10/2021, LVEF 15% at surgery time  1/5-13/2025 for acute on chronic combined s/diastolic heart failure, treated with dobutamine infusion, IV lasix, hyponatremia (133) due to hypervolemia and A1c was 13.4 and smoking abuse    I had the pleasure of seeing Kaveh Murphy in follow up for systolic heart failure with LVEF of 15-20%.  He is ambulatory and by his self today.    History of Present Illness  The patient presents for systolic heart failure.    He reports feeling unwell, attributing his discomfort to the stress and uncertainty related to his heart condition. He expresses frustration with the financial burden of his medical expenses, recalling an $80,000 bill from a hospital stay in 01/2025. He believes that if he had been on medication, the hospitalization could have been avoided. He is concerned about the potential side effects of his medications, including kidney damage, and is reluctant to continue their use. He is not experiencing chest pain, shortness of breath, or lightheadedness. He is adhering to his medication regimen and regularly checks his Coumadin levels. He is due for his next Coumadin check on 08/05/2025. He is hesitant to add another medication to his regimen. He is also worried about sierra COVID-19 again, as he has had it once before and became seriously ill. He is currently taking valsartan, digoxin, metoprolol, and Coumadin.    He always wants to discontinue his medications due to their potential side effects. He is also concerned about the impact of his condition on his social life, as he feels unable to participate in activities or attend

## 2025-08-05 ENCOUNTER — HOSPITAL ENCOUNTER (OUTPATIENT)
Age: 65
Discharge: HOME | End: 2025-08-05
Payer: COMMERCIAL

## 2025-08-05 ENCOUNTER — ANTI-COAG VISIT (OUTPATIENT)
Dept: PHARMACY | Age: 65
End: 2025-08-05
Payer: COMMERCIAL

## 2025-08-05 DIAGNOSIS — Z95.2 H/O MITRAL VALVE REPLACEMENT WITH MECHANICAL VALVE: Primary | ICD-10-CM

## 2025-08-05 DIAGNOSIS — I50.22 CHRONIC SYSTOLIC HEART FAILURE (HCC): ICD-10-CM

## 2025-08-05 LAB
ANION GAP SERPL CALCULATED.3IONS-SCNC: 9 MMOL/L (ref 3–16)
BUN SERPL-MCNC: 18 MG/DL (ref 7–20)
CALCIUM SERPL-MCNC: 9.3 MG/DL (ref 8.3–10.6)
CHLORIDE SERPL-SCNC: 106 MMOL/L (ref 99–110)
CO2 SERPL-SCNC: 24 MMOL/L (ref 21–32)
CREAT SERPL-MCNC: 1.2 MG/DL (ref 0.8–1.3)
GFR SERPLBLD CREATININE-BSD FMLA CKD-EPI: 68 ML/MIN/{1.73_M2}
GLUCOSE SERPL-MCNC: 183 MG/DL (ref 70–99)
INTERNATIONAL NORMALIZATION RATIO, POC: 5.4
NT-PROBNP SERPL-MCNC: 786 PG/ML (ref 0–124)
POTASSIUM SERPL-SCNC: 5 MMOL/L (ref 3.5–5.1)
PROTHROMBIN TIME, POC: 0
SODIUM SERPL-SCNC: 139 MMOL/L (ref 136–145)

## 2025-08-05 PROCEDURE — 83880 ASSAY OF NATRIURETIC PEPTIDE: CPT

## 2025-08-05 PROCEDURE — 80048 BASIC METABOLIC PNL TOTAL CA: CPT

## 2025-08-05 PROCEDURE — 36415 COLL VENOUS BLD VENIPUNCTURE: CPT

## 2025-08-05 PROCEDURE — 99212 OFFICE O/P EST SF 10 MIN: CPT | Performed by: PHYSICIAN ASSISTANT

## 2025-08-05 PROCEDURE — 85610 PROTHROMBIN TIME: CPT | Performed by: PHYSICIAN ASSISTANT

## 2025-08-06 ENCOUNTER — RESULTS FOLLOW-UP (OUTPATIENT)
Dept: CARDIOLOGY CLINIC | Age: 65
End: 2025-08-06

## 2025-08-06 RX ORDER — VALSARTAN 40 MG/1
40 TABLET ORAL DAILY
Qty: 90 TABLET | Refills: 0 | Status: SHIPPED | OUTPATIENT
Start: 2025-08-06 | End: 2025-08-06

## 2025-08-06 RX ORDER — VALSARTAN 40 MG/1
20 TABLET ORAL DAILY
Qty: 90 TABLET | Refills: 0 | Status: SHIPPED | OUTPATIENT
Start: 2025-08-06

## 2025-08-19 ENCOUNTER — ANTI-COAG VISIT (OUTPATIENT)
Dept: PHARMACY | Age: 65
End: 2025-08-19
Payer: COMMERCIAL

## 2025-08-19 DIAGNOSIS — Z95.2 H/O MITRAL VALVE REPLACEMENT WITH MECHANICAL VALVE: Primary | ICD-10-CM

## 2025-08-19 LAB
INTERNATIONAL NORMALIZATION RATIO, POC: 3.5
PROTHROMBIN TIME, POC: NORMAL

## 2025-08-19 PROCEDURE — 85610 PROTHROMBIN TIME: CPT

## 2025-08-19 PROCEDURE — 99211 OFF/OP EST MAY X REQ PHY/QHP: CPT

## 2025-08-20 RX ORDER — DIGOXIN 125 MCG
125 TABLET ORAL DAILY
Qty: 90 TABLET | Refills: 0 | Status: SHIPPED | OUTPATIENT
Start: 2025-08-20

## 2025-09-02 RX ORDER — VALSARTAN 40 MG/1
20 TABLET ORAL DAILY
Qty: 45 TABLET | Refills: 0 | Status: SHIPPED | OUTPATIENT
Start: 2025-09-02

## (undated) DEVICE — 12 FOOT DISPOSABLE EXTENSION CABLE WITH SAFE CONNECT / SCREW-DOWN

## (undated) DEVICE — TOWEL 26X17IN 2 PER PACK COTTON DELINTED

## (undated) DEVICE — SUTURE PROL SZ 7 0 L24IN NONABSORBABLE BLU BV175 6 L8MM 3 8 EP8735H

## (undated) DEVICE — DRAIN SURG SGL COLL PT TB FOR ATS BG OASIS

## (undated) DEVICE — SYRINGE, LUER LOCK, 30ML: Brand: MEDLINE

## (undated) DEVICE — 20 ML SYRINGE LUER-LOCK TIP: Brand: MONOJECT

## (undated) DEVICE — COR-KNOT® QUICK LOAD® 6-POUCH: Brand: COR-KNOT® QUICK LOAD®

## (undated) DEVICE — DRAIN SURG 24FR BLAK SIL HUBLESS 4 CHANNELED RADPQ EXTN TB

## (undated) DEVICE — SUTURE PROL SZ 7-0 L24IN NONABSORBABLE BLU L8MM BV175-6 3/8 8735H

## (undated) DEVICE — SUTURE NONABSORBABLE MONOFILAMENT 6-0 C-1 1X30 IN PROLENE 8706H

## (undated) DEVICE — DRAPE SURG HRT CUST DISP

## (undated) DEVICE — SUTURE VCRL SZ 3-0 L27IN ABSRB UD L26MM SH 1/2 CIR J416H

## (undated) DEVICE — SUTURE ETHBND EXCEL SZ 2-0 L36IN NONABSORBABLE GRN L26MM SH X523H

## (undated) DEVICE — GOWN SIRUS NONREIN XL W/TWL: Brand: MEDLINE INDUSTRIES, INC.

## (undated) DEVICE — SUTURE ETHBND EXCEL SZ 2-0 L30IN PLEDG 7X3X1.5MM PXX41

## (undated) DEVICE — TOURNIQUET SURG 12 FRX7.5 IN TWO TUBE 1 WIRE SNR SURE-SNARE

## (undated) DEVICE — PRESSURE MONITORING SET: Brand: TRUWAVE, VAMP PLUS

## (undated) DEVICE — SUTURE PROL SZ 8-0 L24IN NONABSORBABLE BLU BV175-6 L8MM 3/8 8741H

## (undated) DEVICE — CONTAINER,SPECIMEN,OR STERILE,4OZ: Brand: MEDLINE

## (undated) DEVICE — EXTENSION SET, 2 INJECTION SITES, MALE LUER LOCK ADAPTER WITH RETRACTABLE COLLAR: Brand: INTERLINK

## (undated) DEVICE — BANDAGE COMPR ELASTIC 5 YDX4 IN LT

## (undated) DEVICE — SURGICAL PROCEDURE PACK PROC SMARTPREP 2

## (undated) DEVICE — SYRINGE MED 5ML STD CLR PLAS LUERLOCK TIP N CTRL DISP

## (undated) DEVICE — CANNULA VES L25IN RADPQ BODY W  1 W VLV 3MM BLNT TIP DLP

## (undated) DEVICE — DRAPE THER FLUID WARMING 66X44 IN FLAT SLUSH DBL DISC ORS

## (undated) DEVICE — CATH CTR VEN 3LUM INTRLNK INJ 9FRX11CM

## (undated) DEVICE — APPLICATOR MEDICATED 3 CC SOLUTION CLR STRL CHLORAPREP

## (undated) DEVICE — LEAD PACE L475MM CHNL A OR V MYOCARDIAL STEROID ELUT SIL

## (undated) DEVICE — SUTURE SZ 7 L18IN NONABSORBABLE SIL CCS L48MM 1/2 CIR STRNM M655G

## (undated) DEVICE — HYPODERMIC SAFETY NEEDLE: Brand: MAGELLAN

## (undated) DEVICE — CLIP INT L ORNG TI TRNSVRS GRV CHEVRON SHP W/ PRECIS TIP TO

## (undated) DEVICE — MERCY FAIRFIELD TURNOVER KIT: Brand: MEDLINE INDUSTRIES, INC.

## (undated) DEVICE — COR-KNOT MINI® COMBO KITBASE PACKAGE TYPE - KITEACH STERILE PACKAGE KIT CONTAINS (2) SINGLE PATIENT USE COR-KNOT MINI® DEVICES AND (12) COR-KNOT® QUICK LOADS®.: Brand: COR-KNOT MINI®

## (undated) DEVICE — SYSTEM ENDOSCP VES HARV W/ TOOL CANN SEAL SHT PRT BLNT TIP

## (undated) DEVICE — DRESSING WND SM W2.5XL4IN BRTH W/ CATH SECUREMENT AND

## (undated) DEVICE — SUTURE NONABSORBABLE MONOFILAMENT 4-0 RB-1 36 IN BLU PROLENE 8557H

## (undated) DEVICE — 3 ML SYRINGE LUER-LOCK TIP: Brand: MONOJECT

## (undated) DEVICE — SUPPORT WRST AD W3.5XL9IN DIA14.5IN ART SFT ADJ HK AND LOOP

## (undated) DEVICE — GAUZE,SPONGE,4"X4",8PLY,STRL,LF,10/TRAY: Brand: MEDLINE

## (undated) DEVICE — APPLICATOR PREP 26ML 0.7% IOD POVACRYLEX 74% ISO ALC ST

## (undated) DEVICE — OPEN HRT BASIC PK

## (undated) DEVICE — SET CATH 20GA L1.75IN RAD ART POLYUR RADPQ W/ INTEGR

## (undated) DEVICE — COR-KNOT® QUICK LOAD® SINGLES: Brand: COR-KNOT® QUICK LOAD®

## (undated) DEVICE — CONTAINER STOR SURG SLUSH

## (undated) DEVICE — SUTURE PERMA-HAND SZ 4-0 L144IN NONABSORBABLE BLK LIGAPAK LA53G

## (undated) DEVICE — NEEDLE HYPO 20GA L1.5IN YEL POLYPR HUB S STL REG BVL STR

## (undated) DEVICE — SUTURE PERMAHAND SZ 2-0 L30IN NONABSORBABLE BLK SILK W/O A305H

## (undated) DEVICE — PACKING GZ W2INXL6FT WVN COT VAG RADPQ

## (undated) DEVICE — OPTIFOAM GENTLE LIQUITRAP, SACRUM, 7"X7": Brand: MEDLINE

## (undated) DEVICE — SYRINGE, LUER LOCK, 10ML: Brand: MEDLINE

## (undated) DEVICE — CONNECTOR PERF W3 8XH3 8XL3 8IN BASE EQL Y SHP W O LUERLOCK

## (undated) DEVICE — Z INACTIVE USE 2735373 APPLICATOR FBR LAIN COT WOOD TIP ECONOMICAL

## (undated) DEVICE — CONNECTOR PERF W0.25XH3/8IN BASE Y SHP REDUC W/O LUERLOCK

## (undated) DEVICE — LABEL MED CUST CVR

## (undated) DEVICE — GLOVE SURG SZ 7 L12IN FNGR THK79MIL GRN LTX FREE

## (undated) DEVICE — EVERGRIP INSERT SET 61MM: Brand: FOGARTY EVERGRIP

## (undated) DEVICE — Device

## (undated) DEVICE — SUTURE NONABSORBABLE BRAIDED 2-0 SH 10X30 IN ETHBND EXCEL 10X82

## (undated) DEVICE — SUTURE VCRL SZ 4-0 L18IN ABSRB UD L19MM PS-2 3/8 CIR PRIM J496H

## (undated) DEVICE — TUBING, SUCTION, 1/4" X 10', STRAIGHT: Brand: MEDLINE

## (undated) DEVICE — COVER,MAYO STAND,XL,STERILE: Brand: MEDLINE

## (undated) DEVICE — SUTURE ETHBND EXCEL SZ 0 L18IN NONABSORBABLE GRN L36MM CT-1 CX21D

## (undated) DEVICE — FAIRFIELD CABG ACCESSARY PK

## (undated) DEVICE — CLIP INT SM WIDE RED TI TRNSVRS GRV CHEVRON SHP W PRECIS

## (undated) DEVICE — GLOVE SURG SZ 75 L12IN FNGR THK94MIL STD WHT LTX FREE

## (undated) DEVICE — NEEDLE HYPO 18GA L1.5IN THN WALL PIVOTING SHLD BVL ORIENTED

## (undated) DEVICE — TTL1LYR 16FR10ML 100%SIL TMPST TR: Brand: MEDLINE

## (undated) DEVICE — 3M™ TEGADERM™ TRANSPARENT FILM DRESSING FRAME STYLE, 1626W, 4 IN X 4-3/4 IN (10 CM X 12 CM), 50/CT 4CT/CASE: Brand: 3M™ TEGADERM™

## (undated) DEVICE — TAPE MED W1/8XL30IN WHT POLY

## (undated) DEVICE — DECANTER FLD 9IN ST BG FOR ASEP TRNSF OF FLD

## (undated) DEVICE — BLANKET WRM CARD FOR MISTRAL AIR WRM SYS

## (undated) DEVICE — TOWEL,OR,DSP,ST,WHITE,DLX,XR,4/PK,20PK/C: Brand: MEDLINE

## (undated) DEVICE — BANDAGE COMPR W6INXL10YD ST M E WHITE/BEIGE

## (undated) DEVICE — ELECTRODE PT RET AD L9FT HI MOIST COND ADH HYDRGEL CORDED

## (undated) DEVICE — [HIGH FLOW INSUFFLATOR,  DO NOT USE IF PACKAGE IS DAMAGED,  KEEP DRY,  KEEP AWAY FROM SUNLIGHT,  PROTECT FROM HEAT AND RADIOACTIVE SOURCES.]: Brand: PNEUMOSURE

## (undated) DEVICE — SUTURE PERMAHAND SZ 3-0 L30IN NONABSORBABLE BLK SH L26MM K832H

## (undated) DEVICE — SPONGE LAP W18XL18IN WHT COT 4 PLY FLD STRUNG RADPQ DISP ST

## (undated) DEVICE — FILTER OXGNTR GAS BACT VIR REMOVAL W/ 1/4 INLET

## (undated) DEVICE — SUTURE PROL SZ 3-0 L36IN NONABSORBABLE BLU L26MM SH 1/2 CIR 8522H

## (undated) DEVICE — INTENDED FOR TISSUE SEPARATION, AND OTHER PROCEDURES THAT REQUIRE A SHARP SURGICAL BLADE TO PUNCTURE OR CUT.: Brand: BARD-PARKER ® STAINLESS STEEL BLADES

## (undated) DEVICE — MONITOR LN W LUER LOK 72

## (undated) DEVICE — SWAN-GANZ CCOMBO V THERMODILUTION CATHETER: Brand: SWAN-GANZ CCOMBO V

## (undated) DEVICE — WAX SURG 2.5GM HEMSTAT BNE BEESWAX PARAFFIN ISO PALMITATE

## (undated) DEVICE — BANDAGE ADH W4XL13 3 4IN POSTOP DSG PRIMAPORE

## (undated) DEVICE — STERNUM BLADE, OFFSET (31.7 X 0.64 X 6.3MM)

## (undated) DEVICE — BOWL MED L 32OZ PLAS W/ MOLD GRAD EZ OPN PEEL PCH

## (undated) DEVICE — GLOVE SURG SZ 85 L12IN FNGR ORTHO 126MIL CRM LTX FREE

## (undated) DEVICE — AORTIC PNCH 4.0MM 8IN BX 10 DISP

## (undated) DEVICE — SUTURE ETHBND SZ 3-0 L30IN NONABSORBABLE GRN SH L26MM 1/2 X562H

## (undated) DEVICE — SUTURE VCRL SZ 2-0 L36IN ABSRB UD L36MM CT-1 1/2 CIR J945H

## (undated) DEVICE — CONNECTOR PERF W3/8XH0.25XL0.25IN BASE UNEQUAL Y SHP W/O

## (undated) DEVICE — DRESSING GERM DIA1IN CNTR H DIA7MM BLU CHG ANTIMIC PROTCT

## (undated) DEVICE — TOWEL,OR,DSP,ST,BLUE,STD,4/PK,20PK/CS: Brand: MEDLINE

## (undated) DEVICE — 3M™ IOBAN™ 2 ANTIMICROBIAL INCISE DRAPE 6650EZ: Brand: IOBAN™ 2

## (undated) DEVICE — CATHETER URETH 18FR L16IN RED RUB INTMIT ROB MOD BARDX

## (undated) DEVICE — PAD THRM M SPL TORSO

## (undated) DEVICE — SUTURE PDS II SZ 0 L27IN ABSRB VLT L36MM CT-1 1/2 CIR Z340H

## (undated) DEVICE — SUTURE PROL SZ 3-0 L36IN NONABSORBABLE BLU L17MM RB-1 1/2 8558H

## (undated) DEVICE — PRESSURE TUBING: Brand: TRUWAVE

## (undated) DEVICE — GLOVE SURG SZ 8 L11.77IN FNGR THK9.8MIL STRW LTX POLYMER

## (undated) DEVICE — DRESSING TRNSPAR W FAB BORD SORBAVIEW ULT 475X425IN

## (undated) DEVICE — GLOVE SURG SZ 7.5 L11.73IN FNGR THK9.8MIL STRW LTX POLYMER